# Patient Record
Sex: MALE | Race: WHITE | NOT HISPANIC OR LATINO | Employment: UNEMPLOYED | URBAN - METROPOLITAN AREA
[De-identification: names, ages, dates, MRNs, and addresses within clinical notes are randomized per-mention and may not be internally consistent; named-entity substitution may affect disease eponyms.]

---

## 2023-04-04 LAB
ALANINE AMINOTRANSFERASE (SGPT) (U/L) IN SER/PLAS: 11 U/L (ref 10–52)
ALBUMIN (G/DL) IN SER/PLAS: 3.4 G/DL (ref 3.4–5)
ALKALINE PHOSPHATASE (U/L) IN SER/PLAS: 138 U/L (ref 33–120)
ANION GAP IN SER/PLAS: 11 MMOL/L (ref 10–20)
ASPARTATE AMINOTRANSFERASE (SGOT) (U/L) IN SER/PLAS: 13 U/L (ref 9–39)
BASOPHILS (10*3/UL) IN BLOOD BY AUTOMATED COUNT: 0.07 X10E9/L (ref 0–0.1)
BASOPHILS/100 LEUKOCYTES IN BLOOD BY AUTOMATED COUNT: 1 % (ref 0–2)
BILIRUBIN TOTAL (MG/DL) IN SER/PLAS: 0.4 MG/DL (ref 0–1.2)
CALCIUM (MG/DL) IN SER/PLAS: 8.7 MG/DL (ref 8.6–10.3)
CARBON DIOXIDE, TOTAL (MMOL/L) IN SER/PLAS: 30 MMOL/L (ref 21–32)
CARCINOEMBRYONIC AG (NG/ML) IN SER/PLAS: 160.4 UG/L
CHLORIDE (MMOL/L) IN SER/PLAS: 98 MMOL/L (ref 98–107)
CREATININE (MG/DL) IN SER/PLAS: 0.69 MG/DL (ref 0.5–1.3)
EOSINOPHILS (10*3/UL) IN BLOOD BY AUTOMATED COUNT: 0.37 X10E9/L (ref 0–0.7)
EOSINOPHILS/100 LEUKOCYTES IN BLOOD BY AUTOMATED COUNT: 5.3 % (ref 0–6)
ERYTHROCYTE DISTRIBUTION WIDTH (RATIO) BY AUTOMATED COUNT: 20.5 % (ref 11.5–14.5)
ERYTHROCYTE MEAN CORPUSCULAR HEMOGLOBIN CONCENTRATION (G/DL) BY AUTOMATED: 30.7 G/DL (ref 32–36)
ERYTHROCYTE MEAN CORPUSCULAR VOLUME (FL) BY AUTOMATED COUNT: 80 FL (ref 80–100)
ERYTHROCYTES (10*6/UL) IN BLOOD BY AUTOMATED COUNT: 4.82 X10E12/L (ref 4.5–5.9)
GFR MALE: >90 ML/MIN/1.73M2
GLUCOSE (MG/DL) IN SER/PLAS: 144 MG/DL (ref 74–99)
HEMATOCRIT (%) IN BLOOD BY AUTOMATED COUNT: 38.4 % (ref 41–52)
HEMOGLOBIN (G/DL) IN BLOOD: 11.8 G/DL (ref 13.5–17.5)
IMMATURE GRANULOCYTES/100 LEUKOCYTES IN BLOOD BY AUTOMATED COUNT: 0.4 % (ref 0–0.9)
LEUKOCYTES (10*3/UL) IN BLOOD BY AUTOMATED COUNT: 7 X10E9/L (ref 4.4–11.3)
LYMPHOCYTES (10*3/UL) IN BLOOD BY AUTOMATED COUNT: 1.65 X10E9/L (ref 1.2–4.8)
LYMPHOCYTES/100 LEUKOCYTES IN BLOOD BY AUTOMATED COUNT: 23.4 % (ref 13–44)
MAGNESIUM (MG/DL) IN SER/PLAS: 1.88 MG/DL (ref 1.6–2.4)
MONOCYTES (10*3/UL) IN BLOOD BY AUTOMATED COUNT: 0.83 X10E9/L (ref 0.1–1)
MONOCYTES/100 LEUKOCYTES IN BLOOD BY AUTOMATED COUNT: 11.8 % (ref 2–10)
NEUTROPHILS (10*3/UL) IN BLOOD BY AUTOMATED COUNT: 4.09 X10E9/L (ref 1.2–7.7)
NEUTROPHILS/100 LEUKOCYTES IN BLOOD BY AUTOMATED COUNT: 58.1 % (ref 40–80)
PLATELETS (10*3/UL) IN BLOOD AUTOMATED COUNT: 369 X10E9/L (ref 150–450)
POTASSIUM (MMOL/L) IN SER/PLAS: 3.7 MMOL/L (ref 3.5–5.3)
PROTEIN TOTAL: 6.3 G/DL (ref 6.4–8.2)
SODIUM (MMOL/L) IN SER/PLAS: 135 MMOL/L (ref 136–145)
UREA NITROGEN (MG/DL) IN SER/PLAS: 7 MG/DL (ref 6–23)

## 2023-04-18 LAB
ALANINE AMINOTRANSFERASE (SGPT) (U/L) IN SER/PLAS: 10 U/L (ref 10–52)
ALBUMIN (G/DL) IN SER/PLAS: 3.4 G/DL (ref 3.4–5)
ALKALINE PHOSPHATASE (U/L) IN SER/PLAS: 125 U/L (ref 33–120)
ANION GAP IN SER/PLAS: 11 MMOL/L (ref 10–20)
ASPARTATE AMINOTRANSFERASE (SGOT) (U/L) IN SER/PLAS: 10 U/L (ref 9–39)
BASOPHILS (10*3/UL) IN BLOOD BY AUTOMATED COUNT: 0.07 X10E9/L (ref 0–0.1)
BASOPHILS/100 LEUKOCYTES IN BLOOD BY AUTOMATED COUNT: 0.8 % (ref 0–2)
BILIRUBIN TOTAL (MG/DL) IN SER/PLAS: 0.3 MG/DL (ref 0–1.2)
CALCIUM (MG/DL) IN SER/PLAS: 8.9 MG/DL (ref 8.6–10.3)
CARBON DIOXIDE, TOTAL (MMOL/L) IN SER/PLAS: 28 MMOL/L (ref 21–32)
CHLORIDE (MMOL/L) IN SER/PLAS: 99 MMOL/L (ref 98–107)
CREATININE (MG/DL) IN SER/PLAS: 0.65 MG/DL (ref 0.5–1.3)
EOSINOPHILS (10*3/UL) IN BLOOD BY AUTOMATED COUNT: 0.25 X10E9/L (ref 0–0.7)
EOSINOPHILS/100 LEUKOCYTES IN BLOOD BY AUTOMATED COUNT: 3 % (ref 0–6)
ERYTHROCYTE DISTRIBUTION WIDTH (RATIO) BY AUTOMATED COUNT: 20 % (ref 11.5–14.5)
ERYTHROCYTE MEAN CORPUSCULAR HEMOGLOBIN CONCENTRATION (G/DL) BY AUTOMATED: 31 G/DL (ref 32–36)
ERYTHROCYTE MEAN CORPUSCULAR VOLUME (FL) BY AUTOMATED COUNT: 80 FL (ref 80–100)
ERYTHROCYTES (10*6/UL) IN BLOOD BY AUTOMATED COUNT: 4.69 X10E12/L (ref 4.5–5.9)
FERRITIN (UG/LL) IN SER/PLAS: 165 UG/L (ref 20–300)
GFR MALE: >90 ML/MIN/1.73M2
GLUCOSE (MG/DL) IN SER/PLAS: 172 MG/DL (ref 74–99)
HEMATOCRIT (%) IN BLOOD BY AUTOMATED COUNT: 37.7 % (ref 41–52)
HEMOGLOBIN (G/DL) IN BLOOD: 11.7 G/DL (ref 13.5–17.5)
IMMATURE GRANULOCYTES/100 LEUKOCYTES IN BLOOD BY AUTOMATED COUNT: 0.5 % (ref 0–0.9)
IRON (UG/DL) IN SER/PLAS: NORMAL UG/DL (ref 35–150)
IRON BINDING CAPACITY (UG/DL) IN SER/PLAS: NORMAL UG/DL (ref 240–445)
IRON SATURATION (%) IN SER/PLAS: NORMAL % (ref 25–45)
LEUKOCYTES (10*3/UL) IN BLOOD BY AUTOMATED COUNT: 8.3 X10E9/L (ref 4.4–11.3)
LYMPHOCYTES (10*3/UL) IN BLOOD BY AUTOMATED COUNT: 1.57 X10E9/L (ref 1.2–4.8)
LYMPHOCYTES/100 LEUKOCYTES IN BLOOD BY AUTOMATED COUNT: 18.9 % (ref 13–44)
MAGNESIUM (MG/DL) IN SER/PLAS: 1.68 MG/DL (ref 1.6–2.4)
MONOCYTES (10*3/UL) IN BLOOD BY AUTOMATED COUNT: 0.69 X10E9/L (ref 0.1–1)
MONOCYTES/100 LEUKOCYTES IN BLOOD BY AUTOMATED COUNT: 8.3 % (ref 2–10)
NEUTROPHILS (10*3/UL) IN BLOOD BY AUTOMATED COUNT: 5.67 X10E9/L (ref 1.2–7.7)
NEUTROPHILS/100 LEUKOCYTES IN BLOOD BY AUTOMATED COUNT: 68.5 % (ref 40–80)
PLATELETS (10*3/UL) IN BLOOD AUTOMATED COUNT: 407 X10E9/L (ref 150–450)
POTASSIUM (MMOL/L) IN SER/PLAS: 3.7 MMOL/L (ref 3.5–5.3)
PROTEIN TOTAL: 6.5 G/DL (ref 6.4–8.2)
SODIUM (MMOL/L) IN SER/PLAS: 134 MMOL/L (ref 136–145)
UREA NITROGEN (MG/DL) IN SER/PLAS: 5 MG/DL (ref 6–23)

## 2023-04-22 LAB
INTRINSIC FACTOR BLOCKING ANTIBODY: NEGATIVE
PARIETAL CELL ANTIBODY: 1.3 UNITS (ref 0–24.9)

## 2023-04-28 LAB — METHYLMALONIC ACID, S: 0.34 UMOL/L (ref 0–0.4)

## 2023-05-02 LAB
ALANINE AMINOTRANSFERASE (SGPT) (U/L) IN SER/PLAS: 13 U/L (ref 10–52)
ALBUMIN (G/DL) IN SER/PLAS: 3.6 G/DL (ref 3.4–5)
ALKALINE PHOSPHATASE (U/L) IN SER/PLAS: 137 U/L (ref 33–120)
ANION GAP IN SER/PLAS: 14 MMOL/L (ref 10–20)
ASPARTATE AMINOTRANSFERASE (SGOT) (U/L) IN SER/PLAS: 13 U/L (ref 9–39)
BASOPHILS (10*3/UL) IN BLOOD BY AUTOMATED COUNT: 0.11 X10E9/L (ref 0–0.1)
BASOPHILS/100 LEUKOCYTES IN BLOOD BY AUTOMATED COUNT: 0.9 % (ref 0–2)
BILIRUBIN TOTAL (MG/DL) IN SER/PLAS: 0.3 MG/DL (ref 0–1.2)
CALCIUM (MG/DL) IN SER/PLAS: 8.9 MG/DL (ref 8.6–10.3)
CARBON DIOXIDE, TOTAL (MMOL/L) IN SER/PLAS: 26 MMOL/L (ref 21–32)
CARCINOEMBRYONIC AG (NG/ML) IN SER/PLAS: 137.1 UG/L
CHLORIDE (MMOL/L) IN SER/PLAS: 99 MMOL/L (ref 98–107)
CREATININE (MG/DL) IN SER/PLAS: 0.62 MG/DL (ref 0.5–1.3)
EOSINOPHILS (10*3/UL) IN BLOOD BY AUTOMATED COUNT: 0.29 X10E9/L (ref 0–0.7)
EOSINOPHILS/100 LEUKOCYTES IN BLOOD BY AUTOMATED COUNT: 2.4 % (ref 0–6)
ERYTHROCYTE DISTRIBUTION WIDTH (RATIO) BY AUTOMATED COUNT: 20.1 % (ref 11.5–14.5)
ERYTHROCYTE MEAN CORPUSCULAR HEMOGLOBIN CONCENTRATION (G/DL) BY AUTOMATED: 30.8 G/DL (ref 32–36)
ERYTHROCYTE MEAN CORPUSCULAR VOLUME (FL) BY AUTOMATED COUNT: 81 FL (ref 80–100)
ERYTHROCYTES (10*6/UL) IN BLOOD BY AUTOMATED COUNT: 4.74 X10E12/L (ref 4.5–5.9)
GFR MALE: >90 ML/MIN/1.73M2
GLUCOSE (MG/DL) IN SER/PLAS: 222 MG/DL (ref 74–99)
HEMATOCRIT (%) IN BLOOD BY AUTOMATED COUNT: 38.3 % (ref 41–52)
HEMOGLOBIN (G/DL) IN BLOOD: 11.8 G/DL (ref 13.5–17.5)
IMMATURE GRANULOCYTES/100 LEUKOCYTES IN BLOOD BY AUTOMATED COUNT: 0.6 % (ref 0–0.9)
LEUKOCYTES (10*3/UL) IN BLOOD BY AUTOMATED COUNT: 12 X10E9/L (ref 4.4–11.3)
LYMPHOCYTES (10*3/UL) IN BLOOD BY AUTOMATED COUNT: 1.59 X10E9/L (ref 1.2–4.8)
LYMPHOCYTES/100 LEUKOCYTES IN BLOOD BY AUTOMATED COUNT: 13.2 % (ref 13–44)
MAGNESIUM (MG/DL) IN SER/PLAS: 1.7 MG/DL (ref 1.6–2.4)
MONOCYTES (10*3/UL) IN BLOOD BY AUTOMATED COUNT: 1.17 X10E9/L (ref 0.1–1)
MONOCYTES/100 LEUKOCYTES IN BLOOD BY AUTOMATED COUNT: 9.7 % (ref 2–10)
NEUTROPHILS (10*3/UL) IN BLOOD BY AUTOMATED COUNT: 8.8 X10E9/L (ref 1.2–7.7)
NEUTROPHILS/100 LEUKOCYTES IN BLOOD BY AUTOMATED COUNT: 73.2 % (ref 40–80)
PLATELETS (10*3/UL) IN BLOOD AUTOMATED COUNT: 465 X10E9/L (ref 150–450)
POTASSIUM (MMOL/L) IN SER/PLAS: 3.6 MMOL/L (ref 3.5–5.3)
PROTEIN TOTAL: 6.7 G/DL (ref 6.4–8.2)
SODIUM (MMOL/L) IN SER/PLAS: 135 MMOL/L (ref 136–145)
UREA NITROGEN (MG/DL) IN SER/PLAS: 4 MG/DL (ref 6–23)

## 2023-05-16 LAB
ALANINE AMINOTRANSFERASE (SGPT) (U/L) IN SER/PLAS: 11 U/L (ref 10–52)
ALBUMIN (G/DL) IN SER/PLAS: 3.4 G/DL (ref 3.4–5)
ALKALINE PHOSPHATASE (U/L) IN SER/PLAS: 126 U/L (ref 33–120)
ANION GAP IN SER/PLAS: 11 MMOL/L (ref 10–20)
ASPARTATE AMINOTRANSFERASE (SGOT) (U/L) IN SER/PLAS: 11 U/L (ref 9–39)
BASOPHILS (10*3/UL) IN BLOOD BY AUTOMATED COUNT: 0.1 X10E9/L (ref 0–0.1)
BASOPHILS/100 LEUKOCYTES IN BLOOD BY AUTOMATED COUNT: 0.9 % (ref 0–2)
BILIRUBIN TOTAL (MG/DL) IN SER/PLAS: 0.3 MG/DL (ref 0–1.2)
CALCIUM (MG/DL) IN SER/PLAS: 8.9 MG/DL (ref 8.6–10.3)
CARBON DIOXIDE, TOTAL (MMOL/L) IN SER/PLAS: 27 MMOL/L (ref 21–32)
CHLORIDE (MMOL/L) IN SER/PLAS: 99 MMOL/L (ref 98–107)
CREATININE (MG/DL) IN SER/PLAS: 0.58 MG/DL (ref 0.5–1.3)
EOSINOPHILS (10*3/UL) IN BLOOD BY AUTOMATED COUNT: 0.3 X10E9/L (ref 0–0.7)
EOSINOPHILS/100 LEUKOCYTES IN BLOOD BY AUTOMATED COUNT: 2.7 % (ref 0–6)
ERYTHROCYTE DISTRIBUTION WIDTH (RATIO) BY AUTOMATED COUNT: 19.9 % (ref 11.5–14.5)
ERYTHROCYTE MEAN CORPUSCULAR HEMOGLOBIN CONCENTRATION (G/DL) BY AUTOMATED: 31.2 G/DL (ref 32–36)
ERYTHROCYTE MEAN CORPUSCULAR VOLUME (FL) BY AUTOMATED COUNT: 81 FL (ref 80–100)
ERYTHROCYTES (10*6/UL) IN BLOOD BY AUTOMATED COUNT: 4.86 X10E12/L (ref 4.5–5.9)
GFR MALE: >90 ML/MIN/1.73M2
GLUCOSE (MG/DL) IN SER/PLAS: 223 MG/DL (ref 74–99)
HEMATOCRIT (%) IN BLOOD BY AUTOMATED COUNT: 39.4 % (ref 41–52)
HEMOGLOBIN (G/DL) IN BLOOD: 12.3 G/DL (ref 13.5–17.5)
IMMATURE GRANULOCYTES/100 LEUKOCYTES IN BLOOD BY AUTOMATED COUNT: 0.8 % (ref 0–0.9)
LEUKOCYTES (10*3/UL) IN BLOOD BY AUTOMATED COUNT: 11.1 X10E9/L (ref 4.4–11.3)
LYMPHOCYTES (10*3/UL) IN BLOOD BY AUTOMATED COUNT: 1.92 X10E9/L (ref 1.2–4.8)
LYMPHOCYTES/100 LEUKOCYTES IN BLOOD BY AUTOMATED COUNT: 17.4 % (ref 13–44)
MAGNESIUM (MG/DL) IN SER/PLAS: 1.54 MG/DL (ref 1.6–2.4)
MONOCYTES (10*3/UL) IN BLOOD BY AUTOMATED COUNT: 0.81 X10E9/L (ref 0.1–1)
MONOCYTES/100 LEUKOCYTES IN BLOOD BY AUTOMATED COUNT: 7.3 % (ref 2–10)
NEUTROPHILS (10*3/UL) IN BLOOD BY AUTOMATED COUNT: 7.84 X10E9/L (ref 1.2–7.7)
NEUTROPHILS/100 LEUKOCYTES IN BLOOD BY AUTOMATED COUNT: 70.9 % (ref 40–80)
PHOSPHATE (MG/DL) IN SER/PLAS: 2.8 MG/DL (ref 2.5–4.9)
PLATELETS (10*3/UL) IN BLOOD AUTOMATED COUNT: 429 X10E9/L (ref 150–450)
POTASSIUM (MMOL/L) IN SER/PLAS: 3.1 MMOL/L (ref 3.5–5.3)
PROTEIN TOTAL: 6.4 G/DL (ref 6.4–8.2)
SODIUM (MMOL/L) IN SER/PLAS: 134 MMOL/L (ref 136–145)
UREA NITROGEN (MG/DL) IN SER/PLAS: 2 MG/DL (ref 6–23)

## 2023-05-23 LAB
MAGNESIUM (MG/DL) IN SER/PLAS: 1.92 MG/DL (ref 1.6–2.4)
POTASSIUM (MMOL/L) IN SER/PLAS: 3.9 MMOL/L (ref 3.5–5.3)

## 2023-05-25 LAB
ACANTHOCYTES PRESENCE IN BLOOD BY LIGHT MICROSCOPY: NORMAL
ALANINE AMINOTRANSFERASE (SGPT) (U/L) IN SER/PLAS: NORMAL U/L (ref 10–52)
ALBUMIN (G/DL) IN SER/PLAS: NORMAL G/DL (ref 3.4–5)
ALKALINE PHOSPHATASE (U/L) IN SER/PLAS: NORMAL U/L (ref 33–120)
ANION GAP IN SER/PLAS: NORMAL MMOL/L (ref 10–20)
ASPARTATE AMINOTRANSFERASE (SGOT) (U/L) IN SER/PLAS: NORMAL U/L (ref 9–39)
AUER RODS PRESENCE IN BLOOD BY LIGHT MICROSCOPY: NORMAL
BASOPHILIC STIPPLING PRESENCE IN BLOOD BY LIGHT MICROSCOPY: NORMAL
BASOPHILS (10*3/UL) IN BLOOD BY AUTOMATED COUNT: NORMAL X10E9/L (ref 0–0.1)
BASOPHILS/100 LEUKOCYTES IN BLOOD BY AUTOMATED COUNT: NORMAL % (ref 0–2)
BILIRUBIN TOTAL (MG/DL) IN SER/PLAS: NORMAL MG/DL (ref 0–1.2)
BITE CELLS: NORMAL
BURR CELLS PRESENCE IN BLOOD BY LIGHT MICROSCOPY: NORMAL
CALCIUM (MG/DL) IN SER/PLAS: NORMAL MG/DL (ref 8.6–10.3)
CARBON DIOXIDE, TOTAL (MMOL/L) IN SER/PLAS: NORMAL MMOL/L (ref 21–32)
CHLORIDE (MMOL/L) IN SER/PLAS: NORMAL MMOL/L (ref 98–107)
CREATININE (MG/DL) IN SER/PLAS: NORMAL MG/DL (ref 0.5–1.3)
DACROCYTES PRESENCE IN BLOOD BY LIGHT MICROSCOPY: NORMAL
DOHLE BODY PRESENCE IN BLOOD BY LIGHT MICROSCOPY: NORMAL
EOSINOPHILS (10*3/UL) IN BLOOD BY AUTOMATED COUNT: NORMAL X10E9/L (ref 0–0.7)
EOSINOPHILS/100 LEUKOCYTES IN BLOOD BY AUTOMATED COUNT: NORMAL % (ref 0–6)
ERYTHROCYTE DISTRIBUTION WIDTH (RATIO) BY AUTOMATED COUNT: NORMAL % (ref 11.5–14.5)
ERYTHROCYTE MEAN CORPUSCULAR HEMOGLOBIN CONCENTRATION (G/DL) BY AUTOMATED: NORMAL G/DL (ref 32–36)
ERYTHROCYTE MEAN CORPUSCULAR VOLUME (FL) BY AUTOMATED COUNT: NORMAL FL (ref 80–100)
ERYTHROCYTES (10*6/UL) IN BLOOD BY AUTOMATED COUNT: NORMAL X10E12/L (ref 4.5–5.9)
FERRITIN (UG/LL) IN SER/PLAS: NORMAL UG/L (ref 20–300)
GFR FEMALE: NORMAL ML/MIN/1.73M2
GFR MALE: NORMAL ML/MIN/1.73M2
GLUCOSE (MG/DL) IN SER/PLAS: NORMAL MG/DL (ref 74–99)
HEMATOCRIT (%) IN BLOOD BY AUTOMATED COUNT: NORMAL % (ref 41–52)
HEMOGLOBIN (G/DL) IN BLOOD: NORMAL G/DL (ref 13.5–17.5)
HEMOGLOBIN CRYSTALS: NORMAL
HOWELL-JOLLY BODIES PRESENCE IN BLOOD BY LIGHT MICROSCOPY: NORMAL
HYPERSEGMENTED NEUTROPHILS IN BLOOD BY LIGHT MICROSCOPY: NORMAL
HYPOCHROMIA (PRESENCE) IN BLOOD BY LIGHT MICROSCOPY: NORMAL
IMMATURE GRANULOCYTES/100 LEUKOCYTES IN BLOOD BY AUTOMATED COUNT: NORMAL % (ref 0–0.9)
INTRACELLULAR BACTERIA: NORMAL
INTRACELLULAR YEAST: NORMAL
IRON (UG/DL) IN SER/PLAS: NORMAL UG/DL (ref 35–150)
IRON BINDING CAPACITY (UG/DL) IN SER/PLAS: NORMAL UG/DL (ref 240–445)
IRON SATURATION (%) IN SER/PLAS: NORMAL % (ref 25–45)
LEUKOCYTES (10*3/UL) IN BLOOD BY AUTOMATED COUNT: NORMAL X10E9/L (ref 4.4–11.3)
LYMPHOCYTES (10*3/UL) IN BLOOD BY AUTOMATED COUNT: NORMAL X10E9/L (ref 1.2–4.8)
LYMPHOCYTES/100 LEUKOCYTES IN BLOOD BY AUTOMATED COUNT: NORMAL % (ref 13–44)
MALARIA (PRESENCE) IN BLOOD BY LIGHT MICROSCOPY: NORMAL
MANUAL DIFFERENTIAL Y/N: NORMAL
MONOCYTES (10*3/UL) IN BLOOD BY AUTOMATED COUNT: NORMAL X10E9/L (ref 0.1–1)
MONOCYTES/100 LEUKOCYTES IN BLOOD BY AUTOMATED COUNT: NORMAL % (ref 2–10)
NEUTROPHILS (10*3/UL) IN BLOOD BY AUTOMATED COUNT: NORMAL X10E9/L (ref 1.2–7.7)
NEUTROPHILS/100 LEUKOCYTES IN BLOOD BY AUTOMATED COUNT: NORMAL % (ref 40–80)
NRBC (PER 100 WBCS) BY AUTOMATED COUNT: NORMAL /100 WBC (ref 0–0)
OVALOCYTES PRESENCE IN BLOOD BY LIGHT MICROSCOPY: NORMAL
PAPPENHEIMER BODIES: NORMAL
PELGEROID NEUTROPHILS: NORMAL
PLATELET CLUMP (PRESENCE) IN BLOOD BY LIGHT MICROSCOPY: NORMAL
PLATELET SATELLITOSIS: NORMAL
PLATELETS (10*3/UL) IN BLOOD AUTOMATED COUNT: NORMAL X10E9/L (ref 150–450)
PLATELETS GIANT PRESENCE IN BLOOD BY LIGHT MICROSCOPY: NORMAL
POLYCHROMASIA IN BLOOD BY LIGHT MICROSCOPY: NORMAL
POTASSIUM (MMOL/L) IN SER/PLAS: NORMAL MMOL/L (ref 3.5–5.3)
PROTEIN TOTAL: NORMAL G/DL (ref 6.4–8.2)
RBC AGGLUTINATION: NORMAL
RBC MORPHOLOGY IN BLOOD: NORMAL
REACTIVE LYMPH: NORMAL
ROULEAUX PRESENCE IN BLOOD BY LIGHT MICROSCOPY: NORMAL
SCHISTOCYTES (PRESENCE) IN BLOOD BY LIGHT MICROSCOPY: NORMAL
SICKLE CELLS (PRESENCE) IN BLOOD BY LIGHT MICROSCOPY: NORMAL
SMUDGE CELLS/100 LEUKOCYTES IN BLOOD BY MANUAL COUNT: NORMAL
SODIUM (MMOL/L) IN SER/PLAS: NORMAL MMOL/L (ref 136–145)
SPHEROCYTES PRESENCE IN BLOOD BY LIGHT MICROSCOPY: NORMAL
STOMATOCYTES IN BLOOD BY LIGHT MICROSCOPY: NORMAL
TARGET CELLS IN BLOOD BY LIGHT MICROSCOPY: NORMAL
TOXIC GRANULES PRESENCE IN BLOOD BY LIGHT MICROSCOPY: NORMAL
UREA NITROGEN (MG/DL) IN SER/PLAS: NORMAL MG/DL (ref 6–23)
VACUOLATED NEUTROPHILS PRESENCE IN BLOOD BY LIGHT MICROSCOPY: NORMAL

## 2023-05-26 LAB — PHOSPHATE (MG/DL) IN SER/PLAS: NORMAL MG/DL (ref 2.5–4.9)

## 2023-05-30 LAB
ALANINE AMINOTRANSFERASE (SGPT) (U/L) IN SER/PLAS: 13 U/L (ref 10–52)
ALBUMIN (G/DL) IN SER/PLAS: 3.6 G/DL (ref 3.4–5)
ALKALINE PHOSPHATASE (U/L) IN SER/PLAS: 129 U/L (ref 33–120)
ANION GAP IN SER/PLAS: 14 MMOL/L (ref 10–20)
ASPARTATE AMINOTRANSFERASE (SGOT) (U/L) IN SER/PLAS: 11 U/L (ref 9–39)
BASOPHILS (10*3/UL) IN BLOOD BY AUTOMATED COUNT: 0.1 X10E9/L (ref 0–0.1)
BASOPHILS/100 LEUKOCYTES IN BLOOD BY AUTOMATED COUNT: 0.8 % (ref 0–2)
BILIRUBIN TOTAL (MG/DL) IN SER/PLAS: 0.4 MG/DL (ref 0–1.2)
CALCIUM (MG/DL) IN SER/PLAS: 9.5 MG/DL (ref 8.6–10.3)
CARBON DIOXIDE, TOTAL (MMOL/L) IN SER/PLAS: 25 MMOL/L (ref 21–32)
CHLORIDE (MMOL/L) IN SER/PLAS: 99 MMOL/L (ref 98–107)
CREATININE (MG/DL) IN SER/PLAS: 0.6 MG/DL (ref 0.5–1.3)
EOSINOPHILS (10*3/UL) IN BLOOD BY AUTOMATED COUNT: 0.29 X10E9/L (ref 0–0.7)
EOSINOPHILS/100 LEUKOCYTES IN BLOOD BY AUTOMATED COUNT: 2.3 % (ref 0–6)
ERYTHROCYTE DISTRIBUTION WIDTH (RATIO) BY AUTOMATED COUNT: 20 % (ref 11.5–14.5)
ERYTHROCYTE MEAN CORPUSCULAR HEMOGLOBIN CONCENTRATION (G/DL) BY AUTOMATED: 31.3 G/DL (ref 32–36)
ERYTHROCYTE MEAN CORPUSCULAR VOLUME (FL) BY AUTOMATED COUNT: 81 FL (ref 80–100)
ERYTHROCYTES (10*6/UL) IN BLOOD BY AUTOMATED COUNT: 4.98 X10E12/L (ref 4.5–5.9)
GAMMA GLUTAMYL TRANSFERASE (U/L) IN SER/PLAS: 59 U/L (ref 5–64)
GFR MALE: >90 ML/MIN/1.73M2
GLUCOSE (MG/DL) IN SER/PLAS: 208 MG/DL (ref 74–99)
HEMATOCRIT (%) IN BLOOD BY AUTOMATED COUNT: 40.3 % (ref 41–52)
HEMOGLOBIN (G/DL) IN BLOOD: 12.6 G/DL (ref 13.5–17.5)
IMMATURE GRANULOCYTES/100 LEUKOCYTES IN BLOOD BY AUTOMATED COUNT: 0.8 % (ref 0–0.9)
LEUKOCYTES (10*3/UL) IN BLOOD BY AUTOMATED COUNT: 12.8 X10E9/L (ref 4.4–11.3)
LYMPHOCYTES (10*3/UL) IN BLOOD BY AUTOMATED COUNT: 1.78 X10E9/L (ref 1.2–4.8)
LYMPHOCYTES/100 LEUKOCYTES IN BLOOD BY AUTOMATED COUNT: 13.9 % (ref 13–44)
MAGNESIUM (MG/DL) IN SER/PLAS: 1.8 MG/DL (ref 1.6–2.4)
MONOCYTES (10*3/UL) IN BLOOD BY AUTOMATED COUNT: 0.88 X10E9/L (ref 0.1–1)
MONOCYTES/100 LEUKOCYTES IN BLOOD BY AUTOMATED COUNT: 6.9 % (ref 2–10)
NEUTROPHILS (10*3/UL) IN BLOOD BY AUTOMATED COUNT: 9.61 X10E9/L (ref 1.2–7.7)
NEUTROPHILS/100 LEUKOCYTES IN BLOOD BY AUTOMATED COUNT: 75.3 % (ref 40–80)
PLATELETS (10*3/UL) IN BLOOD AUTOMATED COUNT: 462 X10E9/L (ref 150–450)
POTASSIUM (MMOL/L) IN SER/PLAS: 3.5 MMOL/L (ref 3.5–5.3)
PROTEIN TOTAL: 7 G/DL (ref 6.4–8.2)
SODIUM (MMOL/L) IN SER/PLAS: 134 MMOL/L (ref 136–145)
UREA NITROGEN (MG/DL) IN SER/PLAS: 3 MG/DL (ref 6–23)

## 2023-06-20 LAB
ALANINE AMINOTRANSFERASE (SGPT) (U/L) IN SER/PLAS: 28 U/L (ref 10–52)
ALBUMIN (G/DL) IN SER/PLAS: 3.4 G/DL (ref 3.4–5)
ALKALINE PHOSPHATASE (U/L) IN SER/PLAS: 160 U/L (ref 33–120)
ANION GAP IN SER/PLAS: 13 MMOL/L (ref 10–20)
ASPARTATE AMINOTRANSFERASE (SGOT) (U/L) IN SER/PLAS: 21 U/L (ref 9–39)
BASOPHILS (10*3/UL) IN BLOOD BY AUTOMATED COUNT: 0.14 X10E9/L (ref 0–0.1)
BASOPHILS/100 LEUKOCYTES IN BLOOD BY AUTOMATED COUNT: 1.1 % (ref 0–2)
BILIRUBIN TOTAL (MG/DL) IN SER/PLAS: 0.5 MG/DL (ref 0–1.2)
CALCIUM (MG/DL) IN SER/PLAS: 9.2 MG/DL (ref 8.6–10.3)
CARBON DIOXIDE, TOTAL (MMOL/L) IN SER/PLAS: 25 MMOL/L (ref 21–32)
CHLORIDE (MMOL/L) IN SER/PLAS: 99 MMOL/L (ref 98–107)
CREATININE (MG/DL) IN SER/PLAS: 0.61 MG/DL (ref 0.5–1.3)
EOSINOPHILS (10*3/UL) IN BLOOD BY AUTOMATED COUNT: 0.36 X10E9/L (ref 0–0.7)
EOSINOPHILS/100 LEUKOCYTES IN BLOOD BY AUTOMATED COUNT: 2.7 % (ref 0–6)
ERYTHROCYTE DISTRIBUTION WIDTH (RATIO) BY AUTOMATED COUNT: 19.6 % (ref 11.5–14.5)
ERYTHROCYTE MEAN CORPUSCULAR HEMOGLOBIN CONCENTRATION (G/DL) BY AUTOMATED: 30.6 G/DL (ref 32–36)
ERYTHROCYTE MEAN CORPUSCULAR VOLUME (FL) BY AUTOMATED COUNT: 80 FL (ref 80–100)
ERYTHROCYTES (10*6/UL) IN BLOOD BY AUTOMATED COUNT: 4.8 X10E12/L (ref 4.5–5.9)
FERRITIN (UG/LL) IN SER/PLAS: 343 UG/L (ref 20–300)
GFR MALE: >90 ML/MIN/1.73M2
GLUCOSE (MG/DL) IN SER/PLAS: 198 MG/DL (ref 74–99)
HEMATOCRIT (%) IN BLOOD BY AUTOMATED COUNT: 38.6 % (ref 41–52)
HEMOGLOBIN (G/DL) IN BLOOD: 11.8 G/DL (ref 13.5–17.5)
IMMATURE GRANULOCYTES/100 LEUKOCYTES IN BLOOD BY AUTOMATED COUNT: 2 % (ref 0–0.9)
IRON (UG/DL) IN SER/PLAS: 18 UG/DL (ref 35–150)
IRON BINDING CAPACITY (UG/DL) IN SER/PLAS: 236 UG/DL (ref 240–445)
IRON SATURATION (%) IN SER/PLAS: 8 % (ref 25–45)
LEUKOCYTES (10*3/UL) IN BLOOD BY AUTOMATED COUNT: 13.3 X10E9/L (ref 4.4–11.3)
LYMPHOCYTES (10*3/UL) IN BLOOD BY AUTOMATED COUNT: 1.76 X10E9/L (ref 1.2–4.8)
LYMPHOCYTES/100 LEUKOCYTES IN BLOOD BY AUTOMATED COUNT: 13.2 % (ref 13–44)
MAGNESIUM (MG/DL) IN SER/PLAS: 1.72 MG/DL (ref 1.6–2.4)
MONOCYTES (10*3/UL) IN BLOOD BY AUTOMATED COUNT: 1.68 X10E9/L (ref 0.1–1)
MONOCYTES/100 LEUKOCYTES IN BLOOD BY AUTOMATED COUNT: 12.6 % (ref 2–10)
NEUTROPHILS (10*3/UL) IN BLOOD BY AUTOMATED COUNT: 9.1 X10E9/L (ref 1.2–7.7)
NEUTROPHILS/100 LEUKOCYTES IN BLOOD BY AUTOMATED COUNT: 68.4 % (ref 40–80)
PLATELETS (10*3/UL) IN BLOOD AUTOMATED COUNT: 644 X10E9/L (ref 150–450)
POTASSIUM (MMOL/L) IN SER/PLAS: 3.8 MMOL/L (ref 3.5–5.3)
PROTEIN TOTAL: 7 G/DL (ref 6.4–8.2)
SODIUM (MMOL/L) IN SER/PLAS: 133 MMOL/L (ref 136–145)
UREA NITROGEN (MG/DL) IN SER/PLAS: 3 MG/DL (ref 6–23)

## 2023-06-21 LAB
COBALAMIN (VITAMIN B12) (PG/ML) IN SER/PLAS: 396 PG/ML (ref 211–911)
FOLATE (NG/ML) IN SER/PLAS: 12.8 NG/ML

## 2023-06-26 LAB
GENETICS TEST NAME-DATA CONVERSION: NORMAL
LAB MOLECULAR CA TECHNICAL NOTES: NORMAL

## 2023-07-03 LAB
ALANINE AMINOTRANSFERASE (SGPT) (U/L) IN SER/PLAS: 11 U/L (ref 10–52)
ALBUMIN (G/DL) IN SER/PLAS: 3.5 G/DL (ref 3.4–5)
ALKALINE PHOSPHATASE (U/L) IN SER/PLAS: 118 U/L (ref 33–120)
ANION GAP IN SER/PLAS: 11 MMOL/L (ref 10–20)
ASPARTATE AMINOTRANSFERASE (SGOT) (U/L) IN SER/PLAS: 10 U/L (ref 9–39)
BASOPHILS (10*3/UL) IN BLOOD BY AUTOMATED COUNT: 0.12 X10E9/L (ref 0–0.1)
BASOPHILS/100 LEUKOCYTES IN BLOOD BY AUTOMATED COUNT: 1 % (ref 0–2)
BILIRUBIN TOTAL (MG/DL) IN SER/PLAS: 0.2 MG/DL (ref 0–1.2)
CALCIUM (MG/DL) IN SER/PLAS: 8.9 MG/DL (ref 8.6–10.3)
CARBON DIOXIDE, TOTAL (MMOL/L) IN SER/PLAS: 26 MMOL/L (ref 21–32)
CHLORIDE (MMOL/L) IN SER/PLAS: 99 MMOL/L (ref 98–107)
CREATININE (MG/DL) IN SER/PLAS: 0.59 MG/DL (ref 0.5–1.3)
EOSINOPHILS (10*3/UL) IN BLOOD BY AUTOMATED COUNT: 0.39 X10E9/L (ref 0–0.7)
EOSINOPHILS/100 LEUKOCYTES IN BLOOD BY AUTOMATED COUNT: 3.3 % (ref 0–6)
ERYTHROCYTE DISTRIBUTION WIDTH (RATIO) BY AUTOMATED COUNT: 19.6 % (ref 11.5–14.5)
ERYTHROCYTE MEAN CORPUSCULAR HEMOGLOBIN CONCENTRATION (G/DL) BY AUTOMATED: 31.2 G/DL (ref 32–36)
ERYTHROCYTE MEAN CORPUSCULAR VOLUME (FL) BY AUTOMATED COUNT: 81 FL (ref 80–100)
ERYTHROCYTES (10*6/UL) IN BLOOD BY AUTOMATED COUNT: 4.73 X10E12/L (ref 4.5–5.9)
GFR MALE: >90 ML/MIN/1.73M2
GLUCOSE (MG/DL) IN SER/PLAS: 201 MG/DL (ref 74–99)
HEMATOCRIT (%) IN BLOOD BY AUTOMATED COUNT: 38.2 % (ref 41–52)
HEMOGLOBIN (G/DL) IN BLOOD: 11.9 G/DL (ref 13.5–17.5)
IMMATURE GRANULOCYTES/100 LEUKOCYTES IN BLOOD BY AUTOMATED COUNT: 0.9 % (ref 0–0.9)
LEUKOCYTES (10*3/UL) IN BLOOD BY AUTOMATED COUNT: 11.7 X10E9/L (ref 4.4–11.3)
LYMPHOCYTES (10*3/UL) IN BLOOD BY AUTOMATED COUNT: 2.01 X10E9/L (ref 1.2–4.8)
LYMPHOCYTES/100 LEUKOCYTES IN BLOOD BY AUTOMATED COUNT: 17.1 % (ref 13–44)
MAGNESIUM (MG/DL) IN SER/PLAS: 1.69 MG/DL (ref 1.6–2.4)
MONOCYTES (10*3/UL) IN BLOOD BY AUTOMATED COUNT: 0.83 X10E9/L (ref 0.1–1)
MONOCYTES/100 LEUKOCYTES IN BLOOD BY AUTOMATED COUNT: 7.1 % (ref 2–10)
NEUTROPHILS (10*3/UL) IN BLOOD BY AUTOMATED COUNT: 8.28 X10E9/L (ref 1.2–7.7)
NEUTROPHILS/100 LEUKOCYTES IN BLOOD BY AUTOMATED COUNT: 70.6 % (ref 40–80)
PLATELETS (10*3/UL) IN BLOOD AUTOMATED COUNT: 550 X10E9/L (ref 150–450)
POTASSIUM (MMOL/L) IN SER/PLAS: 3.2 MMOL/L (ref 3.5–5.3)
PROTEIN TOTAL: 6.7 G/DL (ref 6.4–8.2)
SODIUM (MMOL/L) IN SER/PLAS: 133 MMOL/L (ref 136–145)
UREA NITROGEN (MG/DL) IN SER/PLAS: 4 MG/DL (ref 6–23)

## 2023-07-20 LAB
ALANINE AMINOTRANSFERASE (SGPT) (U/L) IN SER/PLAS: 9 U/L (ref 10–52)
ALBUMIN (G/DL) IN SER/PLAS: 3.5 G/DL (ref 3.4–5)
ALKALINE PHOSPHATASE (U/L) IN SER/PLAS: 128 U/L (ref 33–120)
ANION GAP IN SER/PLAS: 13 MMOL/L (ref 10–20)
ASPARTATE AMINOTRANSFERASE (SGOT) (U/L) IN SER/PLAS: 10 U/L (ref 9–39)
BASOPHILS (10*3/UL) IN BLOOD BY AUTOMATED COUNT: 0.13 X10E9/L (ref 0–0.1)
BASOPHILS/100 LEUKOCYTES IN BLOOD BY AUTOMATED COUNT: 1 % (ref 0–2)
BILIRUBIN TOTAL (MG/DL) IN SER/PLAS: 0.7 MG/DL (ref 0–1.2)
CALCIUM (MG/DL) IN SER/PLAS: 9 MG/DL (ref 8.6–10.3)
CARBON DIOXIDE, TOTAL (MMOL/L) IN SER/PLAS: 25 MMOL/L (ref 21–32)
CHLORIDE (MMOL/L) IN SER/PLAS: 100 MMOL/L (ref 98–107)
COBALAMIN (VITAMIN B12) (PG/ML) IN SER/PLAS: 336 PG/ML (ref 211–911)
CREATININE (MG/DL) IN SER/PLAS: 0.53 MG/DL (ref 0.5–1.3)
EOSINOPHILS (10*3/UL) IN BLOOD BY AUTOMATED COUNT: 0.32 X10E9/L (ref 0–0.7)
EOSINOPHILS/100 LEUKOCYTES IN BLOOD BY AUTOMATED COUNT: 2.6 % (ref 0–6)
ERYTHROCYTE DISTRIBUTION WIDTH (RATIO) BY AUTOMATED COUNT: 20.5 % (ref 11.5–14.5)
ERYTHROCYTE MEAN CORPUSCULAR HEMOGLOBIN CONCENTRATION (G/DL) BY AUTOMATED: 31.4 G/DL (ref 32–36)
ERYTHROCYTE MEAN CORPUSCULAR VOLUME (FL) BY AUTOMATED COUNT: 81 FL (ref 80–100)
ERYTHROCYTES (10*6/UL) IN BLOOD BY AUTOMATED COUNT: 5.13 X10E12/L (ref 4.5–5.9)
GFR MALE: >90 ML/MIN/1.73M2
GLUCOSE (MG/DL) IN SER/PLAS: 188 MG/DL (ref 74–99)
HEMATOCRIT (%) IN BLOOD BY AUTOMATED COUNT: 41.4 % (ref 41–52)
HEMOGLOBIN (G/DL) IN BLOOD: 13 G/DL (ref 13.5–17.5)
IMMATURE GRANULOCYTES/100 LEUKOCYTES IN BLOOD BY AUTOMATED COUNT: 1 % (ref 0–0.9)
LEUKOCYTES (10*3/UL) IN BLOOD BY AUTOMATED COUNT: 12.5 X10E9/L (ref 4.4–11.3)
LYMPHOCYTES (10*3/UL) IN BLOOD BY AUTOMATED COUNT: 1.89 X10E9/L (ref 1.2–4.8)
LYMPHOCYTES/100 LEUKOCYTES IN BLOOD BY AUTOMATED COUNT: 15.1 % (ref 13–44)
MAGNESIUM (MG/DL) IN SER/PLAS: 1.88 MG/DL (ref 1.6–2.4)
MONOCYTES (10*3/UL) IN BLOOD BY AUTOMATED COUNT: 1.34 X10E9/L (ref 0.1–1)
MONOCYTES/100 LEUKOCYTES IN BLOOD BY AUTOMATED COUNT: 10.7 % (ref 2–10)
NEUTROPHILS (10*3/UL) IN BLOOD BY AUTOMATED COUNT: 8.71 X10E9/L (ref 1.2–7.7)
NEUTROPHILS/100 LEUKOCYTES IN BLOOD BY AUTOMATED COUNT: 69.6 % (ref 40–80)
PHOSPHATE (MG/DL) IN SER/PLAS: 2.3 MG/DL (ref 2.5–4.9)
PLATELETS (10*3/UL) IN BLOOD AUTOMATED COUNT: 430 X10E9/L (ref 150–450)
POLYCHROMASIA IN BLOOD BY LIGHT MICROSCOPY: NORMAL
POTASSIUM (MMOL/L) IN SER/PLAS: 3.2 MMOL/L (ref 3.5–5.3)
PROTEIN TOTAL: 6.8 G/DL (ref 6.4–8.2)
RBC MORPHOLOGY IN BLOOD: NORMAL
SODIUM (MMOL/L) IN SER/PLAS: 135 MMOL/L (ref 136–145)
UREA NITROGEN (MG/DL) IN SER/PLAS: 4 MG/DL (ref 6–23)

## 2023-07-24 LAB — METHYLMALONIC ACID, S: 0.17 UMOL/L (ref 0–0.4)

## 2023-08-08 LAB
ALANINE AMINOTRANSFERASE (SGPT) (U/L) IN SER/PLAS: 9 U/L (ref 10–52)
ALBUMIN (G/DL) IN SER/PLAS: 3.4 G/DL (ref 3.4–5)
ALKALINE PHOSPHATASE (U/L) IN SER/PLAS: 124 U/L (ref 33–120)
ANION GAP IN SER/PLAS: 12 MMOL/L (ref 10–20)
ASPARTATE AMINOTRANSFERASE (SGOT) (U/L) IN SER/PLAS: 9 U/L (ref 9–39)
BASOPHILS (10*3/UL) IN BLOOD BY AUTOMATED COUNT: 0.07 X10E9/L (ref 0–0.1)
BASOPHILS/100 LEUKOCYTES IN BLOOD BY AUTOMATED COUNT: 0.7 % (ref 0–2)
BILIRUBIN TOTAL (MG/DL) IN SER/PLAS: 0.4 MG/DL (ref 0–1.2)
CALCIUM (MG/DL) IN SER/PLAS: 9 MG/DL (ref 8.6–10.3)
CARBON DIOXIDE, TOTAL (MMOL/L) IN SER/PLAS: 26 MMOL/L (ref 21–32)
CHLORIDE (MMOL/L) IN SER/PLAS: 99 MMOL/L (ref 98–107)
CREATININE (MG/DL) IN SER/PLAS: 0.48 MG/DL (ref 0.5–1.3)
EOSINOPHILS (10*3/UL) IN BLOOD BY AUTOMATED COUNT: 0.38 X10E9/L (ref 0–0.7)
EOSINOPHILS/100 LEUKOCYTES IN BLOOD BY AUTOMATED COUNT: 3.9 % (ref 0–6)
ERYTHROCYTE DISTRIBUTION WIDTH (RATIO) BY AUTOMATED COUNT: 21 % (ref 11.5–14.5)
ERYTHROCYTE MEAN CORPUSCULAR HEMOGLOBIN CONCENTRATION (G/DL) BY AUTOMATED: 31.3 G/DL (ref 32–36)
ERYTHROCYTE MEAN CORPUSCULAR VOLUME (FL) BY AUTOMATED COUNT: 82 FL (ref 80–100)
ERYTHROCYTES (10*6/UL) IN BLOOD BY AUTOMATED COUNT: 4.93 X10E12/L (ref 4.5–5.9)
FERRITIN (UG/LL) IN SER/PLAS: 524 UG/L (ref 20–300)
GFR MALE: >90 ML/MIN/1.73M2
GLUCOSE (MG/DL) IN SER/PLAS: 203 MG/DL (ref 74–99)
HEMATOCRIT (%) IN BLOOD BY AUTOMATED COUNT: 40.2 % (ref 41–52)
HEMOGLOBIN (G/DL) IN BLOOD: 12.6 G/DL (ref 13.5–17.5)
IMMATURE GRANULOCYTES/100 LEUKOCYTES IN BLOOD BY AUTOMATED COUNT: 0.4 % (ref 0–0.9)
IRON (UG/DL) IN SER/PLAS: 45 UG/DL (ref 35–150)
IRON BINDING CAPACITY (UG/DL) IN SER/PLAS: 233 UG/DL (ref 240–445)
IRON SATURATION (%) IN SER/PLAS: 19 % (ref 25–45)
LEUKOCYTES (10*3/UL) IN BLOOD BY AUTOMATED COUNT: 9.9 X10E9/L (ref 4.4–11.3)
LYMPHOCYTES (10*3/UL) IN BLOOD BY AUTOMATED COUNT: 1.7 X10E9/L (ref 1.2–4.8)
LYMPHOCYTES/100 LEUKOCYTES IN BLOOD BY AUTOMATED COUNT: 17.2 % (ref 13–44)
MAGNESIUM (MG/DL) IN SER/PLAS: 1.74 MG/DL (ref 1.6–2.4)
MONOCYTES (10*3/UL) IN BLOOD BY AUTOMATED COUNT: 0.91 X10E9/L (ref 0.1–1)
MONOCYTES/100 LEUKOCYTES IN BLOOD BY AUTOMATED COUNT: 9.2 % (ref 2–10)
NEUTROPHILS (10*3/UL) IN BLOOD BY AUTOMATED COUNT: 6.77 X10E9/L (ref 1.2–7.7)
NEUTROPHILS/100 LEUKOCYTES IN BLOOD BY AUTOMATED COUNT: 68.6 % (ref 40–80)
PHOSPHATE (MG/DL) IN SER/PLAS: 3.1 MG/DL (ref 2.5–4.9)
PLATELETS (10*3/UL) IN BLOOD AUTOMATED COUNT: 452 X10E9/L (ref 150–450)
POLYCHROMASIA IN BLOOD BY LIGHT MICROSCOPY: NORMAL
POTASSIUM (MMOL/L) IN SER/PLAS: 3.2 MMOL/L (ref 3.5–5.3)
PROTEIN TOTAL: 6.6 G/DL (ref 6.4–8.2)
RBC MORPHOLOGY IN BLOOD: NORMAL
SCHISTOCYTES (PRESENCE) IN BLOOD BY LIGHT MICROSCOPY: NORMAL
SODIUM (MMOL/L) IN SER/PLAS: 134 MMOL/L (ref 136–145)
UREA NITROGEN (MG/DL) IN SER/PLAS: 2 MG/DL (ref 6–23)

## 2023-08-29 LAB
ALANINE AMINOTRANSFERASE (SGPT) (U/L) IN SER/PLAS: 11 U/L (ref 10–52)
ALBUMIN (G/DL) IN SER/PLAS: 3.5 G/DL (ref 3.4–5)
ALKALINE PHOSPHATASE (U/L) IN SER/PLAS: 126 U/L (ref 33–120)
ANION GAP IN SER/PLAS: 13 MMOL/L (ref 10–20)
APPEARANCE, URINE: CLEAR
ASPARTATE AMINOTRANSFERASE (SGOT) (U/L) IN SER/PLAS: 10 U/L (ref 9–39)
BASOPHILS (10*3/UL) IN BLOOD BY AUTOMATED COUNT: 0.12 X10E9/L (ref 0–0.1)
BASOPHILS/100 LEUKOCYTES IN BLOOD BY AUTOMATED COUNT: 1.1 % (ref 0–2)
BILIRUBIN TOTAL (MG/DL) IN SER/PLAS: 0.3 MG/DL (ref 0–1.2)
BILIRUBIN, URINE: NEGATIVE
BLOOD, URINE: NEGATIVE
CALCIUM (MG/DL) IN SER/PLAS: 9.2 MG/DL (ref 8.6–10.3)
CARBON DIOXIDE, TOTAL (MMOL/L) IN SER/PLAS: 26 MMOL/L (ref 21–32)
CARCINOEMBRYONIC AG (NG/ML) IN SER/PLAS: 383.3 UG/L
CHLORIDE (MMOL/L) IN SER/PLAS: 101 MMOL/L (ref 98–107)
COLOR, URINE: ABNORMAL
CREATININE (MG/DL) IN SER/PLAS: 0.54 MG/DL (ref 0.5–1.3)
EOSINOPHILS (10*3/UL) IN BLOOD BY AUTOMATED COUNT: 0.46 X10E9/L (ref 0–0.7)
EOSINOPHILS/100 LEUKOCYTES IN BLOOD BY AUTOMATED COUNT: 4.1 % (ref 0–6)
ERYTHROCYTE DISTRIBUTION WIDTH (RATIO) BY AUTOMATED COUNT: 20.5 % (ref 11.5–14.5)
ERYTHROCYTE MEAN CORPUSCULAR HEMOGLOBIN CONCENTRATION (G/DL) BY AUTOMATED: 31.6 G/DL (ref 32–36)
ERYTHROCYTE MEAN CORPUSCULAR VOLUME (FL) BY AUTOMATED COUNT: 83 FL (ref 80–100)
ERYTHROCYTES (10*6/UL) IN BLOOD BY AUTOMATED COUNT: 4.96 X10E12/L (ref 4.5–5.9)
GFR MALE: >90 ML/MIN/1.73M2
GLUCOSE (MG/DL) IN SER/PLAS: 174 MG/DL (ref 74–99)
GLUCOSE, URINE: ABNORMAL MG/DL
HEMATOCRIT (%) IN BLOOD BY AUTOMATED COUNT: 41.1 % (ref 41–52)
HEMOGLOBIN (G/DL) IN BLOOD: 13 G/DL (ref 13.5–17.5)
IMMATURE GRANULOCYTES/100 LEUKOCYTES IN BLOOD BY AUTOMATED COUNT: 1 % (ref 0–0.9)
KETONES, URINE: NEGATIVE MG/DL
LEUKOCYTE ESTERASE, URINE: NEGATIVE
LEUKOCYTES (10*3/UL) IN BLOOD BY AUTOMATED COUNT: 11.3 X10E9/L (ref 4.4–11.3)
LYMPHOCYTES (10*3/UL) IN BLOOD BY AUTOMATED COUNT: 2.08 X10E9/L (ref 1.2–4.8)
LYMPHOCYTES/100 LEUKOCYTES IN BLOOD BY AUTOMATED COUNT: 18.3 % (ref 13–44)
MAGNESIUM (MG/DL) IN SER/PLAS: 1.85 MG/DL (ref 1.6–2.4)
MONOCYTES (10*3/UL) IN BLOOD BY AUTOMATED COUNT: 1.06 X10E9/L (ref 0.1–1)
MONOCYTES/100 LEUKOCYTES IN BLOOD BY AUTOMATED COUNT: 9.3 % (ref 2–10)
NEUTROPHILS (10*3/UL) IN BLOOD BY AUTOMATED COUNT: 7.51 X10E9/L (ref 1.2–7.7)
NEUTROPHILS/100 LEUKOCYTES IN BLOOD BY AUTOMATED COUNT: 66.2 % (ref 40–80)
NITRITE, URINE: NEGATIVE
PH, URINE: 6 (ref 5–8)
PLATELETS (10*3/UL) IN BLOOD AUTOMATED COUNT: 582 X10E9/L (ref 150–450)
PLATELETS GIANT PRESENCE IN BLOOD BY LIGHT MICROSCOPY: NORMAL
POLYCHROMASIA IN BLOOD BY LIGHT MICROSCOPY: NORMAL
POTASSIUM (MMOL/L) IN SER/PLAS: 3.9 MMOL/L (ref 3.5–5.3)
PROTEIN TOTAL: 6.8 G/DL (ref 6.4–8.2)
PROTEIN, URINE: NEGATIVE MG/DL
RBC MORPHOLOGY IN BLOOD: NORMAL
SCHISTOCYTES (PRESENCE) IN BLOOD BY LIGHT MICROSCOPY: NORMAL
SODIUM (MMOL/L) IN SER/PLAS: 136 MMOL/L (ref 136–145)
SPECIFIC GRAVITY, URINE: 1.01 (ref 1–1.03)
UREA NITROGEN (MG/DL) IN SER/PLAS: 4 MG/DL (ref 6–23)
UROBILINOGEN, URINE: <2 MG/DL (ref 0–1.9)

## 2023-08-30 LAB — URINE CULTURE: ABNORMAL

## 2023-08-31 PROBLEM — I60.9 SUBARACHNOID HEMORRHAGE (MULTI): Status: ACTIVE | Noted: 2023-08-31

## 2023-08-31 PROBLEM — I26.99 PULMONARY EMBOLISM (MULTI): Status: ACTIVE | Noted: 2023-08-31

## 2023-08-31 PROBLEM — Z93.2 ILEOSTOMY PRESENT (MULTI): Status: ACTIVE | Noted: 2023-08-31

## 2023-08-31 PROBLEM — R06.82 TACHYPNEA: Status: ACTIVE | Noted: 2023-08-31

## 2023-08-31 PROBLEM — Z15.09 MONOALLELIC MUTATION OF CHEK2 GENE IN FEMALE PATIENT: Status: ACTIVE | Noted: 2023-08-31

## 2023-08-31 PROBLEM — C18.2: Status: ACTIVE | Noted: 2023-08-31

## 2023-08-31 PROBLEM — I10 HYPERTENSION: Status: ACTIVE | Noted: 2023-08-31

## 2023-08-31 PROBLEM — Z15.01 MONOALLELIC MUTATION OF CHEK2 GENE IN FEMALE PATIENT: Status: ACTIVE | Noted: 2023-08-31

## 2023-08-31 PROBLEM — R10.9 RIGHT SIDED ABDOMINAL PAIN: Status: ACTIVE | Noted: 2023-08-31

## 2023-08-31 PROBLEM — F43.21 GRIEF: Status: ACTIVE | Noted: 2023-08-31

## 2023-08-31 PROBLEM — C44.92 SCC (SQUAMOUS CELL CARCINOMA): Status: ACTIVE | Noted: 2023-08-31

## 2023-08-31 PROBLEM — D72.829 LEUCOCYTOSIS: Status: ACTIVE | Noted: 2023-08-31

## 2023-08-31 PROBLEM — I21.4 NON-ST ELEVATION MYOCARDIAL INFARCTION (NSTEMI) (MULTI): Status: ACTIVE | Noted: 2023-08-31

## 2023-08-31 PROBLEM — C20 RECTAL CANCER METASTASIZED TO LUNG (MULTI): Status: ACTIVE | Noted: 2023-08-31

## 2023-08-31 PROBLEM — Z85.038 HISTORY OF MALIGNANT NEOPLASM OF COLON: Status: ACTIVE | Noted: 2023-08-31

## 2023-08-31 PROBLEM — J18.9 POSTOBSTRUCTIVE PNEUMONIA: Status: ACTIVE | Noted: 2023-08-31

## 2023-08-31 PROBLEM — Z15.02 MONOALLELIC MUTATION OF CHEK2 GENE IN FEMALE PATIENT: Status: ACTIVE | Noted: 2023-08-31

## 2023-08-31 PROBLEM — E27.8 ADRENAL MASS (MULTI): Status: ACTIVE | Noted: 2023-08-31

## 2023-08-31 PROBLEM — R59.0 HILAR LYMPHADENOPATHY: Status: ACTIVE | Noted: 2023-08-31

## 2023-08-31 PROBLEM — R09.1 PLEURISY: Status: ACTIVE | Noted: 2023-08-31

## 2023-08-31 PROBLEM — G62.0 DRUG-INDUCED POLYNEUROPATHY (MULTI): Status: ACTIVE | Noted: 2023-08-31

## 2023-08-31 PROBLEM — C77.2: Status: ACTIVE | Noted: 2023-08-31

## 2023-08-31 PROBLEM — I87.8 VENOUS STASIS: Status: ACTIVE | Noted: 2023-08-31

## 2023-08-31 PROBLEM — D64.9 ANEMIA: Status: ACTIVE | Noted: 2023-08-31

## 2023-08-31 PROBLEM — M54.2 CERVICALGIA: Status: ACTIVE | Noted: 2023-08-31

## 2023-08-31 PROBLEM — C20 RECTAL CANCER (MULTI): Status: ACTIVE | Noted: 2023-08-31

## 2023-08-31 PROBLEM — Z86.711 HISTORY OF PULMONARY EMBOLISM: Status: ACTIVE | Noted: 2023-08-31

## 2023-08-31 PROBLEM — R00.0 TACHYCARDIA: Status: ACTIVE | Noted: 2023-08-31

## 2023-08-31 PROBLEM — Z15.89 MONOALLELIC MUTATION OF CHEK2 GENE IN FEMALE PATIENT: Status: ACTIVE | Noted: 2023-08-31

## 2023-08-31 PROBLEM — C78.00 RECTAL CANCER METASTASIZED TO LUNG (MULTI): Status: ACTIVE | Noted: 2023-08-31

## 2023-08-31 PROBLEM — I82.402 LEFT LEG DVT (MULTI): Status: ACTIVE | Noted: 2023-08-31

## 2023-08-31 PROBLEM — R79.89 OTHER SPECIFIED ABNORMAL FINDINGS OF BLOOD CHEMISTRY: Status: ACTIVE | Noted: 2023-08-31

## 2023-08-31 PROBLEM — K31.9 DISORDER OF FUNCTION OF STOMACH: Status: ACTIVE | Noted: 2023-08-31

## 2023-08-31 PROBLEM — D17.9 LIPOMA: Status: ACTIVE | Noted: 2023-08-31

## 2023-08-31 PROBLEM — I60.9 SUBARACHNOID BLEED (MULTI): Status: ACTIVE | Noted: 2023-08-31

## 2023-08-31 RX ORDER — BUPRENORPHINE HYDROCHLORIDE AND NALOXONE HYDROCHLORIDE DIHYDRATE 8; 2 MG/1; MG/1
0.5 TABLET SUBLINGUAL NIGHTLY
COMMUNITY
Start: 2023-08-22 | End: 2023-11-07 | Stop reason: ALTCHOICE

## 2023-08-31 RX ORDER — DEXLANSOPRAZOLE 60 MG/1
60 CAPSULE, DELAYED RELEASE ORAL DAILY
COMMUNITY
Start: 2011-08-12 | End: 2023-10-24 | Stop reason: SDUPTHER

## 2023-08-31 RX ORDER — BUPRENORPHINE HYDROCHLORIDE AND NALOXONE HYDROCHLORIDE DIHYDRATE 8; 2 MG/1; MG/1
1 TABLET SUBLINGUAL 2 TIMES DAILY
COMMUNITY
Start: 2023-08-22 | End: 2024-01-09

## 2023-08-31 RX ORDER — NALOXONE HYDROCHLORIDE 4 MG/.1ML
4 SPRAY NASAL SEE ADMIN INSTRUCTIONS
Status: ON HOLD | COMMUNITY
Start: 2023-05-09 | End: 2024-01-01 | Stop reason: ALTCHOICE

## 2023-08-31 RX ORDER — BUPRENORPHINE HYDROCHLORIDE AND NALOXONE HYDROCHLORIDE 5.7; 1.4 MG/1; MG/1
TABLET, ORALLY DISINTEGRATING SUBLINGUAL
COMMUNITY
Start: 2015-09-04 | End: 2023-10-10 | Stop reason: ALTCHOICE

## 2023-08-31 RX ORDER — GABAPENTIN 400 MG/1
400 CAPSULE ORAL
COMMUNITY
Start: 2018-10-04 | End: 2023-10-10 | Stop reason: ALTCHOICE

## 2023-08-31 RX ORDER — IBUPROFEN 800 MG/1
800 TABLET ORAL
Status: ON HOLD | COMMUNITY
End: 2024-01-01 | Stop reason: ALTCHOICE

## 2023-08-31 RX ORDER — BENZONATATE 100 MG/1
100 CAPSULE ORAL 3 TIMES DAILY PRN
COMMUNITY
End: 2023-10-10 | Stop reason: ALTCHOICE

## 2023-09-12 LAB
ALANINE AMINOTRANSFERASE (SGPT) (U/L) IN SER/PLAS: 9 U/L (ref 10–52)
ALBUMIN (G/DL) IN SER/PLAS: 3.4 G/DL (ref 3.4–5)
ALKALINE PHOSPHATASE (U/L) IN SER/PLAS: 117 U/L (ref 33–120)
ANION GAP IN SER/PLAS: 13 MMOL/L (ref 10–20)
ASPARTATE AMINOTRANSFERASE (SGOT) (U/L) IN SER/PLAS: 10 U/L (ref 9–39)
BASOPHILS (10*3/UL) IN BLOOD BY AUTOMATED COUNT: 0.18 X10E9/L (ref 0–0.1)
BASOPHILS/100 LEUKOCYTES IN BLOOD BY AUTOMATED COUNT: 1.3 % (ref 0–2)
BILIRUBIN TOTAL (MG/DL) IN SER/PLAS: 0.3 MG/DL (ref 0–1.2)
CALCIUM (MG/DL) IN SER/PLAS: 9.6 MG/DL (ref 8.6–10.3)
CARBON DIOXIDE, TOTAL (MMOL/L) IN SER/PLAS: 28 MMOL/L (ref 21–32)
CHLORIDE (MMOL/L) IN SER/PLAS: 97 MMOL/L (ref 98–107)
CREATININE (MG/DL) IN SER/PLAS: 0.6 MG/DL (ref 0.5–1.3)
EOSINOPHILS (10*3/UL) IN BLOOD BY AUTOMATED COUNT: 0.6 X10E9/L (ref 0–0.7)
EOSINOPHILS/100 LEUKOCYTES IN BLOOD BY AUTOMATED COUNT: 4.2 % (ref 0–6)
ERYTHROCYTE DISTRIBUTION WIDTH (RATIO) BY AUTOMATED COUNT: 19.9 % (ref 11.5–14.5)
ERYTHROCYTE MEAN CORPUSCULAR HEMOGLOBIN CONCENTRATION (G/DL) BY AUTOMATED: 31 G/DL (ref 32–36)
ERYTHROCYTE MEAN CORPUSCULAR VOLUME (FL) BY AUTOMATED COUNT: 83 FL (ref 80–100)
ERYTHROCYTES (10*6/UL) IN BLOOD BY AUTOMATED COUNT: 5.11 X10E12/L (ref 4.5–5.9)
GFR MALE: >90 ML/MIN/1.73M2
GLUCOSE (MG/DL) IN SER/PLAS: 258 MG/DL (ref 74–99)
HEMATOCRIT (%) IN BLOOD BY AUTOMATED COUNT: 42.2 % (ref 41–52)
HEMOGLOBIN (G/DL) IN BLOOD: 13.1 G/DL (ref 13.5–17.5)
IMMATURE GRANULOCYTES/100 LEUKOCYTES IN BLOOD BY AUTOMATED COUNT: 1.1 % (ref 0–0.9)
LEUKOCYTES (10*3/UL) IN BLOOD BY AUTOMATED COUNT: 14.3 X10E9/L (ref 4.4–11.3)
LYMPHOCYTES (10*3/UL) IN BLOOD BY AUTOMATED COUNT: 2.09 X10E9/L (ref 1.2–4.8)
LYMPHOCYTES/100 LEUKOCYTES IN BLOOD BY AUTOMATED COUNT: 14.6 % (ref 13–44)
MAGNESIUM (MG/DL) IN SER/PLAS: 1.6 MG/DL (ref 1.6–2.4)
MONOCYTES (10*3/UL) IN BLOOD BY AUTOMATED COUNT: 1.26 X10E9/L (ref 0.1–1)
MONOCYTES/100 LEUKOCYTES IN BLOOD BY AUTOMATED COUNT: 8.8 % (ref 2–10)
NEUTROPHILS (10*3/UL) IN BLOOD BY AUTOMATED COUNT: 10.02 X10E9/L (ref 1.2–7.7)
NEUTROPHILS/100 LEUKOCYTES IN BLOOD BY AUTOMATED COUNT: 70 % (ref 40–80)
PHOSPHATE (MG/DL) IN SER/PLAS: 3 MG/DL (ref 2.5–4.9)
PLATELETS (10*3/UL) IN BLOOD AUTOMATED COUNT: 626 X10E9/L (ref 150–450)
POTASSIUM (MMOL/L) IN SER/PLAS: 4 MMOL/L (ref 3.5–5.3)
PROTEIN TOTAL: 7.5 G/DL (ref 6.4–8.2)
SODIUM (MMOL/L) IN SER/PLAS: 134 MMOL/L (ref 136–145)
UREA NITROGEN (MG/DL) IN SER/PLAS: 3 MG/DL (ref 6–23)

## 2023-09-20 RX ORDER — HEPARIN SODIUM,PORCINE/PF 10 UNIT/ML
50 SYRINGE (ML) INTRAVENOUS AS NEEDED
Status: CANCELLED | OUTPATIENT
Start: 2023-09-30

## 2023-09-20 RX ORDER — HEPARIN 100 UNIT/ML
500 SYRINGE INTRAVENOUS AS NEEDED
Status: CANCELLED | OUTPATIENT
Start: 2023-09-30

## 2023-09-26 LAB
ALANINE AMINOTRANSFERASE (SGPT) (U/L) IN SER/PLAS: 7 U/L (ref 10–52)
ALBUMIN (G/DL) IN SER/PLAS: 3.4 G/DL (ref 3.4–5)
ALKALINE PHOSPHATASE (U/L) IN SER/PLAS: 114 U/L (ref 33–120)
ANION GAP IN SER/PLAS: 15 MMOL/L (ref 10–20)
ASPARTATE AMINOTRANSFERASE (SGOT) (U/L) IN SER/PLAS: 9 U/L (ref 9–39)
BASOPHILS (10*3/UL) IN BLOOD BY AUTOMATED COUNT: 0.09 X10E9/L (ref 0–0.1)
BASOPHILS/100 LEUKOCYTES IN BLOOD BY AUTOMATED COUNT: 0.9 % (ref 0–2)
BILIRUBIN TOTAL (MG/DL) IN SER/PLAS: 0.3 MG/DL (ref 0–1.2)
CALCIUM (MG/DL) IN SER/PLAS: 9.1 MG/DL (ref 8.6–10.3)
CARBON DIOXIDE, TOTAL (MMOL/L) IN SER/PLAS: 25 MMOL/L (ref 21–32)
CARCINOEMBRYONIC AG (NG/ML) IN SER/PLAS: 288.4 UG/L
CHLORIDE (MMOL/L) IN SER/PLAS: 97 MMOL/L (ref 98–107)
CREATININE (MG/DL) IN SER/PLAS: 0.58 MG/DL (ref 0.5–1.3)
EOSINOPHILS (10*3/UL) IN BLOOD BY AUTOMATED COUNT: 0.48 X10E9/L (ref 0–0.7)
EOSINOPHILS/100 LEUKOCYTES IN BLOOD BY AUTOMATED COUNT: 5 % (ref 0–6)
ERYTHROCYTE DISTRIBUTION WIDTH (RATIO) BY AUTOMATED COUNT: 19.6 % (ref 11.5–14.5)
ERYTHROCYTE MEAN CORPUSCULAR HEMOGLOBIN CONCENTRATION (G/DL) BY AUTOMATED: 31.4 G/DL (ref 32–36)
ERYTHROCYTE MEAN CORPUSCULAR VOLUME (FL) BY AUTOMATED COUNT: 83 FL (ref 80–100)
ERYTHROCYTES (10*6/UL) IN BLOOD BY AUTOMATED COUNT: 4.97 X10E12/L (ref 4.5–5.9)
GFR MALE: >90 ML/MIN/1.73M2
GLUCOSE (MG/DL) IN SER/PLAS: 294 MG/DL (ref 74–99)
HEMATOCRIT (%) IN BLOOD BY AUTOMATED COUNT: 41.4 % (ref 41–52)
HEMOGLOBIN (G/DL) IN BLOOD: 13 G/DL (ref 13.5–17.5)
IMMATURE GRANULOCYTES/100 LEUKOCYTES IN BLOOD BY AUTOMATED COUNT: 0.6 % (ref 0–0.9)
LEUKOCYTES (10*3/UL) IN BLOOD BY AUTOMATED COUNT: 9.6 X10E9/L (ref 4.4–11.3)
LYMPHOCYTES (10*3/UL) IN BLOOD BY AUTOMATED COUNT: 1.94 X10E9/L (ref 1.2–4.8)
LYMPHOCYTES/100 LEUKOCYTES IN BLOOD BY AUTOMATED COUNT: 20.2 % (ref 13–44)
MAGNESIUM (MG/DL) IN SER/PLAS: 1.82 MG/DL (ref 1.6–2.4)
MONOCYTES (10*3/UL) IN BLOOD BY AUTOMATED COUNT: 0.92 X10E9/L (ref 0.1–1)
MONOCYTES/100 LEUKOCYTES IN BLOOD BY AUTOMATED COUNT: 9.6 % (ref 2–10)
NEUTROPHILS (10*3/UL) IN BLOOD BY AUTOMATED COUNT: 6.1 X10E9/L (ref 1.2–7.7)
NEUTROPHILS/100 LEUKOCYTES IN BLOOD BY AUTOMATED COUNT: 63.7 % (ref 40–80)
PLATELETS (10*3/UL) IN BLOOD AUTOMATED COUNT: 476 X10E9/L (ref 150–450)
POTASSIUM (MMOL/L) IN SER/PLAS: 3.6 MMOL/L (ref 3.5–5.3)
PROTEIN TOTAL: 7.2 G/DL (ref 6.4–8.2)
SODIUM (MMOL/L) IN SER/PLAS: 133 MMOL/L (ref 136–145)
UREA NITROGEN (MG/DL) IN SER/PLAS: 4 MG/DL (ref 6–23)

## 2023-10-03 ENCOUNTER — PHARMACY VISIT (OUTPATIENT)
Dept: PHARMACY | Facility: CLINIC | Age: 38
End: 2023-10-03
Payer: MEDICARE

## 2023-10-03 ENCOUNTER — SPECIALTY PHARMACY (OUTPATIENT)
Dept: PHARMACY | Facility: CLINIC | Age: 38
End: 2023-10-03

## 2023-10-05 ENCOUNTER — SPECIALTY PHARMACY (OUTPATIENT)
Dept: PHARMACY | Facility: CLINIC | Age: 38
End: 2023-10-05

## 2023-10-05 ENCOUNTER — PHARMACY VISIT (OUTPATIENT)
Dept: PHARMACY | Facility: CLINIC | Age: 38
End: 2023-10-05
Payer: MEDICARE

## 2023-10-05 PROCEDURE — RXMED WILLOW AMBULATORY MEDICATION CHARGE

## 2023-10-09 ENCOUNTER — TELEPHONE (OUTPATIENT)
Dept: HEMATOLOGY/ONCOLOGY | Facility: HOSPITAL | Age: 38
End: 2023-10-09
Payer: MEDICAID

## 2023-10-09 NOTE — TELEPHONE ENCOUNTER
Patient no showed appointment. Attempted to reach patient to reschedule. Unable to reach patient, and unable to leave a voicemail due to mailbox being full. Patient is scheduled for next MD appointment on 10/24/2023.

## 2023-10-10 ENCOUNTER — OFFICE VISIT (OUTPATIENT)
Dept: HEMATOLOGY/ONCOLOGY | Facility: HOSPITAL | Age: 38
End: 2023-10-10
Payer: MEDICARE

## 2023-10-10 ENCOUNTER — DOCUMENTATION (OUTPATIENT)
Dept: HEMATOLOGY/ONCOLOGY | Facility: HOSPITAL | Age: 38
End: 2023-10-10

## 2023-10-10 ENCOUNTER — LAB (OUTPATIENT)
Dept: LAB | Facility: LAB | Age: 38
End: 2023-10-10
Payer: MEDICARE

## 2023-10-10 VITALS
RESPIRATION RATE: 18 BRPM | OXYGEN SATURATION: 94 % | TEMPERATURE: 97.2 F | HEIGHT: 69 IN | SYSTOLIC BLOOD PRESSURE: 127 MMHG | DIASTOLIC BLOOD PRESSURE: 82 MMHG | WEIGHT: 206.57 LBS | BODY MASS INDEX: 30.6 KG/M2 | HEART RATE: 107 BPM

## 2023-10-10 DIAGNOSIS — Z72.0 TOBACCO ABUSE: ICD-10-CM

## 2023-10-10 DIAGNOSIS — R11.2 CHEMOTHERAPY INDUCED NAUSEA AND VOMITING: ICD-10-CM

## 2023-10-10 DIAGNOSIS — E61.1 IRON DEFICIENCY: ICD-10-CM

## 2023-10-10 DIAGNOSIS — T45.1X5A CHEMOTHERAPY INDUCED DIARRHEA: ICD-10-CM

## 2023-10-10 DIAGNOSIS — R74.8 ABNORMAL LIVER ENZYMES: ICD-10-CM

## 2023-10-10 DIAGNOSIS — L27.0 RASH, DRUG: ICD-10-CM

## 2023-10-10 DIAGNOSIS — E83.39 HYPOPHOSPHATEMIA: ICD-10-CM

## 2023-10-10 DIAGNOSIS — C20 RECTAL CANCER (MULTI): ICD-10-CM

## 2023-10-10 DIAGNOSIS — E83.42 HYPOMAGNESEMIA: ICD-10-CM

## 2023-10-10 DIAGNOSIS — T45.1X5A CHEMOTHERAPY INDUCED NAUSEA AND VOMITING: ICD-10-CM

## 2023-10-10 DIAGNOSIS — E87.6 HYPOKALEMIA: ICD-10-CM

## 2023-10-10 DIAGNOSIS — C78.00 RECTAL CANCER METASTASIZED TO LUNG (MULTI): Primary | ICD-10-CM

## 2023-10-10 DIAGNOSIS — R73.09 ELEVATED GLUCOSE: ICD-10-CM

## 2023-10-10 DIAGNOSIS — C20 RECTAL CANCER METASTASIZED TO LUNG (MULTI): Primary | ICD-10-CM

## 2023-10-10 DIAGNOSIS — C78.00 RECTAL CANCER METASTASIZED TO LUNG (MULTI): ICD-10-CM

## 2023-10-10 DIAGNOSIS — C20 RECTAL CANCER METASTASIZED TO LUNG (MULTI): ICD-10-CM

## 2023-10-10 DIAGNOSIS — Z86.711 HISTORY OF PULMONARY EMBOLISM: ICD-10-CM

## 2023-10-10 DIAGNOSIS — K76.0 HEPATIC STEATOSIS: ICD-10-CM

## 2023-10-10 DIAGNOSIS — D72.829 LEUKOCYTOSIS, UNSPECIFIED TYPE: ICD-10-CM

## 2023-10-10 DIAGNOSIS — K52.1 CHEMOTHERAPY INDUCED DIARRHEA: ICD-10-CM

## 2023-10-10 DIAGNOSIS — F39 MOOD DISORDER (CMS-HCC): ICD-10-CM

## 2023-10-10 DIAGNOSIS — I10 HYPERTENSION, UNSPECIFIED TYPE: ICD-10-CM

## 2023-10-10 LAB
ALBUMIN SERPL BCP-MCNC: 3.9 G/DL (ref 3.4–5)
ALP SERPL-CCNC: 119 U/L (ref 33–120)
ALT SERPL W P-5'-P-CCNC: 8 U/L (ref 10–52)
ANION GAP SERPL CALC-SCNC: 15 MMOL/L (ref 10–20)
AST SERPL W P-5'-P-CCNC: 13 U/L (ref 9–39)
BASOPHILS # BLD AUTO: 0.09 X10*3/UL (ref 0–0.1)
BASOPHILS NFR BLD AUTO: 1 %
BILIRUB SERPL-MCNC: 0.5 MG/DL (ref 0–1.2)
BUN SERPL-MCNC: 5 MG/DL (ref 6–23)
CALCIUM SERPL-MCNC: 9.5 MG/DL (ref 8.6–10.3)
CHLORIDE SERPL-SCNC: 98 MMOL/L (ref 98–107)
CO2 SERPL-SCNC: 26 MMOL/L (ref 21–32)
CREAT SERPL-MCNC: 0.57 MG/DL (ref 0.5–1.3)
EOSINOPHIL # BLD AUTO: 0.36 X10*3/UL (ref 0–0.7)
EOSINOPHIL NFR BLD AUTO: 4.1 %
ERYTHROCYTE [DISTWIDTH] IN BLOOD BY AUTOMATED COUNT: 20.3 % (ref 11.5–14.5)
FERRITIN SERPL-MCNC: 411 NG/ML (ref 20–300)
GFR SERPL CREATININE-BSD FRML MDRD: >90 ML/MIN/1.73M*2
GLUCOSE SERPL-MCNC: 145 MG/DL (ref 74–99)
HCT VFR BLD AUTO: 44.3 % (ref 41–52)
HGB BLD-MCNC: 13.9 G/DL (ref 13.5–17.5)
IMM GRANULOCYTES # BLD AUTO: 0.07 X10*3/UL (ref 0–0.7)
IMM GRANULOCYTES NFR BLD AUTO: 0.8 % (ref 0–0.9)
IRON SATN MFR SERPL: 24 % (ref 25–45)
IRON SERPL-MCNC: 71 UG/DL (ref 35–150)
LYMPHOCYTES # BLD AUTO: 2.2 X10*3/UL (ref 1.2–4.8)
LYMPHOCYTES NFR BLD AUTO: 25.2 %
MAGNESIUM SERPL-MCNC: 2.3 MG/DL (ref 1.6–2.4)
MCH RBC QN AUTO: 26.6 PG (ref 26–34)
MCHC RBC AUTO-ENTMCNC: 31.4 G/DL (ref 32–36)
MCV RBC AUTO: 85 FL (ref 80–100)
MONOCYTES # BLD AUTO: 0.98 X10*3/UL (ref 0.1–1)
MONOCYTES NFR BLD AUTO: 11.2 %
NEUTROPHILS # BLD AUTO: 5.04 X10*3/UL (ref 1.2–7.7)
NEUTROPHILS NFR BLD AUTO: 57.7 %
NRBC BLD-RTO: 0 /100 WBCS (ref 0–0)
PLATELET # BLD AUTO: 488 X10*3/UL (ref 150–450)
PMV BLD AUTO: 9.6 FL (ref 7.5–11.5)
POTASSIUM SERPL-SCNC: 4.6 MMOL/L (ref 3.5–5.3)
PROT SERPL-MCNC: 7.5 G/DL (ref 6.4–8.2)
RBC # BLD AUTO: 5.22 X10*6/UL (ref 4.5–5.9)
RBC MORPH BLD: NORMAL
SODIUM SERPL-SCNC: 134 MMOL/L (ref 136–145)
STOMATOCYTES BLD QL SMEAR: NORMAL
TIBC SERPL-MCNC: 296 UG/DL (ref 240–445)
UIBC SERPL-MCNC: 225 UG/DL (ref 110–370)
WBC # BLD AUTO: 8.7 X10*3/UL (ref 4.4–11.3)

## 2023-10-10 PROCEDURE — 85025 COMPLETE CBC W/AUTO DIFF WBC: CPT | Performed by: INTERNAL MEDICINE

## 2023-10-10 PROCEDURE — 83735 ASSAY OF MAGNESIUM: CPT

## 2023-10-10 PROCEDURE — 99215 OFFICE O/P EST HI 40 MIN: CPT | Performed by: INTERNAL MEDICINE

## 2023-10-10 PROCEDURE — 3074F SYST BP LT 130 MM HG: CPT | Performed by: INTERNAL MEDICINE

## 2023-10-10 PROCEDURE — 4004F PT TOBACCO SCREEN RCVD TLK: CPT | Performed by: INTERNAL MEDICINE

## 2023-10-10 PROCEDURE — 83550 IRON BINDING TEST: CPT

## 2023-10-10 PROCEDURE — 36415 COLL VENOUS BLD VENIPUNCTURE: CPT

## 2023-10-10 PROCEDURE — 83540 ASSAY OF IRON: CPT

## 2023-10-10 PROCEDURE — 80053 COMPREHEN METABOLIC PANEL: CPT

## 2023-10-10 PROCEDURE — 82728 ASSAY OF FERRITIN: CPT

## 2023-10-10 PROCEDURE — 3079F DIAST BP 80-89 MM HG: CPT | Performed by: INTERNAL MEDICINE

## 2023-10-10 RX ORDER — DAPAGLIFLOZIN 5 MG/1
TABLET, FILM COATED ORAL
Status: ON HOLD | COMMUNITY
End: 2024-01-01 | Stop reason: ALTCHOICE

## 2023-10-10 ASSESSMENT — PAIN SCALES - GENERAL: PAINLEVEL: 7

## 2023-10-10 NOTE — PROGRESS NOTES
I asked RN Veronica to try to send my note to patient's PCP as I was unable to do this in our system.

## 2023-10-10 NOTE — PROGRESS NOTES
"Interval History:    Patient is a 38 -year-old white man with metastatic rectal carcinoma , previously seen at [place] with Dr. Zuniga, seen by me for the first time 2/7/2023 at [place].  He initially presented with stage III disease (T3 N2 M0) diagnosed in 2013 status post neoadjuvant Xeloda and radiation not completed secondary to noncompliance  but last treated November 2013 followed by resection followed by adjuvant FOLFOX not completed secondary to patient noncompliance.  He had biopsy-proven lung metastasis 6/22/2018 (RLL) , MMR normal, NRAS/KRAS/BRAF wild-type ; also adrenal mets but did not find a bx-metastatic disease was treated with FOLFOX with \" serious adverse\" reaction to oxaliplatin according to notes that patient  could not tell me what the reaction except possibly uncontrollable diarrhea, followed by Xeloda/Avastin with progressive disease,  started irinotecan/cetuximab 11/29/2021 complicated by skin and GI toxicity requiring dose reductions.  Patient was last seen by Dr. Zuniga 1/24/2023 noting status post 22 cycles of cetuximab  with irinotecan with dose reductions due to skin and GI toxicity, loose stools better, skin stable on minocycline, maintained on  Eliquis for recurrent thrombosis lower extremities.       Cetuximab/irinotecan was most recently given 8/9/2023, but unfortunately CT showed progressive disease 8/25/2023 in the lung/mediastinum and bilateral adrenal masses, luckily patient clinically  doing okay, ultimately decided on doing Lonsurf and Avastin given every 2 weeks, started 9/13/2023, did well except for a few episodes of nausea with vomiting while taking the Lonsurf but not every day, only took his Compazine or Zofran as needed but not  prior to taking the Lonsurf which helped, no significant diarrhea, no wound healing issues, did get IV magnesium 9/13/2023.  He got his second dose of the Avastin/Lonsurf 9/27/2023 and took Zofran premedication for the Lonsurf with no nausea or " "vomiting, bowels did well, 10/10/2023 admitted to using marijuana daily.  7/2023 phosphorus rich foods were recommended, IV magnesium and potassium given, recommended referral to nephrology for electrolyte  abnormalities but patient refused.  No thrombotic or bleeding events on anticoagulation, multiple thrombotic events since his diagnosis  of cancer but not prior, compliant with his Eliquis.  I verified with patient that his last CT was in 2021, so I ordered another CT scan done 2/20/2023 which showed most lung nodules increased in size and increase in the right adrenal gland mass . He had an appt. with Dr. Lui 3/20/23-progression of disease but difficult to assess given only 2 time points over about 1.5 years, patient not interested in changing treatment to a 5-FU  based regimen and wanted to continue irinotecan/cetuximab but recommended CT after 4 cycles, offered a clinical trial but patient declined due to the travel, Dr. Lui available for recommendations upon progression.  CT 4/24/2023 thankfully showed  overall stable disease despite CEA rising-patient said he has had the CEA fluctuations in the past, was actually lower subsequently even though CT showed progression 8/2023.  2/2023 folate, iron, and B12 were borderline, prescription written but apparently  insurance denied so he had to get these over-the-counter which he eventually did March 2023-given persistently low iron Feraheme given 7/5/2023 and 7/24/2023-no issues, did not feel any different.     Patient is by himself today, previously accompanied by his mother.   He lives  with his mother and brother.  He is able to do light chores.  He has some sort of mood disorder followed by psychiatry on Haldol and Depakote.  He does take a PPI given his history of Day's.  He is taking potassium 5 tabs/day which was increased from 4 tablets/day 9/26/2023.  PCP manages his chronic pain medications for his \"lung and kidney\" pain which has been " stable since 2018 including Suboxone (Zubsolv), used to take gabapentin but no longer and believes he last took this  in 2022, is on  medications because of a history of pain medication addiction, uses ibuprofen less than once per month.  He has Zofran and Compazine as needed for nausea, 6/20/2023 noted that his nausea was worse with 1 episode of vomiting and we finally figured out that he was using 8 mg of  Zofran instead of the 4 mg that I prescribed, had nausea from his chemotherapy as well as from the minocycline, alternated the Zofran and the Compazine.  8/29/2023 he said he recently stopped his minocycline because he noted more nausea and vomiting with  that medication with subsequent symptoms returned to baseline and no change in his facial rash.  2/21/2023 I felt that his facial rash was worse so I gave him clindamycin topical but it made his face more red so stopped, had been well controlled with  minocycline 1 tablet/day and a facial lotion but not a facial steroid-3/21/2023 I felt that he had some seborrheic dermatitis and recommended the antidandruff shampoo on his face which he has been using which is helping-8/29/2023->CURRENT facial rash  doing okay even off the minocycline, just using a facial lotion and the dandruff shampoo, 10/10/2023 has only minimal flaking on his face.  He was averaging about 2-3 bowel movements per day controlled with Imodium 4 tablets/day and Lomotil 1 tablet/day; March 2023 he tried Metamucil but this did not improve his bowel movements;  5/16/2023 he said that he was having more like 4-5 more watery bowel movements not controlled with his current regimen; 5/30/2023 bowel movements were unchanged even though he increased his Lomotil to 3/day and was taking the Imodium only 3/day, no change  in his diet or sick contacts, negative C. difficile May 2023; June 2023 he started on a probiotic, bowel movements slightly better at 3/day, still using Lomotil and Imodium 3/day;  7/3/2023->current bowel movements are better at 1-2/day using the Imodium  and Lomotil 4 times a day as well as the probiotic.   He has some left thigh numbness and burning since his rectal surgery, no neuropathy from chemotherapy.  He has an essential tremor which is unchanged since his cancer diagnosis.   Patient previously took Lasix which was held April 2022 due to dehydration and hypokalemia-chronic leg edema not bothering him lately.    Glucose has been elevated, no diabetes but he was diagnosed  with diabetes May 2023 and started on dapagliflozin by PCP at the end of May 2023, does not check his glucose.  7/20/2023 he showed me his varicose veins, going on for years, declined vascular consultation.     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     Prescriptions from Dr. Zuniga included Eliquis, potassium, Emla cream, Lomotil.     PCP is Dr. Jesse Dominguez, notes not in our old system but able to see in epic 9/26/2023 saw for chronic medical issues.   Patient declined a flu vaccine 9/12/2023.  Patient received 2 COVID vaccines but  no booster.  I  recommended ongoing vaccine management and COVID management through PCP 9/12/2023. I have reviewed the available laboratory data, radiographic data, medications, and notes,  and have summarized them in this note 10/10/2023.  No other hospitalizations, major illness, or  procedures since his last appointment 9/26/2023.    Epic transition occurred 10/2/2023 from prior EMR system.  I did not go back in the epic system prior to this point unless patient brought up an issue.  I did review the EMR data in epic from 10/2/2023 going forward, but relied on our old EMR system for data prior to the transition.      10/10/2023 note that medication list is not correct, patient going to bring in an updated medication list, supposed to be on potassium 20 mEq 5 tablets/day, Emla cream, Zofran 8 mg, Lomotil, Compazine 10 mg, B12 1000 mg, folic acid 1 mg, metoprolol, losartan, Ventolin, iron, doxepin, Haldol, Depakote, probiotic.  Nursing  was having issues inputting the meds into the system.     No fevers, unintentional weight loss,  drenching sweats, headaches, sore throat, acute vision changes, chest pain, shortness breath, cough, abdominal pain, nausea or vomiting now, blood in stool, dysuria or hematuria, pain or neurologic symptoms except as above , abnormal bruising or bleeding, or thyroid disorder.    Hematology-oncology history (reviewed and updated 10/10/2023)   -At age 28 patient underwent colonoscopy August 2013 for abdominal pain, rectal bleeding, weight loss, nausea/vomiting, and painful small bowel movements found to have a circumferential fungating partially obstructive mass in the rectum with biopsy showing  invasive moderately differentiated adenocarcinoma.  Pathology showed invasive moderately differentiated adenocarcinoma, normal mismatch repair protein expression.  He was felt to have a stage III = T3 N2 M0 rectal adenocarcinoma.  Initial CEA was 5.4.   -Patient underwent neoadjuvant Xeloda (according to Dr. River  Jak's note from 8/2013 plan 1500 mg twice daily throughout radiation) and radiation not completed secondary to noncompliance but  last treated November 2013 under Dr. Perico Benedict and Dr. Constantine Tong, 45 Gy to the pelvis including lymph nodes and an additional 3.6 Gy to the involved nodes and rectal mass, initially  planned to go to 50.4 to the involved nodes and 54 to the rectal masses but patient was noncompliant.  Treatment was complicated by loose stools, nausea, vomiting, anorectal pain, skin irritation.  Rectal bleeding and appetite improved.  He had what  was felt to be an excellent response on MRI.  -1/2/2014 Dr. Willa Khan performed open proctosigmoidectomy with coloanal anastomosis and diverting ileostomy.  Pathology  showed invasive moderately differentiated adenocarcinoma of the rectum, 2 of 31 lymph nodes positive, therapy effect, low-grade, tumor 3.5 x 3 x 0.5 cm, negative margins, lymphovascular invasion and perineural invasion present,  ypT3 ypN1b  -11/2014 patient underwent ileostomy takedown with resection of small bowel by Dr. Willa Khan, benign pathology.  -Patient received adjuvant FOLFOX not completed secondary to patient noncompliance.  -12/2015 Dr. Fernando attempted a colonoscopy but bowel prep was incomplete  -6/2018 biopsy-proven RLL lung  (no adrenal path seen)  metastasis, MMR normal, NRAS and BRAF wild-type, moderately differentiated adenocarcinoma, reviewed the pathology report, c/w CRC origin .  -6/2021 CT showed worsening metastasis including lungs, mediastinum, left adrenal.  -Metastatic rectal cancer was treated with FOLFOX with serious adverse reaction to oxaliplatin followed by Xeloda/Avastin with progressive disease .  -11/8/2021 CT chest, abdomen, pelvis with contrast showed progressive metastasis compared to 3 months prior including pulmonary/mediastinal and left adrenal metastasis (right lower lobe at 7.4 x 5.8 cm up to 8 x 6.3 cm, left lower lobe 4.3 x 2.9  cm up  to 4.7 x 3.3 cm, prevascular lymph node 27 x 15 mm increased to 29 x 22 mm, prevascular nodularity enlarged from 28 x 8 mm to 25 x 11 mm, left adrenal 5.2 x 2.1 cm increased to 5.7 x 3.0 cm), increased nodularity right adrenal gland 22 x 9 mm, stable  presacral soft tissue thickening 3.3 cm, avascular necrosis femoral heads.  -Patient started irinotecan/cetuximab 11/29/2021 at 180 mg/M2 and 500 mg/M2, ev sho 2 weeks.  -2/16/2022 fourth dose irinotecan/cetuximab was reduced in the cetuximab to 400 mg/M2 (20% dose reduction due to skin toxicity and diarrhea)  -3/10/2022 acute nonocclusive DVT left common femoral and proximal to mid femoral veins treated with heparin and subsequently Eliquis .  -4/20/2022 irinotecan was continued at 180 mg/M2 but cetuximab was decreased to 300 mg/M2 (due to diarrhea and dehydration)  -5/11/2022 irinotecan was decreased to 150 mg/M2 (20% reduction due to diarrhea), cetuximab continued at 300 mg/M2   -6/8/2022 irinotecan continued 150 mg/M2, cetuximab was decreased to 250 mg/M2 (due to skin toxicity)  -10/5/2022 irinotecan was continued at 150 mg/M2, cetuximab decreased to 200 mg/M2 (due to skin toxicity)  -10/19/2022 irinotecan was increased  to 165 mg/M2 (due to increased CEA), cetuximab continued 200 mg/M2  -1/11/2022 irinotecan was continued at 165 mg/M2, cetuximab given 445 mg flat dose which came out to 200 mg/M2  -January 2023: WBC 7.7-8.5, hemoglobin 11.6-12.1, MCV 80-81, platelet 341-285, ANC 5.0-4.5.  Glucose 117-133, normal sodium, potassium 3.6-3.7, creatinine 0.5-0.6, calcium 8.6-9.0, magnesium 1.8, albumin 3.4-3.5 otherwise normal LFTs.  -1/25/2023 patient received dexamethasone 12 mg, Benadryl 50 mg, Aloxi 0.25 mg, atropine 0.4 mg, irinotecan 165 mg/M2, cetuximab 445 mg .     -I initially saw the patient on 2/7/2023, previously seen by other providers most recently Dr. Zuniga at [facility], not all of his information in my EMR system, somewhat difficult to sort through the notes.   I did not go through all of his extensive labs.  CEA trend: August 2013: 5.4, November 2014: 2.2, November 2021: 165, January 2022: 151, February 2022: 52.8, March 2022: 27.5, April-May 2022: 46-47.6 (rise felt secondary to dose reductions and missed doses), July 2022: 80, August-September 2022: 56-61,  October 2022: 60<-71, November 2022 64-69, December 2022: 85, January 2023: 71  Patient received IV magnesium most recently November 2022, October 2022, July 2022.     -2/7/2023 labs which went to Dr. Zuniga: WBC 7, hemoglobin 11.4, MCV 81, platelets 350, normal differential number.  Sodium 136, potassium 3.3, creatinine 0.5, calcium 8s, glucose 149.  Magnesium 1.8.  Normal LFTs.  Iron 7% with ferritin 96.  Normal phosphorus.   B12 = 328.  Folate 7.3.  CEA 79.   -2/20/2023 CT chest, abdomen, pelvis with contrast compared to November 2021 showed multiple lung nodules increased in size for example right upper lobe 26 x 24 mm previously 23 x 10 mm and left upper lobe 35 x 32 mm previously 22 x 17 mm, right lower  lobe slightly decreased to 65 x 59 x 52 mm previously 72 x 61 x 62 mm, enlarged mediastinal and hilar lymph nodes and cystic appearing area anterior mediastinal lymph node less obvious otherwise lymph nodes stable, no focal liver lesions, both adrenals  enlarged right measuring 39 x 31 mm previously 22 x 9 mm whereas left adrenal was similar, mixed attenuation presacral region 33 x 24 mm similar could be postoperative change or residual tumor, slightly prominent new pelvic and inguinal lymph nodes, hernia  stable   -2/2023 potassium was increased to 2 tablets/day, B12 and folate recommended along with iron which he had to get over-the-counter.   -2/21/2023: WBC 7.8, hemoglobin 12, MCV 82, platelet 399, ANC 4.4.  Sodium 135, potassium 3.9, creatinine 0.6, normal calcium, glucose elevated 164.  Magnesium 1.9.  Normal LFTs, TSH, methylmalonic acid at 0.25.  Negative intrinsic factor antibody and  parietal cell  antibody, celiac panel, hepatitis panel.    -3/2023: WBC 8s, hemoglobin 11.6-12, MCV 80-82, platelet 359-456, ANC 5.4-5.2.  Normal sodium, potassium 3.3 with 40 mEq IV given and increase to 2 tablets/day->3.5 , creatinine 0.6, normal calcium, glucose 166-178.  Magnesium 1.6-4 g given and 1.7.  Normal LFTs.  Iron 7-9% with ferritin 138  But he had not started the oral iron yet. 3/22 potassium 20 and magnesium 2 given.      -March 2023 he eventually got the B12, folate, iron.  -He had an appt. with Dr. Lui 3/20/23 (he missed 2/27/2023 due to car trouble)-progression of disease but difficult to assess given  only 2 time points over about 1.5 years, patient not interested in changing treatment to a 5-FU based regimen and wanted to continue irinotecan/cetuximab but recommended CT after 4 cycles, offered a clinical trial but patient declined due to the travel,  Dr. Lui available for recommendations upon progression   -4/4/2023 potassium was increased to 3 tablets/day.   -4/4/2023-4/18/23: WBC 7-8.3, hemoglobin 11.7-11.8, MCV 80, platelets 369-407, ANC 4.09-5.6.  Sodium 134-135, potassium 3.7, creatinine 0.6, calcium normal, glucose 144-172.  Magnesium 1.8-1.6.  Total protein 6.3-6.5, alkaline phosphatase 138-125, CEA  elevated 160 up from 79.  Ferritin increased 165.  Negative intrinsic factor antibody and parietal cell antibody.  Methylmalonic acid normal 0.34.  IV potassium 20 and magnesium 4 given on 4/19/2023.   -4/24/2023 CT chest, abdomen, pelvis with contrast showed stable bilateral pulmonary nodules (right upper lobe 2.7 cm, left upper lobe 3.4 cm, right lower lobe 6.5 cm), slight decrease in presacral mass 2.9 x 2.4 cm previously 3.5 x 2.7 cm, decreased  multiple bilateral adrenal nodules on the right 3.4 cm previously 3.9 cm and on the left 4.1 cm previously 3.8 cm, no new or worsening metastatic disease, no lymphadenopathy, bilateral femoral head avascular necrosis and hernia stable, unremarkable  liver,  fatty pancreas.   -5/2/2023-5/30/2023: WBC 12s-11.1, hemoglobin 11.8-12s, MCV 81, platelet 465-429, elevated neutrophils.  Sodium 134-135, potassium 3.6 down to 3.1 with potassium increased to 20 mEq 2 tabs twice  a day and potassium 20 meq given on 5/17 with potassium subsequently up to 3.9 and was 3.5 on 5/30/2023 at which time 20 mEq IV were given, creatinine 0.6-0.5, calcium 8.9-9s, glucose up to 222.   Magnesium 1.7 down to 1.5 status post 4 g of magnesium on 5/17 and improved at 1.9 but subsequently 1.8 and was given 2 g on 5/31/2023.  Normal phosphorus.  Negative C. difficile..  Alkaline phosphatase 137-126.  CEA was better at 137.  Labs sent to  PCP.  Potassium 20 and magnesium 2 IV given on several occasions in May 2023.  Normal GGT.   -6/20/2023: WBC 13.3, hemoglobin 11.8, MCV 80, platelets 644, elevated neutrophils.  Sodium 133, potassium 3.8, creatinine 0.6, calcium 9.2, glucose 198.  Magnesium 1.7.  Alkaline phosphatase 160.  Iron 8% with ferritin 343.  B12 = 396.  Folate 12.  Magnesium  2 and potassium 20 IV given.   -7/3/2023: WBC 11.7, hemoglobin 11.9, MCV 81, platelet 550, elevated neutrophils.  Sodium 133, potassium 3.2, creatinine 0.5, calcium 8.9, glucose 201.  Magnesium 1.6.  Normal LFTs.  Potassium 40 and magnesium 3 given.   -7/5/2023 and 7/24/2023 Feraheme was given.  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   -7/20/2023: WBC 12.5, hemoglobin 13, MCV 81, platelet 430, elevated neutrophils.  Sodium 135, potassium 3.2 with 20 mEq given IV, creatinine 0.5, glucose 188, magnesium 1.8 with 2 g magnesium given, phosphorus 2.3 with phosphorus rich foods recommended,  alkaline phosphatase 128, B12 = 336 with MMA normal 0.17.  -Irinotecan and cetuximab were given 7/24/2023,  slightly delayed due to an issue with the port but was subsequently functioning okay.   -8/8/2023: WBC 9.9, hemoglobin 12.6, MCV 82, platelet 452, normal differential number.  Sodium 134, potassium 3.2 with potassium increased to 5 tablets/day, creatinine 0.4, calcium 9.  Magnesium 1.7, normal phosphorus, alkaline phosphatase 124, iron 19%  with ferritin 524; potassium 20 and magnesium 2 given   -8/9/23 Cetuximab/irinotecan  200 mg/M2 and 165 mg/M2 with Benadryl 50 mg p.o., dexamethasone 12 mg p.o., Aloxi, and atropine 0.4 mg IV,  subsequently discontinued due to progression disease.   -8/25/2023 CT chest, abdomen, pelvis with contrast showed progressive disease in the lung and mediastinal masses (left upper lobe 4.3 x 4.4 cm, left lower lobe 4.4 x 6.4 cm, right lower lobe 6.6 x 5.5 cm, subcarinal 6.3 x 4 cm), increased bilateral adrenal  masses (left 4.9 cm), increased presacral density 3.1 x 2.9 cm.  -8/29/2023: WBC normal 11.3, hemoglobin 13, MCV 83, platelet 582, ANC 7.5.  Normal sodium, potassium 3.9, creatinine 0.5, normal calcium, glucose 174.  Magnesium 1.8.  Alkaline phosphatase 126 stable.  Urine analysis no blood or protein.  .   -9/12/2023 patient admitted to only taking potassium 4 tabs per day instead of 5 tabs per day, 20 mEq each.   -9/12/2023: WBC  14.3, hemoglobin 13.1, MCV 83, platelet 626, elevated neutrophils, elevated monocytes 1.2.  Sodium 134, potassium 4, creatinine 0.6, calcium 9.6, glucose 258.  Magnesium 1.6 with 2 g given.  Normal LFTs and phosphorus.  -Given progressive disease, 9/13/2023 Avastin 5 mg/KG every 2 weeks was ordered along with Lonsurf usually 35 mg/M2 twice daily for 5 days but to make it easier on the patient I gave him 60 mg  (20 mg x 3 tabs) twice a day which was closer to 30 mg/M2.  -9/26/2023: WBC 9.6, hemoglobin 13, MCV 83, platelet 476, ANC 6.1.  Sodium 133, potassium 3.6 and with his potassium increased from 4/day up to 5/day, creatinine 0.5, calcium 9.1.  Magnesium 1.8, observed.  Normal LFTs.  CEA better 288 down from 383.  -10/10/2023: WBC 8.7, hemoglobin 13.9, MCV 85, platelet 488.  Iron 24% with ferritin 411.  Magnesium 2.3.  Sodium 134, potassium 4.6, creatinine 0.5, normal calcium, glucose 145, normal LFTs.        Past medical history  -Rectal cancer as above  -Rectal cancer and treatment complicated by neoplasm related pain, acneiform rash, diarrhea, hypokalemia, hypomagnesemia, noncompliance,  abdominal cramping improved with atropine  -Noncompliance  -3/2022 left common femoral vein DVT treated with heparin and transitioned  to Eliquis.  7/2021 DVT right popliteal vein and right posterior tibial vein.  Pulmonary embolism June 2014 not on anticoagulation at that time but subsequently anticoagulated although noncompliant.  DVT  leg after his rectal surgery in 2014 treated with Coumadin with questionable compliance.  2/7/23 patient noted no clots while he was  on a blood thinner and no clots prior to his cancer diagnosis.  -7/2019 LVEF 30%, multiple wall motion abnormalities, ischemic cardiomyopathy. 7/2019 chest pain with cardiac catheterization  normal coronaries but Takotsubo or stress cardiomyopathy.  2/7/2023 patient was not followed by cardiology.  -Cholecystitis January 2019 managed conservatively given  metastatic cancer but given progressive symptoms patient underwent cholecystectomy by Dr. Fernando 7/2019 complicated by pulmonary edema with evaluation showing cardiomyopathy  -Subarachnoid hemorrhage 9/2014 with cerebrospinal  fluid no malignant cells and was felt to be less likely a bleed based on CSF studies, EEG no seizure-like activity, had vasovagal syncope from pain  -Venous stasis, varicose veins   -Day's esophagus by EGD 8/2013, GERD  -Tobacco abuse  -Abdominal pain 2013/2014 managed with methadone  -Depression/anxiety  -Dyslipidemia  -Hypertension  -Avascular  necrosis femoral heads noted on imaging 2021  -hepatic steatosis noted on imaging 2020  -MRSA right hand infection status post I&D (before his diagnosis of rectal cancer)  -1/2019 admitted with neutropenic fever and pneumonia  -Microcytic anemia.  Patient reported taking oral iron for a few months in 2013/2014.  - Elevated glucose.  5/2023 started on dapagliflozin diabetes type 2 by PCP.  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 Fernando 1/2014, incision and drainage hand for abscess, EGD and colonoscopies, cardiac catheterization, complete dental extractions     Family history  Father with esophageal cancer.  Maternal grandfather with lung cancer.  Paternal grandfather with esophageal  cancer.     Social history  Tobacco: 1 pack/day, started as a teenager.  Alcohol:  Denied.  Drugs: Marijuana.  Has 4 children.     Allergies   Allergen Reactions    Acetaminophen Nausea And Vomiting    Adhesive Tape-Silicones Unknown     Current Outpatient Medications on File Prior to Visit   Medication Sig Dispense Refill    apixaban (Eliquis) 5 mg tablet Take 1 tablet (5  mg) by mouth 2 times a day.      buprenorphine-naloxone (Suboxone) 8-2 mg SL tablet Place 1 tablet under the tongue 2 times a day.      buprenorphine-naloxone (Suboxone) 8-2 mg SL tablet Place 0.5 tablets under the tongue once daily at bedtime.      dapagliflozin propanediol (Farxiga) 5 mg Take by mouth.      dexlansoprazole (Dexilant) 60 mg DR capsule Take 1 capsule (60 mg) by mouth once daily.      ibuprofen 800 mg tablet Take 1 tablet (800 mg) by mouth.      naloxone (Narcan) 4 mg/0.1 mL nasal spray Administer 1 spray (4 mg) into affected nostril(s) see administration instructions. 1 nasal spray (4 mg) as a single dose in one nostril as needed for opioid reversal; may repeat in 2 to 3 minutes in the opposite nostril if there is no response to the first dose. Immediately call 911.      trifluridine-tipiraciL (Lonsurf) 20-8.19 mg tablet TAKE THREE (3) TABLETS BY MOUTH 2 TIMES DAILY FOR 5 DAYS, EVERY 2 WEEKS 30 tablet 11    [DISCONTINUED] benzonatate (Tessalon Perles) 100 mg capsule Take 1 capsule (100 mg) by mouth 3 times a day as needed for cough.      [DISCONTINUED] buprenorphine-naloxone (Zubsolv) 5.7-1.4 mg SL tablet Place under the tongue.      [DISCONTINUED] gabapentin (Neurontin) 400 mg capsule Take 1 capsule (400 mg) by mouth.      [DISCONTINUED] hydrocodone/acetaminophen (VICODIN ORAL) Take 1 tablet by mouth every 6 hours if needed (PAIN).       No current facility-administered medications on file prior to visit.        Vitals:    10/10/23 0843   BP: 127/82   Pulse: 107   Resp: 18   Temp: 36.2 °C (97.2 °F)   SpO2: 94%     Physical Exam:      Constitutional: Performance status 1.  93.7  kg.  -General: Well-developed and well-nourished. No acute distress.  Somewhat odd affect, stands up during the visit.  Visible essential tremor-stable.  Looks well for him today.  -Lymphatics: No cervical or supraclavicular lymphadenopathy.  -Eyes:  Anicteric.  -HEENT: MMM , neck supple, no thrush.   Edentulous.  -Cardiovascular: RRR.    -Respiratory: CTAB. Breathing is nonlabored.  -Abdomen: Soft, nontender, multiple well-healed scars.  Limited by body habitus.  Possible hernia near his scars but reducible.  -Back: No costovertebral angle tenderness. No spine tenderness.  -Extremities: Significant bilateral leg edema with venous stasis changes and varicose veins-stable.    -Neurologic: Awake and interactive.  Gait steady.  Speech fluent.  Essential tremor.   -Skin: Venous stasis changes.  Port clean, dry, intact.  Face clear with mild flaking around his nose.  Tattoos not fully examined today.  Hands clear.  -Psychiatric: Cooperative.  Calm.          Assessment and Plan:   Assessment:    #Metastatic rectal cancer to the lungs (biopsy-proven), mediastinum, adrenals, MMR intact, BRAF and NRAS/KRAS wild-type .    Patient initially had stage III disease diagnosed in 2013 status post neoadjuvant Xeloda and radiation followed by resection followed by adjuvant FOLFOX but treatment incomplete due to compliance  issues, developed a metastasis 6/2018 treated with FOLFOX with some sort of reaction (patient recalled uncontrollable diarrhea), progressive disease on Xeloda/Avastin, started irinotecan/cetuximab 11/2021 complicated by GI and skin toxicity requiring  dose reductions and modifications. Some of his CEA fluctuations were  felt secondary to decreased chemotherapy doses as well as missed doses, April 2023 CEA significantly increased at 160 but May 2022 back down to 137, CEA at the time of initiation of irinotecan/cetuximab  165 in November 2021.  May 2023 he had increased diarrhea of unknown etiology, better after probiotic started June 2023, even better after increasing Imodium and Lomotil June 2023.   Last CT was in 11/2021, so I ordered a follow-up CT 2/20/2023 which over a year showed worsening lung nodules and significant increase in right adrenal gland as well as mild enlargement of inguinal lymph nodes but no  liver metastasis.  He had an appt.  with Dr. Lui 3/20/23 -progression of disease but difficult to assess given only 2 time points over about 1.5 years, patient not interested in changing treatment to a 5-FU based regimen and wanted to continue irinotecan/cetuximab but recommended  CT after 4 cycles, offered a clinical trial but patient declined due to the travel.  CT was stable or improved on 4/24/2023.       Unfortunately CT 8/25/2023 showed disease progression in the lung/mediastinal masses, presacral density, and bilateral adrenal masses consistent with progressive disease.  He still clinically was still doing quite well.  CEA significantly lora 8/29/2023  at 383.  We discussed options and decided on a dose modified Avastin and Lonsurf regimen started 9/13/2023 which was well-tolerated except for some nausea and vomiting but he did not take his premedications.  9/27/2023 he started taking the Zofran as a premedication and Lonsurf was much better tolerated.  CEA down to 288 on 9/26/2023.  Electrolytes seem better on this regimen.  Hepatitis panel negative 2/2023.     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     #Microcytic anemia suggestive of iron deficiency, also anemia on chemotherapy .  He remotely took iron at his original diagnosis in 2013/2014.  February 2023 iron 7% and ferritin 96 with hemoglobin 11.4 likely iron deficiency, also borderline B12 = 328 and borderline folate 7.3.  Keep in mind possible  malabsorption on his PPI.   2/2023 negative/normal intrinsic factor antibody, parietal cell antibody, methylmalonic acid, celiac.  He eventually got iron, B12, folate  over-the-counter March 2023.  3/2023 thrombocytosis  also would go along with the iron deficiency.  April 2023 thrombocytosis resolved, hemoglobin stable 11s.  May 2023 hemoglobin stable, platelet count back up which again could be due to iron deficiency or inflammation or malignancy.  June 2023 hemoglobin  11.8, platelet count up to 644, persistent microcytosis, persistent iron deficiency anemia with iron 8% and ferritin 343 so possible malabsorption with IV iron ordered (Feraheme given 7/5/2023  and 7/24/2023), folate normal, B12 = 396 somewhat borderline.  7/20/2023 hemoglobin improved, platelet count down to 430, MCV improved, so he seemed to respond to the IV iron; also B12 336 but methylmalonic  acid normal so not frankly deficient.  8/2023 iron 19% with ferritin 524 with close to normal hemoglobin so maintained observation, still has thrombocytosis which could be due to his malignancy, basically normal WBC and hemoglobin 8/29/2023.  9/12/2023  hemoglobin okay, leukocytosis and thrombocytosis likely from his malignancy.  10/10/2023 iron 24% with ferritin 411, normal WBC and hemoglobin, platelet count mildly elevated 488 again likely secondary to malignancy and inflammation.     #Hypokalemia possibly secondary to treatment versus malabsorption versus diarrhea.  Still low March 2023 requiring IV potassium and  increased potassium to 2 tablets/day, potassium 3.7 on 4/4/2023 with  potassium increased to 3 tablets/day.  April-May 2023 potassium borderline at 3.6-3.7 status post IV potassium but still persistently low at 3.1 so potassium was increased to a total  of 80 mEq daily and IV potassium given May 2023 with improvement on follow-up labs but still received IV potassium later in May 2023 and again June-July 2023.  Patient refused nephrology consultation July 2023.  August 2023 patient's potassium was increased  to 100 mEq daily and received IV potassium, level actually better 8/29/2023.  9/12/2023 patient admitted to only taking 4 tabs of his potassium rather than 5 tabs for unclear reasons, but actually potassium was okay at 4.0.  9/26/2023 potassium 3.6 so he was increased back up to 5 tablets today with normal potassium 10/10/2023.     #Hypomagnesemia likely from chemotherapy and/or diarrhea as well as possible  malabsorption from PPI.  Magnesium low March- September 2023 requiring IV magnesium.   No oral magnesium given diarrhea.  10/10/2023 magnesium normal, possibly due to being off the cetuximab.     #Mild hypophosphatemia July 2023 recommending dietary increase of phosphorus.  Normal phosphorus 8/8/2023 and 9/12/2023.     #Noncompliance     #Acneiform rash secondary to cetuximab managed with dose reductions/delays, topicals, minocycline.  He was doing well on minocycline 1 tablet daily and a facial lotion but not a facial steroid, seemed a little worse 2/21/2023 so I prescribed  topical  clindamycin lotion 1% but  this made his face more red  so stopped.  3/21/2023 his facial rash looked more consistent with seborrheic dermatitis improved with an antidandruff shampoo, fluctuates.  8/29/2023 he reported stopping minocycline due to nausea  and vomiting with GI symptoms improved and facial rash no worse.  9/12/2023 facial rash actually resolved after stopping the cetuximab.  10/10/2023 mild flaking around his face seems to get better with antidandruff shampoo.     #Diarrhea secondary  to chemotherapy managed with dose reductions/delays, Lomotil, Imodium; March 2023 he tried Metamucil which did not help his bowels.  May 2023 slightly increased bowel movements with more watery bowel movements, negative C. difficile,  not affecting his quality of life at this time and no dehydration, no clear etiology of the increased bowel movements, was still not taking his antidiarrheal medications at maximum.  June 2023 probiotic was started with improvement in bowels although  still baseline diarrhea, better July 2023 with increased Imodium and Lomotil use.  Bowels seem better off the cetuximab.    #Multiple episodes of thrombosis since his cancer diagnosis likely secondary to malignancy most recently 3/2022 treated with Eliquis with no bleeding or thrombotic events with this.  He does have chronic venous stasis changes and varicose veins which  do not bother him, 7/2023 declined vascular consultation.     #Nausea secondary to chemotherapy and minocycline controlled with Zofran and prochlorperazine except June 2023 realized that he was previously using 8 mg of Zofran, 4 mg of Zofran not controlling his nausea and vomiting.  Nausea/vomiting doing well with  the Zofran 8 mg and Compazine except 8/29/2023 noted worsening symptoms so he stopped his minocycline with subsequent improved symptoms.  September 2023 nausea and vomiting on Avastin/Lonsurf as above, better with taking scheduled Zofran.     #Elevated glucose on labs-patient reported PCP April 2023 was going to follow-up on his labs from my office and decide about starting a medication.  I did send our labs to her PCP including May 2023 glucose up to 200s, and PCP started patient to May 2023  on dapagliflozin.     #Chronic pain apparently on pain medications even prior to his cancer diagnosis, could also have pain secondary to malignancy, managed by PCP with Suboxone (2022 took gabapentin but no longer) with a history of pain medication addiction.   10/10/2023 patient admitted to taking ibuprofen which I asked him to stop even though he rarely took it, as he is on a blood thinner.     #Mood disorder, anxiety/depression, followed by psychiatry.     #Tobacco abuse     #Hypertension-this will be monitored on Avastin, so far doing okay     #Hepatic steatosis on imaging     #Takotsubo or stress cardiomyopathy, Day's esophagus, dyslipidemia, asthma/COPD, left thigh neuropathy after surgery but not related to chemotherapy, essential tremor     #April-May 2023 only minimally elevated alkaline phosphatase with normal GGT May 2023, could be due to fatty liver versus chemotherapy with bone marrow stimulation, no known liver metastasis.  June 2023 alkaline phosphatase increased 160.  July 2023 LFTs  actually normal except minimally elevated alkaline phosphatase 120s likely not significant.  August 2023 LFTs with stable mild alkaline phosphatase 120s, no liver metastasis 8/2023.  LFTs normal September 2023 and 10/10/2023.     #Mild hyponatremia down to 134-135 in April-May 2023, 133 in June-July 2023, lowest 133 in August- October 2023- can be observed.     #May-June 2023 mild leukocytosis without signs or symptoms of infection at that time although slightly worsening diarrhea also could have had something else going on.  His new diabetic medication in 2023 does not have leukocytosis associated with it.   Tobacco abuse can cause leukocytosis.  July 2023 WBC count only minimally elevated.  August 2023 normal WBC.  September 2023 mild leukocytosis could be from malignancy.  No signs or symptoms of infection.     -I have explained his unfortunate progressive disease, gave him options of additional treatment versus hospice and he wanted to go with additional treatment, again 8/29/2023 declined a clinical trial.  I did email Dr. Lui and notified her of my  plan, could email her again if issues, using NCCN guidelines version 4/2023 given his prior treatment I  recommended Avastin and Lonsurf, has been studied in patients who previously received bevacizumab, after that could consider regorafenib.  Given his  questionable compliance I am somewhat concerned about the oral regimen but nursing and myself have gone over this with him and he appears to be taking it correctly.  -Avastin 5 mg/kilogram every 2 weeks.   8/29/2023 Lonsurf was ordered, usually 35 mg/M2 twice daily for 5 days but to make it easier on the patient as that dose using a BSA of 2.13 came out to 74 mg, I decided to give him  60 mg (20 mg x 3 tabs) twice daily which is closer to 30 mg/M2 which is reasonable given his previous chemotherapy, #30 with 11 refills given as I only want to give him the 5 days worth each time  so he does not accidentally take too much, going to use a modified schedule where I give the Avastin and the Lonsurf every 2 weeks for less toxicity and ease of administration, advised home antiemetic premedication.   Urine analysis 8/29/2023 was okay, follow-up every 3 months on the Avastin.  Avastin and Lonsurf side effects were outlined in my note on 8/29/2023.  CBC with differential prior to each treatment, treat if ANC greater than 1500 and platelets greater  than 75, continue to monitor for drug interactions, CMP prior to each cycle.   No dose adjustment needed for mild hepatic impairment.  Maximum of 3 dose reductions are allowed for toxicity, discontinue if unable to tolerate 20 mg/M2.  Comes in 15 mg and  20 mg.  Patient was advised to take with food and swallow tablets whole.  Patient was advised to use Imodium at the first onset of diarrhea, call if symptoms not controlled.  Growth factor support or dose reduction could be considered for CBC toxicity.    I did advise the patient to be extra cautious especially as he is on a blood thinner. Discontinue bevacizumab for hypertensive crisis or hypertensive encephalopathy, serious hemorrhage, arterial thromboembolism, nephrotic syndrome,  gastrointestinal  perforation, fistula, heart failure, wound healing complications requiring medical intervention, or RPLS or posterior reversible encephalopathy syndrome.  Bevacizumab should not be administered within 28 days of surgery (before and after), should be suspended  prior to elective surgery, and should not resume after surgery until surgical wound is fully healed.  No dose adjustments for renal or hepatic impairment except if nephrotic syndrome discontinue the bevacizumab (proteinuria >= 2 g in 24 hours requires  holding bevacizumab). CBC, CMP, urinalysis, and magnesium should be monitored.  If proteinuria >=2+ by dipstick, assessed with a 24-hour urine protein.  Monitor blood pressure every 2-3 weeks during treatment and regularly after discontinuation if bevacizumab-induced  hypertension occurs.  Monitor for signs or symptoms of GI perforation or fistula, bleeding, thromboembolism, wound healing complications, and heart failure.    -CBC, CMP, magnesium, iron studies noted 10/10/2023.  CEA can be followed up in 1-2 months from 9/26/2023.  Phosphorus 9/12/2020 3 can be monitored.  Urine analysis 8/29/2023.  Vitamin D level could be considered.  -Continue Lomotil and Imodium.  I advised a bland diet while having diarrhea.    -Continue to monitor genetics recommendations, follow-up in 1-2 years from 7/2023.  I have recommend that family members get colonoscopies  starting at age 20.  -I explained that his disease is not curable but treatable on 2/7/2023, reviewed again 8/29/2023 and advised hope for the best but prepare for the worst.    -Dr. Lui did not recommend any additional tumor testing.  -He started taking over-the-counter B12, folate, iron in March 2023-Feraheme can be given again as needed.  Iron instructions were outlined  in my note on 2/21/2023, 8/29/2023 discussed discontinuing the oral iron but he seemed to want to continue to take that.  Side effects of IV iron were outlined in my  note on 6/20/2023.  Endoscopy for iron deficiency could be considered.  B12 borderline  with normal methylmalonic acid 7/20/2023, follow-up labs in 3-4 months. Folate normal 6/20/2023, follow-up in 4-6 months.  -CT chest, abdomen, pelvis with contrast can be monitored most recently 8/25/2023-patient has been reluctant to do imaging due to cost  but I explained it is the only way I can truly see what is going on, need to monitor at least every 4 months for now.   If alkaline phosphatase increases, consider bone scan given normal GGT or  a PET scan.  -Zofran 8 mg every 8 hours as needed #30 with 11 refills was prescribed 6/20/2023, asked him to disregard the Zofran 4 mg that was ordered 4/18/2023.  Prochlorperazine 10 mg as needed #30 with  11 refills was refilled on 5/2/2023.   -Continue pain medications from PCP, but 10/10/2023 asked him to discontinue the ibuprofen given the blood thinner use.   -Tobacco cessation was again recommended 8/8/2023.  - Minocycline 100 mg daily #30  was prescribed 11/17/2022 but discontinued 8/29/2023 as he stopped taking this and went off the cetuximab.   He can continue with antidandruff shampoo and facial lotion.  Dr. Zuniga recommended 1% hydrocortisone cream on his face twice daily but I have asked him to hold that and see how he does .  Dermatology consultation was offered 5/30/2023 but patient declined.  -Continue Imodium and   Lomotil 2 tabs 4 times a day as needed #80 with 3 refills, refilled 5/16/2023.    -Continue potassium and magnesium replacement, consider IV  if levels less than 3.8 and 1.8 respectively.  No oral magnesium because a tendency towards diarrhea.    Potassium 20 mEq, 5 tablets/day 8/8/2023 #150 with 11 refills, can take 2 in  the morning and 3 at night.  I highly encouraged a nephrology consultation for his electrolyte abnormalities but he declined 8/8/2023 understanding I have limitations in my expertise in this area.   -Continue Emla cream, refilled  6/22/2020 330 g with 3 refills.  -Port flushes will be done on treatment.  - Continue PPI for his history of Day's esophagus with further EGD as needed.  -Continue Eliquis 5 mg twice daily, #60 with 11  refills refilled on 9/11/2023, 8/29/2023 explained the risk on the Avastin with potential bleeding.  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   Information on Eliquis was outlined in my note on 2/21/2023.  General healthy lifestyle with reduction of immobility  and prevention of blood clots was encouraged including prophylactic anticoagulation if ever hospitalized or has a surgery if not on anticoagulation , no smoking, optimization of weight, getting up and moving around regularly if on a plane or in a car, no hormonal agents.  Compression  stockings were recommended again 6/20/2023. Concurrent other blood thinning medications were discouraged unless clinically needed.   I again 6/20/2023 advised the patient to get a medical alert  bracelet while on anticoagulation.  -Appreciate PCP management of diabetes.  - Nutrition consultation was offered 6/20/2023 regarding his diarrhea as well as his diabetes but he declined.  -Labs are incorporated in my note which is to be sent to PCP. I have recommended routine health care maintenance and screening through PCP. The patient was reminded to followup with the PCP  for other medical problems and ongoing care. The patient was asked to return to clinic to see me, or sooner as needed for new or concerning symptoms, in  2 weeks.  10/10/2023 I told the patient of my upcoming departure, notified him that I was told [facility] is working on a replacement for me, wished him the best of luck.

## 2023-10-11 ENCOUNTER — INFUSION (OUTPATIENT)
Dept: HEMATOLOGY/ONCOLOGY | Facility: HOSPITAL | Age: 38
End: 2023-10-11
Payer: MEDICARE

## 2023-10-11 VITALS
RESPIRATION RATE: 18 BRPM | WEIGHT: 209.44 LBS | TEMPERATURE: 97 F | DIASTOLIC BLOOD PRESSURE: 80 MMHG | BODY MASS INDEX: 30.93 KG/M2 | HEART RATE: 100 BPM | SYSTOLIC BLOOD PRESSURE: 128 MMHG | OXYGEN SATURATION: 98 %

## 2023-10-11 DIAGNOSIS — C20 RECTAL CANCER (MULTI): Primary | ICD-10-CM

## 2023-10-11 PROCEDURE — 2500000004 HC RX 250 GENERAL PHARMACY W/ HCPCS (ALT 636 FOR OP/ED): Mod: SE | Performed by: INTERNAL MEDICINE

## 2023-10-11 PROCEDURE — 96409 CHEMO IV PUSH SNGL DRUG: CPT

## 2023-10-11 PROCEDURE — 2500000004 HC RX 250 GENERAL PHARMACY W/ HCPCS (ALT 636 FOR OP/ED): Mod: SE | Performed by: NURSE PRACTITIONER

## 2023-10-11 PROCEDURE — 96523 IRRIG DRUG DELIVERY DEVICE: CPT

## 2023-10-11 RX ORDER — HEPARIN 100 UNIT/ML
500 SYRINGE INTRAVENOUS AS NEEDED
Status: CANCELLED | OUTPATIENT
Start: 2023-10-11

## 2023-10-11 RX ORDER — DIPHENHYDRAMINE HYDROCHLORIDE 50 MG/ML
50 INJECTION INTRAMUSCULAR; INTRAVENOUS AS NEEDED
Status: DISCONTINUED | OUTPATIENT
Start: 2023-10-11 | End: 2023-10-11 | Stop reason: HOSPADM

## 2023-10-11 RX ORDER — FAMOTIDINE 10 MG/ML
20 INJECTION INTRAVENOUS ONCE AS NEEDED
Status: DISCONTINUED | OUTPATIENT
Start: 2023-10-11 | End: 2023-10-11 | Stop reason: HOSPADM

## 2023-10-11 RX ORDER — EPINEPHRINE 0.3 MG/.3ML
0.3 INJECTION SUBCUTANEOUS EVERY 5 MIN PRN
Status: DISCONTINUED | OUTPATIENT
Start: 2023-10-11 | End: 2023-10-11 | Stop reason: HOSPADM

## 2023-10-11 RX ORDER — HEPARIN 100 UNIT/ML
500 SYRINGE INTRAVENOUS AS NEEDED
Status: DISCONTINUED | OUTPATIENT
Start: 2023-10-11 | End: 2023-10-11 | Stop reason: HOSPADM

## 2023-10-11 RX ORDER — PROCHLORPERAZINE MALEATE 10 MG
10 TABLET ORAL EVERY 6 HOURS PRN
Status: DISCONTINUED | OUTPATIENT
Start: 2023-10-11 | End: 2023-10-11 | Stop reason: HOSPADM

## 2023-10-11 RX ORDER — ALBUTEROL SULFATE 0.83 MG/ML
3 SOLUTION RESPIRATORY (INHALATION) AS NEEDED
Status: DISCONTINUED | OUTPATIENT
Start: 2023-10-11 | End: 2023-10-11 | Stop reason: HOSPADM

## 2023-10-11 RX ORDER — HEPARIN SODIUM,PORCINE/PF 10 UNIT/ML
50 SYRINGE (ML) INTRAVENOUS AS NEEDED
Status: CANCELLED | OUTPATIENT
Start: 2023-10-11

## 2023-10-11 RX ORDER — PROCHLORPERAZINE EDISYLATE 5 MG/ML
10 INJECTION INTRAMUSCULAR; INTRAVENOUS EVERY 6 HOURS PRN
Status: DISCONTINUED | OUTPATIENT
Start: 2023-10-11 | End: 2023-10-11 | Stop reason: HOSPADM

## 2023-10-11 RX ADMIN — Medication 500 UNITS: at 10:58

## 2023-10-11 RX ADMIN — BEVACIZUMAB-AWWB 472 MG: 400 INJECTION, SOLUTION INTRAVENOUS at 10:47

## 2023-10-11 ASSESSMENT — PAIN SCALES - GENERAL
PAINLEVEL: 7
PAINLEVEL_OUTOF10: 5

## 2023-10-11 NOTE — PROGRESS NOTES
Outstanding Clarification:     - Regimen: MVASI every 14 days  - Cycle/Day: Cycle 3 day 1 (last dose 9/27/23)  - Dose modifications: no  - Anti-emetics: no  - Drug interactions / Allergies:no  - Growth Factor: no  - Date change required: no    I Raj Ramirez, PharmD hereby acknowledge that the treatment plan indicated above has been verified for correctness to the best of my ability on 10/11/2023 at 9:29 AM.

## 2023-10-11 NOTE — SIGNIFICANT EVENT
10/11/23 0921   Prechemo Checklist   Has the patient been in the hospital, ED, or urgent care since last date of service No   Chemo/Immuno Consent Signed Yes   Protocol/Indications Verified Yes   Confirmed to previous date/time of medication Yes   Compared to previous dose Yes   All medications are dated accurately Yes   Pregnancy Test Negative Not applicable   Parameters Met Yes   BSA/Weight-Height Verified Yes   Dose Calculations Verified Yes

## 2023-10-23 ENCOUNTER — SPECIALTY PHARMACY (OUTPATIENT)
Dept: PHARMACY | Facility: CLINIC | Age: 38
End: 2023-10-23

## 2023-10-23 ENCOUNTER — PHARMACY VISIT (OUTPATIENT)
Dept: PHARMACY | Facility: CLINIC | Age: 38
End: 2023-10-23
Payer: MEDICARE

## 2023-10-23 PROCEDURE — RXMED WILLOW AMBULATORY MEDICATION CHARGE

## 2023-10-24 ENCOUNTER — APPOINTMENT (OUTPATIENT)
Dept: LAB | Facility: LAB | Age: 38
End: 2023-10-24
Payer: MEDICARE

## 2023-10-24 ENCOUNTER — TELEPHONE (OUTPATIENT)
Dept: HEMATOLOGY/ONCOLOGY | Facility: HOSPITAL | Age: 38
End: 2023-10-24

## 2023-10-24 ENCOUNTER — OFFICE VISIT (OUTPATIENT)
Dept: HEMATOLOGY/ONCOLOGY | Facility: HOSPITAL | Age: 38
End: 2023-10-24
Payer: MEDICARE

## 2023-10-24 VITALS
HEART RATE: 81 BPM | WEIGHT: 203.18 LBS | BODY MASS INDEX: 30.09 KG/M2 | RESPIRATION RATE: 20 BRPM | TEMPERATURE: 97.3 F | OXYGEN SATURATION: 96 % | HEIGHT: 69 IN | SYSTOLIC BLOOD PRESSURE: 114 MMHG | DIASTOLIC BLOOD PRESSURE: 75 MMHG

## 2023-10-24 DIAGNOSIS — E61.1 IRON DEFICIENCY: ICD-10-CM

## 2023-10-24 DIAGNOSIS — C78.00 RECTAL CANCER METASTASIZED TO LUNG (MULTI): ICD-10-CM

## 2023-10-24 DIAGNOSIS — E53.8 B12 DEFICIENCY: ICD-10-CM

## 2023-10-24 DIAGNOSIS — I26.99 PULMONARY EMBOLISM, UNSPECIFIED CHRONICITY, UNSPECIFIED PULMONARY EMBOLISM TYPE, UNSPECIFIED WHETHER ACUTE COR PULMONALE PRESENT (MULTI): ICD-10-CM

## 2023-10-24 DIAGNOSIS — C20 RECTAL CANCER (MULTI): Primary | ICD-10-CM

## 2023-10-24 DIAGNOSIS — E55.9 VITAMIN D DEFICIENCY: ICD-10-CM

## 2023-10-24 DIAGNOSIS — E53.8 FOLATE DEFICIENCY: ICD-10-CM

## 2023-10-24 DIAGNOSIS — E83.42 HYPOMAGNESEMIA: ICD-10-CM

## 2023-10-24 DIAGNOSIS — E87.6 HYPOKALEMIA: ICD-10-CM

## 2023-10-24 DIAGNOSIS — C20 RECTAL CANCER METASTASIZED TO LUNG (MULTI): ICD-10-CM

## 2023-10-24 DIAGNOSIS — I10 HYPERTENSION, UNSPECIFIED TYPE: ICD-10-CM

## 2023-10-24 LAB
25(OH)D3 SERPL-MCNC: 9 NG/ML (ref 30–100)
BASOPHILS # BLD AUTO: 0.09 X10*3/UL (ref 0–0.1)
BASOPHILS NFR BLD AUTO: 0.9 %
EOSINOPHIL # BLD AUTO: 0.3 X10*3/UL (ref 0–0.7)
EOSINOPHIL NFR BLD AUTO: 2.9 %
ERYTHROCYTE [DISTWIDTH] IN BLOOD BY AUTOMATED COUNT: 20.4 % (ref 11.5–14.5)
HCT VFR BLD AUTO: 43.7 % (ref 41–52)
HGB BLD-MCNC: 13.9 G/DL (ref 13.5–17.5)
IMM GRANULOCYTES # BLD AUTO: 0.11 X10*3/UL (ref 0–0.7)
IMM GRANULOCYTES NFR BLD AUTO: 1.1 % (ref 0–0.9)
LYMPHOCYTES # BLD AUTO: 1.95 X10*3/UL (ref 1.2–4.8)
LYMPHOCYTES NFR BLD AUTO: 19 %
MCH RBC QN AUTO: 27.3 PG (ref 26–34)
MCHC RBC AUTO-ENTMCNC: 31.8 G/DL (ref 32–36)
MCV RBC AUTO: 86 FL (ref 80–100)
MONOCYTES # BLD AUTO: 1.01 X10*3/UL (ref 0.1–1)
MONOCYTES NFR BLD AUTO: 9.8 %
NEUTROPHILS # BLD AUTO: 6.82 X10*3/UL (ref 1.2–7.7)
NEUTROPHILS NFR BLD AUTO: 66.3 %
NRBC BLD-RTO: 0 /100 WBCS (ref 0–0)
PLATELET # BLD AUTO: 511 X10*3/UL (ref 150–450)
PMV BLD AUTO: 9.4 FL (ref 7.5–11.5)
RBC # BLD AUTO: 5.09 X10*6/UL (ref 4.5–5.9)
RBC MORPH BLD: NORMAL
SCHISTOCYTES BLD QL SMEAR: NORMAL
WBC # BLD AUTO: 10.3 X10*3/UL (ref 4.4–11.3)

## 2023-10-24 PROCEDURE — 36415 COLL VENOUS BLD VENIPUNCTURE: CPT

## 2023-10-24 PROCEDURE — 82306 VITAMIN D 25 HYDROXY: CPT

## 2023-10-24 PROCEDURE — 4004F PT TOBACCO SCREEN RCVD TLK: CPT | Performed by: INTERNAL MEDICINE

## 2023-10-24 PROCEDURE — 82378 CARCINOEMBRYONIC ANTIGEN: CPT

## 2023-10-24 PROCEDURE — 3078F DIAST BP <80 MM HG: CPT | Performed by: INTERNAL MEDICINE

## 2023-10-24 PROCEDURE — 99215 OFFICE O/P EST HI 40 MIN: CPT | Performed by: INTERNAL MEDICINE

## 2023-10-24 PROCEDURE — 3074F SYST BP LT 130 MM HG: CPT | Performed by: INTERNAL MEDICINE

## 2023-10-24 PROCEDURE — 85025 COMPLETE CBC W/AUTO DIFF WBC: CPT

## 2023-10-24 RX ORDER — PROCHLORPERAZINE MALEATE 5 MG
10 TABLET ORAL EVERY 6 HOURS PRN
Status: CANCELLED | OUTPATIENT
Start: 2023-10-25

## 2023-10-24 RX ORDER — DOXEPIN HYDROCHLORIDE 10 MG/1
10 CAPSULE ORAL DAILY
Status: ON HOLD | COMMUNITY
End: 2024-01-01 | Stop reason: ALTCHOICE

## 2023-10-24 RX ORDER — ONDANSETRON HYDROCHLORIDE 8 MG/1
8 TABLET, FILM COATED ORAL EVERY 8 HOURS PRN
Status: ON HOLD | COMMUNITY
End: 2024-01-01 | Stop reason: ALTCHOICE

## 2023-10-24 RX ORDER — FERROUS SULFATE 325(65) MG
65 TABLET ORAL DAILY
Status: ON HOLD | COMMUNITY
End: 2024-01-01 | Stop reason: ALTCHOICE

## 2023-10-24 RX ORDER — POTASSIUM CHLORIDE 1500 MG/1
1 TABLET, EXTENDED RELEASE ORAL 2 TIMES DAILY
Status: ON HOLD | COMMUNITY
Start: 2022-05-11 | End: 2024-01-01 | Stop reason: ALTCHOICE

## 2023-10-24 RX ORDER — ALBUTEROL SULFATE 0.83 MG/ML
3 SOLUTION RESPIRATORY (INHALATION) AS NEEDED
Status: CANCELLED | OUTPATIENT
Start: 2023-10-25

## 2023-10-24 RX ORDER — OMEPRAZOLE 40 MG/1
1 CAPSULE, DELAYED RELEASE ORAL
Status: ON HOLD | COMMUNITY
Start: 2023-06-06 | End: 2024-01-01 | Stop reason: ALTCHOICE

## 2023-10-24 RX ORDER — DIVALPROEX SODIUM 500 MG/1
1000 TABLET, FILM COATED, EXTENDED RELEASE ORAL
Status: ON HOLD | COMMUNITY
Start: 2020-01-17 | End: 2024-01-01 | Stop reason: ALTCHOICE

## 2023-10-24 RX ORDER — HALOPERIDOL 2 MG/1
TABLET ORAL
Status: ON HOLD | COMMUNITY
Start: 2023-10-10 | End: 2024-01-01 | Stop reason: ALTCHOICE

## 2023-10-24 RX ORDER — ALBUTEROL SULFATE 90 UG/1
AEROSOL, METERED RESPIRATORY (INHALATION)
COMMUNITY
Start: 2021-11-02

## 2023-10-24 RX ORDER — LIDOCAINE AND PRILOCAINE 25; 25 MG/G; MG/G
CREAM TOPICAL
COMMUNITY
Start: 2021-12-21 | End: 2023-11-21 | Stop reason: SDUPTHER

## 2023-10-24 RX ORDER — PROCHLORPERAZINE MALEATE 10 MG
TABLET ORAL
Status: ON HOLD | COMMUNITY
End: 2024-01-01 | Stop reason: ALTCHOICE

## 2023-10-24 RX ORDER — LANOLIN ALCOHOL/MO/W.PET/CERES
1000 CREAM (GRAM) TOPICAL DAILY
Status: ON HOLD | COMMUNITY
End: 2024-01-01 | Stop reason: ALTCHOICE

## 2023-10-24 RX ORDER — EPINEPHRINE 0.3 MG/.3ML
0.3 INJECTION SUBCUTANEOUS EVERY 5 MIN PRN
Status: CANCELLED | OUTPATIENT
Start: 2023-10-25

## 2023-10-24 RX ORDER — PROCHLORPERAZINE EDISYLATE 5 MG/ML
10 INJECTION INTRAMUSCULAR; INTRAVENOUS EVERY 6 HOURS PRN
Status: CANCELLED | OUTPATIENT
Start: 2023-10-25

## 2023-10-24 RX ORDER — DIVALPROEX SODIUM 250 MG/1
1 TABLET, DELAYED RELEASE ORAL NIGHTLY
Status: ON HOLD | COMMUNITY
Start: 2020-01-17 | End: 2024-01-01 | Stop reason: ALTCHOICE

## 2023-10-24 RX ORDER — LOSARTAN POTASSIUM 50 MG/1
50 TABLET ORAL DAILY
COMMUNITY

## 2023-10-24 RX ORDER — LOPERAMIDE HCL 2 MG
2 TABLET ORAL 4 TIMES DAILY PRN
Status: ON HOLD | COMMUNITY
End: 2024-01-01 | Stop reason: ALTCHOICE

## 2023-10-24 RX ORDER — FOLIC ACID 1 MG/1
1 TABLET ORAL DAILY
Status: ON HOLD | COMMUNITY
End: 2024-01-01 | Stop reason: ALTCHOICE

## 2023-10-24 RX ORDER — FAMOTIDINE 10 MG/ML
20 INJECTION INTRAVENOUS ONCE AS NEEDED
Status: CANCELLED | OUTPATIENT
Start: 2023-10-25

## 2023-10-24 RX ORDER — METOPROLOL SUCCINATE 25 MG/1
25 TABLET, EXTENDED RELEASE ORAL DAILY
COMMUNITY

## 2023-10-24 RX ORDER — DIPHENHYDRAMINE HYDROCHLORIDE 50 MG/ML
50 INJECTION INTRAMUSCULAR; INTRAVENOUS AS NEEDED
Status: CANCELLED | OUTPATIENT
Start: 2023-10-25

## 2023-10-24 ASSESSMENT — PAIN SCALES - GENERAL: PAINLEVEL: 7

## 2023-10-24 NOTE — PROGRESS NOTES
"Interval History:    Patient is a 38 -year-old white man with metastatic rectal carcinoma , previously seen at [facility] with Dr. Zuniga, seen by me for the first time 2/7/2023 at [place].  He initially presented with stage III disease (T3 N2 M0) diagnosed in 2013 status post neoadjuvant Xeloda and radiation not completed secondary to noncompliance  but last treated November 2013 followed by resection followed by adjuvant FOLFOX not completed secondary to patient noncompliance.  He had biopsy-proven lung metastasis 6/22/2018 (RLL) , MMR normal, NRAS/KRAS/BRAF wild-type ; also adrenal mets but did not find a bx-metastatic disease was treated with FOLFOX with \" serious adverse\" reaction to oxaliplatin according to notes that patient  could not tell me what the reaction except possibly uncontrollable diarrhea, followed by Xeloda/Avastin with progressive disease,  started irinotecan/cetuximab 11/29/2021 complicated by skin and GI toxicity requiring dose reductions.  Patient was last seen by Dr. Zuniga 1/24/2023 noting status post 22 cycles of cetuximab  with irinotecan with dose reductions due to skin and GI toxicity, loose stools better, skin stable on minocycline, maintained on  Eliquis for recurrent thrombosis lower extremities.       Cetuximab/irinotecan was most recently given 8/9/2023, but unfortunately CT showed progressive disease 8/25/2023 in the lung/mediastinum and bilateral adrenal masses, luckily patient clinically  doing okay, ultimately decided on doing Lonsurf and Avastin given every 2 weeks, started 9/13/2023, did well except for a few episodes of nausea with vomiting while taking the Lonsurf but not every day, only took his Compazine or Zofran as needed but not  prior to taking the Lonsurf which helped, no significant diarrhea, no wound healing issues, did get IV magnesium 9/13/2023.  He got his second dose of the Avastin/Lonsurf 9/27/2023 and took Zofran premedication for the Lonsurf with no nausea or " vomiting, bowels did well.  He received the Lonsurf/Avastin 10/11/2023 with no nausea or vomiting, bowels doing okay.  10/24/2023 he says his Lonsurf is supposed to be coming today, asked my office to check on this and asked the patient to call with any issues.  10/10/2023 he admitted to using marijuana daily.  7/2023 phosphorus rich foods were recommended, IV magnesium and potassium given, recommended referral to nephrology for electrolyte  abnormalities but patient refused.  No thrombotic or bleeding events on anticoagulation, multiple thrombotic events since his diagnosis  of cancer but not prior, compliant with his Eliquis.  I verified with patient that his last CT was in 2021, so I ordered another CT scan done 2/20/2023 which showed most lung nodules increased in size and increase in the right adrenal gland mass . He had an appt. with Dr. Lui 3/20/23-progression of disease but difficult to assess given only 2 time points over about 1.5 years, patient not interested in changing treatment to a 5-FU  based regimen and wanted to continue irinotecan/cetuximab but recommended CT after 4 cycles, offered a clinical trial but patient declined due to the travel, Dr. Lui available for recommendations upon progression.  CT 4/24/2023 thankfully showed  overall stable disease despite CEA rising-patient said he has had the CEA fluctuations in the past, was actually lower subsequently even though CT showed progression 8/2023.  2/2023 folate, iron, and B12 were borderline, prescription written but apparently  insurance denied so he had to get these over-the-counter which he eventually did March 2023-given persistently low iron Feraheme given 7/5/2023 and 7/24/2023-no issues, did not feel any different.     Patient is by himself today, previously accompanied by his mother.   He lives  with his mother and brother.  He is able to do light chores.  He has some sort of mood disorder followed by psychiatry on Haldol and  "Depakote.  He does take a PPI given his history of Day's.  He is taking potassium 5 tabs/day which was increased from 4 tablets/day 9/26/2023.  PCP manages his chronic pain medications for his \"lung and kidney\" pain which has been stable since 2018 including Suboxone (Zubsolv), used to take gabapentin but no longer and believes he last took this  in 2022, is on  medications because of a history of pain medication addiction, uses ibuprofen less than once per month.  He has Zofran and Compazine as needed for nausea, 6/20/2023 noted that his nausea was worse with 1 episode of vomiting and we finally figured out that he was using 8 mg of  Zofran instead of the 4 mg that I prescribed, had nausea from his chemotherapy as well as from the minocycline, alternated the Zofran and the Compazine.  8/29/2023 he said he recently stopped his minocycline because he noted more nausea and vomiting with  that medication with subsequent symptoms returned to baseline and no change in his facial rash.  2/21/2023 I felt that his facial rash was worse so I gave him clindamycin topical but it made his face more red so stopped, had been well controlled with  minocycline 1 tablet/day and a facial lotion but not a facial steroid-3/21/2023 I felt that he had some seborrheic dermatitis and recommended the antidandruff shampoo on his face which he has been using which is helping-8/29/2023->CURRENT facial rash  doing okay even off the minocycline, just using a facial lotion and the dandruff shampoo, October 2023 has only minimal flaking on his face.  He was averaging about 2-3 bowel movements per day controlled with Imodium 4 tablets/day and Lomotil 1 tablet/day; March 2023 he tried Metamucil but this did not improve his bowel movements;  5/16/2023 he said that he was having more like 4-5 more watery bowel movements not controlled with his current regimen; 5/30/2023 bowel movements were unchanged even though he increased his Lomotil to 3/day " and was taking the Imodium only 3/day, no change  in his diet or sick contacts, negative C. difficile May 2023; June 2023 he started on a probiotic, bowel movements slightly better at 3/day, still using Lomotil and Imodium 3/day; 7/3/2023->current bowel movements are better at 1-2/day, was using the Imodium  and Lomotil 4 times a day as well as the probiotic but 10/24/2023 he says he is only using the Imodium 4 times a day and no longer using the probiotic and Lomotil with bowels doing fine.   He has some left thigh numbness and burning since his rectal surgery, no neuropathy from chemotherapy.  He has an essential tremor which is unchanged since his cancer diagnosis.   Patient previously took Lasix which was held April 2022 due to dehydration and hypokalemia-chronic leg edema not bothering him lately.    Glucose has been elevated, no diabetes but he was diagnosed  with diabetes May 2023 and started on dapagliflozin by PCP at the end of May 2023, does not check his glucose.  7/20/2023 he showed me his varicose veins, going on for years, declined vascular consultation.     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     Prescriptions from Dr. Zuniga included Eliquis, potassium, Emla cream, Lomotil.     PCP is Dr. Jesse Dominguez, notes not in our old system but able to see in epic 9/26/2023 saw for chronic medical issues.   Patient declined a flu vaccine 9/12/2023.  Patient received 2 COVID vaccines but  no booster.  I recommended ongoing vaccine management and COVID management through PCP 9/12/2023. I have reviewed the available laboratory data, radiographic data, medications, and notes,  and have summarized them in this note 10/24/2023.  No other hospitalizations, major illness, or  procedures since his last appointment 10/10/2023.    Epic transition occurred 10/2/2023 from prior EMR system.  I did not go back in the epic system prior to this point unless patient brought up an issue.  I did review the EMR data in epic from 10/2/2023 going forward, but relied on our old EMR system for data prior to the transition.      10/10/2023 note that medication list is not correct, patient going to bring in an updated medication list, supposed to be on potassium 20 mEq 5 tablets/day, Emla cream, Zofran 8 mg, Lomotil, Compazine 10 mg, B12 1000 mg, folic acid 1 mg, metoprolol, losartan, Ventolin, iron, doxepin, Haldol, Depakote, probiotic.  Nursing  was having issues inputting the meds into the system.     No fevers, unintentional weight loss,  drenching sweats, headaches, sore throat, acute vision changes, chest pain, shortness breath, cough, abdominal pain, nausea or vomiting now, blood in stool, dysuria or hematuria, pain or neurologic symptoms except as above , abnormal bruising or bleeding, or thyroid disorder.    Hematology-oncology history (reviewed and updated 10/24/2023)   -At age 28 patient underwent colonoscopy August 2013 for abdominal pain, rectal bleeding, weight loss, nausea/vomiting, and painful small bowel movements found to have a circumferential fungating partially  obstructive mass in the rectum with biopsy showing  invasive moderately differentiated adenocarcinoma.  Pathology showed invasive moderately differentiated adenocarcinoma, normal mismatch repair protein expression.  He was felt to have a stage III = T3 N2 M0 rectal adenocarcinoma.  Initial CEA was 5.4.   -Patient underwent neoadjuvant Xeloda (according to Dr. Perico Benedict's note from 8/2013 plan 1500 mg twice daily throughout radiation) and radiation not completed secondary to noncompliance but  last treated November 2013 under Dr. Perico Benedict and Dr. Constantine Tong, 45 Gy to the pelvis including lymph nodes and an additional 3.6 Gy to the involved nodes and rectal mass, initially  planned to go to 50.4 to the involved nodes and 54 to the rectal masses but patient was noncompliant.  Treatment was complicated by loose stools, nausea, vomiting, anorectal pain, skin irritation.  Rectal bleeding and appetite improved.  He had what  was felt to be an excellent response on MRI.  -1/2/2014 Dr. Willa Khan performed open proctosigmoidectomy with coloanal anastomosis and diverting ileostomy.  Pathology  showed invasive moderately differentiated adenocarcinoma of the rectum, 2 of 31 lymph nodes positive, therapy effect, low-grade, tumor 3.5 x 3 x 0.5 cm, negative margins, lymphovascular invasion and perineural invasion present,  ypT3 ypN1b  -11/2014 patient underwent ileostomy takedown with resection of small bowel by Dr. Willa Khan, benign pathology.  -Patient received adjuvant FOLFOX not completed secondary to patient noncompliance.  -12/2015 Dr. Fernando attempted a colonoscopy but bowel prep was incomplete  -6/2018 biopsy-proven RLL lung  (no adrenal path seen)  metastasis, MMR normal, NRAS and BRAF wild-type, moderately differentiated adenocarcinoma, reviewed the pathology report, c/w CRC origin .  -6/2021 CT showed worsening metastasis including lungs, mediastinum, left adrenal.  -Metastatic rectal cancer was  treated with FOLFOX with serious adverse reaction to oxaliplatin followed by Xeloda/Avastin with progressive disease .  -11/8/2021 CT chest, abdomen, pelvis with contrast showed progressive metastasis compared to 3 months prior including pulmonary/mediastinal and left adrenal metastasis (right lower lobe at 7.4 x 5.8 cm up to 8 x 6.3 cm, left lower lobe 4.3 x 2.9  cm up to 4.7 x 3.3 cm, prevascular lymph node 27 x 15 mm increased to 29 x 22 mm, prevascular nodularity enlarged from 28 x 8 mm to 25 x 11 mm, left adrenal 5.2 x 2.1 cm increased to 5.7 x 3.0 cm), increased nodularity right adrenal gland 22 x 9 mm, stable  presacral soft tissue thickening 3.3 cm, avascular necrosis femoral heads.  -Patient started irinotecan/cetuximab 11/29/2021 at 180 mg/M2 and 500 mg/M2, ev sho 2 weeks.  -2/16/2022 fourth dose irinotecan/cetuximab was reduced in the cetuximab to 400 mg/M2 (20% dose reduction due to skin toxicity and diarrhea)  -3/10/2022 acute nonocclusive DVT left common femoral and proximal to mid femoral veins treated with heparin and subsequently Eliquis .  -4/20/2022 irinotecan was continued at 180 mg/M2 but cetuximab was decreased to 300 mg/M2 (due to diarrhea and dehydration)  -5/11/2022 irinotecan was decreased to 150 mg/M2 (20% reduction due to diarrhea), cetuximab continued at 300 mg/M2   -6/8/2022 irinotecan continued 150 mg/M2, cetuximab was decreased to 250 mg/M2 (due to skin toxicity)  -10/5/2022 irinotecan was continued at 150 mg/M2, cetuximab decreased to 200 mg/M2 (due to skin toxicity)  -10/19/2022 irinotecan was increased  to 165 mg/M2 (due to increased CEA), cetuximab continued 200 mg/M2  -1/11/2022 irinotecan was continued at 165 mg/M2, cetuximab given 445 mg flat dose which came out to 200 mg/M2  -January 2023: WBC 7.7-8.5, hemoglobin 11.6-12.1, MCV 80-81, platelet 341-285, ANC 5.0-4.5.  Glucose 117-133, normal sodium, potassium 3.6-3.7, creatinine 0.5-0.6, calcium 8.6-9.0, magnesium 1.8, albumin  3.4-3.5 otherwise normal LFTs.  -1/25/2023 patient received dexamethasone 12 mg, Benadryl 50 mg, Aloxi 0.25 mg, atropine 0.4 mg, irinotecan 165 mg/M2, cetuximab 445 mg .     -I initially saw the patient on 2/7/2023, previously seen by other providers most recently Dr. Zuniga at [facility], not all of his information in my EMR system, somewhat difficult to sort through the notes.  I did not go through all of his extensive labs.  CEA trend: August 2013: 5.4, November 2014: 2.2, November 2021: 165, January 2022: 151, February 2022: 52.8, March 2022: 27.5, April-May 2022: 46-47.6 (rise felt secondary to dose reductions and missed doses), July 2022: 80, August-September 2022: 56-61,  October 2022: 60<-71, November 2022 64-69, December 2022: 85, January 2023: 71  Patient received IV magnesium most recently November 2022, October 2022, July 2022.     -2/7/2023 labs which went to Dr. Zuniga: WBC 7, hemoglobin 11.4, MCV 81, platelets 350, normal differential number.  Sodium 136, potassium 3.3, creatinine 0.5, calcium 8s, glucose 149.  Magnesium 1.8.  Normal LFTs.  Iron 7% with ferritin 96.  Normal phosphorus.   B12 = 328.  Folate 7.3.  CEA 79.   -2/20/2023 CT chest, abdomen, pelvis with contrast compared to November 2021 showed multiple lung nodules increased in size for example right upper lobe 26 x 24 mm previously 23 x 10 mm and left upper lobe 35 x 32 mm previously 22 x 17 mm, right lower  lobe slightly decreased to 65 x 59 x 52 mm previously 72 x 61 x 62 mm, enlarged mediastinal and hilar lymph nodes and cystic appearing area anterior mediastinal lymph node less obvious otherwise lymph nodes stable, no focal liver lesions, both adrenals  enlarged right measuring 39 x 31 mm previously 22 x 9 mm whereas left adrenal was similar, mixed attenuation presacral region 33 x 24 mm similar could be postoperative change or residual tumor, slightly prominent new pelvic and inguinal lymph nodes, hernia  stable   -2/2023 potassium was  increased to 2 tablets/day, B12 and folate recommended along with iron which he had to get over-the-counter.   -2/21/2023: WBC 7.8, hemoglobin 12, MCV 82, platelet 399, ANC 4.4.  Sodium 135, potassium 3.9, creatinine 0.6, normal calcium, glucose elevated 164.  Magnesium 1.9.  Normal LFTs, TSH, methylmalonic acid at 0.25.  Negative intrinsic factor antibody and  parietal cell antibody, celiac panel, hepatitis panel.    -3/2023: WBC 8s, hemoglobin 11.6-12, MCV 80-82, platelet 359-456, ANC 5.4-5.2.  Normal sodium, potassium 3.3 with 40 mEq IV given and increase to 2 tablets/day->3.5 , creatinine 0.6, normal calcium, glucose 166-178.  Magnesium 1.6-4 g given and 1.7.  Normal LFTs.  Iron 7-9% with ferritin 138  But he had not started the oral iron yet. 3/22 potassium 20 and magnesium 2 given.      -March 2023 he eventually got the B12, folate, iron.  -He had an appt. with Dr. Lui 3/20/23 (he missed 2/27/2023 due to car trouble)-progression of disease but difficult to assess given  only 2 time points over about 1.5 years, patient not interested in changing treatment to a 5-FU based regimen and wanted to continue irinotecan/cetuximab but recommended CT after 4 cycles, offered a clinical trial but patient declined due to the travel,  Dr. Lui available for recommendations upon progression   -4/4/2023 potassium was increased to 3 tablets/day.   -4/4/2023-4/18/23: WBC 7-8.3, hemoglobin 11.7-11.8, MCV 80, platelets 369-407, ANC 4.09-5.6.  Sodium 134-135, potassium 3.7, creatinine 0.6, calcium normal, glucose 144-172.  Magnesium 1.8-1.6.  Total protein 6.3-6.5, alkaline phosphatase 138-125, CEA  elevated 160 up from 79.  Ferritin increased 165.  Negative intrinsic factor antibody and parietal cell antibody.  Methylmalonic acid normal 0.34.  IV potassium 20 and magnesium 4 given on 4/19/2023.   -4/24/2023 CT chest, abdomen, pelvis with contrast showed stable bilateral pulmonary nodules (right upper lobe 2.7 cm, left  upper lobe 3.4 cm, right lower lobe 6.5 cm), slight decrease in presacral mass 2.9 x 2.4 cm previously 3.5 x 2.7 cm, decreased  multiple bilateral adrenal nodules on the right 3.4 cm previously 3.9 cm and on the left 4.1 cm previously 3.8 cm, no new or worsening metastatic disease, no lymphadenopathy, bilateral femoral head avascular necrosis and hernia stable, unremarkable liver,  fatty pancreas.   -5/2/2023-5/30/2023: WBC 12s-11.1, hemoglobin 11.8-12s, MCV 81, platelet 465-429, elevated neutrophils.  Sodium 134-135, potassium 3.6 down to 3.1 with potassium increased to 20 mEq 2 tabs twice  a day and potassium 20 meq given on 5/17 with potassium subsequently up to 3.9 and was 3.5 on 5/30/2023 at which time 20 mEq IV were given, creatinine 0.6-0.5, calcium 8.9-9s, glucose up to 222.   Magnesium 1.7 down to 1.5 status post 4 g of magnesium on 5/17 and improved at 1.9 but subsequently 1.8 and was given 2 g on 5/31/2023.  Normal phosphorus.  Negative C. difficile..  Alkaline phosphatase 137-126.  CEA was better at 137.  Labs sent to  PCP.  Potassium 20 and magnesium 2 IV given on several occasions in May 2023.  Normal GGT.   -6/20/2023: WBC 13.3, hemoglobin 11.8, MCV 80, platelets 644, elevated neutrophils.  Sodium 133, potassium 3.8, creatinine 0.6, calcium 9.2, glucose 198.  Magnesium 1.7.  Alkaline phosphatase 160.  Iron 8% with ferritin 343.  B12 = 396.  Folate 12.  Magnesium  2 and potassium 20 IV given.   -7/3/2023: WBC 11.7, hemoglobin 11.9, MCV 81, platelet 550, elevated neutrophils.  Sodium 133, potassium 3.2, creatinine 0.5, calcium 8.9, glucose 201.  Magnesium 1.6.  Normal LFTs.  Potassium 40 and magnesium 3 given.   -7/5/2023 and 7/24/2023 Feraheme was given.  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   -7/20/2023: WBC 12.5, hemoglobin 13, MCV 81, platelet 430, elevated neutrophils.  Sodium 135, potassium 3.2 with 20 mEq given IV, creatinine 0.5, glucose 188, magnesium 1.8 with 2 g magnesium given, phosphorus 2.3 with phosphorus rich foods recommended,  alkaline phosphatase 128, B12 = 336 with MMA normal 0.17.  -Irinotecan and cetuximab were given 7/24/2023,  slightly delayed due to an issue with the port but was subsequently functioning okay.   -8/8/2023: WBC 9.9, hemoglobin 12.6, MCV 82, platelet 452, normal differential number.  Sodium 134, potassium 3.2 with potassium increased to 5 tablets/day, creatinine 0.4, calcium 9.  Magnesium 1.7, normal phosphorus, alkaline phosphatase 124, iron 19%  with ferritin 524; potassium 20 and magnesium 2 given   -8/9/23 Cetuximab/irinotecan  200 mg/M2 and 165 mg/M2 with Benadryl 50 mg p.o., dexamethasone 12 mg p.o., Aloxi, and atropine 0.4 mg IV,  subsequently discontinued due to progression disease.   -8/25/2023 CT chest, abdomen, pelvis with contrast showed progressive disease in the lung and mediastinal masses (left upper lobe 4.3 x 4.4 cm, left lower lobe 4.4 x 6.4 cm, right lower lobe 6.6 x 5.5 cm, subcarinal 6.3 x 4 cm), increased bilateral adrenal  masses (left 4.9 cm), increased presacral density 3.1 x 2.9  cm.  -8/29/2023: WBC normal 11.3, hemoglobin 13, MCV 83, platelet 582, ANC 7.5.  Normal sodium, potassium 3.9, creatinine 0.5, normal calcium, glucose 174.  Magnesium 1.8.  Alkaline phosphatase 126 stable.  Urine analysis no blood or protein.  .   -9/12/2023 patient admitted to only taking potassium 4 tabs per day instead of 5 tabs per day, 20 mEq each.   -9/12/2023: WBC 14.3, hemoglobin 13.1, MCV 83, platelet 626, elevated neutrophils, elevated monocytes 1.2.  Sodium 134, potassium 4, creatinine 0.6, calcium 9.6, glucose 258.  Magnesium 1.6 with 2 g given.  Normal LFTs and phosphorus.  -Given progressive disease, 9/13/2023 Avastin 5 mg/KG every 2 weeks was ordered along with Lonsurf usually 35 mg/M2 twice daily for 5 days but to make it easier on the patient I gave him 60 mg  (20 mg x 3 tabs) twice a day which was closer to 30 mg/M2.  -9/26/2023: WBC 9.6, hemoglobin 13, MCV 83, platelet 476, ANC 6.1.  Sodium 133, potassium 3.6 and with his potassium increased from 4/day up to 5/day, creatinine 0.5, calcium 9.1.  Magnesium 1.8, observed.  Normal LFTs.  CEA better 288 down from 383.  -10/10/2023: WBC 8.7, hemoglobin 13.9, MCV 85, platelet 488.  Iron 24% with ferritin 411.  Magnesium 2.3.  Sodium 134, potassium 4.6, creatinine 0.5, normal calcium, glucose 145, normal LFTs.        Past medical history  -Rectal cancer as above  -Rectal cancer and treatment complicated by neoplasm related pain, acneiform rash, diarrhea, hypokalemia, hypomagnesemia, noncompliance,  abdominal cramping improved with atropine  -Noncompliance  -3/2022 left common femoral vein DVT treated with heparin and transitioned  to Eliquis.  7/2021 DVT right popliteal vein and right posterior tibial vein.  Pulmonary embolism June 2014 not on anticoagulation at that time but subsequently anticoagulated although noncompliant.  DVT  leg after his rectal surgery in 2014 treated with Coumadin with questionable compliance.  2/7/23 patient noted no  clots while he was  on a blood thinner and no clots prior to his cancer diagnosis.  -7/2019 LVEF 30%, multiple wall motion abnormalities, ischemic cardiomyopathy. 7/2019 chest pain with cardiac catheterization  normal coronaries but Takotsubo or stress cardiomyopathy.  2/7/2023 patient was not followed by cardiology.  -Cholecystitis January 2019 managed conservatively given metastatic cancer but given progressive symptoms patient underwent cholecystectomy by Dr. Fernando 7/2019 complicated by pulmonary edema with evaluation showing cardiomyopathy  -Subarachnoid hemorrhage 9/2014 with cerebrospinal  fluid no malignant cells and was felt to be less likely a bleed based on CSF studies, EEG no seizure-like activity, had vasovagal syncope from pain  -Venous stasis, varicose veins   -Day's esophagus by EGD 8/2013, GERD  -Tobacco abuse  -Abdominal pain 2013/2014 managed with methadone  -Depression/anxiety  -Dyslipidemia  -Hypertension  -Avascular  necrosis femoral heads noted on imaging 2021  -hepatic steatosis noted on imaging 2020  -MRSA right hand infection status post I&D (before his diagnosis of rectal cancer)  -1/2019 admitted with neutropenic fever and pneumonia  -Microcytic anemia.  Patient reported taking oral iron for a few months in 2013/2014.  - Elevated glucose.  5/2023 started on dapagliflozin diabetes type 2 by PCP.  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 Fernando 1/2014, incision and drainage hand for abscess, EGD and colonoscopies, cardiac catheterization, complete dental extractions     Family history  Father with esophageal cancer.  Maternal grandfather with lung cancer.  Paternal grandfather with esophageal   cancer.     Social history  Tobacco: 1 pack/day, started as a teenager.  Alcohol:  Denied.  Drugs: Marijuana.  Has 4 children.     Allergies   Allergen Reactions    Acetaminophen Nausea And Vomiting    Adhesive Tape-Silicones Unknown     Current Outpatient Medications on File Prior to Visit   Medication Sig Dispense Refill    albuterol (Ventolin HFA) 90 mcg/actuation inhaler INHALE TWO PUFFS INTO THE LUNGS EVERY 6 HOURS AS NEEDED FOR SHORTNESS OF BREATH OR FOR WHEEZING FOR UP TO 30 DAYS      apixaban (Eliquis) 5 mg tablet Take 1 tablet (5 mg) by mouth 2 times a day.      buprenorphine-naloxone (Suboxone) 8-2 mg SL tablet Place 1 tablet under the tongue 2 times a day.      cyanocobalamin (Vitamin B-12) 1,000 mcg tablet Take 1 tablet (1,000 mcg) by mouth once daily.      dapagliflozin propanediol (Farxiga) 5 mg Take by mouth.      divalproex (Depakote ER) 500 mg 24 hr tablet Take 2 tablets (1,000 mg) by mouth.      divalproex (Depakote) 250 mg EC tablet Take 1 tablet (250 mg) by mouth once daily at bedtime.      ferrous sulfate 325 (65 Fe) MG tablet Take 1 tablet (65 mg of iron) by mouth once daily.      folic acid (Folvite) 1 mg tablet Take 1 tablet (1 mg) by mouth once daily.      haloperidol (Haldol) 2 mg tablet TAKE ONE TABLET BY MOUTH ONCE DAILY FOR AGITATION.      ibuprofen 800 mg tablet Take 1 tablet (800 mg) by mouth.      lidocaine-prilocaine (Emla) 2.5-2.5 % cream apply topically to affected area if needed      loperamide (Imodium A-D) 2 mg tablet Take 1 tablet (2 mg) by mouth 4 times a day as needed for diarrhea.      losartan (Cozaar) 50 mg tablet Take 1 tablet (50 mg) by mouth once daily.      metoprolol succinate XL (Toprol-XL) 25 mg 24 hr tablet Take 1 tablet (25 mg) by mouth once daily.      naloxone (Narcan) 4 mg/0.1 mL nasal spray Administer 1 spray (4 mg) into affected nostril(s) see administration instructions. 1 nasal spray (4 mg) as a single dose in one nostril as needed for opioid reversal; may  repeat in 2 to 3 minutes in the opposite nostril if there is no response to the first dose. Immediately call 911.      omeprazole (PriLOSEC) 40 mg DR capsule Take 1 capsule (40 mg) by mouth.      ondansetron (Zofran) 8 mg tablet Take 1 tablet (8 mg) by mouth every 8 hours if needed.      potassium chloride CR 20 mEq ER tablet Take 1 tablet (20 mEq) by mouth twice a day.      prochlorperazine (Compazine) 10 mg tablet TAKE ONE TABLET BY MOUTH EVERY 6 HOURS AS NEEDED FOR NAUSEA AND / OR VOMITING      trifluridine-tipiraciL (Lonsurf) 20-8.19 mg tablet TAKE THREE (3) TABLETS BY MOUTH 2 TIMES DAILY FOR 5 DAYS, EVERY 2 WEEKS 30 tablet 11    buprenorphine-naloxone (Suboxone) 8-2 mg SL tablet Place 0.5 tablets under the tongue once daily at bedtime.      doxepin (SINEquan) 10 mg capsule Take 1 capsule (10 mg) by mouth once daily. Pt unsure of dose      [DISCONTINUED] dexlansoprazole (Dexilant) 60 mg DR capsule Take 1 capsule (60 mg) by mouth once daily.       No current facility-administered medications on file prior to visit.        Vitals:    10/24/23 0800   BP: 114/75   Pulse: 81   Resp: 20   Temp: 36.3 °C (97.3 °F)   SpO2: 96%     Physical Exam:      Constitutional: Performance status 1.  92.2  kg.  -General: Well-developed and well-nourished. No acute distress.  Somewhat odd affect, stands up during the visit.  Visible essential tremor-stable.  Looks well for him today again.  -Lymphatics: No cervical or supraclavicular lymphadenopathy.  -Eyes:  Anicteric.  -HEENT: MMM , neck supple, no thrush.  Edentulous.  -Cardiovascular: RRR.    -Respiratory: CTAB except for a very faint wheeze scattered. Breathing is nonlabored.  -Abdomen: Soft, nontender, multiple well-healed scars.  Limited by body habitus.  Possible hernia near his scars but reducible.  -Back: No costovertebral angle tenderness. No spine tenderness.  -Extremities: Significant bilateral leg edema with venous stasis changes and varicose veins-stable.     -Neurologic: Awake and interactive.  Gait steady.  Speech fluent.  Essential tremor.   -Skin: Venous stasis changes.  Port clean, dry, intact.  Face clear with mild flaking around his nose and forehead-stable.  Tattoos not fully examined today.  Hands clear.  -Psychiatric: Cooperative.  Calm.          Assessment and Plan:   Assessment:    #Metastatic rectal cancer to the lungs (biopsy-proven), mediastinum, adrenals, MMR intact, BRAF and NRAS/KRAS wild-type .    Patient initially had stage III disease diagnosed in 2013 status post neoadjuvant Xeloda and radiation followed by resection followed by adjuvant FOLFOX but treatment incomplete due to compliance  issues, developed a metastasis 6/2018 treated with FOLFOX with some sort of reaction (patient recalled uncontrollable diarrhea), progressive disease on Xeloda/Avastin, started irinotecan/cetuximab 11/2021 complicated by GI and skin toxicity requiring  dose reductions and modifications. Some of his CEA fluctuations were  felt secondary to decreased chemotherapy doses as well as missed doses, April 2023 CEA significantly increased at 160 but May 2022 back down to 137, CEA at the time of initiation of irinotecan/cetuximab  165 in November 2021.  May 2023 he had increased diarrhea of unknown etiology, better after probiotic started June 2023, even better after increasing Imodium and Lomotil June 2023.   Last CT was in 11/2021, so I ordered a follow-up CT 2/20/2023 which over a year showed worsening lung nodules and significant increase in right adrenal gland as well as mild enlargement of inguinal lymph nodes but no liver metastasis.  He had an appt.  with Dr. Lui 3/20/23 -progression of disease but difficult to assess given only 2 time points over about 1.5 years, patient not interested in changing treatment to a 5-FU based regimen and wanted to continue irinotecan/cetuximab but recommended  CT after 4 cycles, offered a clinical trial but patient declined due  to the travel.  CT was stable or improved on 4/24/2023.       Unfortunately CT 8/25/2023 showed disease progression in the lung/mediastinal masses, presacral density, and bilateral adrenal masses consistent with progressive disease.  He still clinically was still doing quite well.  CEA significantly lora 8/29/2023  at 383.  We discussed options and decided on a dose modified Avastin and Lonsurf regimen started 9/13/2023 which was well-tolerated except for some nausea and vomiting but he did not take his premedications.  9/27/2023 he started taking the Zofran as a premedication and Lonsurf was much better tolerated.  CEA down to 288 on 9/26/2023.  Electrolytes and bowels as well as skin seem better on this regimen.  Hepatitis panel negative 2/2023.     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     #Microcytic anemia suggestive of iron deficiency, also anemia on chemotherapy .  He remotely took iron at his original diagnosis in 2013/2014.  February 2023 iron 7% and ferritin 96 with hemoglobin 11.4 likely iron deficiency, also borderline B12 = 328 and  borderline folate 7.3.  Keep in mind possible  malabsorption on his PPI.   2/2023 negative/normal intrinsic factor antibody, parietal cell antibody, methylmalonic acid, celiac.  He eventually got iron, B12, folate  over-the-counter March 2023.  3/2023 thrombocytosis  also would go along with the iron deficiency.  April 2023 thrombocytosis resolved, hemoglobin stable 11s.  May 2023 hemoglobin stable, platelet count back up which again could be due to iron deficiency or inflammation or malignancy.  June 2023 hemoglobin  11.8, platelet count up to 644, persistent microcytosis, persistent iron deficiency anemia with iron 8% and ferritin 343 so possible malabsorption with IV iron ordered (Feraheme given 7/5/2023  and 7/24/2023), folate normal, B12 = 396 somewhat borderline.  7/20/2023 hemoglobin improved, platelet count down to 430, MCV improved, so he seemed to respond to the IV iron; also B12 336 but methylmalonic  acid normal so not frankly deficient.  8/2023 iron 19% with ferritin 524 with close to normal hemoglobin so maintained observation, still has thrombocytosis which could be due to his malignancy, basically normal WBC and hemoglobin 8/29/2023.  9/12/2023  hemoglobin okay, leukocytosis and thrombocytosis likely from his malignancy.  10/10/2023 iron 24% with ferritin 411, normal WBC and hemoglobin, platelet count mildly elevated 488 again likely secondary to malignancy and inflammation.     #Hypokalemia possibly secondary to treatment versus malabsorption versus diarrhea.  Still low March 2023 requiring IV potassium and  increased potassium to 2 tablets/day, potassium 3.7 on 4/4/2023 with potassium increased to 3 tablets/day.  April-May 2023 potassium borderline at 3.6-3.7 status post IV potassium but still persistently low at 3.1 so potassium was increased to a total  of 80 mEq daily and IV potassium given May 2023 with improvement on follow-up labs but still received IV potassium later in May 2023 and again  June-July 2023.  Patient refused nephrology consultation July 2023.  August 2023 patient's potassium was increased  to 100 mEq daily and received IV potassium, level actually better 8/29/2023.  9/12/2023 patient admitted to only taking 4 tabs of his potassium rather than 5 tabs for unclear reasons, but actually potassium was okay at 4.0.  9/26/2023 potassium 3.6 so he was increased back up to 5 tablets today with normal potassium 10/10/2023.     #Hypomagnesemia likely from chemotherapy and/or diarrhea as well as possible  malabsorption from PPI.  Magnesium low March- September 2023 requiring IV magnesium.   No oral magnesium given diarrhea.  10/10/2023 magnesium normal, possibly due to being off the cetuximab.     #Mild hypophosphatemia July 2023 recommending dietary increase of phosphorus.  Normal phosphorus 8/8/2023 and 9/12/2023.     #Noncompliance     #Acneiform rash secondary to cetuximab managed with dose reductions/delays, topicals, minocycline.  He was doing well on minocycline 1 tablet daily and a facial lotion but not a facial steroid, seemed a little worse 2/21/2023 so I prescribed  topical  clindamycin lotion 1% but  this made his face more red  so stopped.  3/21/2023 his facial rash looked more consistent with seborrheic dermatitis improved with an antidandruff shampoo, fluctuates.  8/29/2023 he reported stopping minocycline due to nausea  and vomiting with GI symptoms improved and facial rash no worse.  9/12/2023 facial rash actually resolved after stopping the cetuximab.  October 2023 mild flaking around his face seems to get better with antidandruff shampoo.     #Diarrhea secondary to chemotherapy managed with dose reductions/delays, Lomotil, Imodium; March 2023 he tried Metamucil which did not help his bowels.  May 2023 slightly increased bowel movements with more watery bowel movements, negative C. difficile,  not affecting his quality of life at this time and no dehydration, no clear etiology of  the increased bowel movements, was still not taking his antidiarrheal medications at maximum.  June 2023 probiotic was started with improvement in bowels although  still baseline diarrhea, better July 2023 with increased Imodium and Lomotil use.  Bowels seem better off the cetuximab, even after he stopped the probiotic and Lomotil and is just using Imodium as of 10/24/2023.    #Multiple episodes of thrombosis since his cancer diagnosis likely secondary to malignancy most recently 3/2022 treated with Eliquis with no bleeding or thrombotic events with this.  He does have chronic venous stasis changes and varicose veins which  do not bother him, 7/2023 declined vascular consultation.     #Nausea secondary to chemotherapy and minocycline controlled with Zofran and prochlorperazine except June 2023 realized that he was previously using 8 mg of Zofran, 4 mg of Zofran not controlling his nausea and vomiting.  Nausea/vomiting doing well with  the Zofran 8 mg and Compazine except 8/29/2023 noted worsening symptoms so he stopped his minocycline with subsequent improved symptoms.  September 2023 nausea and vomiting on Avastin/Lonsurf as above, better with taking scheduled Zofran.     #Elevated glucose on labs-patient reported PCP April 2023 was going to follow-up on his labs from my office and decide about starting a medication.  I did send our labs to her PCP including May 2023 glucose up to 200s, and PCP started patient to May 2023  on dapagliflozin.     #Chronic pain apparently on pain medications even prior to his cancer diagnosis, could also have pain secondary to malignancy, managed by PCP with Suboxone (2022 took gabapentin but no longer) with a history of pain medication addiction.  10/10/2023 patient admitted to taking ibuprofen which I asked him to stop even though he rarely took it, as he is on a blood thinner.     #Mood disorder, anxiety/depression, followed by psychiatry.     #Tobacco abuse     #Hypertension-this  will be monitored on Avastin, so far doing okay     #Hepatic steatosis on imaging     #Takotsubo or stress cardiomyopathy, Day's esophagus, dyslipidemia, asthma/COPD, left thigh neuropathy after surgery but not related to chemotherapy, essential tremor     #April-May 2023 only minimally elevated alkaline phosphatase with normal GGT May 2023, could be due to fatty liver versus chemotherapy with bone marrow stimulation, no known liver metastasis.  June 2023 alkaline phosphatase increased 160.  July 2023 LFTs  actually normal except minimally elevated alkaline phosphatase 120s likely not significant.  August 2023 LFTs with stable mild alkaline phosphatase 120s, no liver metastasis 8/2023.  LFTs normal September 2023 and 10/10/2023.     #Mild hyponatremia down to 134-135 in April-May 2023, 133 in June-July 2023, lowest 133 in August- October 2023- can be observed.     #May-June 2023 mild leukocytosis without signs or symptoms of infection at that time although slightly worsening diarrhea also could have had something else going on.  His new diabetic medication in 2023 does not have leukocytosis associated with it.   Tobacco abuse can cause leukocytosis.  July 2023 WBC count only minimally elevated.  August 2023 normal WBC.  September 2023 mild leukocytosis could be from malignancy.  No signs or symptoms of infection.    #Mild wheezing on exam 10/24/2023 with no shortness of breath     -I have explained his unfortunate progressive disease, gave him options of additional treatment versus hospice and he wanted to go with additional treatment, again 8/29/2023 declined a clinical trial.  I did email Dr. Lui and notified her of my  plan, could email her again if issues, using NCCN guidelines version 4/2023 given his prior treatment I recommended Avastin and Lonsurf, has been studied in patients who previously received bevacizumab, after that could consider regorafenib.  Given his  questionable compliance I am  somewhat concerned about the oral regimen but nursing and myself have gone over this with him and he appears to be taking it correctly.  -Avastin 5 mg/kilogram every 2 weeks.   8/29/2023 Lonsurf was ordered, usually 35 mg/M2 twice daily for 5 days but to make it easier on the patient as that dose using a BSA of 2.13 came out to 74 mg, I decided to give him  60 mg (20 mg x 3 tabs) twice daily which is closer to 30 mg/M2 which is reasonable given his previous chemotherapy, #30 with 11 refills given as I only want to give him the 5 days worth each time  so he does not accidentally take too much, going to use a modified schedule where I give the Avastin and the Lonsurf every 2 weeks for less toxicity and ease of administration, advised home antiemetic premedication.   Urine analysis 8/29/2023 was okay, follow-up every 3 months on the Avastin.  Avastin and Lonsurf side effects were outlined in my note on 8/29/2023.  CBC with differential prior to each treatment, treat if ANC greater than 1500 and platelets greater  than 75, continue to monitor for drug interactions, CMP prior to each cycle (monthly).   No dose adjustment needed for mild hepatic impairment.  Maximum of 3 dose reductions are allowed for toxicity, discontinue if unable to tolerate 20 mg/M2.  Comes in 15 mg and  20 mg.  Patient was advised to take with food and swallow tablets whole.  Patient was advised to use Imodium at the first onset of diarrhea, call if symptoms not controlled.  Growth factor support or dose reduction could be considered for CBC toxicity.    I did advise the patient to be extra cautious especially as he is on a blood thinner. Discontinue bevacizumab for hypertensive crisis or hypertensive encephalopathy, serious hemorrhage, arterial thromboembolism, nephrotic syndrome, gastrointestinal  perforation, fistula, heart failure, wound healing complications requiring medical intervention, or RPLS or posterior reversible encephalopathy  syndrome.  Bevacizumab should not be administered within 28 days of surgery (before and after), should be suspended  prior to elective surgery, and should not resume after surgery until surgical wound is fully healed.  No dose adjustments for renal or hepatic impairment except if nephrotic syndrome discontinue the bevacizumab (proteinuria >= 2 g in 24 hours requires  holding bevacizumab). CBC, CMP, urinalysis, and magnesium should be monitored.  If proteinuria >=2+ by dipstick, assessed with a 24-hour urine protein.  Monitor blood pressure every 2-3 weeks during treatment and regularly after discontinuation if bevacizumab-induced  hypertension occurs.  Monitor for signs or symptoms of GI perforation or fistula, bleeding, thromboembolism, wound healing complications, and heart failure.    -CBC, CEA, vitamin D ordered and the order set today.  CBC, CMP, magnesium, iron studies noted 10/10/2023.  Phosphorus 9/12/2023 can be monitored.  Urine analysis 8/29/2023.     -Continue Imodium.  Can add back in probiotic and Lomotil if needed.  -Continue to monitor genetics recommendations, follow-up in 1-2 years from 7/2023.  I have recommend that family members get colonoscopies  starting at age 20.  -I explained that his disease is not curable but treatable on 2/7/2023, reviewed again 8/29/2023 and advised hope for the best but prepare for the worst.    -Dr. Lui did not recommend any additional tumor testing.  -He started taking over-the-counter B12, folate, iron in March 2023-Feraheme can be given again as needed.  Iron instructions were outlined  in my note on 2/21/2023, 8/29/2023 discussed discontinuing the oral iron but he seemed to want to continue to take that.  Side effects of IV iron were outlined in my note on 6/20/2023.  Endoscopy for iron deficiency could be considered.  B12 borderline  with normal methylmalonic acid 7/20/2023, follow-up labs in 3-4 months. Folate normal 6/20/2023, follow-up in 4-6  months.  -CT chest, abdomen, pelvis with contrast can be monitored most recently 8/25/2023-patient has been reluctant to do imaging due to cost  but I explained it is the only way I can truly see what is going on, need to monitor at least every 4 months for now.   If alkaline phosphatase increases, consider bone scan given normal GGT or  a PET scan.  -Zofran 8 mg every 8 hours as needed #30 with 11 refills was prescribed 6/20/2023, asked him to disregard the Zofran 4 mg that was ordered 4/18/2023.  Prochlorperazine 10 mg as needed #30 with  11 refills was refilled on 5/2/2023.   -Continue pain medications from PCP, but 10/10/2023 asked him to discontinue the ibuprofen given the blood thinner use.   -Tobacco cessation was again recommended 8/8/2023.  -I asked him to follow-up with the PCP for some wheezing, no shortness of breath, has albuterol.  - Minocycline 100 mg daily #30  was prescribed 11/17/2022 but discontinued 8/29/2023 as he stopped taking this and went off the cetuximab.   He can continue with antidandruff shampoo and facial lotion.  Dr. Zuniga recommended 1% hydrocortisone cream on his face twice daily but I have asked him to hold that and see how he does .  Dermatology consultation was offered 5/30/2023 but patient declined.  -Continue Imodium.  Lomotil 2 tabs 4 times a day as needed #80 with 3 refills, refilled 5/16/2023, no longer taking.    -Continue potassium and magnesium replacement, consider IV  if levels less than 3.8 and 1.8 respectively.  No oral magnesium because a tendency towards diarrhea.    Potassium 20 mEq, 5 tablets/day 8/8/2023 #150 with 11 refills, can take 2 in  the morning and 3 at night.  I highly encouraged a nephrology consultation for his electrolyte abnormalities but he declined 8/8/2023 understanding I have limitations in my expertise in this area.   -Continue Emla cream, refilled 6/22/2020 330 g with 3 refills.  -Port flushes will be done on treatment.  - Continue PPI for his  history of Day's esophagus with further EGD as needed.  -Continue Eliquis 5 mg twice daily, #60 with 11  refills refilled on 9/11/2023, 8/29/2023 explained the risk on the Avastin with potential bleeding.  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   Information on Eliquis was outlined in my note on 2/21/2023.  General healthy lifestyle with reduction of immobility  and prevention of blood clots was encouraged including prophylactic anticoagulation if ever hospitalized or has a surgery if not on anticoagulation , no smoking, optimization of weight, getting up and moving around regularly if on a plane or in a car, no hormonal agents.  Compression  stockings were recommended again 10/24/2023. Concurrent other blood thinning medications were discouraged unless clinically needed.   I again 10/24/2023 advised the patient to get a medical alert  bracelet while on anticoagulation.  -Appreciate PCP management of diabetes.  - Nutrition consultation was offered 6/20/2023 regarding his diarrhea as well as his diabetes but he declined.  -Labs are incorporated in my note which is to be sent to PCP. I have recommended routine health care maintenance and screening through PCP. The patient was reminded to followup with the PCP  for other medical problems and ongoing care. The patient was asked to return to clinic, or sooner as needed for new or concerning symptoms, in  2 weeks.    10/10/2023 I explained my upcoming departure from [facility], explained that I was told that [facility] is working on my replacement, notified patient to make sure appointments/orders are scheduled and kept as directed.  I wished the patient the best of luck.  I reminded patient to call if he does not get his Lonsurf today.

## 2023-10-25 ENCOUNTER — INFUSION (OUTPATIENT)
Dept: HEMATOLOGY/ONCOLOGY | Facility: HOSPITAL | Age: 38
End: 2023-10-25
Payer: MEDICARE

## 2023-10-25 VITALS
HEART RATE: 91 BPM | BODY MASS INDEX: 30.37 KG/M2 | WEIGHT: 205.03 LBS | RESPIRATION RATE: 18 BRPM | OXYGEN SATURATION: 100 % | SYSTOLIC BLOOD PRESSURE: 113 MMHG | DIASTOLIC BLOOD PRESSURE: 78 MMHG | HEIGHT: 69 IN | TEMPERATURE: 97.9 F

## 2023-10-25 DIAGNOSIS — C20 RECTAL CANCER (MULTI): ICD-10-CM

## 2023-10-25 LAB — CEA SERPL-MCNC: 236.6 UG/L

## 2023-10-25 PROCEDURE — 2500000004 HC RX 250 GENERAL PHARMACY W/ HCPCS (ALT 636 FOR OP/ED): Mod: SE | Performed by: NURSE PRACTITIONER

## 2023-10-25 PROCEDURE — 96413 CHEMO IV INFUSION 1 HR: CPT

## 2023-10-25 PROCEDURE — 2500000004 HC RX 250 GENERAL PHARMACY W/ HCPCS (ALT 636 FOR OP/ED): Mod: SE | Performed by: INTERNAL MEDICINE

## 2023-10-25 RX ORDER — HEPARIN SODIUM,PORCINE/PF 10 UNIT/ML
50 SYRINGE (ML) INTRAVENOUS AS NEEDED
Status: CANCELLED | OUTPATIENT
Start: 2023-10-25

## 2023-10-25 RX ORDER — DIPHENHYDRAMINE HYDROCHLORIDE 50 MG/ML
50 INJECTION INTRAMUSCULAR; INTRAVENOUS AS NEEDED
Status: DISCONTINUED | OUTPATIENT
Start: 2023-10-25 | End: 2023-10-25 | Stop reason: HOSPADM

## 2023-10-25 RX ORDER — ALBUTEROL SULFATE 0.83 MG/ML
3 SOLUTION RESPIRATORY (INHALATION) AS NEEDED
Status: DISCONTINUED | OUTPATIENT
Start: 2023-10-25 | End: 2023-10-25 | Stop reason: HOSPADM

## 2023-10-25 RX ORDER — PROCHLORPERAZINE EDISYLATE 5 MG/ML
10 INJECTION INTRAMUSCULAR; INTRAVENOUS EVERY 6 HOURS PRN
Status: DISCONTINUED | OUTPATIENT
Start: 2023-10-25 | End: 2023-10-25 | Stop reason: HOSPADM

## 2023-10-25 RX ORDER — EPINEPHRINE 0.3 MG/.3ML
0.3 INJECTION SUBCUTANEOUS EVERY 5 MIN PRN
Status: DISCONTINUED | OUTPATIENT
Start: 2023-10-25 | End: 2023-10-25 | Stop reason: HOSPADM

## 2023-10-25 RX ORDER — FAMOTIDINE 10 MG/ML
20 INJECTION INTRAVENOUS ONCE AS NEEDED
Status: DISCONTINUED | OUTPATIENT
Start: 2023-10-25 | End: 2023-10-25 | Stop reason: HOSPADM

## 2023-10-25 RX ORDER — HEPARIN 100 UNIT/ML
500 SYRINGE INTRAVENOUS AS NEEDED
Status: CANCELLED | OUTPATIENT
Start: 2023-10-25

## 2023-10-25 RX ORDER — PROCHLORPERAZINE MALEATE 10 MG
10 TABLET ORAL EVERY 6 HOURS PRN
Status: DISCONTINUED | OUTPATIENT
Start: 2023-10-25 | End: 2023-10-25 | Stop reason: HOSPADM

## 2023-10-25 RX ORDER — HEPARIN 100 UNIT/ML
500 SYRINGE INTRAVENOUS AS NEEDED
Status: DISCONTINUED | OUTPATIENT
Start: 2023-10-25 | End: 2023-10-25 | Stop reason: HOSPADM

## 2023-10-25 RX ADMIN — Medication 500 UNITS: at 10:35

## 2023-10-25 RX ADMIN — BEVACIZUMAB-AWWB 472 MG: 400 INJECTION, SOLUTION INTRAVENOUS at 10:23

## 2023-10-25 ASSESSMENT — PATIENT HEALTH QUESTIONNAIRE - PHQ9
SUM OF ALL RESPONSES TO PHQ9 QUESTIONS 1 & 2: 2
10. IF YOU CHECKED OFF ANY PROBLEMS, HOW DIFFICULT HAVE THESE PROBLEMS MADE IT FOR YOU TO DO YOUR WORK, TAKE CARE OF THINGS AT HOME, OR GET ALONG WITH OTHER PEOPLE: NOT DIFFICULT AT ALL
2. FEELING DOWN, DEPRESSED OR HOPELESS: SEVERAL DAYS
1. LITTLE INTEREST OR PLEASURE IN DOING THINGS: SEVERAL DAYS

## 2023-10-25 ASSESSMENT — LIFESTYLE VARIABLES
HOW MANY STANDARD DRINKS CONTAINING ALCOHOL DO YOU HAVE ON A TYPICAL DAY: PATIENT DOES NOT DRINK
HOW OFTEN DO YOU HAVE A DRINK CONTAINING ALCOHOL: NEVER
AUDIT-C TOTAL SCORE: 0
SKIP TO QUESTIONS 9-10: 1
HOW OFTEN DO YOU HAVE SIX OR MORE DRINKS ON ONE OCCASION: NEVER

## 2023-10-25 ASSESSMENT — ENCOUNTER SYMPTOMS
OCCASIONAL FEELINGS OF UNSTEADINESS: 0
DEPRESSION: 1
LOSS OF SENSATION IN FEET: 0

## 2023-10-25 ASSESSMENT — COLUMBIA-SUICIDE SEVERITY RATING SCALE - C-SSRS
6. HAVE YOU EVER DONE ANYTHING, STARTED TO DO ANYTHING, OR PREPARED TO DO ANYTHING TO END YOUR LIFE?: NO
2. HAVE YOU ACTUALLY HAD ANY THOUGHTS OF KILLING YOURSELF?: NO
1. IN THE PAST MONTH, HAVE YOU WISHED YOU WERE DEAD OR WISHED YOU COULD GO TO SLEEP AND NOT WAKE UP?: NO

## 2023-10-25 ASSESSMENT — PAIN SCALES - GENERAL: PAINLEVEL: 6

## 2023-10-25 NOTE — PROGRESS NOTES
"October 25, 2023 at 9:18 AM    Acetaminophen and Adhesive tape-silicones    Misael Hernandez is a 38 y.o. male   Blood pressure 119/79, pulse 95, temperature 36.4 °C (97.5 °F), resp. rate 18, height 1.753 m (5' 9.02\"), weight 93 kg (205 lb 0.4 oz), SpO2 94 %.  Body surface area is 2.13 meters squared.    Past Medical History:   Diagnosis Date    Other conditions influencing health status     Rectal Cancer       Encounter Diagnosis   Name Primary?    Rectal cancer (CMS/HCC)        Has the entire regimen been authorized by financial clearance (pre-certification)?  Yes     Prior to Admission medications    Medication Sig Start Date End Date Taking? Authorizing Provider   albuterol (Ventolin HFA) 90 mcg/actuation inhaler INHALE TWO PUFFS INTO THE LUNGS EVERY 6 HOURS AS NEEDED FOR SHORTNESS OF BREATH OR FOR WHEEZING FOR UP TO 30 DAYS 11/2/21   Historical Provider, MD   apixaban (Eliquis) 5 mg tablet Take 1 tablet (5 mg) by mouth 2 times a day.    Historical Provider, MD   buprenorphine-naloxone (Suboxone) 8-2 mg SL tablet Place 1 tablet under the tongue 2 times a day. 8/22/23 10/24/23  Historical Provider, MD   buprenorphine-naloxone (Suboxone) 8-2 mg SL tablet Place 0.5 tablets under the tongue once daily at bedtime. 8/22/23 10/10/23  Historical Provider, MD   cyanocobalamin (Vitamin B-12) 1,000 mcg tablet Take 1 tablet (1,000 mcg) by mouth once daily.    Historical Provider, MD   dapagliflozin propanediol (Farxiga) 5 mg Take by mouth.    Historical Provider, MD   divalproex (Depakote ER) 500 mg 24 hr tablet Take 2 tablets (1,000 mg) by mouth. 1/17/20   Historical Provider, MD   divalproex (Depakote) 250 mg EC tablet Take 1 tablet (250 mg) by mouth once daily at bedtime. 1/17/20   Historical Provider, MD   doxepin (SINEquan) 10 mg capsule Take 1 capsule (10 mg) by mouth once daily. Pt unsure of dose    Historical Provider, MD   ferrous sulfate 325 (65 Fe) MG tablet Take 1 tablet (65 mg of iron) by mouth once daily.    " Historical Provider, MD   folic acid (Folvite) 1 mg tablet Take 1 tablet (1 mg) by mouth once daily.    Historical Provider, MD   haloperidol (Haldol) 2 mg tablet TAKE ONE TABLET BY MOUTH ONCE DAILY FOR AGITATION. 10/10/23   Historical Provider, MD   ibuprofen 800 mg tablet Take 1 tablet (800 mg) by mouth.    Historical Provider, MD   lidocaine-prilocaine (Emla) 2.5-2.5 % cream apply topically to affected area if needed 12/21/21   Historical Provider, MD   loperamide (Imodium A-D) 2 mg tablet Take 1 tablet (2 mg) by mouth 4 times a day as needed for diarrhea.    Historical Provider, MD   losartan (Cozaar) 50 mg tablet Take 1 tablet (50 mg) by mouth once daily.    Historical Provider, MD   metoprolol succinate XL (Toprol-XL) 25 mg 24 hr tablet Take 1 tablet (25 mg) by mouth once daily.    Historical Provider, MD   naloxone (Narcan) 4 mg/0.1 mL nasal spray Administer 1 spray (4 mg) into affected nostril(s) see administration instructions. 1 nasal spray (4 mg) as a single dose in one nostril as needed for opioid reversal; may repeat in 2 to 3 minutes in the opposite nostril if there is no response to the first dose. Immediately call 911. 5/9/23 5/8/24  Historical Provider, MD   omeprazole (PriLOSEC) 40 mg DR capsule Take 1 capsule (40 mg) by mouth. 6/6/23   Historical Provider, MD   ondansetron (Zofran) 8 mg tablet Take 1 tablet (8 mg) by mouth every 8 hours if needed.    Historical Provider, MD   potassium chloride CR 20 mEq ER tablet Take 1 tablet (20 mEq) by mouth twice a day. 5/11/22   Historical Provider, MD   prochlorperazine (Compazine) 10 mg tablet TAKE ONE TABLET BY MOUTH EVERY 6 HOURS AS NEEDED FOR NAUSEA AND / OR VOMITING    Historical Provider, MD   trifluridine-tipiraciL (Lonsurf) 20-8.19 mg tablet TAKE THREE (3) TABLETS BY MOUTH 2 TIMES DAILY FOR 5 DAYS, EVERY 2 WEEKS 8/29/23 8/28/24  Willa Vega MD   dexlansoprazole (Dexilant) 60 mg DR capsule Take 1 capsule (60 mg) by mouth once daily. 8/12/11  10/24/23  Historical Provider, MD       Reviewed documented list of home medications.  No significant drug interactions identified between home medications and chemotherapy regimen.    Yes    WBC   Date Value Ref Range Status   10/24/2023 10.3 4.4 - 11.3 x10*3/uL Final   10/10/2023 8.7 4.4 - 11.3 x10*3/uL Final   09/26/2023 9.6 4.4 - 11.3 x10E9/L Final   09/12/2023 14.3 (H) 4.4 - 11.3 x10E9/L Final   08/29/2023 11.3 4.4 - 11.3 x10E9/L Final     Hemoglobin   Date Value Ref Range Status   10/24/2023 13.9 13.5 - 17.5 g/dL Final   10/10/2023 13.9 13.5 - 17.5 g/dL Final   09/26/2023 13.0 (L) 13.5 - 17.5 g/dL Final   09/12/2023 13.1 (L) 13.5 - 17.5 g/dL Final   08/29/2023 13.0 (L) 13.5 - 17.5 g/dL Final     Hematocrit   Date Value Ref Range Status   10/24/2023 43.7 41.0 - 52.0 % Final   10/10/2023 44.3 41.0 - 52.0 % Final   09/26/2023 41.4 41.0 - 52.0 % Final   09/12/2023 42.2 41.0 - 52.0 % Final   08/29/2023 41.1 41.0 - 52.0 % Final     Neutrophils Absolute   Date Value Ref Range Status   10/24/2023 6.82 1.20 - 7.70 x10*3/uL Final     Comment:     Percent differential counts (%) should be interpreted in the context of the absolute cell counts (cells/uL).   10/10/2023 5.04 1.20 - 7.70 x10*3/uL Final     Comment:     Percent differential counts (%) should be interpreted in the context of the absolute cell counts (cells/uL).   09/26/2023 6.10 1.20 - 7.70 x10E9/L Final   09/12/2023 10.02 (H) 1.20 - 7.70 x10E9/L Final   08/29/2023 7.51 1.20 - 7.70 x10E9/L Final     Platelets   Date Value Ref Range Status   10/24/2023 511 (H) 150 - 450 x10*3/uL Final   10/10/2023 488 (H) 150 - 450 x10*3/uL Final   09/26/2023 476 (H) 150 - 450 x10E9/L Final   09/12/2023 626 (H) 150 - 450 x10E9/L Final   08/29/2023 582 (H) 150 - 450 x10E9/L Final     Creatinine   Date Value Ref Range Status   10/10/2023 0.57 0.50 - 1.30 mg/dL Final   09/26/2023 0.58 0.50 - 1.30 mg/dL Final   09/12/2023 0.60 0.50 - 1.30 mg/dL Final   08/29/2023 0.54 0.50 - 1.30  mg/dL Final     Alkaline Phosphatase   Date Value Ref Range Status   10/10/2023 119 33 - 120 U/L Final   09/26/2023 114 33 - 120 U/L Final   09/12/2023 117 33 - 120 U/L Final   08/29/2023 126 (H) 33 - 120 U/L Final     AST   Date Value Ref Range Status   10/10/2023 13 9 - 39 U/L Final   09/26/2023 9 9 - 39 U/L Final   09/12/2023 10 9 - 39 U/L Final   08/29/2023 10 9 - 39 U/L Final     ALT   Date Value Ref Range Status   10/10/2023 8 (L) 10 - 52 U/L Final     Comment:     Patients treated with Sulfasalazine may generate falsely decreased results for ALT.     ALT (SGPT)   Date Value Ref Range Status   09/26/2023 7 (L) 10 - 52 U/L Final     Comment:      Patients treated with Sulfasalazine may generate    falsely decreased results for ALT.     09/12/2023 9 (L) 10 - 52 U/L Final     Comment:      Patients treated with Sulfasalazine may generate    falsely decreased results for ALT.     08/29/2023 11 10 - 52 U/L Final     Comment:      Patients treated with Sulfasalazine may generate    falsely decreased results for ALT.       Bilirubin, Total   Date Value Ref Range Status   10/10/2023 0.5 0.0 - 1.2 mg/dL Final     Total Bilirubin   Date Value Ref Range Status   09/26/2023 0.3 0.0 - 1.2 mg/dL Final   09/12/2023 0.3 0.0 - 1.2 mg/dL Final   08/29/2023 0.3 0.0 - 1.2 mg/dL Final         Does the patient meet the treatment conditions?  Yes    Are dosage calculations correct?  Yes    Are doses the same as previous cycle?   Yes    Were any doses modified?  No    If appropriate, was the Creatinine Clearance independently calculated based on [facility] standards?   Yes      Comments:      I Raj Ramirez, PharmD hereby acknowledge that the treatment plan indicated above has been verified for correctness to the best of my ability on 10/25/2023 at 9:18 AM.

## 2023-10-25 NOTE — SIGNIFICANT EVENT
10/25/23 0907   Prechemo Checklist   Has the patient been in the hospital, ED, or urgent care since last date of service No   Chemo/Immuno Consent Signed Yes   Protocol/Indications Verified Yes   Confirmed to previous date/time of medication Yes   Compared to previous dose Yes   All medications are dated accurately Yes   Pregnancy Test Negative Not applicable   Parameters Met Yes   BSA/Weight-Height Verified Yes   Dose Calculations Verified Yes

## 2023-10-25 NOTE — PROGRESS NOTES
Outstanding Clarification:     - Regimen: MVASI every 14 days  - Cycle/Day: Cycle 3 day 15  - Dose modifications: no  - Anti-emetics: no  - Drug interactions / Allergies:no  - Growth Factor: no  - Date change required: no    I Raj Ramirez, PharmD hereby acknowledge that the treatment plan indicated above has been verified for correctness to the best of my ability on 10/25/2023 at 8:29 AM.

## 2023-10-30 ENCOUNTER — TELEPHONE (OUTPATIENT)
Dept: HEMATOLOGY/ONCOLOGY | Facility: HOSPITAL | Age: 38
End: 2023-10-30
Payer: MEDICAID

## 2023-10-30 DIAGNOSIS — E55.9 VITAMIN D DEFICIENCY: Primary | ICD-10-CM

## 2023-10-30 RX ORDER — ERGOCALCIFEROL 1.25 MG/1
50000 CAPSULE ORAL
Qty: 12 CAPSULE | Refills: 0 | Status: SHIPPED | OUTPATIENT
Start: 2023-10-30 | End: 2024-01-15 | Stop reason: HOSPADM

## 2023-10-30 NOTE — TELEPHONE ENCOUNTER
Vitamin D weekly x12 ordered-please notify patient to pick this up. Per Dr. Vega staff message    Mailbox full, unable to leave message on mobile phone    Home number not in service.    10/31/23 contacted patient --instructed to take Vitamin D 50,000 units by mouth weekly x 12 weeks. Notified a prescription has been sent in for him. Patient expressed concerns about the cost, instructed him to call the office, if he is unable to afford the medication.

## 2023-10-31 ENCOUNTER — PHARMACY VISIT (OUTPATIENT)
Dept: PHARMACY | Facility: CLINIC | Age: 38
End: 2023-10-31
Payer: MEDICARE

## 2023-10-31 PROCEDURE — RXMED WILLOW AMBULATORY MEDICATION CHARGE

## 2023-11-07 ENCOUNTER — LAB (OUTPATIENT)
Dept: LAB | Facility: LAB | Age: 38
End: 2023-11-07
Payer: MEDICARE

## 2023-11-07 ENCOUNTER — TELEPHONE (OUTPATIENT)
Dept: HEMATOLOGY/ONCOLOGY | Facility: HOSPITAL | Age: 38
End: 2023-11-07

## 2023-11-07 ENCOUNTER — DOCUMENTATION (OUTPATIENT)
Dept: HEMATOLOGY/ONCOLOGY | Facility: HOSPITAL | Age: 38
End: 2023-11-07

## 2023-11-07 ENCOUNTER — OFFICE VISIT (OUTPATIENT)
Dept: HEMATOLOGY/ONCOLOGY | Facility: HOSPITAL | Age: 38
End: 2023-11-07
Payer: MEDICARE

## 2023-11-07 VITALS
OXYGEN SATURATION: 96 % | DIASTOLIC BLOOD PRESSURE: 61 MMHG | SYSTOLIC BLOOD PRESSURE: 89 MMHG | HEART RATE: 82 BPM | TEMPERATURE: 97.3 F | HEIGHT: 69 IN | RESPIRATION RATE: 20 BRPM | BODY MASS INDEX: 30.87 KG/M2 | WEIGHT: 208.44 LBS

## 2023-11-07 DIAGNOSIS — R74.8 ABNORMAL LIVER ENZYMES: ICD-10-CM

## 2023-11-07 DIAGNOSIS — Z72.0 TOBACCO ABUSE: ICD-10-CM

## 2023-11-07 DIAGNOSIS — I26.99 PULMONARY EMBOLISM, UNSPECIFIED CHRONICITY, UNSPECIFIED PULMONARY EMBOLISM TYPE, UNSPECIFIED WHETHER ACUTE COR PULMONALE PRESENT (MULTI): ICD-10-CM

## 2023-11-07 DIAGNOSIS — E87.6 HYPOKALEMIA: ICD-10-CM

## 2023-11-07 DIAGNOSIS — C20 RECTAL CANCER (MULTI): ICD-10-CM

## 2023-11-07 DIAGNOSIS — T45.1X5A CHEMOTHERAPY INDUCED DIARRHEA: ICD-10-CM

## 2023-11-07 DIAGNOSIS — E53.8 FOLATE DEFICIENCY: ICD-10-CM

## 2023-11-07 DIAGNOSIS — E53.8 B12 DEFICIENCY: ICD-10-CM

## 2023-11-07 DIAGNOSIS — K52.1 CHEMOTHERAPY INDUCED DIARRHEA: ICD-10-CM

## 2023-11-07 DIAGNOSIS — C20 RECTAL CANCER (MULTI): Primary | ICD-10-CM

## 2023-11-07 DIAGNOSIS — R11.2 CHEMOTHERAPY INDUCED NAUSEA AND VOMITING: ICD-10-CM

## 2023-11-07 DIAGNOSIS — I10 HYPERTENSION, UNSPECIFIED TYPE: ICD-10-CM

## 2023-11-07 DIAGNOSIS — F39 MOOD DISORDER (CMS-HCC): ICD-10-CM

## 2023-11-07 DIAGNOSIS — E55.9 VITAMIN D DEFICIENCY: ICD-10-CM

## 2023-11-07 DIAGNOSIS — T45.1X5A CHEMOTHERAPY INDUCED NAUSEA AND VOMITING: ICD-10-CM

## 2023-11-07 DIAGNOSIS — K76.0 HEPATIC STEATOSIS: ICD-10-CM

## 2023-11-07 DIAGNOSIS — E83.42 HYPOMAGNESEMIA: ICD-10-CM

## 2023-11-07 LAB
ALBUMIN SERPL BCP-MCNC: 3.7 G/DL (ref 3.4–5)
ALP SERPL-CCNC: 117 U/L (ref 33–120)
ALT SERPL W P-5'-P-CCNC: 8 U/L (ref 10–52)
ANION GAP SERPL CALC-SCNC: 13 MMOL/L (ref 10–20)
AST SERPL W P-5'-P-CCNC: 11 U/L (ref 9–39)
BASOPHILS # BLD MANUAL: 0.18 X10*3/UL (ref 0–0.1)
BASOPHILS NFR BLD MANUAL: 2 %
BILIRUB SERPL-MCNC: 0.4 MG/DL (ref 0–1.2)
BUN SERPL-MCNC: 7 MG/DL (ref 6–23)
CALCIUM SERPL-MCNC: 9.2 MG/DL (ref 8.6–10.3)
CHLORIDE SERPL-SCNC: 97 MMOL/L (ref 98–107)
CO2 SERPL-SCNC: 29 MMOL/L (ref 21–32)
CREAT SERPL-MCNC: 0.95 MG/DL (ref 0.5–1.3)
EOSINOPHIL # BLD MANUAL: 0.55 X10*3/UL (ref 0–0.7)
EOSINOPHIL NFR BLD MANUAL: 6 %
ERYTHROCYTE [DISTWIDTH] IN BLOOD BY AUTOMATED COUNT: 21.1 % (ref 11.5–14.5)
FOLATE SERPL-MCNC: 5 NG/ML
GFR SERPL CREATININE-BSD FRML MDRD: >90 ML/MIN/1.73M*2
GIANT PLATELETS BLD QL SMEAR: ABNORMAL
GLUCOSE SERPL-MCNC: 147 MG/DL (ref 74–99)
HCT VFR BLD AUTO: 40.4 % (ref 41–52)
HGB BLD-MCNC: 12.6 G/DL (ref 13.5–17.5)
IMM GRANULOCYTES # BLD AUTO: 0.05 X10*3/UL (ref 0–0.7)
IMM GRANULOCYTES NFR BLD AUTO: 0.5 % (ref 0–0.9)
LYMPHOCYTES # BLD MANUAL: 2.82 X10*3/UL (ref 1.2–4.8)
LYMPHOCYTES NFR BLD MANUAL: 31 %
MCH RBC QN AUTO: 27.6 PG (ref 26–34)
MCHC RBC AUTO-ENTMCNC: 31.2 G/DL (ref 32–36)
MCV RBC AUTO: 89 FL (ref 80–100)
MONOCYTES # BLD MANUAL: 1.37 X10*3/UL (ref 0.1–1)
MONOCYTES NFR BLD MANUAL: 15 %
NEUTROPHILS # BLD MANUAL: 4.09 X10*3/UL (ref 1.2–7.7)
NEUTS BAND # BLD MANUAL: 0.09 X10*3/UL (ref 0–0.7)
NEUTS BAND NFR BLD MANUAL: 1 %
NEUTS SEG # BLD MANUAL: 4 X10*3/UL (ref 1.2–7)
NEUTS SEG NFR BLD MANUAL: 44 %
NRBC BLD-RTO: 0 /100 WBCS (ref 0–0)
PLATELET # BLD AUTO: 372 X10*3/UL (ref 150–450)
POTASSIUM SERPL-SCNC: 5.5 MMOL/L (ref 3.5–5.3)
PROT SERPL-MCNC: 6.5 G/DL (ref 6.4–8.2)
RBC # BLD AUTO: 4.56 X10*6/UL (ref 4.5–5.9)
RBC MORPH BLD: ABNORMAL
SODIUM SERPL-SCNC: 133 MMOL/L (ref 136–145)
TOTAL CELLS COUNTED BLD: 100
VARIANT LYMPHS # BLD MANUAL: 0.09 X10*3/UL (ref 0–0.5)
VARIANT LYMPHS NFR BLD: 1 %
VIT B12 SERPL-MCNC: 453 PG/ML (ref 211–911)
WBC # BLD AUTO: 9.1 X10*3/UL (ref 4.4–11.3)

## 2023-11-07 PROCEDURE — 85027 COMPLETE CBC AUTOMATED: CPT

## 2023-11-07 PROCEDURE — 3074F SYST BP LT 130 MM HG: CPT | Performed by: INTERNAL MEDICINE

## 2023-11-07 PROCEDURE — 99215 OFFICE O/P EST HI 40 MIN: CPT | Performed by: INTERNAL MEDICINE

## 2023-11-07 PROCEDURE — 36415 COLL VENOUS BLD VENIPUNCTURE: CPT

## 2023-11-07 PROCEDURE — 85007 BL SMEAR W/DIFF WBC COUNT: CPT

## 2023-11-07 PROCEDURE — 82607 VITAMIN B-12: CPT

## 2023-11-07 PROCEDURE — 83921 ORGANIC ACID SINGLE QUANT: CPT

## 2023-11-07 PROCEDURE — 82746 ASSAY OF FOLIC ACID SERUM: CPT

## 2023-11-07 PROCEDURE — 80053 COMPREHEN METABOLIC PANEL: CPT

## 2023-11-07 PROCEDURE — 3078F DIAST BP <80 MM HG: CPT | Performed by: INTERNAL MEDICINE

## 2023-11-07 PROCEDURE — 4004F PT TOBACCO SCREEN RCVD TLK: CPT | Performed by: INTERNAL MEDICINE

## 2023-11-07 RX ORDER — ALBUTEROL SULFATE 0.83 MG/ML
3 SOLUTION RESPIRATORY (INHALATION) AS NEEDED
Status: CANCELLED | OUTPATIENT
Start: 2023-11-08

## 2023-11-07 RX ORDER — PROCHLORPERAZINE MALEATE 5 MG
10 TABLET ORAL EVERY 6 HOURS PRN
Status: CANCELLED | OUTPATIENT
Start: 2023-11-08

## 2023-11-07 RX ORDER — DIPHENHYDRAMINE HYDROCHLORIDE 50 MG/ML
50 INJECTION INTRAMUSCULAR; INTRAVENOUS AS NEEDED
Status: CANCELLED | OUTPATIENT
Start: 2023-11-08

## 2023-11-07 RX ORDER — PROCHLORPERAZINE EDISYLATE 5 MG/ML
10 INJECTION INTRAMUSCULAR; INTRAVENOUS EVERY 6 HOURS PRN
Status: CANCELLED | OUTPATIENT
Start: 2023-11-08

## 2023-11-07 RX ORDER — EPINEPHRINE 0.3 MG/.3ML
0.3 INJECTION SUBCUTANEOUS EVERY 5 MIN PRN
Status: CANCELLED | OUTPATIENT
Start: 2023-11-08

## 2023-11-07 RX ORDER — FAMOTIDINE 10 MG/ML
20 INJECTION INTRAVENOUS ONCE AS NEEDED
Status: CANCELLED | OUTPATIENT
Start: 2023-11-08

## 2023-11-07 ASSESSMENT — PAIN SCALES - GENERAL: PAINLEVEL: 7

## 2023-11-07 NOTE — PROGRESS NOTES
"Sent to JAKY June: \"Given the elevated potassium, tell patient to only take 2 potassium pills per day rather than 5/day.  Given only minimal elevation, I can follow-up on this next visit.\"  "

## 2023-11-07 NOTE — TELEPHONE ENCOUNTER
Given the elevated potassium, tell patient to only take 2 potassium pills per day rather than 5/day.  Given only minimal elevation, I can follow-up on this next visit. Per Dr. Vega's staff message.    Mailbox full, unable to leave message.    11/8/2023 spoke with patient in infusion center today. Patient instructed to take 2 potassium pills per day, rather than 5 potassium pills per day. Patient verbalized understanding.

## 2023-11-07 NOTE — TELEPHONE ENCOUNTER
Patient's PCP, Dr. Dominguez, notified of Bps of 89/61 & 88/59 in Dr. Vega's office today by staff message.

## 2023-11-07 NOTE — PROGRESS NOTES
"Interval History:    Patient is a 38 -year-old white man with metastatic rectal carcinoma , previously seen at [facility] with Dr. Zuniga, seen by me for the first time 2/7/2023 at [place].  He initially presented with stage III disease (T3 N2 M0) diagnosed in 2013 status post neoadjuvant Xeloda and radiation not completed secondary to noncompliance  but last treated November 2013 followed by resection followed by adjuvant FOLFOX not completed secondary to patient noncompliance.  He had biopsy-proven lung metastasis 6/22/2018 (RLL) , MMR normal, NRAS/KRAS/BRAF wild-type ; also adrenal mets but did not find a bx-metastatic disease was treated with FOLFOX with \" serious adverse\" reaction to oxaliplatin according to notes that patient  could not tell me what the reaction except possibly uncontrollable diarrhea, followed by Xeloda/Avastin with progressive disease,  started irinotecan/cetuximab 11/29/2021 complicated by skin and GI toxicity requiring dose reductions.  Patient was last seen by Dr. Zuniga 1/24/2023 noting status post 22 cycles of cetuximab  with irinotecan with dose reductions due to skin and GI toxicity, loose stools better, skin stable on minocycline, maintained on  Eliquis for recurrent thrombosis lower extremities.      11/7/2023 blood pressure is low, takes blood pressure medications, no dizziness, does not feel dehydrated, actually feels fine.  10/24/2023 vitamin D level was low, prescribed vitamin D weekly x12 but patient admits to not getting this due to cost.     Cetuximab/irinotecan was most recently given 8/9/2023, but unfortunately CT showed progressive disease 8/25/2023 in the lung/mediastinum and bilateral adrenal masses, luckily patient clinically  doing okay, ultimately decided on doing Lonsurf and Avastin given every 2 weeks, started 9/13/2023, did well except for a few episodes of nausea with vomiting while taking the Lonsurf but not every day, only took his Compazine or Zofran as needed but not  " prior to taking the Lonsurf which helped, no significant diarrhea, no wound healing issues, did get IV magnesium 9/13/2023.  He got his second dose of the Avastin/Lonsurf 9/27/2023 and took Zofran premedication for the Lonsurf with no nausea or vomiting, bowels did well.  He received the Lonsurf/Avastin 10/11/2023 and 10/25/2023 with no nausea or vomiting, bowels doing okay.   10/10/2023 he admitted to using marijuana daily.  7/2023 phosphorus rich foods were recommended, IV magnesium and potassium given, recommended referral to nephrology for electrolyte  abnormalities but patient refused.  No thrombotic or bleeding events on anticoagulation, multiple thrombotic events since his diagnosis  of cancer but not prior, compliant with his Eliquis.  I verified with patient that his last CT was in 2021, so I ordered another CT scan done 2/20/2023 which showed most lung nodules increased in size and increase in the right adrenal gland mass . He had an appt. with Dr. Lui 3/20/23-progression of disease but difficult to assess given only 2 time points over about 1.5 years, patient not interested in changing treatment to a 5-FU  based regimen and wanted to continue irinotecan/cetuximab but recommended CT after 4 cycles, offered a clinical trial but patient declined due to the travel, Dr. Lui available for recommendations upon progression.  CT 4/24/2023 thankfully showed  overall stable disease despite CEA rising-patient said he has had the CEA fluctuations in the past, was actually lower subsequently even though CT showed progression 8/2023.  2/2023 folate, iron, and B12 were borderline, prescription written but apparently  insurance denied so he had to get these over-the-counter which he eventually did March 2023-given persistently low iron Feraheme given 7/5/2023 and 7/24/2023-no issues, did not feel any different.     Patient is by himself today, previously accompanied by his mother.   He lives  with his mother  "and brother.  He is able to do light chores.  He has some sort of mood disorder followed by psychiatry on Haldol and Depakote.  He does take a PPI given his history of Day's.  He is taking potassium 5 tabs/day which was increased from 4 tablets/day 9/26/2023.  PCP manages his chronic pain medications for his \"lung and kidney\" pain which has been stable since 2018 including Suboxone (Zubsolv), used to take gabapentin but no longer and believes he last took this  in 2022, is on  medications because of a history of pain medication addiction, uses ibuprofen less than once per month.  He has Zofran and Compazine as needed for nausea, 6/20/2023 noted that his nausea was worse with 1 episode of vomiting and we finally figured out that he was using 8 mg of  Zofran instead of the 4 mg that I prescribed, had nausea from his chemotherapy as well as from the minocycline, alternated the Zofran and the Compazine.  8/29/2023 he said he recently stopped his minocycline because he noted more nausea and vomiting with  that medication with subsequent symptoms returned to baseline and no change in his facial rash.  2/21/2023 I felt that his facial rash was worse so I gave him clindamycin topical but it made his face more red so stopped, had been well controlled with  minocycline 1 tablet/day and a facial lotion but not a facial steroid-3/21/2023 I felt that he had some seborrheic dermatitis and recommended the antidandruff shampoo on his face which he has been using which is helping-8/29/2023->CURRENT facial rash  doing okay even off the minocycline, just using a facial lotion and the dandruff shampoo, October 2023 had only minimal flaking on his face, skin very clear 11/7/2023.  He was averaging about 2-3 bowel movements per day controlled with Imodium 4 tablets/day and Lomotil 1 tablet/day; March 2023 he tried Metamucil but this did not improve his bowel movements;  5/16/2023 he said that he was having more like 4-5 more watery " bowel movements not controlled with his current regimen; 5/30/2023 bowel movements were unchanged even though he increased his Lomotil to 3/day and was taking the Imodium only 3/day, no change  in his diet or sick contacts, negative C. difficile May 2023; June 2023 he started on a probiotic, bowel movements slightly better at 3/day, still using Lomotil and Imodium 3/day; 7/3/2023->current bowel movements are better at 1-2/day, was using the Imodium  and Lomotil 4 times a day as well as the probiotic but 10/24/2023 and 11/7/2023 he says he is only using the Imodium 4 times a day and no longer using the probiotic and Lomotil with bowels doing fine.   He has some left thigh numbness and burning since his rectal surgery, no neuropathy from chemotherapy.  He has an essential tremor which is unchanged since his cancer diagnosis.   Patient previously took Lasix which was held April 2022 due to dehydration and hypokalemia-chronic leg edema not bothering him lately.    Glucose has been elevated, no diabetes but he was diagnosed  with diabetes May 2023 and started on dapagliflozin by PCP at the end of May 2023, does not check his glucose.  7/20/2023 he showed me his varicose veins, going on for years, declined vascular consultation.     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     Prescriptions from Dr. Zuniga included Eliquis, potassium, Emla cream, Lomotil.     PCP is Dr. Jesse Dominguez, notes not in our old system but able to see in epic 9/26/2023 saw for chronic medical issues.   Patient declined a flu vaccine 9/12/2023.  Patient received 2 COVID vaccines but  no booster.  I recommended ongoing vaccine management and COVID management through PCP 9/12/2023. I have reviewed the available laboratory data, radiographic data, medications, and notes,  and have summarized them in this note 11/7/2023.  No other hospitalizations, major illness, or  procedures since his last appointment 10/24/2023.    Epic transition occurred 10/2/2023 from prior EMR system.  I did not go back in the epic system prior to this point unless patient brought up an issue.  I did review the EMR data in epic from 10/2/2023 going forward, but relied on our old EMR system for data prior to the transition.         No fevers, unintentional weight loss,  drenching sweats, headaches, sore throat, acute vision changes, chest pain, shortness breath, cough, abdominal pain, nausea or vomiting now, blood in stool, dysuria or hematuria, pain or neurologic symptoms except as above , abnormal bruising or bleeding, or thyroid disorder.    Hematology-oncology history (reviewed and updated 11/7/2023)   -At age 28 patient underwent colonoscopy August 2013 for abdominal pain, rectal bleeding, weight loss, nausea/vomiting, and painful small bowel movements found to have a circumferential fungating partially obstructive mass in the rectum with biopsy showing  invasive moderately differentiated adenocarcinoma.  Pathology showed invasive moderately differentiated adenocarcinoma, normal mismatch repair protein expression.  He was  felt to have a stage III = T3 N2 M0 rectal adenocarcinoma.  Initial CEA was 5.4.   -Patient underwent neoadjuvant Xeloda (according to Dr. Perico Benedict's note from 8/2013 plan 1500 mg twice daily throughout radiation) and radiation not completed secondary to noncompliance but  last treated November 2013 under Dr. Perico Benedict and Dr. Constantine Tong, 45 Gy to the pelvis including lymph nodes and an additional 3.6 Gy to the involved nodes and rectal mass, initially  planned to go to 50.4 to the involved nodes and 54 to the rectal masses but patient was noncompliant.  Treatment was complicated by loose stools, nausea, vomiting, anorectal pain, skin irritation.  Rectal bleeding and appetite improved.  He had what  was felt to be an excellent response on MRI.  -1/2/2014 Dr. Willa Khan performed open proctosigmoidectomy with coloanal anastomosis and diverting ileostomy.  Pathology  showed invasive moderately differentiated adenocarcinoma of the rectum, 2 of 31 lymph nodes positive, therapy effect, low-grade, tumor 3.5 x 3 x 0.5 cm, negative margins, lymphovascular invasion and perineural invasion present,  ypT3 ypN1b  -11/2014 patient underwent ileostomy takedown with resection of small bowel by Dr. Willa Khan, benign pathology.  -Patient received adjuvant FOLFOX not completed secondary to patient noncompliance.  -12/2015 Dr. Fernando attempted a colonoscopy but bowel prep was incomplete  -6/2018 biopsy-proven RLL lung  (no adrenal path seen)  metastasis, MMR normal, NRAS and BRAF wild-type, moderately differentiated adenocarcinoma, reviewed the pathology report, c/w CRC origin .  -6/2021 CT showed worsening metastasis including lungs, mediastinum, left adrenal.  -Metastatic rectal cancer was treated with FOLFOX with serious adverse reaction to oxaliplatin followed by Xeloda/Avastin with progressive disease .  -11/8/2021 CT chest, abdomen, pelvis with contrast showed progressive metastasis compared to 3 months prior  including pulmonary/mediastinal and left adrenal metastasis (right lower lobe at 7.4 x 5.8 cm up to 8 x 6.3 cm, left lower lobe 4.3 x 2.9  cm up to 4.7 x 3.3 cm, prevascular lymph node 27 x 15 mm increased to 29 x 22 mm, prevascular nodularity enlarged from 28 x 8 mm to 25 x 11 mm, left adrenal 5.2 x 2.1 cm increased to 5.7 x 3.0 cm), increased nodularity right adrenal gland 22 x 9 mm, stable  presacral soft tissue thickening 3.3 cm, avascular necrosis femoral heads.  -Patient started irinotecan/cetuximab 11/29/2021 at 180 mg/M2 and 500 mg/M2, ev sho 2 weeks.  -2/16/2022 fourth dose irinotecan/cetuximab was reduced in the cetuximab to 400 mg/M2 (20% dose reduction due to skin toxicity and diarrhea)  -3/10/2022 acute nonocclusive DVT left common femoral and proximal to mid femoral veins treated with heparin and subsequently Eliquis .  -4/20/2022 irinotecan was continued at 180 mg/M2 but cetuximab was decreased to 300 mg/M2 (due to diarrhea and dehydration)  -5/11/2022 irinotecan was decreased to 150 mg/M2 (20% reduction due to diarrhea), cetuximab continued at 300 mg/M2   -6/8/2022 irinotecan continued 150 mg/M2, cetuximab was decreased to 250 mg/M2 (due to skin toxicity)  -10/5/2022 irinotecan was continued at 150 mg/M2, cetuximab decreased to 200 mg/M2 (due to skin toxicity)  -10/19/2022 irinotecan was increased  to 165 mg/M2 (due to increased CEA), cetuximab continued 200 mg/M2  -1/11/2022 irinotecan was continued at 165 mg/M2, cetuximab given 445 mg flat dose which came out to 200 mg/M2  -January 2023: WBC 7.7-8.5, hemoglobin 11.6-12.1, MCV 80-81, platelet 341-285, ANC 5.0-4.5.  Glucose 117-133, normal sodium, potassium 3.6-3.7, creatinine 0.5-0.6, calcium 8.6-9.0, magnesium 1.8, albumin 3.4-3.5 otherwise normal LFTs.  -1/25/2023 patient received dexamethasone 12 mg, Benadryl 50 mg, Aloxi 0.25 mg, atropine 0.4 mg, irinotecan 165 mg/M2, cetuximab 445 mg .     -I initially saw the patient on 2/7/2023, previously  seen by other providers most recently Dr. Zuniga at [facility], not all of his information in my EMR system, somewhat difficult to sort through the notes.  I did not go through all of his extensive labs.  CEA trend: August 2013: 5.4, November 2014: 2.2, November 2021: 165, January 2022: 151, February 2022: 52.8, March 2022: 27.5, April-May 2022: 46-47.6 (rise felt secondary to dose reductions and missed doses), July 2022: 80, August-September 2022: 56-61,  October 2022: 60<-71, November 2022 64-69, December 2022: 85, January 2023: 71  Patient received IV magnesium most recently November 2022, October 2022, July 2022.     -2/7/2023 labs which went to Dr. Zuniga: WBC 7, hemoglobin 11.4, MCV 81, platelets 350, normal differential number.  Sodium 136, potassium 3.3, creatinine 0.5, calcium 8s, glucose 149.  Magnesium 1.8.  Normal LFTs.  Iron 7% with ferritin 96.  Normal phosphorus.   B12 = 328.  Folate 7.3.  CEA 79.   -2/20/2023 CT chest, abdomen, pelvis with contrast compared to November 2021 showed multiple lung nodules increased in size for example right upper lobe 26 x 24 mm previously 23 x 10 mm and left upper lobe 35 x 32 mm previously 22 x 17 mm, right lower  lobe slightly decreased to 65 x 59 x 52 mm previously 72 x 61 x 62 mm, enlarged mediastinal and hilar lymph nodes and cystic appearing area anterior mediastinal lymph node less obvious otherwise lymph nodes stable, no focal liver lesions, both adrenals  enlarged right measuring 39 x 31 mm previously 22 x 9 mm whereas left adrenal was similar, mixed attenuation presacral region 33 x 24 mm similar could be postoperative change or residual tumor, slightly prominent new pelvic and inguinal lymph nodes, hernia  stable   -2/2023 potassium was increased to 2 tablets/day, B12 and folate recommended along with iron which he had to get over-the-counter.   -2/21/2023: WBC 7.8, hemoglobin 12, MCV 82, platelet 399, ANC 4.4.  Sodium 135, potassium 3.9, creatinine 0.6, normal  calcium, glucose elevated 164.  Magnesium 1.9.  Normal LFTs, TSH, methylmalonic acid at 0.25.  Negative intrinsic factor antibody and  parietal cell antibody, celiac panel, hepatitis panel.    -3/2023: WBC 8s, hemoglobin 11.6-12, MCV 80-82, platelet 359-456, ANC 5.4-5.2.  Normal sodium, potassium 3.3 with 40 mEq IV given and increase to 2 tablets/day->3.5 , creatinine 0.6, normal calcium, glucose 166-178.  Magnesium 1.6-4 g given and 1.7.  Normal LFTs.  Iron 7-9% with ferritin 138  But he had not started the oral iron yet. 3/22 potassium 20 and magnesium 2 given.      -March 2023 he eventually got the B12, folate, iron.  -He had an appt. with Dr. Lui 3/20/23 (he missed 2/27/2023 due to car trouble)-progression of disease but difficult to assess given  only 2 time points over about 1.5 years, patient not interested in changing treatment to a 5-FU based regimen and wanted to continue irinotecan/cetuximab but recommended CT after 4 cycles, offered a clinical trial but patient declined due to the travel,  Dr. Lui available for recommendations upon progression   -4/4/2023 potassium was increased to 3 tablets/day.   -4/4/2023-4/18/23: WBC 7-8.3, hemoglobin 11.7-11.8, MCV 80, platelets 369-407, ANC 4.09-5.6.  Sodium 134-135, potassium 3.7, creatinine 0.6, calcium normal, glucose 144-172.  Magnesium 1.8-1.6.  Total protein 6.3-6.5, alkaline phosphatase 138-125, CEA  elevated 160 up from 79.  Ferritin increased 165.  Negative intrinsic factor antibody and parietal cell antibody.  Methylmalonic acid normal 0.34.  IV potassium 20 and magnesium 4 given on 4/19/2023.   -4/24/2023 CT chest, abdomen, pelvis with contrast showed stable bilateral pulmonary nodules (right upper lobe 2.7 cm, left upper lobe 3.4 cm, right lower lobe 6.5 cm), slight decrease in presacral mass 2.9 x 2.4 cm previously 3.5 x 2.7 cm, decreased  multiple bilateral adrenal nodules on the right 3.4 cm previously 3.9 cm and on the left 4.1 cm  previously 3.8 cm, no new or worsening metastatic disease, no lymphadenopathy, bilateral femoral head avascular necrosis and hernia stable, unremarkable liver,  fatty pancreas.   -5/2/2023-5/30/2023: WBC 12s-11.1, hemoglobin 11.8-12s, MCV 81, platelet 465-429, elevated neutrophils.  Sodium 134-135, potassium 3.6 down to 3.1 with potassium increased to 20 mEq 2 tabs twice  a day and potassium 20 meq given on 5/17 with potassium subsequently up to 3.9 and was 3.5 on 5/30/2023 at which time 20 mEq IV were given, creatinine 0.6-0.5, calcium 8.9-9s, glucose up to 222.   Magnesium 1.7 down to 1.5 status post 4 g of magnesium on 5/17 and improved at 1.9 but subsequently 1.8 and was given 2 g on 5/31/2023.  Normal phosphorus.  Negative C. difficile..  Alkaline phosphatase 137-126.  CEA was better at 137.  Labs sent to  PCP.  Potassium 20 and magnesium 2 IV given on several occasions in May 2023.  Normal GGT.   -6/20/2023: WBC 13.3, hemoglobin 11.8, MCV 80, platelets 644, elevated neutrophils.  Sodium 133, potassium 3.8, creatinine 0.6, calcium 9.2, glucose 198.  Magnesium 1.7.  Alkaline phosphatase 160.  Iron 8% with ferritin 343.  B12 = 396.  Folate 12.  Magnesium  2 and potassium 20 IV given.   -7/3/2023: WBC 11.7, hemoglobin 11.9, MCV 81, platelet 550, elevated neutrophils.  Sodium 133, potassium 3.2, creatinine 0.5, calcium 8.9, glucose 201.  Magnesium 1.6.  Normal LFTs.  Potassium 40 and magnesium 3 given.   -7/5/2023 and 7/24/2023 Feraheme was given.  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   -7/20/2023: WBC 12.5, hemoglobin 13, MCV 81, platelet 430, elevated neutrophils.  Sodium 135, potassium 3.2 with 20 mEq given IV, creatinine 0.5, glucose 188, magnesium 1.8 with 2 g magnesium given, phosphorus 2.3 with phosphorus rich foods recommended,  alkaline phosphatase 128, B12 = 336 with MMA normal 0.17.  -Irinotecan and cetuximab were given 7/24/2023,  slightly delayed due to an issue with the port but was subsequently functioning okay.   -8/8/2023: WBC 9.9, hemoglobin 12.6, MCV 82, platelet 452, normal differential number.  Sodium 134, potassium 3.2 with potassium increased to 5 tablets/day, creatinine 0.4, calcium 9.  Magnesium 1.7, normal phosphorus, alkaline phosphatase 124, iron 19%  with ferritin 524; potassium 20 and magnesium 2 given   -8/9/23 Cetuximab/irinotecan  200 mg/M2 and 165 mg/M2 with Benadryl 50 mg p.o., dexamethasone 12 mg p.o., Aloxi, and atropine 0.4 mg IV,  subsequently discontinued due to progression disease.   -8/25/2023 CT chest, abdomen, pelvis with contrast showed progressive disease in the lung and mediastinal masses (left upper lobe 4.3 x 4.4 cm, left lower lobe 4.4 x 6.4 cm, right lower lobe 6.6 x 5.5 cm, subcarinal 6.3 x 4 cm), increased bilateral adrenal  masses (left 4.9 cm), increased presacral density 3.1 x 2.9 cm.  -8/29/2023: WBC normal 11.3, hemoglobin 13, MCV 83, platelet 582, ANC 7.5.  Normal sodium, potassium 3.9, creatinine 0.5, normal calcium, glucose 174.  Magnesium 1.8.  Alkaline phosphatase 126 stable.  Urine analysis no blood  or protein.  .   -9/12/2023 patient admitted to only taking potassium 4 tabs per day instead of 5 tabs per day, 20 mEq each.   -9/12/2023: WBC 14.3, hemoglobin 13.1, MCV 83, platelet 626, elevated neutrophils, elevated monocytes 1.2.  Sodium 134, potassium 4, creatinine 0.6, calcium 9.6, glucose 258.  Magnesium 1.6 with 2 g given.  Normal LFTs and phosphorus.  -Given progressive disease, 9/13/2023 Avastin 5 mg/KG every 2 weeks was ordered along with Lonsurf usually 35 mg/M2 twice daily for 5 days but to make it easier on the patient I gave him 60 mg  (20 mg x 3 tabs) twice a day which was closer to 30 mg/M2.  -9/26/2023: WBC 9.6, hemoglobin 13, MCV 83, platelet 476, ANC 6.1.  Sodium 133, potassium 3.6 and with his potassium increased from 4/day up to 5/day, creatinine 0.5, calcium 9.1.  Magnesium 1.8, observed.  Normal LFTs.  CEA better 288 down from 383.  -10/10/2023: WBC 8.7, hemoglobin 13.9, MCV 85, platelet 488.  Iron 24% with ferritin 411.  Magnesium 2.3.  Sodium 134, potassium 4.6, creatinine 0.5, normal calcium, glucose 145, normal LFTs.  -10/24/2023: WBC 10.3, hemoglobin 13.9, normal MCV, platelet 511.  CEA was decreased at 236.  Vitamin D was low at 9 with 12 weeks of supplement given but 11/7/2023 patient admitted to not taking that due to cost.        Past medical history  -Rectal cancer as above  -Vitamin D deficiency as above  -Rectal cancer and treatment complicated by neoplasm related pain, acneiform rash, diarrhea, hypokalemia, hypomagnesemia, noncompliance,  abdominal cramping improved with atropine  -Noncompliance  -3/2022 left common femoral vein DVT treated with heparin and transitioned  to Eliquis.  7/2021 DVT right popliteal vein and right posterior tibial vein.  Pulmonary embolism June 2014 not on anticoagulation at that time but subsequently anticoagulated although noncompliant.  DVT  leg after his rectal surgery in 2014 treated with Coumadin with questionable compliance.  2/7/23  patient noted no clots while he was  on a blood thinner and no clots prior to his cancer diagnosis.  -7/2019 LVEF 30%, multiple wall motion abnormalities, ischemic cardiomyopathy. 7/2019 chest pain with cardiac catheterization  normal coronaries but Takotsubo or stress cardiomyopathy.  2/7/2023 patient was not followed by cardiology.  -Cholecystitis January 2019 managed conservatively given metastatic cancer but given progressive symptoms patient underwent cholecystectomy by Dr. Fernando 7/2019 complicated by pulmonary edema with evaluation showing cardiomyopathy  -Subarachnoid hemorrhage 9/2014 with cerebrospinal  fluid no malignant cells and was felt to be less likely a bleed based on CSF studies, EEG no seizure-like activity, had vasovagal syncope from pain  -Venous stasis, varicose veins   -Day's esophagus by EGD 8/2013, GERD  -Tobacco abuse  -Abdominal pain 2013/2014 managed with methadone  -Depression/anxiety  -Dyslipidemia  -Hypertension  -Avascular  necrosis femoral heads noted on imaging 2021  -hepatic steatosis noted on imaging 2020  -MRSA right hand infection status post I&D (before his diagnosis of rectal cancer)  -1/2019 admitted with neutropenic fever and pneumonia  -Microcytic anemia.  Patient reported taking oral iron for a few months in 2013/2014.  - Elevated glucose.  5/2023 started on dapagliflozin diabetes type 2 by PCP.  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 Fernando 1/2014, incision and drainage hand for abscess, EGD and colonoscopies, cardiac catheterization, complete dental extractions     Family history  Father with esophageal cancer.  Maternal grandfather with lung cancer.  Paternal  grandfather with esophageal  cancer.     Social history  Tobacco: 1 pack/day, started as a teenager.  Alcohol:  Denied.  Drugs: Marijuana.  Has 4 children.     Allergies   Allergen Reactions    Acetaminophen Nausea And Vomiting    Adhesive Tape-Silicones Unknown     Current Outpatient Medications on File Prior to Visit   Medication Sig Dispense Refill    albuterol (Ventolin HFA) 90 mcg/actuation inhaler INHALE TWO PUFFS INTO THE LUNGS EVERY 6 HOURS AS NEEDED FOR SHORTNESS OF BREATH OR FOR WHEEZING FOR UP TO 30 DAYS      apixaban (Eliquis) 5 mg tablet Take 1 tablet (5 mg) by mouth 2 times a day.      buprenorphine-naloxone (Suboxone) 8-2 mg SL tablet Place 1 tablet under the tongue 2 times a day.      cyanocobalamin (Vitamin B-12) 1,000 mcg tablet Take 1 tablet (1,000 mcg) by mouth once daily.      dapagliflozin propanediol (Farxiga) 5 mg Take by mouth.      divalproex (Depakote ER) 500 mg 24 hr tablet Take 2 tablets (1,000 mg) by mouth.      divalproex (Depakote) 250 mg EC tablet Take 1 tablet (250 mg) by mouth once daily at bedtime.      doxepin (SINEquan) 10 mg capsule Take 1 capsule (10 mg) by mouth once daily. Pt unsure of dose      ergocalciferol (Vitamin D-2) 1.25 MG (20056 UT) capsule Take 1 capsule (50,000 Units) by mouth 1 (one) time per week for 12 doses. 12 capsule 0    ferrous sulfate 325 (65 Fe) MG tablet Take 1 tablet (65 mg of iron) by mouth once daily.      folic acid (Folvite) 1 mg tablet Take 1 tablet (1 mg) by mouth once daily.      haloperidol (Haldol) 2 mg tablet TAKE ONE TABLET BY MOUTH ONCE DAILY FOR AGITATION.      ibuprofen 800 mg tablet Take 1 tablet (800 mg) by mouth.      lidocaine-prilocaine (Emla) 2.5-2.5 % cream apply topically to affected area if needed      loperamide (Imodium A-D) 2 mg tablet Take 1 tablet (2 mg) by mouth 4 times a day as needed for diarrhea.      losartan (Cozaar) 50 mg tablet Take 1 tablet (50 mg) by mouth once daily.      metoprolol succinate XL (Toprol-XL) 25 mg  24 hr tablet Take 1 tablet (25 mg) by mouth once daily.      naloxone (Narcan) 4 mg/0.1 mL nasal spray Administer 1 spray (4 mg) into affected nostril(s) see administration instructions. 1 nasal spray (4 mg) as a single dose in one nostril as needed for opioid reversal; may repeat in 2 to 3 minutes in the opposite nostril if there is no response to the first dose. Immediately call 911.      omeprazole (PriLOSEC) 40 mg DR capsule Take 1 capsule (40 mg) by mouth.      ondansetron (Zofran) 8 mg tablet Take 1 tablet (8 mg) by mouth every 8 hours if needed.      potassium chloride CR 20 mEq ER tablet Take 1 tablet (20 mEq) by mouth twice a day.      prochlorperazine (Compazine) 10 mg tablet TAKE ONE TABLET BY MOUTH EVERY 6 HOURS AS NEEDED FOR NAUSEA AND / OR VOMITING      trifluridine-tipiraciL (Lonsurf) 20-8.19 mg tablet TAKE THREE (3) TABLETS BY MOUTH 2 TIMES DAILY FOR 5 DAYS, EVERY 2 WEEKS 30 tablet 11    [DISCONTINUED] buprenorphine-naloxone (Suboxone) 8-2 mg SL tablet Place 0.5 tablets under the tongue once daily at bedtime.       No current facility-administered medications on file prior to visit.        Vitals:    11/07/23 0757   BP: 88/59   Pulse: 82   Resp: 20   Temp: 36.3 °C (97.3 °F)   SpO2: 96%     Physical Exam:      Constitutional: Performance status 1.  94.6  kg.  -General: Well-developed and well-nourished. No acute distress.  Somewhat odd affect, stands up during the visit.  Visible essential tremor-stable.  Looks well for him today.  -Lymphatics: No cervical or supraclavicular lymphadenopathy.  -Eyes:  Anicteric.  -HEENT: MMM , neck supple, no thrush.  Edentulous.  -Cardiovascular: RRR.    -Respiratory: CTAB.  No more faint wheeze scattered. Breathing is nonlabored.  -Abdomen: Soft, nontender, multiple well-healed scars.  Limited by body habitus.  Possible hernia near his scars but reducible.  -Back: No costovertebral angle tenderness. No spine tenderness.  -Extremities: Significant bilateral leg edema  with venous stasis changes and varicose veins-stable.    -Neurologic: Awake and interactive.  Gait steady.  Speech fluent.  Essential tremor.   -Skin: Venous stasis changes.  Port clean, dry, intact.  Face clear now, better.  Tattoos not fully examined today.  Hands clear.  -Psychiatric: Cooperative.  Calm.          Assessment and Plan:   Assessment:    #Metastatic rectal cancer to the lungs (biopsy-proven), mediastinum, adrenals, MMR intact, BRAF and NRAS/KRAS wild-type .    Patient initially had stage III disease diagnosed in 2013 status post neoadjuvant Xeloda and radiation followed by resection followed by adjuvant FOLFOX but treatment incomplete due to compliance  issues, developed a metastasis 6/2018 treated with FOLFOX with some sort of reaction (patient recalled uncontrollable diarrhea), progressive disease on Xeloda/Avastin, started irinotecan/cetuximab 11/2021 complicated by GI and skin toxicity requiring  dose reductions and modifications. Some of his CEA fluctuations were  felt secondary to decreased chemotherapy doses as well as missed doses, April 2023 CEA significantly increased at 160 but May 2022 back down to 137, CEA at the time of initiation of irinotecan/cetuximab  165 in November 2021.  May 2023 he had increased diarrhea of unknown etiology, better after probiotic started June 2023, even better after increasing Imodium and Lomotil June 2023.   Last CT was in 11/2021, so I ordered a follow-up CT 2/20/2023 which over a year showed worsening lung nodules and significant increase in right adrenal gland as well as mild enlargement of inguinal lymph nodes but no liver metastasis.  He had an appt.  with Dr. Lui 3/20/23 -progression of disease but difficult to assess given only 2 time points over about 1.5 years, patient not interested in changing treatment to a 5-FU based regimen and wanted to continue irinotecan/cetuximab but recommended  CT after 4 cycles, offered a clinical trial but patient  declined due to the travel.  CT was stable or improved on 4/24/2023.       Unfortunately CT 8/25/2023 showed disease progression in the lung/mediastinal masses, presacral density, and bilateral adrenal masses consistent with progressive disease.  He clinically was still doing quite well.  CEA significantly lora 8/29/2023  at 383.  We discussed options and decided on a dose modified Avastin and Lonsurf regimen started 9/13/2023 which was well-tolerated except for some nausea and vomiting but he did not take his premedications.  9/27/2023 he started taking the Zofran as a premedication and Lonsurf was much better tolerated.  CEA down to 288 on 9/26/2023, down to 236 on 10/24/2023 so he seems to be responding and tolerating the treatment.  Patient has been reluctant to do CT scans.  Electrolytes and bowels as well as skin seem better on this regimen.  Hepatitis panel negative 2/2023.     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     #Microcytic anemia suggestive of iron deficiency, also anemia on chemotherapy .  He remotely took iron at his original diagnosis  in 2013/2014.  February 2023 iron 7% and ferritin 96 with hemoglobin 11.4 likely iron deficiency, also borderline B12 = 328 and borderline folate 7.3.  Keep in mind possible  malabsorption on his PPI.   2/2023 negative/normal intrinsic factor antibody, parietal cell antibody, methylmalonic acid, celiac.  He eventually got iron, B12, folate  over-the-counter March 2023.  3/2023 thrombocytosis  also would go along with the iron deficiency.  April 2023 thrombocytosis resolved, hemoglobin stable 11s.  May 2023 hemoglobin stable, platelet count back up which again could be due to iron deficiency or inflammation or malignancy.  June 2023 hemoglobin  11.8, platelet count up to 644, persistent microcytosis, persistent iron deficiency anemia with iron 8% and ferritin 343 so possible malabsorption with IV iron ordered (Feraheme given 7/5/2023  and 7/24/2023), folate normal, B12 = 396 somewhat borderline.  7/20/2023 hemoglobin improved, platelet count down to 430, MCV improved, so he seemed to respond to the IV iron; also B12 336 but methylmalonic  acid normal so not frankly deficient.  8/2023 iron 19% with ferritin 524 with close to normal hemoglobin so maintained observation, still has thrombocytosis which could be due to his malignancy, basically normal WBC and hemoglobin 8/29/2023.  9/12/2023  hemoglobin okay, leukocytosis and thrombocytosis likely from his malignancy.  10/10/2023 iron 24% with ferritin 411, normal WBC and hemoglobin, platelet count mildly elevated 488 again likely secondary to malignancy and inflammation.    #Vitamin D deficiency 10/24/2023 with vitamin D weekly prescribed.  Patient reports noncompliance due to cost.     #Hypokalemia possibly secondary to treatment versus malabsorption versus diarrhea.  Still low March 2023 requiring IV potassium and  increased potassium to 2 tablets/day, potassium 3.7 on 4/4/2023 with potassium increased to 3 tablets/day.  April-May 2023 potassium borderline at 3.6-3.7  status post IV potassium but still persistently low at 3.1 so potassium was increased to a total  of 80 mEq daily and IV potassium given May 2023 with improvement on follow-up labs but still received IV potassium later in May 2023 and again June-July 2023.  Patient refused nephrology consultation July 2023.  August 2023 patient's potassium was increased  to 100 mEq daily and received IV potassium, level actually better 8/29/2023.  9/12/2023 patient admitted to only taking 4 tabs of his potassium rather than 5 tabs for unclear reasons, but actually potassium was okay at 4.0.  9/26/2023 potassium 3.6 so he was increased back up to 5 tablets today with normal potassium 10/10/2023.     #Hypomagnesemia likely from chemotherapy and/or diarrhea as well as possible  malabsorption from PPI.  Magnesium low March- September 2023 requiring IV magnesium.   No oral magnesium given diarrhea.  10/10/2023 magnesium normal, possibly due to being off the cetuximab.     #Mild hypophosphatemia July 2023 recommending dietary increase of phosphorus.  Normal phosphorus 8/8/2023 and 9/12/2023.     #Noncompliance     #Acneiform rash secondary to cetuximab managed with dose reductions/delays, topicals, minocycline.  He was doing well on minocycline 1 tablet daily and a facial lotion but not a facial steroid, seemed a little worse 2/21/2023 so I prescribed  topical  clindamycin lotion 1% but  this made his face more red  so stopped.  3/21/2023 his facial rash looked more consistent with seborrheic dermatitis improved with an antidandruff shampoo, fluctuates.  8/29/2023 he reported stopping minocycline due to nausea  and vomiting with GI symptoms improved and facial rash no worse.  9/12/2023 facial rash actually resolved after stopping the cetuximab.  October 2023 mild flaking around his face seemed 11/7/2023 skin actually very clear.  To get better with antidandruff shampoo.     #Diarrhea secondary to chemotherapy managed with dose  reductions/delays, Lomotil, Imodium; March 2023 he tried Metamucil which did not help his bowels.  May 2023 slightly increased bowel movements with more watery bowel movements, negative C. difficile,  not affecting his quality of life at this time and no dehydration, no clear etiology of the increased bowel movements, was still not taking his antidiarrheal medications at maximum.  June 2023 probiotic was started with improvement in bowels although  still baseline diarrhea, better July 2023 with increased Imodium and Lomotil use.  Bowels seem better off the cetuximab, even after he stopped the probiotic and Lomotil and is just using Imodium as of 10/24/2023.    #Multiple episodes of thrombosis since his cancer diagnosis likely secondary to malignancy most recently 3/2022 treated with Eliquis with no bleeding or thrombotic events with this.  He does have chronic venous stasis changes and varicose veins which  do not bother him, 7/2023 declined vascular consultation.     #Nausea secondary to chemotherapy and minocycline controlled with Zofran and prochlorperazine except June 2023 realized that he was previously using 8 mg of Zofran, 4 mg of Zofran not controlling his nausea and vomiting.  Nausea/vomiting doing well with  the Zofran 8 mg and Compazine except 8/29/2023 noted worsening symptoms so he stopped his minocycline with subsequent improved symptoms.  September 2023 nausea and vomiting on Avastin/Lonsurf as above, better with taking scheduled Zofran.     #Elevated glucose on labs-patient reported PCP April 2023 was going to follow-up on his labs from my office and decide about starting a medication.  I did send our labs to her PCP including May 2023 glucose up to 200s, and PCP started patient to May 2023  on dapagliflozin.     #Chronic pain apparently on pain medications even prior to his cancer diagnosis, could also have pain secondary to malignancy, managed by PCP with Suboxone (2022 took gabapentin but no  longer) with a history of pain medication addiction.  10/10/2023 patient admitted to taking ibuprofen which I asked him to stop even though he rarely took it, as he is on a blood thinner.     #Mood disorder, anxiety/depression, followed by psychiatry.     #Tobacco abuse     #Hypertension-this will be monitored on Avastin, so far doing okay, actually blood pressure low 11/7/2023 which is asymptomatic     #Hepatic steatosis on imaging     #Takotsubo or stress cardiomyopathy, Day's esophagus, dyslipidemia, asthma/COPD, left thigh neuropathy after surgery but not related to chemotherapy, essential tremor     #April-May 2023 only minimally elevated alkaline phosphatase with normal GGT May 2023, could be due to fatty liver versus chemotherapy with bone marrow stimulation, no known liver metastasis.  June 2023 alkaline phosphatase increased 160.  July 2023 LFTs  actually normal except minimally elevated alkaline phosphatase 120s likely not significant.  August 2023 LFTs with stable mild alkaline phosphatase 120s, no liver metastasis 8/2023.  LFTs normal September 2023 and 10/10/2023.     #Mild hyponatremia down to 134-135 in April-May 2023, 133 in June-July 2023, lowest 133 in August- October 2023- can be observed.     #May-June 2023 mild leukocytosis without signs or symptoms of infection at that time although slightly worsening diarrhea also could have had something else going on.  His new diabetic medication in 2023 does not have leukocytosis associated with it.   Tobacco abuse can cause leukocytosis.  July 2023 WBC count only minimally elevated.  August 2023 normal WBC.  September 2023 mild leukocytosis could be from malignancy.  No signs or symptoms of infection.    #Mild wheezing on exam 10/24/2023 with no shortness of breath.  11/7/2023 lungs clear.     -I requested blood pressure to be repeated and notify PCP if still low.  -I have explained his unfortunate progressive disease, gave him options of additional  treatment versus hospice and he wanted to go with additional treatment, again 8/29/2023 declined a clinical trial.  I did email Dr. Lui and notified her of my  plan, could email her again if issues, using NCCN guidelines version 4/2023 given his prior treatment I recommended Avastin and Lonsurf, has been studied in patients who previously received bevacizumab, after that could consider regorafenib.  Given his  questionable compliance I am somewhat concerned about the oral regimen but nursing and myself have gone over this with him and he appears to be taking it correctly.  -Avastin 5 mg/kilogram every 2 weeks.   8/29/2023 Lonsurf was ordered, usually 35 mg/M2 twice daily for 5 days but to make it easier on the patient as that dose using a BSA of 2.13 came out to 74 mg, I decided to give him  60 mg (20 mg x 3 tabs) twice daily which is closer to 30 mg/M2 which is reasonable given his previous chemotherapy, #30 with 11 refills given as I only want to give him the 5 days worth each time  so he does not accidentally take too much, going to use a modified schedule where I give the Avastin and the Lonsurf every 2 weeks for less toxicity and ease of administration, advised home antiemetic premedication.   11/7/2023 I discussed increasing his dose of Lonsurf and he declined, maintaining the course.  Urine analysis 8/29/2023 was okay, follow-up every 3 months on the Avastin.  Avastin and Lonsurf side effects were outlined in my note on 8/29/2023.  CBC with differential prior to each treatment, treat if ANC greater than 1500 and platelets greater  than 75, continue to monitor for drug interactions, CMP prior to each cycle (monthly).   No dose adjustment needed for mild hepatic impairment.  Maximum of 3 dose reductions are allowed for toxicity, discontinue if unable to tolerate 20 mg/M2.  Comes in 15 mg and  20 mg.  Patient was advised to take with food and swallow tablets whole.  Patient was advised to use Imodium at  the first onset of diarrhea, call if symptoms not controlled.  Growth factor support or dose reduction could be considered for CBC toxicity.    I did advise the patient to be extra cautious especially as he is on a blood thinner. Discontinue bevacizumab for hypertensive crisis or hypertensive encephalopathy, serious hemorrhage, arterial thromboembolism, nephrotic syndrome, gastrointestinal  perforation, fistula, heart failure, wound healing complications requiring medical intervention, or RPLS or posterior reversible encephalopathy syndrome.  Bevacizumab should not be administered within 28 days of surgery (before and after), should be suspended  prior to elective surgery, and should not resume after surgery until surgical wound is fully healed.  No dose adjustments for renal or hepatic impairment except if nephrotic syndrome discontinue the bevacizumab (proteinuria >= 2 g in 24 hours requires  holding bevacizumab). CBC, CMP, urinalysis, and magnesium should be monitored.  If proteinuria >=2+ by dipstick, assessed with a 24-hour urine protein.  Monitor blood pressure every 2-3 weeks during treatment and regularly after discontinuation if bevacizumab-induced  hypertension occurs.  Monitor for signs or symptoms of GI perforation or fistula, bleeding, thromboembolism, wound healing complications, and heart failure.    -CBC, CMP, B12, MMA, folic acid today.  Magnesium and phosphorus can be followed up next visit.  10/24/2023 CBC, CEA, vitamin D.  Iron studies noted 10/10/2023.   Urine analysis 8/29/2023.     -Continue Imodium.  Can add back in probiotic and Lomotil if needed.  -Continue PCP management of medications not prescribed by me with abnormal LFTs.  -11/7/2023 patient requested a letter for job and family, office working on that.  -10/30/2023 vitamin D weekly x8 was prescribed but patient has had financial issues, asked my nurse to check on this.  Follow-up levels with additional vitamin D daily as  needed.  -Continue to monitor genetics recommendations, follow-up in 1-2 years from 7/2023.  I have recommend that family members get colonoscopies  starting at age 20.  -I explained that his disease is not curable but treatable on 2/7/2023, reviewed again 8/29/2023 and advised hope for the best but prepare for the worst.    -Dr. Lui did not recommend any additional tumor testing.  -He started taking over-the-counter B12, folate, iron in March 2023-Feraheme can be given again as needed.  Iron instructions were outlined  in my note on 2/21/2023, 8/29/2023 discussed discontinuing the oral iron but he seemed to want to continue to take that.  Side effects of IV iron were outlined in my note on 6/20/2023.  Endoscopy for iron deficiency could be considered.  B12 borderline  with normal methylmalonic acid 7/20/2023, follow-up labs in 3-4 months. Folate normal 6/20/2023, follow-up in 4-6 months.  -CT chest, abdomen, pelvis with contrast can be monitored most recently 8/25/2023-patient has been reluctant to do imaging due to cost  but I explained it is the only way I can truly see what is going on, ideally need to monitor at least every 4 months for now.  11/7/2023 patient declined the CT scan understanding I am limited without that, continue to adjust.   If alkaline phosphatase increases, consider bone scan given normal GGT or  a PET scan.  -Zofran 8 mg every 8 hours as needed #30 with 11 refills was prescribed 6/20/2023, asked him to disregard the Zofran 4 mg that was ordered 4/18/2023.  Prochlorperazine 10 mg as needed #30 with  11 refills was refilled on 5/2/2023.   -Continue pain medications from PCP, but 10/10/2023 asked him to discontinue the ibuprofen given the blood thinner use.   -Tobacco cessation was again recommended 8/8/2023.  - Minocycline 100 mg daily #30  was prescribed 11/17/2022 but discontinued 8/29/2023 as he stopped taking this and went off the cetuximab.   He can continue with antidandruff  shampoo and facial lotion.  Dr. Zuniga recommended 1% hydrocortisone cream on his face twice daily but I have asked him to hold that and see how he does .  Dermatology consultation was offered 5/30/2023 but patient declined.  -Continue Imodium.  Lomotil 2 tabs 4 times a day as needed #80 with 3 refills, refilled 5/16/2023, no longer taking.    -Continue potassium and magnesium replacement, consider IV  if levels less than 3.8 and 1.8 respectively.  No oral magnesium because a tendency towards diarrhea.    Potassium 20 mEq, 5 tablets/day 8/8/2023 #150 with 11 refills, can take 2 in  the morning and 3 at night.  I highly encouraged a nephrology consultation for his electrolyte abnormalities but he declined 8/8/2023 understanding I have limitations in my expertise in this area.   -Continue Emla cream, refilled 6/22/2020 330 g with 3 refills.  -Port flushes will be done on treatment.  - Continue PPI for his history of Day's esophagus with further EGD as needed.  -Continue Eliquis 5 mg twice daily, #60 with 11  refills refilled on 9/11/2023, 8/29/2023 explained the risk on the Avastin with potential bleeding.  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   Information on Eliquis was outlined in my note on 2/21/2023.  General healthy lifestyle with reduction of immobility  and prevention of blood clots was encouraged including prophylactic anticoagulation if ever hospitalized or has a surgery if not on anticoagulation , no smoking, optimization of weight, getting up and moving around regularly if on a plane or in a car, no hormonal agents.  Compression  stockings were recommended again 10/24/2023. Concurrent other blood thinning medications were discouraged unless clinically needed.   I again 10/24/2023 advised the patient to get a medical alert  bracelet while on anticoagulation.  -Appreciate PCP management of diabetes.  - Nutrition consultation was  offered 6/20/2023 regarding his diarrhea as well as his diabetes but he declined.  -Labs are incorporated in my note which is to be sent to PCP. I have recommended routine health care maintenance and screening through PCP. The patient was reminded to followup with the PCP  for other medical problems and ongoing care. The patient was asked to return to clinic, or sooner as needed for new or concerning symptoms, in  2 weeks.    10/10/2023 I explained my upcoming departure from [facility], explained that I was told that [facility] is working on my replacement, notified patient to make sure appointments/orders are scheduled and kept as directed.  I wished the patient the best of luck.

## 2023-11-08 ENCOUNTER — INFUSION (OUTPATIENT)
Dept: HEMATOLOGY/ONCOLOGY | Facility: HOSPITAL | Age: 38
End: 2023-11-08
Payer: MEDICARE

## 2023-11-08 VITALS
TEMPERATURE: 97 F | BODY MASS INDEX: 31.03 KG/M2 | OXYGEN SATURATION: 97 % | HEART RATE: 90 BPM | SYSTOLIC BLOOD PRESSURE: 114 MMHG | WEIGHT: 210.21 LBS | DIASTOLIC BLOOD PRESSURE: 75 MMHG | RESPIRATION RATE: 18 BRPM

## 2023-11-08 DIAGNOSIS — C20 RECTAL CANCER (MULTI): ICD-10-CM

## 2023-11-08 PROCEDURE — 96413 CHEMO IV INFUSION 1 HR: CPT

## 2023-11-08 PROCEDURE — 2500000004 HC RX 250 GENERAL PHARMACY W/ HCPCS (ALT 636 FOR OP/ED): Mod: SE | Performed by: NURSE PRACTITIONER

## 2023-11-08 PROCEDURE — 2500000004 HC RX 250 GENERAL PHARMACY W/ HCPCS (ALT 636 FOR OP/ED): Mod: SE | Performed by: INTERNAL MEDICINE

## 2023-11-08 PROCEDURE — 96409 CHEMO IV PUSH SNGL DRUG: CPT

## 2023-11-08 RX ORDER — HEPARIN SODIUM,PORCINE/PF 10 UNIT/ML
50 SYRINGE (ML) INTRAVENOUS AS NEEDED
Status: DISCONTINUED | OUTPATIENT
Start: 2023-11-08 | End: 2023-11-08 | Stop reason: HOSPADM

## 2023-11-08 RX ORDER — DIPHENHYDRAMINE HYDROCHLORIDE 50 MG/ML
50 INJECTION INTRAMUSCULAR; INTRAVENOUS AS NEEDED
Status: DISCONTINUED | OUTPATIENT
Start: 2023-11-08 | End: 2023-11-08 | Stop reason: HOSPADM

## 2023-11-08 RX ORDER — HEPARIN SODIUM,PORCINE/PF 10 UNIT/ML
50 SYRINGE (ML) INTRAVENOUS AS NEEDED
Status: CANCELLED | OUTPATIENT
Start: 2023-11-08

## 2023-11-08 RX ORDER — FAMOTIDINE 10 MG/ML
20 INJECTION INTRAVENOUS ONCE AS NEEDED
Status: DISCONTINUED | OUTPATIENT
Start: 2023-11-08 | End: 2023-11-08 | Stop reason: HOSPADM

## 2023-11-08 RX ORDER — HEPARIN 100 UNIT/ML
500 SYRINGE INTRAVENOUS AS NEEDED
Status: CANCELLED | OUTPATIENT
Start: 2023-11-08

## 2023-11-08 RX ORDER — ALBUTEROL SULFATE 0.83 MG/ML
3 SOLUTION RESPIRATORY (INHALATION) AS NEEDED
Status: DISCONTINUED | OUTPATIENT
Start: 2023-11-08 | End: 2023-11-08 | Stop reason: HOSPADM

## 2023-11-08 RX ORDER — PROCHLORPERAZINE MALEATE 10 MG
10 TABLET ORAL EVERY 6 HOURS PRN
Status: DISCONTINUED | OUTPATIENT
Start: 2023-11-08 | End: 2023-11-08 | Stop reason: HOSPADM

## 2023-11-08 RX ORDER — HEPARIN 100 UNIT/ML
500 SYRINGE INTRAVENOUS AS NEEDED
Status: DISCONTINUED | OUTPATIENT
Start: 2023-11-08 | End: 2023-11-08 | Stop reason: HOSPADM

## 2023-11-08 RX ORDER — EPINEPHRINE 0.3 MG/.3ML
0.3 INJECTION SUBCUTANEOUS EVERY 5 MIN PRN
Status: DISCONTINUED | OUTPATIENT
Start: 2023-11-08 | End: 2023-11-08 | Stop reason: HOSPADM

## 2023-11-08 RX ORDER — PROCHLORPERAZINE EDISYLATE 5 MG/ML
10 INJECTION INTRAMUSCULAR; INTRAVENOUS EVERY 6 HOURS PRN
Status: DISCONTINUED | OUTPATIENT
Start: 2023-11-08 | End: 2023-11-08 | Stop reason: HOSPADM

## 2023-11-08 RX ADMIN — BEVACIZUMAB-AWWB 472 MG: 400 INJECTION, SOLUTION INTRAVENOUS at 10:24

## 2023-11-08 RX ADMIN — Medication 500 UNITS: at 10:36

## 2023-11-08 ASSESSMENT — PAIN SCALES - GENERAL: PAINLEVEL: 0-NO PAIN

## 2023-11-08 NOTE — PROGRESS NOTES
November 8, 2023 at 9:33 AM    Acetaminophen and Adhesive tape-silicones    Misael Hernandez is a 38 y.o. male   There were no vitals taken for this visit.  There is no height or weight on file to calculate BSA.    Past Medical History:   Diagnosis Date    Other conditions influencing health status     Rectal Cancer       Encounter Diagnosis   Name Primary?    Rectal cancer (CMS/HCC)        Has the entire regimen been authorized by financial clearance (pre-certification)?  Yes     Prior to Admission medications    Medication Sig Start Date End Date Taking? Authorizing Provider   albuterol (Ventolin HFA) 90 mcg/actuation inhaler INHALE TWO PUFFS INTO THE LUNGS EVERY 6 HOURS AS NEEDED FOR SHORTNESS OF BREATH OR FOR WHEEZING FOR UP TO 30 DAYS 11/2/21   Historical Provider, MD   apixaban (Eliquis) 5 mg tablet Take 1 tablet (5 mg) by mouth 2 times a day.    Historical Provider, MD   buprenorphine-naloxone (Suboxone) 8-2 mg SL tablet Place 1 tablet under the tongue 2 times a day. 8/22/23 11/7/23  Historical Provider, MD   cyanocobalamin (Vitamin B-12) 1,000 mcg tablet Take 1 tablet (1,000 mcg) by mouth once daily.    Historical Provider, MD   dapagliflozin propanediol (Farxiga) 5 mg Take by mouth.    Historical Provider, MD   divalproex (Depakote ER) 500 mg 24 hr tablet Take 2 tablets (1,000 mg) by mouth. 1/17/20   Historical Provider, MD   divalproex (Depakote) 250 mg EC tablet Take 1 tablet (250 mg) by mouth once daily at bedtime. 1/17/20   Historical Provider, MD   doxepin (SINEquan) 10 mg capsule Take 1 capsule (10 mg) by mouth once daily. Pt unsure of dose    Historical Provider, MD   ergocalciferol (Vitamin D-2) 1.25 MG (16686 UT) capsule Take 1 capsule (50,000 Units) by mouth 1 (one) time per week for 12 doses. 10/30/23 1/16/24  Willa Vega MD   ferrous sulfate 325 (65 Fe) MG tablet Take 1 tablet (65 mg of iron) by mouth once daily.    Historical Provider, MD   folic acid (Folvite) 1 mg tablet Take 1 tablet (1  mg) by mouth once daily.    Historical Provider, MD   haloperidol (Haldol) 2 mg tablet TAKE ONE TABLET BY MOUTH ONCE DAILY FOR AGITATION. 10/10/23   Historical Provider, MD   ibuprofen 800 mg tablet Take 1 tablet (800 mg) by mouth.    Historical Provider, MD   lidocaine-prilocaine (Emla) 2.5-2.5 % cream apply topically to affected area if needed 12/21/21   Historical Provider, MD   loperamide (Imodium A-D) 2 mg tablet Take 1 tablet (2 mg) by mouth 4 times a day as needed for diarrhea.    Historical Provider, MD   losartan (Cozaar) 50 mg tablet Take 1 tablet (50 mg) by mouth once daily.    Historical Provider, MD   metoprolol succinate XL (Toprol-XL) 25 mg 24 hr tablet Take 1 tablet (25 mg) by mouth once daily.    Historical Provider, MD   naloxone (Narcan) 4 mg/0.1 mL nasal spray Administer 1 spray (4 mg) into affected nostril(s) see administration instructions. 1 nasal spray (4 mg) as a single dose in one nostril as needed for opioid reversal; may repeat in 2 to 3 minutes in the opposite nostril if there is no response to the first dose. Immediately call 911. 5/9/23 5/8/24  Historical Provider, MD   omeprazole (PriLOSEC) 40 mg DR capsule Take 1 capsule (40 mg) by mouth. 6/6/23   Historical Provider, MD   ondansetron (Zofran) 8 mg tablet Take 1 tablet (8 mg) by mouth every 8 hours if needed.    Historical Provider, MD   potassium chloride CR 20 mEq ER tablet Take 1 tablet (20 mEq) by mouth twice a day. 5/11/22   Historical Provider, MD   prochlorperazine (Compazine) 10 mg tablet TAKE ONE TABLET BY MOUTH EVERY 6 HOURS AS NEEDED FOR NAUSEA AND / OR VOMITING    Historical Provider, MD   trifluridine-tipiraciL (Lonsurf) 20-8.19 mg tablet TAKE THREE (3) TABLETS BY MOUTH 2 TIMES DAILY FOR 5 DAYS, EVERY 2 WEEKS 8/29/23 8/28/24  Willa Vega MD   buprenorphine-naloxone (Suboxone) 8-2 mg SL tablet Place 0.5 tablets under the tongue once daily at bedtime. 8/22/23 11/7/23  Historical Provider, MD       Reviewed documented  list of home medications.  No significant drug interactions identified between home medications and chemotherapy regimen.    Yes    WBC   Date Value Ref Range Status   11/07/2023 9.1 4.4 - 11.3 x10*3/uL Final   10/24/2023 10.3 4.4 - 11.3 x10*3/uL Final   10/10/2023 8.7 4.4 - 11.3 x10*3/uL Final     Hemoglobin   Date Value Ref Range Status   11/07/2023 12.6 (L) 13.5 - 17.5 g/dL Final   10/24/2023 13.9 13.5 - 17.5 g/dL Final   10/10/2023 13.9 13.5 - 17.5 g/dL Final     Hematocrit   Date Value Ref Range Status   11/07/2023 40.4 (L) 41.0 - 52.0 % Final   10/24/2023 43.7 41.0 - 52.0 % Final   10/10/2023 44.3 41.0 - 52.0 % Final     Neutrophils Absolute   Date Value Ref Range Status   10/24/2023 6.82 1.20 - 7.70 x10*3/uL Final     Comment:     Percent differential counts (%) should be interpreted in the context of the absolute cell counts (cells/uL).   10/10/2023 5.04 1.20 - 7.70 x10*3/uL Final     Comment:     Percent differential counts (%) should be interpreted in the context of the absolute cell counts (cells/uL).   09/26/2023 6.10 1.20 - 7.70 x10E9/L Final   09/12/2023 10.02 (H) 1.20 - 7.70 x10E9/L Final   08/29/2023 7.51 1.20 - 7.70 x10E9/L Final     Platelets   Date Value Ref Range Status   11/07/2023 372 150 - 450 x10*3/uL Final   10/24/2023 511 (H) 150 - 450 x10*3/uL Final   10/10/2023 488 (H) 150 - 450 x10*3/uL Final     Creatinine   Date Value Ref Range Status   11/07/2023 0.95 0.50 - 1.30 mg/dL Final   10/10/2023 0.57 0.50 - 1.30 mg/dL Final   09/26/2023 0.58 0.50 - 1.30 mg/dL Final   09/12/2023 0.60 0.50 - 1.30 mg/dL Final   08/29/2023 0.54 0.50 - 1.30 mg/dL Final     Alkaline Phosphatase   Date Value Ref Range Status   11/07/2023 117 33 - 120 U/L Final   10/10/2023 119 33 - 120 U/L Final   09/26/2023 114 33 - 120 U/L Final   09/12/2023 117 33 - 120 U/L Final   08/29/2023 126 (H) 33 - 120 U/L Final     AST   Date Value Ref Range Status   11/07/2023 11 9 - 39 U/L Final   10/10/2023 13 9 - 39 U/L Final    09/26/2023 9 9 - 39 U/L Final   09/12/2023 10 9 - 39 U/L Final   08/29/2023 10 9 - 39 U/L Final     ALT   Date Value Ref Range Status   11/07/2023 8 (L) 10 - 52 U/L Final     Comment:     Patients treated with Sulfasalazine may generate falsely decreased results for ALT.   10/10/2023 8 (L) 10 - 52 U/L Final     Comment:     Patients treated with Sulfasalazine may generate falsely decreased results for ALT.     ALT (SGPT)   Date Value Ref Range Status   09/26/2023 7 (L) 10 - 52 U/L Final     Comment:      Patients treated with Sulfasalazine may generate    falsely decreased results for ALT.     09/12/2023 9 (L) 10 - 52 U/L Final     Comment:      Patients treated with Sulfasalazine may generate    falsely decreased results for ALT.     08/29/2023 11 10 - 52 U/L Final     Comment:      Patients treated with Sulfasalazine may generate    falsely decreased results for ALT.       Bilirubin, Total   Date Value Ref Range Status   11/07/2023 0.4 0.0 - 1.2 mg/dL Final   10/10/2023 0.5 0.0 - 1.2 mg/dL Final     Total Bilirubin   Date Value Ref Range Status   09/26/2023 0.3 0.0 - 1.2 mg/dL Final   09/12/2023 0.3 0.0 - 1.2 mg/dL Final   08/29/2023 0.3 0.0 - 1.2 mg/dL Final         Does the patient meet the treatment conditions?  Yes  ANC >= 1.5  Plt >= 75  Bili <= 1.5  CrCl >= 30  Are dosage calculations correct?  Yes    Are doses the same as previous cycle?   Yes    Were any doses modified?  No    If appropriate, was the Creatinine Clearance independently calculated based on [facility] standards?   Yes      Comments:      I Raj Ramirez, PharmD hereby acknowledge that the treatment plan indicated above has been verified for correctness to the best of my ability on 11/8/2023 at 9:33 AM.

## 2023-11-08 NOTE — PROGRESS NOTES
Outstanding Clarification:     Regimen: MVASI every 14 days  - Cycle/Day: Cycle 4 day 1  - Dose modifications: no  - Anti-emetics: no  - Drug interactions / Allergies: no  - Growth Factor: no  - Date change required: no    I Raj Ramirez, PharmD hereby acknowledge that the treatment plan indicated above has been verified for correctness to the best of my ability on 11/8/2023 at 9:27 AM.

## 2023-11-08 NOTE — SIGNIFICANT EVENT
11/08/23 0910   Prechemo Checklist   Has the patient been in the hospital, ED, or urgent care since last date of service Yes   Chemo/Immuno Consent Signed Yes   Protocol/Indications Verified Yes   Confirmed to previous date/time of medication Yes   Compared to previous dose Yes   All medications are dated accurately Yes   Pregnancy Test Negative Not applicable   Parameters Met Yes   BSA/Weight-Height Verified Yes   Dose Calculations Verified Yes

## 2023-11-10 LAB — METHYLMALONATE SERPL-SCNC: 0.42 UMOL/L (ref 0–0.4)

## 2023-11-13 ENCOUNTER — TELEPHONE (OUTPATIENT)
Dept: HEMATOLOGY/ONCOLOGY | Facility: HOSPITAL | Age: 38
End: 2023-11-13
Payer: MEDICAID

## 2023-11-13 DIAGNOSIS — E53.8 B12 DEFICIENCY: Primary | ICD-10-CM

## 2023-11-13 RX ORDER — EPINEPHRINE 0.3 MG/.3ML
0.3 INJECTION SUBCUTANEOUS EVERY 5 MIN PRN
Status: CANCELLED | OUTPATIENT
Start: 2023-11-22

## 2023-11-13 RX ORDER — ALBUTEROL SULFATE 0.83 MG/ML
3 SOLUTION RESPIRATORY (INHALATION) AS NEEDED
Status: CANCELLED | OUTPATIENT
Start: 2023-11-22

## 2023-11-13 RX ORDER — CYANOCOBALAMIN 1000 UG/ML
1000 INJECTION, SOLUTION INTRAMUSCULAR; SUBCUTANEOUS ONCE
Status: CANCELLED | OUTPATIENT
Start: 2023-11-22

## 2023-11-13 RX ORDER — FAMOTIDINE 10 MG/ML
20 INJECTION INTRAVENOUS ONCE AS NEEDED
Status: CANCELLED | OUTPATIENT
Start: 2023-11-22

## 2023-11-13 RX ORDER — DIPHENHYDRAMINE HYDROCHLORIDE 50 MG/ML
50 INJECTION INTRAMUSCULAR; INTRAVENOUS AS NEEDED
Status: CANCELLED | OUTPATIENT
Start: 2023-11-22

## 2023-11-13 NOTE — PROGRESS NOTES
"Message sent to RN June: \"B12 injections ordered monthly, to start next week when he comes for chemo.  Subcutaneous as he is on anticoagulation.  Done due to elevated MMA.  Folate is still low-tell patient to double his dose if he is already taking the folate, but if not make sure that he resumes his folic acid at the previous dose.\"  "

## 2023-11-13 NOTE — TELEPHONE ENCOUNTER
B12 injections ordered monthly, to start next week when he comes for chemo.  Subcutaneous as he is on anticoagulation.  Done due to elevated MMA.   Folate is still low-tell patient to double his dose if he is already taking the folate, but if not make sure that he resumes his folic acid at the previous dose. Per Dr. Vega staff message      Contacted patient, states he has been taking his folic acid. Instructed patient to take two folic acid 1 mg tablets daily. Notified he will be receiving his vitamin b12 injections monthly starting next week.

## 2023-11-15 ENCOUNTER — SPECIALTY PHARMACY (OUTPATIENT)
Dept: PHARMACY | Facility: CLINIC | Age: 38
End: 2023-11-15

## 2023-11-15 ENCOUNTER — PHARMACY VISIT (OUTPATIENT)
Dept: PHARMACY | Facility: CLINIC | Age: 38
End: 2023-11-15
Payer: MEDICARE

## 2023-11-15 PROCEDURE — RXMED WILLOW AMBULATORY MEDICATION CHARGE

## 2023-11-21 ENCOUNTER — OFFICE VISIT (OUTPATIENT)
Dept: HEMATOLOGY/ONCOLOGY | Facility: HOSPITAL | Age: 38
End: 2023-11-21
Payer: MEDICARE

## 2023-11-21 ENCOUNTER — LAB (OUTPATIENT)
Dept: LAB | Facility: LAB | Age: 38
End: 2023-11-21
Payer: MEDICARE

## 2023-11-21 VITALS
DIASTOLIC BLOOD PRESSURE: 72 MMHG | WEIGHT: 196.32 LBS | BODY MASS INDEX: 29.08 KG/M2 | HEART RATE: 98 BPM | OXYGEN SATURATION: 97 % | RESPIRATION RATE: 20 BRPM | TEMPERATURE: 97.2 F | SYSTOLIC BLOOD PRESSURE: 105 MMHG | HEIGHT: 69 IN

## 2023-11-21 DIAGNOSIS — I10 HYPERTENSION, UNSPECIFIED TYPE: ICD-10-CM

## 2023-11-21 DIAGNOSIS — Z72.0 TOBACCO ABUSE: ICD-10-CM

## 2023-11-21 DIAGNOSIS — K52.1 CHEMOTHERAPY INDUCED DIARRHEA: ICD-10-CM

## 2023-11-21 DIAGNOSIS — R11.2 CHEMOTHERAPY INDUCED NAUSEA AND VOMITING: ICD-10-CM

## 2023-11-21 DIAGNOSIS — K76.0 HEPATIC STEATOSIS: ICD-10-CM

## 2023-11-21 DIAGNOSIS — E87.6 HYPOKALEMIA: ICD-10-CM

## 2023-11-21 DIAGNOSIS — E83.42 HYPOMAGNESEMIA: ICD-10-CM

## 2023-11-21 DIAGNOSIS — F39 MOOD DISORDER (CMS-HCC): ICD-10-CM

## 2023-11-21 DIAGNOSIS — C20 RECTAL CANCER METASTASIZED TO LUNG (MULTI): ICD-10-CM

## 2023-11-21 DIAGNOSIS — C20 RECTAL CANCER (MULTI): ICD-10-CM

## 2023-11-21 DIAGNOSIS — R73.09 ELEVATED GLUCOSE: ICD-10-CM

## 2023-11-21 DIAGNOSIS — C78.00 RECTAL CANCER METASTASIZED TO LUNG (MULTI): ICD-10-CM

## 2023-11-21 DIAGNOSIS — E53.8 B12 DEFICIENCY: Primary | ICD-10-CM

## 2023-11-21 DIAGNOSIS — E53.8 FOLATE DEFICIENCY: ICD-10-CM

## 2023-11-21 DIAGNOSIS — I26.99 PULMONARY EMBOLISM, UNSPECIFIED CHRONICITY, UNSPECIFIED PULMONARY EMBOLISM TYPE, UNSPECIFIED WHETHER ACUTE COR PULMONALE PRESENT (MULTI): ICD-10-CM

## 2023-11-21 DIAGNOSIS — T45.1X5A CHEMOTHERAPY INDUCED NAUSEA AND VOMITING: ICD-10-CM

## 2023-11-21 DIAGNOSIS — T45.1X5A CHEMOTHERAPY INDUCED DIARRHEA: ICD-10-CM

## 2023-11-21 DIAGNOSIS — E55.9 VITAMIN D DEFICIENCY: ICD-10-CM

## 2023-11-21 DIAGNOSIS — C20 RECTAL CANCER (MULTI): Primary | ICD-10-CM

## 2023-11-21 DIAGNOSIS — E61.1 IRON DEFICIENCY: ICD-10-CM

## 2023-11-21 LAB
ALBUMIN SERPL BCP-MCNC: 3.7 G/DL (ref 3.4–5)
ALP SERPL-CCNC: 122 U/L (ref 33–120)
ALT SERPL W P-5'-P-CCNC: 10 U/L (ref 10–52)
ANION GAP SERPL CALC-SCNC: 14 MMOL/L (ref 10–20)
AST SERPL W P-5'-P-CCNC: 13 U/L (ref 9–39)
BASOPHILS # BLD AUTO: 0.1 X10*3/UL (ref 0–0.1)
BASOPHILS NFR BLD AUTO: 0.9 %
BILIRUB SERPL-MCNC: 0.5 MG/DL (ref 0–1.2)
BUN SERPL-MCNC: 5 MG/DL (ref 6–23)
CALCIUM SERPL-MCNC: 9.5 MG/DL (ref 8.6–10.3)
CHLORIDE SERPL-SCNC: 97 MMOL/L (ref 98–107)
CO2 SERPL-SCNC: 26 MMOL/L (ref 21–32)
CREAT SERPL-MCNC: 0.66 MG/DL (ref 0.5–1.3)
EOSINOPHIL # BLD AUTO: 0.25 X10*3/UL (ref 0–0.7)
EOSINOPHIL NFR BLD AUTO: 2.2 %
ERYTHROCYTE [DISTWIDTH] IN BLOOD BY AUTOMATED COUNT: 21.5 % (ref 11.5–14.5)
GFR SERPL CREATININE-BSD FRML MDRD: >90 ML/MIN/1.73M*2
GIANT PLATELETS BLD QL SMEAR: NORMAL
GLUCOSE SERPL-MCNC: 183 MG/DL (ref 74–99)
HCT VFR BLD AUTO: 43 % (ref 41–52)
HGB BLD-MCNC: 13.9 G/DL (ref 13.5–17.5)
IMM GRANULOCYTES # BLD AUTO: 0.1 X10*3/UL (ref 0–0.7)
IMM GRANULOCYTES NFR BLD AUTO: 0.9 % (ref 0–0.9)
LYMPHOCYTES # BLD AUTO: 1.75 X10*3/UL (ref 1.2–4.8)
LYMPHOCYTES NFR BLD AUTO: 15.7 %
MAGNESIUM SERPL-MCNC: 2.13 MG/DL (ref 1.6–2.4)
MCH RBC QN AUTO: 28.7 PG (ref 26–34)
MCHC RBC AUTO-ENTMCNC: 32.3 G/DL (ref 32–36)
MCV RBC AUTO: 89 FL (ref 80–100)
MONOCYTES # BLD AUTO: 1.17 X10*3/UL (ref 0.1–1)
MONOCYTES NFR BLD AUTO: 10.5 %
NEUTROPHILS # BLD AUTO: 7.81 X10*3/UL (ref 1.2–7.7)
NEUTROPHILS NFR BLD AUTO: 69.8 %
NRBC BLD-RTO: 0 /100 WBCS (ref 0–0)
PHOSPHATE SERPL-MCNC: 3.2 MG/DL (ref 2.5–4.9)
PLATELET # BLD AUTO: 481 X10*3/UL (ref 150–450)
POTASSIUM SERPL-SCNC: 4.5 MMOL/L (ref 3.5–5.3)
PROT SERPL-MCNC: 7.2 G/DL (ref 6.4–8.2)
RBC # BLD AUTO: 4.84 X10*6/UL (ref 4.5–5.9)
RBC MORPH BLD: NORMAL
SODIUM SERPL-SCNC: 132 MMOL/L (ref 136–145)
WBC # BLD AUTO: 11.2 X10*3/UL (ref 4.4–11.3)

## 2023-11-21 PROCEDURE — 83735 ASSAY OF MAGNESIUM: CPT

## 2023-11-21 PROCEDURE — 3074F SYST BP LT 130 MM HG: CPT | Performed by: INTERNAL MEDICINE

## 2023-11-21 PROCEDURE — 84100 ASSAY OF PHOSPHORUS: CPT

## 2023-11-21 PROCEDURE — 99215 OFFICE O/P EST HI 40 MIN: CPT | Performed by: INTERNAL MEDICINE

## 2023-11-21 PROCEDURE — 85025 COMPLETE CBC W/AUTO DIFF WBC: CPT

## 2023-11-21 PROCEDURE — 4004F PT TOBACCO SCREEN RCVD TLK: CPT | Performed by: INTERNAL MEDICINE

## 2023-11-21 PROCEDURE — 3078F DIAST BP <80 MM HG: CPT | Performed by: INTERNAL MEDICINE

## 2023-11-21 PROCEDURE — 80053 COMPREHEN METABOLIC PANEL: CPT

## 2023-11-21 RX ORDER — PROCHLORPERAZINE MALEATE 5 MG
10 TABLET ORAL EVERY 6 HOURS PRN
Status: CANCELLED | OUTPATIENT
Start: 2023-11-22

## 2023-11-21 RX ORDER — DIPHENHYDRAMINE HYDROCHLORIDE 50 MG/ML
50 INJECTION INTRAMUSCULAR; INTRAVENOUS AS NEEDED
Status: CANCELLED | OUTPATIENT
Start: 2023-11-22

## 2023-11-21 RX ORDER — PROCHLORPERAZINE EDISYLATE 5 MG/ML
10 INJECTION INTRAMUSCULAR; INTRAVENOUS EVERY 6 HOURS PRN
Status: CANCELLED | OUTPATIENT
Start: 2023-11-22

## 2023-11-21 RX ORDER — ALBUTEROL SULFATE 0.83 MG/ML
3 SOLUTION RESPIRATORY (INHALATION) AS NEEDED
Status: CANCELLED | OUTPATIENT
Start: 2023-11-22

## 2023-11-21 RX ORDER — FAMOTIDINE 10 MG/ML
20 INJECTION INTRAVENOUS ONCE AS NEEDED
Status: CANCELLED | OUTPATIENT
Start: 2023-11-22

## 2023-11-21 RX ORDER — LIDOCAINE AND PRILOCAINE 25; 25 MG/G; MG/G
CREAM TOPICAL
Qty: 1 G | Refills: 1 | Status: SHIPPED | OUTPATIENT
Start: 2023-11-21

## 2023-11-21 RX ORDER — EPINEPHRINE 0.3 MG/.3ML
0.3 INJECTION SUBCUTANEOUS EVERY 5 MIN PRN
Status: CANCELLED | OUTPATIENT
Start: 2023-11-22

## 2023-11-21 ASSESSMENT — PAIN SCALES - GENERAL: PAINLEVEL: 7

## 2023-11-21 NOTE — PROGRESS NOTES
"Interval History:    Patient is a 38 -year-old white man with metastatic rectal carcinoma , previously seen at [facility] with Dr. Zuniga, seen by me for the first time 2/7/2023 at [place].  He initially presented with stage III disease (T3 N2 M0) diagnosed in 2013 status post neoadjuvant Xeloda and radiation not completed secondary to noncompliance  but last treated November 2013 followed by resection followed by adjuvant FOLFOX not completed secondary to patient noncompliance.  He had biopsy-proven lung metastasis 6/22/2018 (RLL) , MMR normal, NRAS/KRAS/BRAF wild-type ; also adrenal mets but did not find a bx-metastatic disease was treated with FOLFOX with \" serious adverse\" reaction to oxaliplatin according to notes that patient  could not tell me what the reaction except possibly uncontrollable diarrhea, followed by Xeloda/Avastin with progressive disease,  started irinotecan/cetuximab 11/29/2021 complicated by skin and GI toxicity requiring dose reductions.  Patient was last seen by Dr. Zuniga 1/24/2023 noting status post 22 cycles of cetuximab  with irinotecan with dose reductions due to skin and GI toxicity, loose stools better, skin stable on minocycline, maintained on  Eliquis for recurrent thrombosis lower extremities.      11/7/2023 blood pressure was low, on blood pressure medication, no dizziness, 59, blood pressure okay subsequently.  10/24/2023 vitamin D level was low, prescribed vitamin D weekly x12 but patient was unable to get that due to the cost even after nursing checked into it for him.  11/21/2023 patient says he is feeling well except he has a mild URI with some cough and runny nose, no shortness of breath or fevers.  His weight is down 5 kg as well but he does not feel that much different.     Cetuximab/irinotecan was most recently given 8/9/2023, but unfortunately CT showed progressive disease 8/25/2023 in the lung/mediastinum and bilateral adrenal masses, luckily patient clinically  doing okay, " ultimately decided on doing Lonsurf and Avastin given every 2 weeks, started 9/13/2023, did well except for a few episodes of nausea with vomiting while taking the Lonsurf but not every day, only took his Compazine or Zofran as needed but not  prior to taking the Lonsurf which helped, no significant diarrhea, no wound healing issues, did get IV magnesium 9/13/2023.  He got his second dose of the Avastin/Lonsurf 9/27/2023 and took Zofran premedication for the Lonsurf with no nausea or vomiting, bowels did well.  He received the Lonsurf/Avastin 10/11/2023 every 2 weeks most recently 11/8/2023 with no nausea or vomiting, bowels doing okay.   10/10/2023 he admitted to using marijuana daily.  7/2023 phosphorus rich foods were recommended, IV magnesium and potassium given, recommended referral to nephrology for electrolyte  abnormalities but patient refused.  No thrombotic or bleeding events on anticoagulation, multiple thrombotic events since his diagnosis  of cancer but not prior, compliant with his Eliquis.  I verified with patient that his last CT was in 2021, so I ordered another CT scan done 2/20/2023 which showed most lung nodules increased in size and increase in the right adrenal gland mass . He had an appt. with Dr. Lui 3/20/23-progression of disease but difficult to assess given only 2 time points over about 1.5 years, patient not interested in changing treatment to a 5-FU  based regimen and wanted to continue irinotecan/cetuximab but recommended CT after 4 cycles, offered a clinical trial but patient declined due to the travel, Dr. Lui available for recommendations upon progression.  CT 4/24/2023 thankfully showed  overall stable disease despite CEA rising-patient said he has had the CEA fluctuations in the past, was actually lower subsequently even though CT showed progression 8/2023.  2/2023 folate, iron, and B12 were borderline, prescription written but apparently  insurance denied so he had to  "get these over-the-counter which he eventually did March 2023-given persistently low iron Feraheme given 7/5/2023 and 7/24/2023-no issues, did not feel any different.  11/7/2023 folic acid was still low, advised him to take 2 pills/day which she is doing.  Also 11/2023 B12 deficiency was still present with elevated methylmalonic acid so B12 injections were ordered and pending tomorrow.    Patient is by himself today, previously accompanied by his mother.   He lives  with his mother and brother.  He is able to do light chores.  He has some sort of mood disorder followed by psychiatry on Haldol and Depakote.  He does take a PPI given his history of Day's.  He was taking potassium 5 tabs/day which was increased from 4 tablets/day 9/26/2023, but as his potassium level was elevated 11/7/2023 I backed him down to 2 tabs per day which she is doing.  PCP manages his chronic pain medications for his \"lung and kidney\" pain which has been stable since 2018 including Suboxone (Zubsolv), used to take gabapentin but no longer and believes he last took this  in 2022, is on  medications because of a history of pain medication addiction, uses ibuprofen less than once per month.  He has Zofran and Compazine as needed for nausea, 6/20/2023 noted that his nausea was worse with 1 episode of vomiting and we finally figured out that he was using 8 mg of  Zofran instead of the 4 mg that I prescribed, had nausea from his chemotherapy as well as from the minocycline, alternated the Zofran and the Compazine.  8/29/2023 he said he recently stopped his minocycline because he noted more nausea and vomiting with  that medication with subsequent symptoms returned to baseline and no change in his facial rash.  2/21/2023 I felt that his facial rash was worse so I gave him clindamycin topical but it made his face more red so stopped, had been well controlled with  minocycline 1 tablet/day and a facial lotion but not a facial steroid-3/21/2023 I " felt that he had some seborrheic dermatitis and recommended the antidandruff shampoo on his face which he has been using which is helping-8/29/2023->CURRENT facial rash  doing okay even off the minocycline, just using a facial lotion and the dandruff shampoo, October 2023 had only minimal flaking on his face, skin very clear so far November 2023.  He was averaging about 2-3 bowel movements per day controlled with Imodium 4 tablets/day and Lomotil 1 tablet/day; March 2023 he tried Metamucil but this did not improve his bowel movements;  5/16/2023 he said that he was having more like 4-5 more watery bowel movements not controlled with his current regimen; 5/30/2023 bowel movements were unchanged even though he increased his Lomotil to 3/day and was taking the Imodium only 3/day, no change  in his diet or sick contacts, negative C. difficile May 2023; June 2023 he started on a probiotic, bowel movements slightly better at 3/day, still using Lomotil and Imodium 3/day; 7/3/2023->current bowel movements are better at 1-2/day, was using the Imodium  and Lomotil 4 times a day as well as the probiotic but 10/24/2023 and so far November 2023 he says he is only using the Imodium 4 times a day and no longer using the probiotic and Lomotil with bowels doing fine.   He has some left thigh numbness and burning since his rectal surgery, no neuropathy from chemotherapy.  He has an essential tremor which is unchanged since his cancer diagnosis.   Patient previously took Lasix which was held April 2022 due to dehydration and hypokalemia-chronic leg edema not bothering him lately.    Glucose has been elevated, no diabetes but he was diagnosed  with diabetes May 2023 and started on dapagliflozin by PCP at the end of May 2023, does not check his glucose.  7/20/2023 he showed me his varicose veins, going on for years, declined vascular consultation.     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     Prescriptions from Dr. Zuniga included Eliquis, potassium, Emla cream, Lomotil.     PCP is Dr. Jesse Dominguez, notes not in our old system but able to see in epic 9/26/2023 saw for chronic medical issues.   Patient declined a flu vaccine 9/12/2023.  Patient received 2 COVID vaccines but  no booster.  I recommended ongoing vaccine management and COVID management through PCP 9/12/2023. I have reviewed the available laboratory data, radiographic data, medications, and notes,  and have summarized them in this note 11/21/2023.  No other hospitalizations, major illness, or  procedures since his last appointment 11/7/2023.    Epic transition occurred 10/2/2023 from prior EMR system.  I did not go back in the epic system prior to this point unless patient brought up an issue.  I did review the EMR data in epic from 10/2/2023 going forward, but relied on our old EMR system for data prior to the transition.         No fevers, unintentional weight loss although weight loss noted as above,  drenching sweats, headaches, sore throat, acute vision changes, chest pain, shortness breath, cough except with  recent URI, abdominal pain, nausea or vomiting now, blood in stool, dysuria or hematuria, pain or neurologic symptoms except as above , abnormal bruising or bleeding, or thyroid disorder.    Hematology-oncology history (reviewed and updated 11/21/2023)   -At age 28 patient underwent colonoscopy August 2013 for abdominal pain, rectal bleeding, weight loss, nausea/vomiting, and painful small bowel movements found to have a circumferential fungating partially obstructive mass in the rectum with biopsy showing  invasive moderately differentiated adenocarcinoma.  Pathology showed invasive moderately differentiated adenocarcinoma, normal mismatch repair protein expression.  He was felt to have a stage III = T3 N2 M0 rectal adenocarcinoma.  Initial CEA was 5.4.   -Patient underwent neoadjuvant Xeloda (according to Dr. Perico Benedict's note from 8/2013 plan 1500 mg twice daily throughout radiation) and radiation not completed secondary to noncompliance but  last treated November 2013 under Dr. Perico Benedict and Dr. Constantine Tong, 45 Gy to the pelvis including lymph nodes and an additional 3.6 Gy to the involved nodes and rectal mass, initially  planned to go to 50.4 to the involved nodes and 54 to the rectal masses but patient was noncompliant.  Treatment was complicated by loose stools, nausea, vomiting, anorectal pain, skin irritation.  Rectal bleeding and appetite improved.  He had what  was felt to be an excellent response on MRI.  -1/2/2014 Dr. Willa Khan performed open proctosigmoidectomy with coloanal anastomosis and diverting ileostomy.  Pathology  showed invasive moderately differentiated adenocarcinoma of the rectum, 2 of 31 lymph nodes positive, therapy effect, low-grade, tumor 3.5 x 3 x 0.5 cm, negative margins, lymphovascular invasion and perineural invasion present,  ypT3 ypN1b  -11/2014 patient underwent ileostomy takedown with resection of small bowel by Dr. Willa Khan, benign pathology.  -Patient  received adjuvant FOLFOX not completed secondary to patient noncompliance.  -12/2015 Dr. Fernando attempted a colonoscopy but bowel prep was incomplete  -6/2018 biopsy-proven RLL lung  (no adrenal path seen)  metastasis, MMR normal, NRAS and BRAF wild-type, moderately differentiated adenocarcinoma, reviewed the pathology report, c/w CRC origin .  -6/2021 CT showed worsening metastasis including lungs, mediastinum, left adrenal.  -Metastatic rectal cancer was treated with FOLFOX with serious adverse reaction to oxaliplatin followed by Xeloda/Avastin with progressive disease .  -11/8/2021 CT chest, abdomen, pelvis with contrast showed progressive metastasis compared to 3 months prior including pulmonary/mediastinal and left adrenal metastasis (right lower lobe at 7.4 x 5.8 cm up to 8 x 6.3 cm, left lower lobe 4.3 x 2.9  cm up to 4.7 x 3.3 cm, prevascular lymph node 27 x 15 mm increased to 29 x 22 mm, prevascular nodularity enlarged from 28 x 8 mm to 25 x 11 mm, left adrenal 5.2 x 2.1 cm increased to 5.7 x 3.0 cm), increased nodularity right adrenal gland 22 x 9 mm, stable  presacral soft tissue thickening 3.3 cm, avascular necrosis femoral heads.  -Patient started irinotecan/cetuximab 11/29/2021 at 180 mg/M2 and 500 mg/M2, ev sho 2 weeks.  -2/16/2022 fourth dose irinotecan/cetuximab was reduced in the cetuximab to 400 mg/M2 (20% dose reduction due to skin toxicity and diarrhea)  -3/10/2022 acute nonocclusive DVT left common femoral and proximal to mid femoral veins treated with heparin and subsequently Eliquis .  -4/20/2022 irinotecan was continued at 180 mg/M2 but cetuximab was decreased to 300 mg/M2 (due to diarrhea and dehydration)  -5/11/2022 irinotecan was decreased to 150 mg/M2 (20% reduction due to diarrhea), cetuximab continued at 300 mg/M2   -6/8/2022 irinotecan continued 150 mg/M2, cetuximab was decreased to 250 mg/M2 (due to skin toxicity)  -10/5/2022 irinotecan was continued at 150 mg/M2, cetuximab decreased  to 200 mg/M2 (due to skin toxicity)  -10/19/2022 irinotecan was increased  to 165 mg/M2 (due to increased CEA), cetuximab continued 200 mg/M2  -1/11/2022 irinotecan was continued at 165 mg/M2, cetuximab given 445 mg flat dose which came out to 200 mg/M2  -January 2023: WBC 7.7-8.5, hemoglobin 11.6-12.1, MCV 80-81, platelet 341-285, ANC 5.0-4.5.  Glucose 117-133, normal sodium, potassium 3.6-3.7, creatinine 0.5-0.6, calcium 8.6-9.0, magnesium 1.8, albumin 3.4-3.5 otherwise normal LFTs.  -1/25/2023 patient received dexamethasone 12 mg, Benadryl 50 mg, Aloxi 0.25 mg, atropine 0.4 mg, irinotecan 165 mg/M2, cetuximab 445 mg .     -I initially saw the patient on 2/7/2023, previously seen by other providers most recently Dr. Zuniga at [facility], not all of his information in my EMR system, somewhat difficult to sort through the notes.  I did not go through all of his extensive labs.  CEA trend: August 2013: 5.4, November 2014: 2.2, November 2021: 165, January 2022: 151, February 2022: 52.8, March 2022: 27.5, April-May 2022: 46-47.6 (rise felt secondary to dose reductions and missed doses), July 2022: 80, August-September 2022: 56-61,  October 2022: 60<-71, November 2022 64-69, December 2022: 85, January 2023: 71  Patient received IV magnesium most recently November 2022, October 2022, July 2022.     -2/7/2023 labs which went to Dr. Zuniga: WBC 7, hemoglobin 11.4, MCV 81, platelets 350, normal differential number.  Sodium 136, potassium 3.3, creatinine 0.5, calcium 8s, glucose 149.  Magnesium 1.8.  Normal LFTs.  Iron 7% with ferritin 96.  Normal phosphorus.   B12 = 328.  Folate 7.3.  CEA 79.   -2/20/2023 CT chest, abdomen, pelvis with contrast compared to November 2021 showed multiple lung nodules increased in size for example right upper lobe 26 x 24 mm previously 23 x 10 mm and left upper lobe 35 x 32 mm previously 22 x 17 mm, right lower  lobe slightly decreased to 65 x 59 x 52 mm previously 72 x 61 x 62 mm, enlarged  mediastinal and hilar lymph nodes and cystic appearing area anterior mediastinal lymph node less obvious otherwise lymph nodes stable, no focal liver lesions, both adrenals  enlarged right measuring 39 x 31 mm previously 22 x 9 mm whereas left adrenal was similar, mixed attenuation presacral region 33 x 24 mm similar could be postoperative change or residual tumor, slightly prominent new pelvic and inguinal lymph nodes, hernia  stable   -2/2023 potassium was increased to 2 tablets/day, B12 and folate recommended along with iron which he had to get over-the-counter.   -2/21/2023: WBC 7.8, hemoglobin 12, MCV 82, platelet 399, ANC 4.4.  Sodium 135, potassium 3.9, creatinine 0.6, normal calcium, glucose elevated 164.  Magnesium 1.9.  Normal LFTs, TSH, methylmalonic acid at 0.25.  Negative intrinsic factor antibody and  parietal cell antibody, celiac panel, hepatitis panel.    -3/2023: WBC 8s, hemoglobin 11.6-12, MCV 80-82, platelet 359-456, ANC 5.4-5.2.  Normal sodium, potassium 3.3 with 40 mEq IV given and increase to 2 tablets/day->3.5 , creatinine 0.6, normal calcium, glucose 166-178.  Magnesium 1.6-4 g given and 1.7.  Normal LFTs.  Iron 7-9% with ferritin 138  But he had not started the oral iron yet. 3/22 potassium 20 and magnesium 2 given.      -March 2023 he eventually got the B12, folate, iron.  -He had an appt. with Dr. Lui 3/20/23 (he missed 2/27/2023 due to car trouble)-progression of disease but difficult to assess given  only 2 time points over about 1.5 years, patient not interested in changing treatment to a 5-FU based regimen and wanted to continue irinotecan/cetuximab but recommended CT after 4 cycles, offered a clinical trial but patient declined due to the travel,  Dr. Lui available for recommendations upon progression   -4/4/2023 potassium was increased to 3 tablets/day.   -4/4/2023-4/18/23: WBC 7-8.3, hemoglobin 11.7-11.8, MCV 80, platelets 369-407, ANC 4.09-5.6.  Sodium 134-135,  potassium 3.7, creatinine 0.6, calcium normal, glucose 144-172.  Magnesium 1.8-1.6.  Total protein 6.3-6.5, alkaline phosphatase 138-125, CEA  elevated 160 up from 79.  Ferritin increased 165.  Negative intrinsic factor antibody and parietal cell antibody.  Methylmalonic acid normal 0.34.  IV potassium 20 and magnesium 4 given on 4/19/2023.   -4/24/2023 CT chest, abdomen, pelvis with contrast showed stable bilateral pulmonary nodules (right upper lobe 2.7 cm, left upper lobe 3.4 cm, right lower lobe 6.5 cm), slight decrease in presacral mass 2.9 x 2.4 cm previously 3.5 x 2.7 cm, decreased  multiple bilateral adrenal nodules on the right 3.4 cm previously 3.9 cm and on the left 4.1 cm previously 3.8 cm, no new or worsening metastatic disease, no lymphadenopathy, bilateral femoral head avascular necrosis and hernia stable, unremarkable liver,  fatty pancreas.   -5/2/2023-5/30/2023: WBC 12s-11.1, hemoglobin 11.8-12s, MCV 81, platelet 465-429, elevated neutrophils.  Sodium 134-135, potassium 3.6 down to 3.1 with potassium increased to 20 mEq 2 tabs twice  a day and potassium 20 meq given on 5/17 with potassium subsequently up to 3.9 and was 3.5 on 5/30/2023 at which time 20 mEq IV were given, creatinine 0.6-0.5, calcium 8.9-9s, glucose up to 222.   Magnesium 1.7 down to 1.5 status post 4 g of magnesium on 5/17 and improved at 1.9 but subsequently 1.8 and was given 2 g on 5/31/2023.  Normal phosphorus.  Negative C. difficile..  Alkaline phosphatase 137-126.  CEA was better at 137.  Labs sent to  PCP.  Potassium 20 and magnesium 2 IV given on several occasions in May 2023.  Normal GGT.   -6/20/2023: WBC 13.3, hemoglobin 11.8, MCV 80, platelets 644, elevated neutrophils.  Sodium 133, potassium 3.8, creatinine 0.6, calcium 9.2, glucose 198.  Magnesium 1.7.  Alkaline phosphatase 160.  Iron 8% with ferritin 343.  B12 = 396.  Folate 12.  Magnesium  2 and potassium 20 IV given.   -7/3/2023: WBC 11.7, hemoglobin 11.9, MCV 81,  platelet 550, elevated neutrophils.  Sodium 133, potassium 3.2, creatinine 0.5, calcium 8.9, glucose 201.  Magnesium 1.6.  Normal LFTs.  Potassium 40 and magnesium 3 given.   -7/5/2023 and 7/24/2023 Feraheme was given.  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   -7/20/2023: WBC 12.5, hemoglobin 13, MCV 81, platelet 430, elevated neutrophils.  Sodium 135, potassium 3.2 with 20 mEq given IV, creatinine 0.5, glucose 188, magnesium 1.8 with 2 g magnesium given, phosphorus 2.3 with phosphorus rich foods recommended,  alkaline phosphatase 128, B12 = 336 with MMA normal 0.17.  -Irinotecan and cetuximab were given 7/24/2023,  slightly delayed due to an issue with the port but was subsequently functioning okay.   -8/8/2023: WBC 9.9, hemoglobin 12.6, MCV 82, platelet 452, normal differential number.  Sodium 134, potassium 3.2 with potassium increased to 5 tablets/day, creatinine 0.4, calcium 9.  Magnesium 1.7, normal phosphorus, alkaline phosphatase 124, iron 19%  with ferritin 524; potassium 20 and magnesium 2 given   -8/9/23 Cetuximab/irinotecan  200 mg/M2 and 165  mg/M2 with Benadryl 50 mg p.o., dexamethasone 12 mg p.o., Aloxi, and atropine 0.4 mg IV,  subsequently discontinued due to progression disease.   -8/25/2023 CT chest, abdomen, pelvis with contrast showed progressive disease in the lung and mediastinal masses (left upper lobe 4.3 x 4.4 cm, left lower lobe 4.4 x 6.4 cm, right lower lobe 6.6 x 5.5 cm, subcarinal 6.3 x 4 cm), increased bilateral adrenal  masses (left 4.9 cm), increased presacral density 3.1 x 2.9 cm.  -8/29/2023: WBC normal 11.3, hemoglobin 13, MCV 83, platelet 582, ANC 7.5.  Normal sodium, potassium 3.9, creatinine 0.5, normal calcium, glucose 174.  Magnesium 1.8.  Alkaline phosphatase 126 stable.  Urine analysis no blood or protein.  .   -9/12/2023 patient admitted to only taking potassium 4 tabs per day instead of 5 tabs per day, 20 mEq each.   -9/12/2023: WBC 14.3, hemoglobin 13.1, MCV 83, platelet 626, elevated neutrophils, elevated monocytes 1.2.  Sodium 134, potassium 4, creatinine 0.6, calcium 9.6, glucose 258.  Magnesium 1.6 with 2 g given.  Normal LFTs and phosphorus.  -Given progressive disease, 9/13/2023 Avastin 5 mg/KG every 2 weeks was ordered along with Lonsurf usually 35 mg/M2 twice daily for 5 days but to make it easier on the patient I gave him 60 mg  (20 mg x 3 tabs) twice a day which was closer to 30 mg/M2.  -9/26/2023: WBC 9.6, hemoglobin 13, MCV 83, platelet 476, ANC 6.1.  Sodium 133, potassium 3.6 and with his potassium increased from 4/day up to 5/day, creatinine 0.5, calcium 9.1.  Magnesium 1.8, observed.  Normal LFTs.  CEA better 288 down from 383.  -10/10/2023: WBC 8.7, hemoglobin 13.9, MCV 85, platelet 488.  Iron 24% with ferritin 411.  Magnesium 2.3.  Sodium 134, potassium 4.6, creatinine 0.5, normal calcium, glucose 145, normal LFTs.  -10/24/2023: WBC 10.3, hemoglobin 13.9, normal MCV, platelet 511.  CEA was decreased at 236.  Vitamin D was low at 9 with 12 weeks of supplement given but 11/7/2023 patient admitted to  not taking that due to cost.  Folate 5.0.  Methylmalonic acid elevated 0.42.  B12 = 453.  Sodium 133, potassium elevated 5.5, creatinine 0.9, calcium 9.2.  Normal LFTs.  Potassium was decreased from 5 tabs per day down to 2 tabs per day.  Folic acid was doubled to 2 tabs per day.  B12 1000 mcg subcutaneous monthly was ordered.-11/7/2023: WBC 9.1, hemoglobin 12.6, MCV 89, platelet 372.        Past medical history  -Rectal cancer as above  -Vitamin D deficiency as above  -Rectal cancer and treatment complicated by neoplasm related pain, acneiform rash, diarrhea, hypokalemia, hypomagnesemia, noncompliance,  abdominal cramping improved with atropine  -Noncompliance  -3/2022 left common femoral vein DVT treated with heparin and transitioned  to Eliquis.  7/2021 DVT right popliteal vein and right posterior tibial vein.  Pulmonary embolism June 2014 not on anticoagulation at that time but subsequently anticoagulated although noncompliant.  DVT  leg after his rectal surgery in 2014 treated with Coumadin with questionable compliance.  2/7/23 patient noted no clots while he was  on a blood thinner and no clots prior to his cancer diagnosis.  -7/2019 LVEF 30%, multiple wall motion abnormalities, ischemic cardiomyopathy. 7/2019 chest pain with cardiac catheterization  normal coronaries but Takotsubo or stress cardiomyopathy.  2/7/2023 patient was not followed by cardiology.  -Cholecystitis January 2019 managed conservatively given metastatic cancer but given progressive symptoms patient underwent cholecystectomy by Dr. Fernando 7/2019 complicated by pulmonary edema with evaluation showing cardiomyopathy  -Subarachnoid hemorrhage 9/2014 with cerebrospinal  fluid no malignant cells and was felt to be less likely a bleed based on CSF studies, EEG no seizure-like activity, had vasovagal syncope from pain  -Venous stasis, varicose veins   -Day's esophagus by EGD 8/2013, GERD  -Tobacco abuse  -Abdominal pain 2013/2014 managed with  methadone  -Depression/anxiety  -Dyslipidemia  -Hypertension  -Avascular  necrosis femoral heads noted on imaging 2021  -hepatic steatosis noted on imaging 2020  -MRSA right hand infection status post I&D (before his diagnosis of rectal cancer)  -1/2019 admitted with neutropenic fever and pneumonia  -Microcytic anemia.  Patient reported taking oral iron for a few months in 2013/2014.  - Elevated glucose.  5/2023 started on dapagliflozin diabetes type 2 by PCP.  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 Fernando 1/2014, incision and drainage hand for abscess, EGD and colonoscopies, cardiac catheterization, complete dental extractions     Family history  Father with esophageal cancer.  Maternal grandfather with lung cancer.  Paternal grandfather with esophageal  cancer.     Social history  Tobacco: 1 pack/day, started as a teenager.  Alcohol:  Denied.  Drugs: Marijuana.  Has 4 children.     Allergies   Allergen Reactions    Acetaminophen Nausea And Vomiting    Adhesive Tape-Silicones Unknown     Current Outpatient Medications on File Prior to Visit   Medication Sig Dispense Refill    albuterol (Ventolin HFA) 90 mcg/actuation inhaler INHALE TWO PUFFS INTO THE LUNGS EVERY 6 HOURS AS NEEDED FOR SHORTNESS OF BREATH OR FOR WHEEZING FOR UP TO 30 DAYS      apixaban (Eliquis) 5 mg tablet Take 1 tablet (5 mg) by mouth 2 times a day.      buprenorphine-naloxone (Suboxone) 8-2 mg SL tablet Place 1 tablet under the tongue 2 times a day.      cyanocobalamin (Vitamin B-12) 1,000 mcg tablet Take 1 tablet (1,000 mcg) by mouth once daily.      dapagliflozin propanediol (Farxiga) 5 mg Take by mouth.      divalproex (Depakote ER) 500 mg 24 hr  tablet Take 2 tablets (1,000 mg) by mouth.      divalproex (Depakote) 250 mg EC tablet Take 1 tablet (250 mg) by mouth once daily at bedtime.      doxepin (SINEquan) 10 mg capsule Take 1 capsule (10 mg) by mouth once daily. Pt unsure of dose      ergocalciferol (Vitamin D-2) 1.25 MG (49624 UT) capsule Take 1 capsule (50,000 Units) by mouth 1 (one) time per week for 12 doses. 12 capsule 0    ferrous sulfate 325 (65 Fe) MG tablet Take 1 tablet (65 mg of iron) by mouth once daily.      folic acid (Folvite) 1 mg tablet Take 1 tablet (1 mg) by mouth once daily.      haloperidol (Haldol) 2 mg tablet TAKE ONE TABLET BY MOUTH ONCE DAILY FOR AGITATION.      ibuprofen 800 mg tablet Take 1 tablet (800 mg) by mouth.      lidocaine-prilocaine (Emla) 2.5-2.5 % cream apply topically to affected area if needed      loperamide (Imodium A-D) 2 mg tablet Take 1 tablet (2 mg) by mouth 4 times a day as needed for diarrhea.      losartan (Cozaar) 50 mg tablet Take 1 tablet (50 mg) by mouth once daily.      metoprolol succinate XL (Toprol-XL) 25 mg 24 hr tablet Take 1 tablet (25 mg) by mouth once daily.      naloxone (Narcan) 4 mg/0.1 mL nasal spray Administer 1 spray (4 mg) into affected nostril(s) see administration instructions. 1 nasal spray (4 mg) as a single dose in one nostril as needed for opioid reversal; may repeat in 2 to 3 minutes in the opposite nostril if there is no response to the first dose. Immediately call 911.      omeprazole (PriLOSEC) 40 mg DR capsule Take 1 capsule (40 mg) by mouth.      ondansetron (Zofran) 8 mg tablet Take 1 tablet (8 mg) by mouth every 8 hours if needed.      potassium chloride CR 20 mEq ER tablet Take 1 tablet (20 mEq) by mouth twice a day.      prochlorperazine (Compazine) 10 mg tablet TAKE ONE TABLET BY MOUTH EVERY 6 HOURS AS NEEDED FOR NAUSEA AND / OR VOMITING      trifluridine-tipiraciL (Lonsurf) 20-8.19 mg tablet TAKE THREE (3) TABLETS BY MOUTH 2 TIMES DAILY FOR 5 DAYS, EVERY 2 WEEKS 30  tablet 11     No current facility-administered medications on file prior to visit.        Vitals:    11/21/23 0831   BP: 105/72   Pulse: 98   Resp: 20   Temp: 36.2 °C (97.2 °F)   SpO2: 97%     Physical Exam:      Constitutional: Performance status 1.  89  kg.  -General: Well-developed and well-nourished. No acute distress.  Somewhat odd affect, stands up during the visit, but pleasant.  Visible essential tremor-stable.  Looks well for him today except mild intermittent cough.  -Lymphatics: No cervical or supraclavicular lymphadenopathy.  -Eyes:  Anicteric.  -HEENT: MMM , neck supple, no thrush.  Edentulous.  -Cardiovascular: RRR.    -Respiratory: Very scattered faint wheeze which she has had in the past. Breathing is nonlabored.  -Abdomen: Soft, nontender, multiple well-healed scars.  Limited by body habitus.  Possible hernia near his scars but reducible.  -Back: No costovertebral angle tenderness. No spine tenderness.  -Extremities: Significant bilateral leg edema with venous stasis changes and varicose veins-stable.    -Neurologic: Awake and interactive.  Gait steady.  Speech fluent.  Essential tremor.   -Skin: Venous stasis changes.  Port clean, dry, intact.  Face basically clear.  Tattoos not fully examined today.  Hands clear.  -Psychiatric: Cooperative.  Calm.          Assessment and Plan:   Assessment:    #Metastatic rectal cancer to the lungs (biopsy-proven), mediastinum, adrenals, MMR intact, BRAF and NRAS/KRAS wild-type .    Patient initially had stage III disease diagnosed in 2013 status post neoadjuvant Xeloda and radiation followed by resection followed by adjuvant FOLFOX but treatment incomplete due to compliance  issues, developed a metastasis 6/2018 treated with FOLFOX with some sort of reaction (patient recalled uncontrollable diarrhea), progressive disease on Xeloda/Avastin, started irinotecan/cetuximab 11/2021 complicated by GI and skin toxicity requiring  dose reductions and modifications. Some  of his CEA fluctuations were  felt secondary to decreased chemotherapy doses as well as missed doses, April 2023 CEA significantly increased at 160 but May 2022 back down to 137, CEA at the time of initiation of irinotecan/cetuximab  165 in November 2021.  May 2023 he had increased diarrhea of unknown etiology, better after probiotic started June 2023, even better after increasing Imodium and Lomotil June 2023.   Last CT was in 11/2021, so I ordered a follow-up CT 2/20/2023 which over a year showed worsening lung nodules and significant increase in right adrenal gland as well as mild enlargement of inguinal lymph nodes but no liver metastasis.  He had an appt.  with Dr. Lui 3/20/23 -progression of disease but difficult to assess given only 2 time points over about 1.5 years, patient not interested in changing treatment to a 5-FU based regimen and wanted to continue irinotecan/cetuximab but recommended  CT after 4 cycles, offered a clinical trial but patient declined due to the travel.  CT was stable or improved on 4/24/2023.       Unfortunately CT 8/25/2023 showed disease progression in the lung/mediastinal masses, presacral density, and bilateral adrenal masses consistent with progressive disease.  He clinically was still doing quite well.  CEA significantly lora 8/29/2023  at 383.  We discussed options and decided on a dose modified Avastin and Lonsurf regimen started 9/13/2023 which was well-tolerated except for some nausea and vomiting but he did not take his premedications.  9/27/2023 he started taking the Zofran as a premedication and Lonsurf was much better tolerated.  CEA down to 288 on 9/26/2023, down to 236 on 10/24/2023 so he seems to be responding and tolerating the treatment.  Patient has been reluctant to do CT scans.  Electrolytes and bowels as well as skin seem better on this regimen.  Hepatitis panel negative 2/2023.     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     #Microcytic anemia suggestive of iron deficiency, also anemia on chemotherapy .  He remotely took iron at his original diagnosis in 2013/2014.  February 2023 iron 7% and ferritin 96 with hemoglobin 11.4 likely iron deficiency, also borderline B12 = 328 and borderline folate 7.3.  Keep in mind possible  malabsorption on his PPI.   2/2023 negative/normal intrinsic factor antibody, parietal cell antibody, methylmalonic acid, celiac.  He eventually got iron, B12, folate  over-the-counter March 2023.  3/2023 thrombocytosis  also would go along with the iron deficiency.  April 2023 thrombocytosis resolved, hemoglobin stable 11s.  May 2023 hemoglobin stable, platelet count back up which again could be due to iron deficiency or inflammation or malignancy.  June 2023 hemoglobin  11.8, platelet count up to 644, persistent microcytosis, persistent iron deficiency anemia with iron 8% and ferritin 343 so possible malabsorption with IV iron ordered (Feraheme given 7/5/2023  and 7/24/2023), folate normal, B12 = 396 somewhat borderline.  7/20/2023 hemoglobin improved, platelet count down to  430, MCV improved, so he seemed to respond to the IV iron; also B12 336 but methylmalonic  acid normal so not frankly deficient.  8/2023 iron 19% with ferritin 524 with close to normal hemoglobin so maintained observation, still has thrombocytosis which could be due to his malignancy, basically normal WBC and hemoglobin 8/29/2023.  9/12/2023  hemoglobin okay, leukocytosis and thrombocytosis likely from his malignancy.  10/10/2023 iron 24% with ferritin 411, normal WBC and hemoglobin, platelet count mildly elevated 488 again likely secondary to malignancy and inflammation.  11/7/2023 normal WBC, hemoglobin 12.6, platelet count normal, folate borderline at 5.0 and he was increased to 2 tabs per day, methylmalonic acid elevated 0.42 with B12 453 with subcutaneous injections ordered to start at the end of November 2023.    #Vitamin D deficiency 10/24/2023 with vitamin D weekly prescribed.  Patient reports noncompliance due to cost.     #Hypokalemia possibly secondary to treatment versus malabsorption versus diarrhea.  Still low March 2023 requiring IV potassium and  increased potassium to 2 tablets/day, potassium 3.7 on 4/4/2023 with potassium increased to 3 tablets/day.  April-May 2023 potassium borderline at 3.6-3.7 status post IV potassium but still persistently low at 3.1 so potassium was increased to a total  of 80 mEq daily and IV potassium given May 2023 with improvement on follow-up labs but still received IV potassium later in May 2023 and again June-July 2023.  Patient refused nephrology consultation July 2023.  August 2023 patient's potassium was increased  to 100 mEq daily and received IV potassium, level actually better 8/29/2023.  9/12/2023 patient admitted to only taking 4 tabs of his potassium rather than 5 tabs for unclear reasons, but actually potassium was okay at 4.0.  9/26/2023 potassium 3.6 so he was increased back up to 5 tablets today with normal potassium 10/10/2023.  11/7/2023 potassium was  elevated 5.5 so potassium was decreased from 5 tablets/day down to 2 tablets/day.     #Hypomagnesemia likely from chemotherapy and/or diarrhea as well as possible  malabsorption from PPI.  Magnesium low March- September 2023 requiring IV magnesium.   No oral magnesium given diarrhea.  10/10/2023 magnesium normal, possibly due to being off the cetuximab and improved diarrhea.     #Mild hypophosphatemia July 2023 recommending dietary increase of phosphorus.  Normal phosphorus 8/8/2023 and 9/12/2023.     #Noncompliance     #Acneiform rash secondary to cetuximab managed with dose reductions/delays, topicals, minocycline.  He was doing well on minocycline 1 tablet daily and a facial lotion but not a facial steroid, seemed a little worse 2/21/2023 so I prescribed  topical  clindamycin lotion 1% but  this made his face more red  so stopped.  3/21/2023 his facial rash looked more consistent with seborrheic dermatitis improved with an antidandruff shampoo, fluctuates.  8/29/2023 he reported stopping minocycline due to nausea  and vomiting with GI symptoms improved and facial rash no worse.  9/12/2023 facial rash actually resolved after stopping the cetuximab.  October 2023 mild flaking around his face seemed to get better with antidandruff shampoo.  November 2023 face clear.     #Diarrhea secondary to chemotherapy managed with dose reductions/delays, Lomotil, Imodium; March 2023 he tried Metamucil which did not help his bowels.  May 2023 slightly increased bowel movements with more watery bowel movements, negative C. difficile,  not affecting his quality of life at this time and no dehydration, no clear etiology of the increased bowel movements, was still not taking his antidiarrheal medications at maximum.  June 2023 probiotic was started with improvement in bowels although  still baseline diarrhea, better July 2023 with increased Imodium and Lomotil use.  Bowels seem better off the cetuximab, even after he stopped the  probiotic and Lomotil and is just using Imodium as of 10/24/2023.    #Multiple episodes of thrombosis since his cancer diagnosis likely secondary to malignancy most recently 3/2022 treated with Eliquis with no bleeding or thrombotic events with this.  He does have chronic venous stasis changes and varicose veins which  do not bother him, 7/2023 declined vascular consultation.     #Nausea secondary to chemotherapy and minocycline controlled with Zofran and prochlorperazine except June 2023 realized that he was previously using 8 mg of Zofran, 4 mg of Zofran not controlling his nausea and vomiting.  Nausea/vomiting doing well with  the Zofran 8 mg and Compazine except 8/29/2023 noted worsening symptoms so he stopped his minocycline with subsequent improved symptoms.  September 2023 nausea and vomiting on Avastin/Lonsurf as above, better with taking scheduled Zofran.     #Elevated glucose on labs-patient reported PCP April 2023 was going to follow-up on his labs from my office and decide about starting a medication.  I did send our labs to her PCP including May 2023 glucose up to 200s, and PCP started patient to May 2023  on dapagliflozin.     #Chronic pain apparently on pain medications even prior to his cancer diagnosis, could also have pain secondary to malignancy, managed by PCP with Suboxone (2022 took gabapentin but no longer) with a history of pain medication addiction.  10/10/2023 patient admitted to taking ibuprofen which I asked him to stop even though he rarely took it, as he is on a blood thinner.     #Mood disorder, anxiety/depression, followed by psychiatry.     #Tobacco abuse     #Hypertension-this will be monitored on Avastin, so far doing okay, actually blood pressure low 11/7/2023 but asymptomatic.  Blood pressure okay now.     #Hepatic steatosis on imaging     #Takotsubo or stress cardiomyopathy, Day's esophagus, dyslipidemia, asthma/COPD, left thigh neuropathy after surgery but not related to  chemotherapy, essential tremor     #April-May 2023 only minimally elevated alkaline phosphatase with normal GGT May 2023, could be due to fatty liver versus chemotherapy with bone marrow stimulation, no known liver metastasis.  June 2023 alkaline phosphatase increased 160.  July 2023 LFTs  actually normal except minimally elevated alkaline phosphatase 120s likely not significant.  August 2023 LFTs with stable mild alkaline phosphatase 120s, no liver metastasis 8/2023.  LFTs normal September 2023 and most recently 11/7/2023.     #Mild hyponatremia down to 134-135 in April-May 2023, 133 in June-July 2023, lowest 133 in August- November 2023 so far- can be observed.     #May-June 2023 mild leukocytosis without signs or symptoms of infection at that time although slightly worsening diarrhea also could have had something else going on.  His new diabetic medication in 2023 does not have leukocytosis associated with it.   Tobacco abuse can cause leukocytosis.  July 2023 WBC count only minimally elevated.  August 2023 normal WBC.  September 2023 mild leukocytosis could be from malignancy.  Normal WBC 11/7/2023.    #Mild wheezing on exam 10/24/2023 with no shortness of breath.  11/7/2023 lungs clear.  11/21/2023 mild URI with lungs pretty good for him, does not feel particularly ill just a mild cough and runny nose.     -I advised follow-up with PCP or call me if his URI does not get better, so far appears to be viral so no antibiotics given, recommended over-the-counter medications if he reviews them with pharmacy with his current medications.  -Subcutaneous B12 1000 mcg monthly to be given at our office tomorrow.  I did tell him he could discontinue his oral B12 11/21/2023.  -CBC, CMP, magnesium, phosphorus, urine analysis ordered today.  11/7/2023 B12, folic acid, and methylmalonic acid can be followed up in 2-3 months.  10/24/2023 CEA and vitamin D were done, monitor every 2-3 months.  10/10/2023 iron studies done,  monitor in 2-3 months.  -I have explained his unfortunate progressive disease, gave him options of additional treatment versus hospice and he wanted to go with additional treatment, again 8/29/2023 declined a clinical trial.  I did email Dr. Lui and notified her of my  plan, could email her again if issues, using NCCN guidelines version 4/2023 given his prior treatment I recommended Avastin and Lonsurf, has been studied in patients who previously received bevacizumab, after that could consider regorafenib.  Given his  questionable compliance I am somewhat concerned about the oral regimen but nursing and myself have gone over this with him and he appears to be taking it correctly.  -Avastin 5 mg/kilogram every 2 weeks.   8/29/2023 Lonsurf was ordered, usually 35 mg/M2 twice daily for 5 days but to make it easier on the patient as that dose using a BSA of 2.13 came out to 74 mg, I decided to give him  60 mg (20 mg x 3 tabs) twice daily which is closer to 30 mg/M2 which is reasonable given his previous chemotherapy, #30 with 11 refills given as I only want to give him the 5 days worth each time  so he does not accidentally take too much, going to use a modified schedule where I give the Avastin and the Lonsurf every 2 weeks for less toxicity and ease of administration, advised home antiemetic premedication.   11/7/2023 I discussed increasing his dose of Lonsurf and he declined, maintaining the course.  Urine analysis 8/29/2023 was okay, follow-up every 3 months on the Avastin.  Avastin and Lonsurf side effects were outlined in my note on 8/29/2023.  CBC with differential prior to each treatment, treat if ANC greater than 1500 and platelets greater  than 75, continue to monitor for drug interactions, CMP prior to each cycle (monthly).   No dose adjustment needed for mild hepatic impairment.  Maximum of 3 dose reductions are allowed for toxicity, discontinue if unable to tolerate 20 mg/M2.  Comes in 15 mg and  20  mg.  Patient was advised to take with food and swallow tablets whole.  Patient was advised to use Imodium at the first onset of diarrhea, call if symptoms not controlled.  Growth factor support or dose reduction could be considered for CBC toxicity.  I have advised the patient to be extra cautious especially as he is on a blood thinner. Discontinue bevacizumab for hypertensive crisis or hypertensive encephalopathy, serious hemorrhage, arterial thromboembolism, nephrotic syndrome, gastrointestinal  perforation, fistula, heart failure, wound healing complications requiring medical intervention, or RPLS or posterior reversible encephalopathy syndrome.  Bevacizumab should not be administered within 28 days of surgery (before and after), should be suspended  prior to elective surgery, and should not resume after surgery until surgical wound is fully healed.  No dose adjustments for renal or hepatic impairment except if nephrotic syndrome discontinue the bevacizumab (proteinuria >= 2 g in 24 hours requires  holding bevacizumab). CBC, CMP, urinalysis, and magnesium should be monitored.  If proteinuria >=2+ by dipstick, assessed with a 24-hour urine protein.  Monitor blood pressure every 2-3 weeks during treatment and regularly after discontinuation if bevacizumab-induced  hypertension occurs.  Monitor for signs or symptoms of GI perforation or fistula, bleeding, thromboembolism, wound healing complications, and heart failure.    -Continue Imodium.  Can add back in probiotic and Lomotil if needed.  -Continue PCP management of medications not prescribed by me with abnormal LFTs.  -11/7/2023 patient requested a letter for job and family, office gave to the patient 11/21/2023.  -10/30/2023 vitamin D weekly x8 was prescribed but patient has had financial issues, asked the patient to get an over-the-counter supplement instead.  Follow-up levels with additional vitamin D daily as needed.  -Continue to monitor genetics  recommendations, follow-up in 1-2 years from 7/2023.  I have recommend that family members get colonoscopies  starting at age 20.  -I explained that his disease is not curable but treatable on 2/7/2023, reviewed again 8/29/2023 and advised hope for the best but prepare for the worst.    -Dr. Lui did not recommend any additional tumor testing.  -He started taking over-the-counter B12, folate, iron in March 2023, 11/21/2023 told him he could discontinue the oral B12 as he is going on injections-Feraheme can be given again as needed.  Iron instructions were outlined  in my note on 2/21/2023, 8/29/2023 discussed discontinuing the oral iron but he seemed to want to continue to take that.  Side effects of IV iron were outlined in my note on 6/20/2023.  Endoscopy for iron deficiency could be considered.    -CT chest, abdomen, pelvis with contrast can be monitored most recently 8/25/2023-patient has been reluctant to do imaging due to cost  but I explained it is the only way I can truly see what is going on, ideally need to monitor at least every 4 months for now.  11/7/2023 and again 11/21/2023 patient declined the CT scan understanding I am limited without that, but he agrees to discuss possible imaging January 2024 with his new provider.  -  If alkaline phosphatase increases, consider bone scan given normal GGT or  a PET scan.  -Zofran 8 mg every 8 hours as needed #30 with 11 refills was prescribed 6/20/2023, asked him to disregard the Zofran 4 mg that was ordered 4/18/2023.  Prochlorperazine 10 mg as needed #30 with  11 refills was refilled on 5/2/2023.   -Continue pain medications from PCP, but 10/10/2023 asked him to discontinue the ibuprofen given the blood thinner use.   -Tobacco cessation was again recommended 8/8/2023.  - Minocycline 100 mg daily #30  was prescribed 11/17/2022 but discontinued 8/29/2023 as he stopped taking this and went off the cetuximab.   He can continue with antidandruff shampoo and  facial lotion.  Dr. Zuniga recommended 1% hydrocortisone cream on his face twice daily but I have asked him to hold that and see how he does .  Dermatology consultation was offered 5/30/2023 but patient declined.  -Continue Imodium.  Lomotil 2 tabs 4 times a day as needed #80 with 3 refills, refilled 5/16/2023, no longer taking.    -Continue potassium and magnesium replacement, consider IV  if levels less than 3.8 and 1.8 respectively.  No oral magnesium because a tendency towards diarrhea.    Potassium 20 mEq, 5 tablets/day 8/8/2023 #150 with 11 refills, was taking  2 in  the morning and 3 at night, 11/7/2023 changed to just 2 tablets/day.  I highly encouraged a nephrology consultation for his electrolyte abnormalities but he declined 8/8/2023 understanding I have limitations in my expertise in this area.   -Continue Emla cream, refilled 6/22/2020 330 g with 3 refills.  -Port flushes will be done on treatment.  - Continue PPI for his history of Day's esophagus with further EGD as needed.  -Continue Eliquis 5 mg twice daily, #60 with 11  refills refilled on 9/11/2023, 8/29/2023 explained the risk on the Avastin with potential bleeding.  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   Information on Eliquis was outlined in my note on 2/21/2023.  General healthy lifestyle with reduction of immobility  and prevention of blood clots was encouraged including prophylactic anticoagulation if ever hospitalized or has a surgery if not on anticoagulation , no smoking, optimization of weight, getting up and moving around regularly if on a plane or in a car, no hormonal agents.  Compression  stockings were recommended again 10/24/2023. Concurrent other blood thinning medications were discouraged unless clinically needed.   I again 10/24/2023 advised the patient to get a medical alert  bracelet while on anticoagulation.  -Appreciate PCP management of diabetes.  -  Nutrition consultation was offered 6/20/2023 regarding his diarrhea as well as his diabetes but he declined.  -Labs are incorporated in my note which is to be sent to PCP. I have recommended routine health care maintenance and screening through PCP. The patient was reminded to followup with the PCP  for other medical problems and ongoing care. The patient was asked to return to clinic, or sooner as needed for new or concerning symptoms, in  2 weeks.    10/10/2023 I explained my upcoming departure from [facility], explained that I was told that [facility] is working on my replacement, notified patient to make sure appointments/orders are scheduled and kept as directed.  I wished the patient the best of luck.

## 2023-11-22 ENCOUNTER — INFUSION (OUTPATIENT)
Dept: HEMATOLOGY/ONCOLOGY | Facility: HOSPITAL | Age: 38
End: 2023-11-22
Payer: MEDICARE

## 2023-11-22 VITALS
WEIGHT: 199.74 LBS | RESPIRATION RATE: 18 BRPM | TEMPERATURE: 96.6 F | OXYGEN SATURATION: 98 % | BODY MASS INDEX: 29.48 KG/M2 | SYSTOLIC BLOOD PRESSURE: 123 MMHG | HEART RATE: 109 BPM | DIASTOLIC BLOOD PRESSURE: 83 MMHG

## 2023-11-22 DIAGNOSIS — C20 RECTAL CANCER (MULTI): ICD-10-CM

## 2023-11-22 DIAGNOSIS — E53.8 B12 DEFICIENCY: ICD-10-CM

## 2023-11-22 LAB
APPEARANCE UR: CLEAR
BILIRUB UR STRIP.AUTO-MCNC: NEGATIVE MG/DL
COLOR UR: YELLOW
GLUCOSE UR STRIP.AUTO-MCNC: ABNORMAL MG/DL
KETONES UR STRIP.AUTO-MCNC: NEGATIVE MG/DL
LEUKOCYTE ESTERASE UR QL STRIP.AUTO: NEGATIVE
NITRITE UR QL STRIP.AUTO: NEGATIVE
PH UR STRIP.AUTO: 6 [PH]
PROT UR STRIP.AUTO-MCNC: NEGATIVE MG/DL
RBC # UR STRIP.AUTO: NEGATIVE /UL
SP GR UR STRIP.AUTO: 1.03
UROBILINOGEN UR STRIP.AUTO-MCNC: 2 MG/DL

## 2023-11-22 PROCEDURE — 96372 THER/PROPH/DIAG INJ SC/IM: CPT | Mod: 59

## 2023-11-22 PROCEDURE — 96409 CHEMO IV PUSH SNGL DRUG: CPT

## 2023-11-22 PROCEDURE — 2500000004 HC RX 250 GENERAL PHARMACY W/ HCPCS (ALT 636 FOR OP/ED): Mod: SE | Performed by: INTERNAL MEDICINE

## 2023-11-22 PROCEDURE — 2500000004 HC RX 250 GENERAL PHARMACY W/ HCPCS (ALT 636 FOR OP/ED): Mod: SE | Performed by: NURSE PRACTITIONER

## 2023-11-22 PROCEDURE — 81003 URINALYSIS AUTO W/O SCOPE: CPT | Performed by: INTERNAL MEDICINE

## 2023-11-22 RX ORDER — FAMOTIDINE 10 MG/ML
20 INJECTION INTRAVENOUS ONCE AS NEEDED
OUTPATIENT
Start: 2023-12-20

## 2023-11-22 RX ORDER — FAMOTIDINE 10 MG/ML
20 INJECTION INTRAVENOUS ONCE AS NEEDED
Status: DISCONTINUED | OUTPATIENT
Start: 2023-11-22 | End: 2023-11-22 | Stop reason: HOSPADM

## 2023-11-22 RX ORDER — ALBUTEROL SULFATE 0.83 MG/ML
3 SOLUTION RESPIRATORY (INHALATION) AS NEEDED
OUTPATIENT
Start: 2023-12-20

## 2023-11-22 RX ORDER — CYANOCOBALAMIN 1000 UG/ML
1000 INJECTION, SOLUTION INTRAMUSCULAR; SUBCUTANEOUS ONCE
Status: COMPLETED | OUTPATIENT
Start: 2023-11-22 | End: 2023-11-22

## 2023-11-22 RX ORDER — DIPHENHYDRAMINE HYDROCHLORIDE 50 MG/ML
50 INJECTION INTRAMUSCULAR; INTRAVENOUS AS NEEDED
Status: DISCONTINUED | OUTPATIENT
Start: 2023-11-22 | End: 2023-11-22 | Stop reason: HOSPADM

## 2023-11-22 RX ORDER — EPINEPHRINE 0.3 MG/.3ML
0.3 INJECTION SUBCUTANEOUS EVERY 5 MIN PRN
OUTPATIENT
Start: 2023-12-20

## 2023-11-22 RX ORDER — PROCHLORPERAZINE MALEATE 10 MG
10 TABLET ORAL EVERY 6 HOURS PRN
Status: DISCONTINUED | OUTPATIENT
Start: 2023-11-22 | End: 2023-11-22 | Stop reason: HOSPADM

## 2023-11-22 RX ORDER — DIPHENHYDRAMINE HYDROCHLORIDE 50 MG/ML
50 INJECTION INTRAMUSCULAR; INTRAVENOUS AS NEEDED
OUTPATIENT
Start: 2023-12-20

## 2023-11-22 RX ORDER — HEPARIN SODIUM,PORCINE/PF 10 UNIT/ML
50 SYRINGE (ML) INTRAVENOUS AS NEEDED
Status: DISCONTINUED | OUTPATIENT
Start: 2023-11-22 | End: 2023-11-22 | Stop reason: HOSPADM

## 2023-11-22 RX ORDER — CYANOCOBALAMIN 1000 UG/ML
1000 INJECTION, SOLUTION INTRAMUSCULAR; SUBCUTANEOUS ONCE
OUTPATIENT
Start: 2023-12-20

## 2023-11-22 RX ORDER — PROCHLORPERAZINE EDISYLATE 5 MG/ML
10 INJECTION INTRAMUSCULAR; INTRAVENOUS EVERY 6 HOURS PRN
Status: DISCONTINUED | OUTPATIENT
Start: 2023-11-22 | End: 2023-11-22 | Stop reason: HOSPADM

## 2023-11-22 RX ORDER — HEPARIN 100 UNIT/ML
500 SYRINGE INTRAVENOUS AS NEEDED
Status: CANCELLED | OUTPATIENT
Start: 2023-11-22

## 2023-11-22 RX ORDER — HEPARIN 100 UNIT/ML
500 SYRINGE INTRAVENOUS AS NEEDED
Status: DISCONTINUED | OUTPATIENT
Start: 2023-11-22 | End: 2023-11-22 | Stop reason: HOSPADM

## 2023-11-22 RX ORDER — EPINEPHRINE 0.3 MG/.3ML
0.3 INJECTION SUBCUTANEOUS EVERY 5 MIN PRN
Status: DISCONTINUED | OUTPATIENT
Start: 2023-11-22 | End: 2023-11-22 | Stop reason: HOSPADM

## 2023-11-22 RX ORDER — HEPARIN SODIUM,PORCINE/PF 10 UNIT/ML
50 SYRINGE (ML) INTRAVENOUS AS NEEDED
Status: CANCELLED | OUTPATIENT
Start: 2023-11-22

## 2023-11-22 RX ORDER — ALBUTEROL SULFATE 0.83 MG/ML
3 SOLUTION RESPIRATORY (INHALATION) AS NEEDED
Status: DISCONTINUED | OUTPATIENT
Start: 2023-11-22 | End: 2023-11-22 | Stop reason: HOSPADM

## 2023-11-22 RX ADMIN — CYANOCOBALAMIN 1000 MCG: 1000 INJECTION, SOLUTION INTRAMUSCULAR at 10:04

## 2023-11-22 RX ADMIN — Medication 500 UNITS: at 10:43

## 2023-11-22 RX ADMIN — BEVACIZUMAB-AWWB 472 MG: 400 INJECTION, SOLUTION INTRAVENOUS at 10:05

## 2023-11-22 ASSESSMENT — PATIENT HEALTH QUESTIONNAIRE - PHQ9
2. FEELING DOWN, DEPRESSED OR HOPELESS: NOT AT ALL
10. IF YOU CHECKED OFF ANY PROBLEMS, HOW DIFFICULT HAVE THESE PROBLEMS MADE IT FOR YOU TO DO YOUR WORK, TAKE CARE OF THINGS AT HOME, OR GET ALONG WITH OTHER PEOPLE: NOT DIFFICULT AT ALL
SUM OF ALL RESPONSES TO PHQ9 QUESTIONS 1 & 2: 0
1. LITTLE INTEREST OR PLEASURE IN DOING THINGS: NOT AT ALL

## 2023-11-22 ASSESSMENT — PAIN SCALES - GENERAL: PAINLEVEL: 7

## 2023-11-22 NOTE — SIGNIFICANT EVENT
11/22/23 0920   Prechemo Checklist   Has the patient been in the hospital, ED, or urgent care since last date of service No   Chemo/Immuno Consent Signed Yes   Protocol/Indications Verified Yes   Confirmed to previous date/time of medication Yes   Compared to previous dose Yes   All medications are dated accurately Yes   Pregnancy Test Negative Not applicable   Parameters Met Yes   BSA/Weight-Height Verified Yes   Dose Calculations Verified Yes

## 2023-11-22 NOTE — PROGRESS NOTES
November 22, 2023 at 9:32 AM    Acetaminophen and Adhesive tape-silicones    Misael Hernandez is a 38 y.o. male   Blood pressure 129/83, pulse 110, temperature 35.8 °C (96.4 °F), temperature source Temporal, resp. rate 18, weight 90.6 kg (199 lb 11.8 oz), SpO2 95 %.  Body surface area is 2.1 meters squared.    Past Medical History:   Diagnosis Date    Other conditions influencing health status     Rectal Cancer       Encounter Diagnoses   Name Primary?    B12 deficiency     Rectal cancer (CMS/[facility])        Has the entire regimen been authorized by financial clearance (pre-certification)?  Yes     Prior to Admission medications    Medication Sig Start Date End Date Taking? Authorizing Provider   albuterol (Ventolin HFA) 90 mcg/actuation inhaler INHALE TWO PUFFS INTO THE LUNGS EVERY 6 HOURS AS NEEDED FOR SHORTNESS OF BREATH OR FOR WHEEZING FOR UP TO 30 DAYS 11/2/21   Historical Provider, MD   apixaban (Eliquis) 5 mg tablet Take 1 tablet (5 mg) by mouth 2 times a day.    Historical Provider, MD   buprenorphine-naloxone (Suboxone) 8-2 mg SL tablet Place 1 tablet under the tongue 2 times a day. 8/22/23 11/21/23  Historical Provider, MD   cyanocobalamin (Vitamin B-12) 1,000 mcg tablet Take 1 tablet (1,000 mcg) by mouth once daily.    Historical Provider, MD   dapagliflozin propanediol (Farxiga) 5 mg Take by mouth.    Historical Provider, MD   divalproex (Depakote ER) 500 mg 24 hr tablet Take 2 tablets (1,000 mg) by mouth. 1/17/20   Historical Provider, MD   divalproex (Depakote) 250 mg EC tablet Take 1 tablet (250 mg) by mouth once daily at bedtime. 1/17/20   Historical Provider, MD   doxepin (SINEquan) 10 mg capsule Take 1 capsule (10 mg) by mouth once daily. Pt unsure of dose    Historical Provider, MD   ergocalciferol (Vitamin D-2) 1.25 MG (72616 UT) capsule Take 1 capsule (50,000 Units) by mouth 1 (one) time per week for 12 doses. 10/30/23 1/16/24  Willa Vega MD   ferrous sulfate 325 (65 Fe) MG tablet Take 1 tablet  (65 mg of iron) by mouth once daily.    Historical Provider, MD   folic acid (Folvite) 1 mg tablet Take 1 tablet (1 mg) by mouth once daily.    Historical Provider, MD   haloperidol (Haldol) 2 mg tablet TAKE ONE TABLET BY MOUTH ONCE DAILY FOR AGITATION. 10/10/23   Historical Provider, MD   ibuprofen 800 mg tablet Take 1 tablet (800 mg) by mouth.    Historical Provider, MD   lidocaine-prilocaine (Emla) 2.5-2.5 % cream apply topically to affected area if needed 11/21/23   Willa Vega MD   loperamide (Imodium A-D) 2 mg tablet Take 1 tablet (2 mg) by mouth 4 times a day as needed for diarrhea.    Historical Provider, MD   losartan (Cozaar) 50 mg tablet Take 1 tablet (50 mg) by mouth once daily.    Historical Provider, MD   metoprolol succinate XL (Toprol-XL) 25 mg 24 hr tablet Take 1 tablet (25 mg) by mouth once daily.    Historical Provider, MD   naloxone (Narcan) 4 mg/0.1 mL nasal spray Administer 1 spray (4 mg) into affected nostril(s) see administration instructions. 1 nasal spray (4 mg) as a single dose in one nostril as needed for opioid reversal; may repeat in 2 to 3 minutes in the opposite nostril if there is no response to the first dose. Immediately call 911. 5/9/23 5/8/24  Historical Provider, MD   omeprazole (PriLOSEC) 40 mg DR capsule Take 1 capsule (40 mg) by mouth. 6/6/23   Historical Provider, MD   ondansetron (Zofran) 8 mg tablet Take 1 tablet (8 mg) by mouth every 8 hours if needed.    Historical Provider, MD   potassium chloride CR 20 mEq ER tablet Take 1 tablet (20 mEq) by mouth twice a day. 5/11/22   Historical Provider, MD   prochlorperazine (Compazine) 10 mg tablet TAKE ONE TABLET BY MOUTH EVERY 6 HOURS AS NEEDED FOR NAUSEA AND / OR VOMITING    Historical Provider, MD   trifluridine-tipiraciL (Lonsurf) 20-8.19 mg tablet TAKE THREE (3) TABLETS BY MOUTH 2 TIMES DAILY FOR 5 DAYS, EVERY 2 WEEKS 8/29/23 8/28/24  Willa Vega MD   lidocaine-prilocaine (Emla) 2.5-2.5 % cream apply topically to  affected area if needed 12/21/21 11/21/23  Historical Provider, MD       Reviewed documented list of home medications.  No significant drug interactions identified between home medications and chemotherapy regimen.    Yes    WBC   Date Value Ref Range Status   11/21/2023 11.2 4.4 - 11.3 x10*3/uL Final   11/07/2023 9.1 4.4 - 11.3 x10*3/uL Final   10/24/2023 10.3 4.4 - 11.3 x10*3/uL Final     Hemoglobin   Date Value Ref Range Status   11/21/2023 13.9 13.5 - 17.5 g/dL Final   11/07/2023 12.6 (L) 13.5 - 17.5 g/dL Final   10/24/2023 13.9 13.5 - 17.5 g/dL Final     Hematocrit   Date Value Ref Range Status   11/21/2023 43.0 41.0 - 52.0 % Final   11/07/2023 40.4 (L) 41.0 - 52.0 % Final   10/24/2023 43.7 41.0 - 52.0 % Final     Neutrophils Absolute   Date Value Ref Range Status   11/21/2023 7.81 (H) 1.20 - 7.70 x10*3/uL Final     Comment:     Percent differential counts (%) should be interpreted in the context of the absolute cell counts (cells/uL).   10/24/2023 6.82 1.20 - 7.70 x10*3/uL Final     Comment:     Percent differential counts (%) should be interpreted in the context of the absolute cell counts (cells/uL).   10/10/2023 5.04 1.20 - 7.70 x10*3/uL Final     Comment:     Percent differential counts (%) should be interpreted in the context of the absolute cell counts (cells/uL).     Platelets   Date Value Ref Range Status   11/21/2023 481 (H) 150 - 450 x10*3/uL Final   11/07/2023 372 150 - 450 x10*3/uL Final   10/24/2023 511 (H) 150 - 450 x10*3/uL Final     Creatinine   Date Value Ref Range Status   11/21/2023 0.66 0.50 - 1.30 mg/dL Final   11/07/2023 0.95 0.50 - 1.30 mg/dL Final   10/10/2023 0.57 0.50 - 1.30 mg/dL Final     Alkaline Phosphatase   Date Value Ref Range Status   11/21/2023 122 (H) 33 - 120 U/L Final   11/07/2023 117 33 - 120 U/L Final   10/10/2023 119 33 - 120 U/L Final     AST   Date Value Ref Range Status   11/21/2023 13 9 - 39 U/L Final   11/07/2023 11 9 - 39 U/L Final   10/10/2023 13 9 - 39 U/L Final      ALT   Date Value Ref Range Status   11/21/2023 10 10 - 52 U/L Final     Comment:     Patients treated with Sulfasalazine may generate falsely decreased results for ALT.   11/07/2023 8 (L) 10 - 52 U/L Final     Comment:     Patients treated with Sulfasalazine may generate falsely decreased results for ALT.   10/10/2023 8 (L) 10 - 52 U/L Final     Comment:     Patients treated with Sulfasalazine may generate falsely decreased results for ALT.     Bilirubin, Total   Date Value Ref Range Status   11/21/2023 0.5 0.0 - 1.2 mg/dL Final   11/07/2023 0.4 0.0 - 1.2 mg/dL Final   10/10/2023 0.5 0.0 - 1.2 mg/dL Final         Does the patient meet the treatment conditions?  Yes    Are dosage calculations correct?  Yes    Are doses the same as previous cycle?   Yes    Were any doses modified?  No    If appropriate, was the Creatinine Clearance independently calculated based on [facility] standards?   Yes      Comments:      I Raj Ramirez, PharmD hereby acknowledge that the treatment plan indicated above has been verified for correctness to the best of my ability on 11/22/2023 at 9:32 AM.

## 2023-11-22 NOTE — PROGRESS NOTES
Outstanding Clarification:     - Regimen: MVASI Days 1 + 15; Cycle repeats every 28 days   - Cycle/Day: Cycle 3 Day 15   - Dose modifications: no   - Anti-emetics: no   - Drug interactions / Allergies: no  - Growth Factor: no   - Date change required: no    I Raj Ramirez, PharmD hereby acknowledge that the treatment plan indicated above has been verified for correctness to the best of my ability on 11/22/2023 at 8:30 AM.

## 2023-11-29 ENCOUNTER — PHARMACY VISIT (OUTPATIENT)
Dept: PHARMACY | Facility: CLINIC | Age: 38
End: 2023-11-29
Payer: MEDICARE

## 2023-11-29 PROCEDURE — RXMED WILLOW AMBULATORY MEDICATION CHARGE

## 2023-12-05 ENCOUNTER — OFFICE VISIT (OUTPATIENT)
Dept: HEMATOLOGY/ONCOLOGY | Facility: HOSPITAL | Age: 38
End: 2023-12-05
Payer: MEDICARE

## 2023-12-05 ENCOUNTER — LAB (OUTPATIENT)
Dept: LAB | Facility: LAB | Age: 38
End: 2023-12-05
Payer: MEDICARE

## 2023-12-05 VITALS
OXYGEN SATURATION: 98 % | BODY MASS INDEX: 29.37 KG/M2 | SYSTOLIC BLOOD PRESSURE: 110 MMHG | RESPIRATION RATE: 20 BRPM | DIASTOLIC BLOOD PRESSURE: 77 MMHG | HEART RATE: 102 BPM | HEIGHT: 69 IN | TEMPERATURE: 97 F | WEIGHT: 198.3 LBS

## 2023-12-05 DIAGNOSIS — E53.8 B12 DEFICIENCY: ICD-10-CM

## 2023-12-05 DIAGNOSIS — E83.42 HYPOMAGNESEMIA: ICD-10-CM

## 2023-12-05 DIAGNOSIS — R74.8 ABNORMAL LIVER ENZYMES: ICD-10-CM

## 2023-12-05 DIAGNOSIS — C78.00 RECTAL CANCER METASTASIZED TO LUNG (MULTI): ICD-10-CM

## 2023-12-05 DIAGNOSIS — Z72.0 TOBACCO ABUSE: ICD-10-CM

## 2023-12-05 DIAGNOSIS — I10 HYPERTENSION, UNSPECIFIED TYPE: ICD-10-CM

## 2023-12-05 DIAGNOSIS — C20 RECTAL CANCER (MULTI): ICD-10-CM

## 2023-12-05 DIAGNOSIS — C20 RECTAL CANCER METASTASIZED TO LUNG (MULTI): ICD-10-CM

## 2023-12-05 DIAGNOSIS — K76.0 HEPATIC STEATOSIS: ICD-10-CM

## 2023-12-05 DIAGNOSIS — E55.9 VITAMIN D DEFICIENCY: ICD-10-CM

## 2023-12-05 DIAGNOSIS — I26.99 PULMONARY EMBOLISM, UNSPECIFIED CHRONICITY, UNSPECIFIED PULMONARY EMBOLISM TYPE, UNSPECIFIED WHETHER ACUTE COR PULMONALE PRESENT (MULTI): ICD-10-CM

## 2023-12-05 DIAGNOSIS — F39 MOOD DISORDER (CMS-HCC): ICD-10-CM

## 2023-12-05 DIAGNOSIS — E53.8 FOLATE DEFICIENCY: ICD-10-CM

## 2023-12-05 DIAGNOSIS — C20 RECTAL CANCER (MULTI): Primary | ICD-10-CM

## 2023-12-05 DIAGNOSIS — E87.6 HYPOKALEMIA: ICD-10-CM

## 2023-12-05 LAB
BASOPHILS # BLD AUTO: 0.09 X10*3/UL (ref 0–0.1)
BASOPHILS NFR BLD AUTO: 0.8 %
EOSINOPHIL # BLD AUTO: 0.26 X10*3/UL (ref 0–0.7)
EOSINOPHIL NFR BLD AUTO: 2.3 %
ERYTHROCYTE [DISTWIDTH] IN BLOOD BY AUTOMATED COUNT: 19.4 % (ref 11.5–14.5)
HCT VFR BLD AUTO: 38 % (ref 41–52)
HGB BLD-MCNC: 12.2 G/DL (ref 13.5–17.5)
IMM GRANULOCYTES # BLD AUTO: 0.1 X10*3/UL (ref 0–0.7)
IMM GRANULOCYTES NFR BLD AUTO: 0.9 % (ref 0–0.9)
LYMPHOCYTES # BLD AUTO: 1.58 X10*3/UL (ref 1.2–4.8)
LYMPHOCYTES NFR BLD AUTO: 14.2 %
MCH RBC QN AUTO: 28 PG (ref 26–34)
MCHC RBC AUTO-ENTMCNC: 32.1 G/DL (ref 32–36)
MCV RBC AUTO: 87 FL (ref 80–100)
MONOCYTES # BLD AUTO: 1.21 X10*3/UL (ref 0.1–1)
MONOCYTES NFR BLD AUTO: 10.9 %
NEUTROPHILS # BLD AUTO: 7.86 X10*3/UL (ref 1.2–7.7)
NEUTROPHILS NFR BLD AUTO: 70.9 %
NRBC BLD-RTO: 0 /100 WBCS (ref 0–0)
PLATELET # BLD AUTO: 457 X10*3/UL (ref 150–450)
RBC # BLD AUTO: 4.36 X10*6/UL (ref 4.5–5.9)
WBC # BLD AUTO: 11.1 X10*3/UL (ref 4.4–11.3)

## 2023-12-05 PROCEDURE — 99215 OFFICE O/P EST HI 40 MIN: CPT | Performed by: INTERNAL MEDICINE

## 2023-12-05 PROCEDURE — 85025 COMPLETE CBC W/AUTO DIFF WBC: CPT

## 2023-12-05 PROCEDURE — 3078F DIAST BP <80 MM HG: CPT | Performed by: INTERNAL MEDICINE

## 2023-12-05 PROCEDURE — 3074F SYST BP LT 130 MM HG: CPT | Performed by: INTERNAL MEDICINE

## 2023-12-05 PROCEDURE — 4004F PT TOBACCO SCREEN RCVD TLK: CPT | Performed by: INTERNAL MEDICINE

## 2023-12-05 RX ORDER — FAMOTIDINE 10 MG/ML
20 INJECTION INTRAVENOUS ONCE AS NEEDED
Status: CANCELLED | OUTPATIENT
Start: 2023-12-06

## 2023-12-05 RX ORDER — EPINEPHRINE 0.3 MG/.3ML
0.3 INJECTION SUBCUTANEOUS EVERY 5 MIN PRN
Status: CANCELLED | OUTPATIENT
Start: 2023-12-06

## 2023-12-05 RX ORDER — PROCHLORPERAZINE EDISYLATE 5 MG/ML
10 INJECTION INTRAMUSCULAR; INTRAVENOUS EVERY 6 HOURS PRN
Status: CANCELLED | OUTPATIENT
Start: 2023-12-06

## 2023-12-05 RX ORDER — PROCHLORPERAZINE MALEATE 5 MG
10 TABLET ORAL EVERY 6 HOURS PRN
Status: CANCELLED | OUTPATIENT
Start: 2023-12-06

## 2023-12-05 RX ORDER — ALBUTEROL SULFATE 0.83 MG/ML
3 SOLUTION RESPIRATORY (INHALATION) AS NEEDED
Status: CANCELLED | OUTPATIENT
Start: 2023-12-06

## 2023-12-05 RX ORDER — DIPHENHYDRAMINE HYDROCHLORIDE 50 MG/ML
50 INJECTION INTRAMUSCULAR; INTRAVENOUS AS NEEDED
Status: CANCELLED | OUTPATIENT
Start: 2023-12-06

## 2023-12-05 NOTE — PROGRESS NOTES
"Interval History:    Patient is a 38 -year-old white man with metastatic rectal carcinoma , previously seen at [facility] with Dr. Zuniga, seen by me for the first time 2/7/2023 at [place].  He initially presented with stage III disease (T3 N2 M0) diagnosed in 2013 status post neoadjuvant Xeloda and radiation not completed secondary to noncompliance  but last treated November 2013 followed by resection followed by adjuvant FOLFOX not completed secondary to patient noncompliance.  He had biopsy-proven lung metastasis 6/22/2018 (RLL) , MMR normal, NRAS/KRAS/BRAF wild-type ; also adrenal mets but did not find a bx-metastatic disease was treated with FOLFOX with \" serious adverse\" reaction to oxaliplatin according to notes that patient  could not tell me what the reaction except possibly uncontrollable diarrhea, followed by Xeloda/Avastin with progressive disease,  started irinotecan/cetuximab 11/29/2021 complicated by skin and GI toxicity requiring dose reductions.  Patient was last seen by Dr. Zuniga 1/24/2023 noting status post 22 cycles of cetuximab  with irinotecan with dose reductions due to skin and GI toxicity, loose stools better, skin stable on minocycline, maintained on  Eliquis for recurrent thrombosis lower extremities.      11/7/2023 blood pressure was low, on blood pressure medication, no dizziness, blood pressure okay subsequently.  10/24/2023 vitamin D level was low, prescribed vitamin D weekly x12 but patient was unable to get that due to the cost even after nursing checked into it for him, still not taking the daily supplement I recommended.  11/21/2023 patient said he was feeling well except he had a mild URI with some cough and runny nose, no shortness of breath or fevers-symptoms subsequently resolved without intervention.  His weight was down 5 kg 11/21/2023, subsequently stabilized.     Cetuximab/irinotecan was most recently given 8/9/2023, but unfortunately CT showed progressive disease 8/25/2023 in the " lung/mediastinum and bilateral adrenal masses, luckily patient clinically  doing okay, ultimately decided on doing Lonsurf and Avastin given every 2 weeks, started 9/13/2023, did well except for a few episodes of nausea with vomiting while taking the Lonsurf but not every day, only took his Compazine or Zofran as needed but not  prior to taking the Lonsurf which helped, no significant diarrhea, no wound healing issues, did get IV magnesium 9/13/2023.  He got his second dose of the Avastin/Lonsurf 9/27/2023 and took Zofran premedication for the Lonsurf with no nausea or vomiting, bowels did well.  He received the Lonsurf/Avastin 10/11/2023 every 2 weeks most recently 11/21/2023 with no nausea or vomiting, bowels doing okay.   10/10/2023 he admitted to using marijuana daily.  7/2023 phosphorus rich foods were recommended, IV magnesium and potassium given, recommended referral to nephrology for electrolyte  abnormalities but patient refused.  No thrombotic or bleeding events on anticoagulation, multiple thrombotic events since his diagnosis  of cancer but not prior, compliant with his Eliquis.  I verified with patient that his last CT was in 2021, so I ordered another CT scan done 2/20/2023 which showed most lung nodules increased in size and increase in the right adrenal gland mass . He had an appt. with Dr. Lui 3/20/23-progression of disease but difficult to assess given only 2 time points over about 1.5 years, patient not interested in changing treatment to a 5-FU  based regimen and wanted to continue irinotecan/cetuximab but recommended CT after 4 cycles, offered a clinical trial but patient declined due to the travel, Dr. Lui available for recommendations upon progression.  CT 4/24/2023 thankfully showed  overall stable disease despite CEA rising-patient said he has had the CEA fluctuations in the past, was actually lower subsequently even though CT showed progression 8/2023.  2/2023 folate, iron, and  "B12 were borderline, prescription written but apparently  insurance denied so he had to get these over-the-counter which he eventually did March 2023-given persistently low iron Feraheme given 7/5/2023 and 7/24/2023-no issues, did not feel any different.  11/7/2023 folic acid was still low, advised him to take 2 pills/day which he is doing.  Also 11/2023 B12 deficiency was still present with elevated methylmalonic acid so B12 injections were ordered with first B12 injection given 11/21/2023.    Patient is by himself today, previously accompanied by his mother.   He lives  with his mother and brother.  He is able to do light chores.  He has some sort of mood disorder followed by psychiatry on Haldol and Depakote.  He does take a PPI given his history of Day's.  He was taking potassium 5 tabs/day which was increased from 4 tablets/day 9/26/2023, but as his potassium level was elevated 11/7/2023 I backed him down to 2 tabs per day which he is doing.  PCP manages his chronic pain medications for his \"lung and kidney\" pain which has been stable since 2018 including Suboxone (Zubsolv), used to take gabapentin but no longer and believes he last took this  in 2022, is on  medications because of a history of pain medication addiction, uses ibuprofen less than once per month.  He has Zofran and Compazine as needed for nausea, 6/20/2023 noted that his nausea was worse with 1 episode of vomiting and we finally figured out that he was using 8 mg of  Zofran instead of the 4 mg that I prescribed, had nausea from his chemotherapy as well as from the minocycline, alternated the Zofran and the Compazine.  8/29/2023 he said he recently stopped his minocycline because he noted more nausea and vomiting with  that medication with subsequent symptoms returned to baseline and no change in his facial rash.  2/21/2023 I felt that his facial rash was worse so I gave him clindamycin topical but it made his face more red so stopped, had " been well controlled with  minocycline 1 tablet/day and a facial lotion but not a facial steroid-3/21/2023 I felt that he had some seborrheic dermatitis and recommended the antidandruff shampoo on his face which he has been using which is helping-8/29/2023->CURRENT facial rash  doing okay even off the minocycline, just using a facial lotion and the dandruff shampoo, October 2023 had only minimal flaking on his face, skin very clear so far November-December 2023.  He was averaging about 2-3 bowel movements per day controlled with Imodium 4 tablets/day and Lomotil 1 tablet/day; March 2023 he tried Metamucil but this did not improve his bowel movements;  5/16/2023 he said that he was having more like 4-5 more watery bowel movements not controlled with his current regimen; 5/30/2023 bowel movements were unchanged even though he increased his Lomotil to 3/day and was taking the Imodium only 3/day, no change  in his diet or sick contacts, negative C. difficile May 2023; June 2023 he started on a probiotic, bowel movements slightly better at 3/day, still using Lomotil and Imodium 3/day; 7/3/2023->current bowel movements are better at 1-2/day, was using the Imodium  and Lomotil 4 times a day as well as the probiotic but 10/24/2023 and so far November 2023 he says he is only using the Imodium 4 times a day and no longer using the probiotic and Lomotil with bowels doing fine.   He has some left thigh numbness and burning since his rectal surgery, no neuropathy from chemotherapy.  He has an essential tremor which is unchanged since his cancer diagnosis.   Patient previously took Lasix which was held April 2022 due to dehydration and hypokalemia-chronic leg edema not bothering him lately.    Glucose has been elevated, no diabetes but he was diagnosed  with diabetes May 2023 and started on dapagliflozin by PCP at the end of May 2023, does not check his glucose.  7/20/2023 he showed me his varicose veins, going on for years,  declined vascular consultation.     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     Prescriptions from Dr. Zuniga included Eliquis, potassium, Emla cream, Lomotil.     PCP is Dr. Jesse Dominguez, notes not in our old system but able to see in [facility] 9/26/2023 saw for chronic medical issues.   Patient declined a flu vaccine 9/12/2023.  Patient received 2 COVID vaccines but  no booster.  I recommended ongoing vaccine management and COVID management through PCP 9/12/2023. I have reviewed the available laboratory data, radiographic data, medications, and notes,  and have summarized them in this note 12/5/2023.  No other hospitalizations, major illness, or  procedures since his last appointment 11/21/2023.    Epic transition occurred 10/2/2023 from prior EMR system.  I did not go back in the epic system prior to this point unless patient brought up an issue.  I did review the EMR data in epic from 10/2/2023 going forward, but relied on our old EMR system for data prior to the transition.         No fevers, unintentional weight loss now,  drenching sweats, headaches, sore  throat, acute vision changes, chest pain, shortness breath, cough now , abdominal pain, nausea or vomiting now, blood in stool, dysuria or hematuria, pain or neurologic symptoms except as above , abnormal bruising or bleeding, or thyroid disorder.    Hematology-oncology history (reviewed and updated 12/5/2023)   -At age 28 patient underwent colonoscopy August 2013 for abdominal pain, rectal bleeding, weight loss, nausea/vomiting, and painful small bowel movements found to have a circumferential fungating partially obstructive mass in the rectum with biopsy showing  invasive moderately differentiated adenocarcinoma.  Pathology showed invasive moderately differentiated adenocarcinoma, normal mismatch repair protein expression.  He was felt to have a stage III = T3 N2 M0 rectal adenocarcinoma.  Initial CEA was 5.4.   -Patient underwent neoadjuvant Xeloda (according to Dr. Perico Benedict's note from 8/2013 plan 1500 mg twice daily throughout radiation) and radiation not completed secondary to noncompliance but  last treated November 2013 under Dr. Perico Benedict and Dr. Constantine Tong, 45 Gy to the pelvis including lymph nodes and an additional 3.6 Gy to the involved nodes and rectal mass, initially  planned to go to 50.4 to the involved nodes and 54 to the rectal masses but patient was noncompliant.  Treatment was complicated by loose stools, nausea, vomiting, anorectal pain, skin irritation.  Rectal bleeding and appetite improved.  He had what  was felt to be an excellent response on MRI.  -1/2/2014 Dr. Willa Khan performed open proctosigmoidectomy with coloanal anastomosis and diverting ileostomy.  Pathology  showed invasive moderately differentiated adenocarcinoma of the rectum, 2 of 31 lymph nodes positive, therapy effect, low-grade, tumor 3.5 x 3 x 0.5 cm, negative margins, lymphovascular invasion and perineural invasion present,  ypT3 ypN1b  -11/2014 patient underwent ileostomy takedown with resection of small  bowel by Dr. Willa Khan, benign pathology.  -Patient received adjuvant FOLFOX not completed secondary to patient noncompliance.  -12/2015 Dr. Fernando attempted a colonoscopy but bowel prep was incomplete  -6/2018 biopsy-proven RLL lung  (no adrenal path seen)  metastasis, MMR normal, NRAS and BRAF wild-type, moderately differentiated adenocarcinoma, reviewed the pathology report, c/w CRC origin .  -6/2021 CT showed worsening metastasis including lungs, mediastinum, left adrenal.  -Metastatic rectal cancer was treated with FOLFOX with serious adverse reaction to oxaliplatin followed by Xeloda/Avastin with progressive disease .  -11/8/2021 CT chest, abdomen, pelvis with contrast showed progressive metastasis compared to 3 months prior including pulmonary/mediastinal and left adrenal metastasis (right lower lobe at 7.4 x 5.8 cm up to 8 x 6.3 cm, left lower lobe 4.3 x 2.9  cm up to 4.7 x 3.3 cm, prevascular lymph node 27 x 15 mm increased to 29 x 22 mm, prevascular nodularity enlarged from 28 x 8 mm to 25 x 11 mm, left adrenal 5.2 x 2.1 cm increased to 5.7 x 3.0 cm), increased nodularity right adrenal gland 22 x 9 mm, stable  presacral soft tissue thickening 3.3 cm, avascular necrosis femoral heads.  -Patient started irinotecan/cetuximab 11/29/2021 at 180 mg/M2 and 500 mg/M2, ev sho 2 weeks.  -2/16/2022 fourth dose irinotecan/cetuximab was reduced in the cetuximab to 400 mg/M2 (20% dose reduction due to skin toxicity and diarrhea)  -3/10/2022 acute nonocclusive DVT left common femoral and proximal to mid femoral veins treated with heparin and subsequently Eliquis .  -4/20/2022 irinotecan was continued at 180 mg/M2 but cetuximab was decreased to 300 mg/M2 (due to diarrhea and dehydration)  -5/11/2022 irinotecan was decreased to 150 mg/M2 (20% reduction due to diarrhea), cetuximab continued at 300 mg/M2   -6/8/2022 irinotecan continued 150 mg/M2, cetuximab was decreased to 250 mg/M2 (due to skin toxicity)  -10/5/2022  irinotecan was continued at 150 mg/M2, cetuximab decreased to 200 mg/M2 (due to skin toxicity)  -10/19/2022 irinotecan was increased  to 165 mg/M2 (due to increased CEA), cetuximab continued 200 mg/M2  -1/11/2022 irinotecan was continued at 165 mg/M2, cetuximab given 445 mg flat dose which came out to 200 mg/M2  -January 2023: WBC 7.7-8.5, hemoglobin 11.6-12.1, MCV 80-81, platelet 341-285, ANC 5.0-4.5.  Glucose 117-133, normal sodium, potassium 3.6-3.7, creatinine 0.5-0.6, calcium 8.6-9.0, magnesium 1.8, albumin 3.4-3.5 otherwise normal LFTs.  -1/25/2023 patient received dexamethasone 12 mg, Benadryl 50 mg, Aloxi 0.25 mg, atropine 0.4 mg, irinotecan 165 mg/M2, cetuximab 445 mg .     -I initially saw the patient on 2/7/2023, previously seen by other providers most recently Dr. Zuniga at [place], not all of his information in my EMR system, somewhat difficult to sort through the notes.  I did not go through all of his extensive labs.  CEA trend: August 2013: 5.4, November 2014: 2.2, November 2021: 165, January 2022: 151, February 2022: 52.8, March 2022: 27.5, April-May 2022: 46-47.6 (rise felt secondary to dose reductions and missed doses), July 2022: 80, August-September 2022: 56-61,  October 2022: 60<-71, November 2022 64-69, December 2022: 85, January 2023: 71  Patient received IV magnesium most recently November 2022, October 2022, July 2022.     -2/7/2023 labs which went to Dr. Zuniga: WBC 7, hemoglobin 11.4, MCV 81, platelets 350, normal differential number.  Sodium 136, potassium 3.3, creatinine 0.5, calcium 8s, glucose 149.  Magnesium 1.8.  Normal LFTs.  Iron 7% with ferritin 96.  Normal phosphorus.   B12 = 328.  Folate 7.3.  CEA 79.   -2/20/2023 CT chest, abdomen, pelvis with contrast compared to November 2021 showed multiple lung nodules increased in size for example right upper lobe 26 x 24 mm previously 23 x 10 mm and left upper lobe 35 x 32 mm previously 22 x 17 mm, right lower  lobe slightly decreased to 65 x  59 x 52 mm previously 72 x 61 x 62 mm, enlarged mediastinal and hilar lymph nodes and cystic appearing area anterior mediastinal lymph node less obvious otherwise lymph nodes stable, no focal liver lesions, both adrenals  enlarged right measuring 39 x 31 mm previously 22 x 9 mm whereas left adrenal was similar, mixed attenuation presacral region 33 x 24 mm similar could be postoperative change or residual tumor, slightly prominent new pelvic and inguinal lymph nodes, hernia  stable   -2/2023 potassium was increased to 2 tablets/day, B12 and folate recommended along with iron which he had to get over-the-counter.   -2/21/2023: WBC 7.8, hemoglobin 12, MCV 82, platelet 399, ANC 4.4.  Sodium 135, potassium 3.9, creatinine 0.6, normal calcium, glucose elevated 164.  Magnesium 1.9.  Normal LFTs, TSH, methylmalonic acid at 0.25.  Negative intrinsic factor antibody and  parietal cell antibody, celiac panel, hepatitis panel.    -3/2023: WBC 8s, hemoglobin 11.6-12, MCV 80-82, platelet 359-456, ANC 5.4-5.2.  Normal sodium, potassium 3.3 with 40 mEq IV given and increase to 2 tablets/day->3.5 , creatinine 0.6, normal calcium, glucose 166-178.  Magnesium 1.6-4 g given and 1.7.  Normal LFTs.  Iron 7-9% with ferritin 138  But he had not started the oral iron yet. 3/22 potassium 20 and magnesium 2 given.      -March 2023 he eventually got the B12, folate, iron.  -He had an appt. with Dr. Lui 3/20/23 (he missed 2/27/2023 due to car trouble)-progression of disease but difficult to assess given  only 2 time points over about 1.5 years, patient not interested in changing treatment to a 5-FU based regimen and wanted to continue irinotecan/cetuximab but recommended CT after 4 cycles, offered a clinical trial but patient declined due to the travel,  Dr. Lui available for recommendations upon progression   -4/4/2023 potassium was increased to 3 tablets/day.   -4/4/2023-4/18/23: WBC 7-8.3, hemoglobin 11.7-11.8, MCV 80, platelets  369-407, ANC 4.09-5.6.  Sodium 134-135, potassium 3.7, creatinine 0.6, calcium normal, glucose 144-172.  Magnesium 1.8-1.6.  Total protein 6.3-6.5, alkaline phosphatase 138-125, CEA  elevated 160 up from 79.  Ferritin increased 165.  Negative intrinsic factor antibody and parietal cell antibody.  Methylmalonic acid normal 0.34.  IV potassium 20 and magnesium 4 given on 4/19/2023.   -4/24/2023 CT chest, abdomen, pelvis with contrast showed stable bilateral pulmonary nodules (right upper lobe 2.7 cm, left upper lobe 3.4 cm, right lower lobe 6.5 cm), slight decrease in presacral mass 2.9 x 2.4 cm previously 3.5 x 2.7 cm, decreased  multiple bilateral adrenal nodules on the right 3.4 cm previously 3.9 cm and on the left 4.1 cm previously 3.8 cm, no new or worsening metastatic disease, no lymphadenopathy, bilateral femoral head avascular necrosis and hernia stable, unremarkable liver,  fatty pancreas.   -5/2/2023-5/30/2023: WBC 12s-11.1, hemoglobin 11.8-12s, MCV 81, platelet 465-429, elevated neutrophils.  Sodium 134-135, potassium 3.6 down to 3.1 with potassium increased to 20 mEq 2 tabs twice  a day and potassium 20 meq given on 5/17 with potassium subsequently up to 3.9 and was 3.5 on 5/30/2023 at which time 20 mEq IV were given, creatinine 0.6-0.5, calcium 8.9-9s, glucose up to 222.   Magnesium 1.7 down to 1.5 status post 4 g of magnesium on 5/17 and improved at 1.9 but subsequently 1.8 and was given 2 g on 5/31/2023.  Normal phosphorus.  Negative C. difficile..  Alkaline phosphatase 137-126.  CEA was better at 137.  Labs sent to  PCP.  Potassium 20 and magnesium 2 IV given on several occasions in May 2023.  Normal GGT.   -6/20/2023: WBC 13.3, hemoglobin 11.8, MCV 80, platelets 644, elevated neutrophils.  Sodium 133, potassium 3.8, creatinine 0.6, calcium 9.2, glucose 198.  Magnesium 1.7.  Alkaline phosphatase 160.  Iron 8% with ferritin 343.  B12 = 396.  Folate 12.  Magnesium  2 and potassium 20 IV given.    -7/3/2023: WBC 11.7, hemoglobin 11.9, MCV 81, platelet 550, elevated neutrophils.  Sodium 133, potassium 3.2, creatinine 0.5, calcium 8.9, glucose 201.  Magnesium 1.6.  Normal LFTs.  Potassium 40 and magnesium 3 given.   -7/5/2023 and 7/24/2023 Feraheme was given.  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   -7/20/2023: WBC 12.5, hemoglobin 13, MCV 81, platelet 430, elevated neutrophils.  Sodium 135, potassium 3.2 with 20 mEq given IV, creatinine 0.5, glucose 188, magnesium 1.8 with 2 g magnesium given, phosphorus 2.3 with phosphorus rich foods recommended,  alkaline phosphatase 128, B12 = 336 with MMA normal 0.17.  -Irinotecan and cetuximab were given 7/24/2023,  slightly delayed due to an issue with the port but was subsequently functioning okay.   -8/8/2023: WBC 9.9, hemoglobin 12.6, MCV 82, platelet 452, normal differential number.  Sodium 134, potassium 3.2 with potassium increased to 5 tablets/day, creatinine 0.4, calcium 9.  Magnesium 1.7, normal phosphorus, alkaline phosphatase 124, iron 19%  with ferritin 524; potassium 20 and magnesium 2 given    -8/9/23 Cetuximab/irinotecan  200 mg/M2 and 165 mg/M2 with Benadryl 50 mg p.o., dexamethasone 12 mg p.o., Aloxi, and atropine 0.4 mg IV,  subsequently discontinued due to progression disease.   -8/25/2023 CT chest, abdomen, pelvis with contrast showed progressive disease in the lung and mediastinal masses (left upper lobe 4.3 x 4.4 cm, left lower lobe 4.4 x 6.4 cm, right lower lobe 6.6 x 5.5 cm, subcarinal 6.3 x 4 cm), increased bilateral adrenal  masses (left 4.9 cm), increased presacral density 3.1 x 2.9 cm.  -8/29/2023: WBC normal 11.3, hemoglobin 13, MCV 83, platelet 582, ANC 7.5.  Normal sodium, potassium 3.9, creatinine 0.5, normal calcium, glucose 174.  Magnesium 1.8.  Alkaline phosphatase 126 stable.  Urine analysis no blood or protein.  .   -9/12/2023 patient admitted to only taking potassium 4 tabs per day instead of 5 tabs per day, 20 mEq each.   -9/12/2023: WBC 14.3, hemoglobin 13.1, MCV 83, platelet 626, elevated neutrophils, elevated monocytes 1.2.  Sodium 134, potassium 4, creatinine 0.6, calcium 9.6, glucose 258.  Magnesium 1.6 with 2 g given.  Normal LFTs and phosphorus.  -Given progressive disease, 9/13/2023 Avastin 5 mg/KG every 2 weeks was ordered along with Lonsurf usually 35 mg/M2 twice daily for 5 days but to make it easier on the patient I gave him 60 mg  (20 mg x 3 tabs) twice a day which was closer to 30 mg/M2.  -9/26/2023: WBC 9.6, hemoglobin 13, MCV 83, platelet 476, ANC 6.1.  Sodium 133, potassium 3.6 and with his potassium increased from 4/day up to 5/day, creatinine 0.5, calcium 9.1.  Magnesium 1.8, observed.  Normal LFTs.  CEA better 288 down from 383.  -10/10/2023: WBC 8.7, hemoglobin 13.9, MCV 85, platelet 488.  Iron 24% with ferritin 411.  Magnesium 2.3.  Sodium 134, potassium 4.6, creatinine 0.5, normal calcium, glucose 145, normal LFTs.  -10/24/2023: WBC 10.3, hemoglobin 13.9, normal MCV, platelet 511.  CEA was decreased at 236.  Vitamin D was low at 9 with 12 weeks of  supplement given but 11/7/2023 patient admitted to not taking that due to cost.  Folate 5.0.  Methylmalonic acid elevated 0.42.  B12 = 453.  Sodium 133, potassium elevated 5.5, creatinine 0.9, calcium 9.2.  Normal LFTs.  Potassium was decreased from 5 tabs per day down to 2 tabs per day.  Folic acid was doubled to 2 tabs per day.  B12 1000 mcg subcutaneous monthly was ordered.  -11/7/2023: WBC 9.1, hemoglobin 12.6, MCV 89, platelet 372.  -11/21/2023 B12 injections 1000 mcg subcutaneous monthly were started.  -11/21/2023-11/22/2023: WBC 11.2 normal, hemoglobin 13.9 normal, MCV normal, platelet 481, elevated neutrophils.  Sodium 132, potassium 4.5, creatinine 0.6, normal calcium, glucose 183.  Urinalysis with glucose but negative protein.  Alkaline phosphatase 122.  Magnesium 2.1 and normal phosphorus.        Past medical history  -Rectal cancer as above  -Vitamin D deficiency as above  -Rectal cancer and treatment complicated by neoplasm related pain, acneiform rash, diarrhea, hypokalemia, hypomagnesemia, noncompliance,  abdominal cramping improved with atropine  -Noncompliance  -3/2022 left common femoral vein DVT treated with heparin and transitioned  to Eliquis.  7/2021 DVT right popliteal vein and right posterior tibial vein.  Pulmonary embolism June 2014 not on anticoagulation at that time but subsequently anticoagulated although noncompliant.  DVT  leg after his rectal surgery in 2014 treated with Coumadin with questionable compliance.  2/7/23 patient noted no clots while he was  on a blood thinner and no clots prior to his cancer diagnosis.  -7/2019 LVEF 30%, multiple wall motion abnormalities, ischemic cardiomyopathy. 7/2019 chest pain with cardiac catheterization  normal coronaries but Takotsubo or stress cardiomyopathy.  2/7/2023 patient was not followed by cardiology.  -Cholecystitis January 2019 managed conservatively given metastatic cancer but given progressive symptoms patient underwent  cholecystectomy by Dr. Fernando 7/2019 complicated by pulmonary edema with evaluation showing cardiomyopathy  -Subarachnoid hemorrhage 9/2014 with cerebrospinal  fluid no malignant cells and was felt to be less likely a bleed based on CSF studies, EEG no seizure-like activity, had vasovagal syncope from pain  -Venous stasis, varicose veins   -Day's esophagus by EGD 8/2013, GERD  -Tobacco abuse  -Abdominal pain 2013/2014 managed with methadone  -Depression/anxiety  -Dyslipidemia  -Hypertension  -Avascular  necrosis femoral heads noted on imaging 2021  -hepatic steatosis noted on imaging 2020  -MRSA right hand infection status post I&D (before his diagnosis of rectal cancer)  -1/2019 admitted with neutropenic fever and pneumonia  -Microcytic anemia.  Patient reported taking oral iron for a few months in 2013/2014.  - Elevated glucose.  5/2023 started on dapagliflozin diabetes type 2 by PCP.  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 Fernando 1/2014, incision and drainage hand for abscess, EGD and colonoscopies, cardiac catheterization, complete dental extractions     Family history  Father with esophageal cancer.  Maternal grandfather with lung cancer.  Paternal grandfather with esophageal  cancer.     Social history  Tobacco: 1 pack/day, started as a teenager.  Alcohol:  Denied.  Drugs: Marijuana.  Has 4 children.     Allergies   Allergen Reactions    Acetaminophen Nausea And Vomiting    Adhesive Tape-Silicones Unknown     Current Outpatient Medications on File Prior to Visit   Medication Sig Dispense Refill    albuterol (Ventolin HFA) 90 mcg/actuation inhaler INHALE TWO PUFFS INTO THE LUNGS EVERY 6 HOURS AS NEEDED FOR  SHORTNESS OF BREATH OR FOR WHEEZING FOR UP TO 30 DAYS      apixaban (Eliquis) 5 mg tablet Take 1 tablet (5 mg) by mouth 2 times a day.      buprenorphine-naloxone (Suboxone) 8-2 mg SL tablet Place 1 tablet under the tongue 2 times a day.      cyanocobalamin (Vitamin B-12) 1,000 mcg tablet Take 1 tablet (1,000 mcg) by mouth once daily.      dapagliflozin propanediol (Farxiga) 5 mg Take by mouth.      divalproex (Depakote ER) 500 mg 24 hr tablet Take 2 tablets (1,000 mg) by mouth.      divalproex (Depakote) 250 mg EC tablet Take 1 tablet (250 mg) by mouth once daily at bedtime.      doxepin (SINEquan) 10 mg capsule Take 1 capsule (10 mg) by mouth once daily. Pt unsure of dose      ergocalciferol (Vitamin D-2) 1.25 MG (44432 UT) capsule Take 1 capsule (50,000 Units) by mouth 1 (one) time per week for 12 doses. 12 capsule 0    ferrous sulfate 325 (65 Fe) MG tablet Take 1 tablet by mouth once daily.      folic acid (Folvite) 1 mg tablet Take 1 tablet (1 mg) by mouth once daily.      haloperidol (Haldol) 2 mg tablet TAKE ONE TABLET BY MOUTH ONCE DAILY FOR AGITATION.      ibuprofen 800 mg tablet Take 1 tablet (800 mg) by mouth.      lidocaine-prilocaine (Emla) 2.5-2.5 % cream apply topically to affected area if needed 1 g 1    loperamide (Imodium A-D) 2 mg tablet Take 1 tablet (2 mg) by mouth 4 times a day as needed for diarrhea.      losartan (Cozaar) 50 mg tablet Take 1 tablet (50 mg) by mouth once daily.      metoprolol succinate XL (Toprol-XL) 25 mg 24 hr tablet Take 1 tablet (25 mg) by mouth once daily.      naloxone (Narcan) 4 mg/0.1 mL nasal spray Administer 1 spray (4 mg) into affected nostril(s) see administration instructions. 1 nasal spray (4 mg) as a single dose in one nostril as needed for opioid reversal; may repeat in 2 to 3 minutes in the opposite nostril if there is no response to the first dose. Immediately call 911.      omeprazole (PriLOSEC) 40 mg DR capsule Take 1 capsule (40 mg) by mouth.       ondansetron (Zofran) 8 mg tablet Take 1 tablet (8 mg) by mouth every 8 hours if needed.      potassium chloride CR 20 mEq ER tablet Take 1 tablet (20 mEq) by mouth twice a day.      prochlorperazine (Compazine) 10 mg tablet TAKE ONE TABLET BY MOUTH EVERY 6 HOURS AS NEEDED FOR NAUSEA AND / OR VOMITING      trifluridine-tipiraciL (Lonsurf) 20-8.19 mg tablet TAKE THREE (3) TABLETS BY MOUTH 2 TIMES DAILY FOR 5 DAYS, EVERY 2 WEEKS 30 tablet 11     No current facility-administered medications on file prior to visit.        Vitals:    12/05/23 0823   BP: 110/77   Pulse: 102   Resp: 20   Temp: 36.1 °C (97 °F)   SpO2: 98%     Physical Exam:      Constitutional: Performance status 1.  89.9  kg.  -General: Well-developed and well-nourished. No acute distress.  Somewhat odd affect, stands up during the visit, but pleasant.  Visible essential tremor-stable.  Looks well for him today.  -Lymphatics: No cervical or supraclavicular lymphadenopathy.  -Eyes:  Anicteric.  -HEENT: MMM , neck supple, no thrush.  Edentulous.  -Cardiovascular: RRR.    -Respiratory: CTAB, no longer wheezing.  Breathing is nonlabored.  -Abdomen: Soft, nontender, multiple well-healed scars.  Limited by body habitus.  Possible hernia near his scars but reducible-stable.  -Back: No costovertebral angle tenderness. No spine tenderness.  -Extremities: Significant bilateral leg edema with venous stasis changes and varicose veins-actually may be a little better.    -Neurologic: Awake and interactive.  Gait steady.  Speech fluent.  Essential tremor.   -Skin: Venous stasis changes.  Port clean, dry, intact.  Face basically clear.  Tattoos not fully examined today.  Hands clear.  -Psychiatric: Cooperative.  Calm.          Assessment and Plan:   Assessment:    #Metastatic rectal cancer to the lungs (biopsy-proven), mediastinum, adrenals, MMR intact, BRAF and NRAS/KRAS wild-type .    Patient initially had stage III disease diagnosed in 2013 status post neoadjuvant  Xeloda and radiation followed by resection followed by adjuvant FOLFOX but treatment incomplete due to compliance  issues, developed a metastasis 6/2018 treated with FOLFOX with some sort of reaction (patient recalled uncontrollable diarrhea), progressive disease on Xeloda/Avastin, started irinotecan/cetuximab 11/2021 complicated by GI and skin toxicity requiring  dose reductions and modifications. Some of his CEA fluctuations were  felt secondary to decreased chemotherapy doses as well as missed doses, April 2023 CEA significantly increased at 160 but May 2022 back down to 137, CEA at the time of initiation of irinotecan/cetuximab  165 in November 2021.  May 2023 he had increased diarrhea of unknown etiology, better after probiotic started June 2023, even better after increasing Imodium and Lomotil June 2023.   Last CT was in 11/2021, so I ordered a follow-up CT 2/20/2023 which over a year showed worsening lung nodules and significant increase in right adrenal gland as well as mild enlargement of inguinal lymph nodes but no liver metastasis.  He had an appt.  with Dr. Lui 3/20/23 -progression of disease but difficult to assess given only 2 time points over about 1.5 years, patient not interested in changing treatment to a 5-FU based regimen and wanted to continue irinotecan/cetuximab but recommended  CT after 4 cycles, offered a clinical trial but patient declined due to the travel.  CT was stable or improved on 4/24/2023.       Unfortunately CT 8/25/2023 showed disease progression in the lung/mediastinal masses, presacral density, and bilateral adrenal masses consistent with progressive disease.  He clinically was still doing quite well.  CEA significantly lora 8/29/2023  at 383.  We discussed options and decided on a dose modified Avastin and Lonsurf regimen started 9/13/2023 which was well-tolerated except for some nausea and vomiting but he did not take his premedications.  9/27/2023 he started taking the  Zofran as a premedication and Lonsurf was much better tolerated.  CEA down to 288 on 9/26/2023, down to 236 on 10/24/2023 so he seems to be responding and tolerating the treatment.  Patient has been reluctant to do CT scans.  Electrolytes and bowels as well as skin seem better on this regimen.  Hepatitis panel negative 2/2023.     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     #Microcytic anemia suggestive of iron deficiency, also anemia on chemotherapy .  He remotely took iron at his original diagnosis in 2013/2014.  February 2023 iron 7% and ferritin 96 with hemoglobin 11.4 likely iron deficiency, also borderline B12 = 328 and borderline folate 7.3.  Keep in mind possible  malabsorption on his PPI.   2/2023 negative/normal intrinsic factor antibody, parietal cell antibody, methylmalonic acid, celiac.  He eventually got iron, B12, folate  over-the-counter March 2023.  3/2023 thrombocytosis  also would go along with the iron deficiency.  April 2023 thrombocytosis resolved, hemoglobin stable 11s.  May 2023 hemoglobin stable, platelet count back up which again  could be due to iron deficiency or inflammation or malignancy.  June 2023 hemoglobin  11.8, platelet count up to 644, persistent microcytosis, persistent iron deficiency anemia with iron 8% and ferritin 343 so possible malabsorption with IV iron ordered (Feraheme given 7/5/2023  and 7/24/2023), folate normal, B12 = 396 somewhat borderline.  7/20/2023 hemoglobin improved, platelet count down to 430, MCV improved, so he seemed to respond to the IV iron; also B12 336 but methylmalonic  acid normal so not frankly deficient.  8/2023 iron 19% with ferritin 524 with close to normal hemoglobin so maintained observation, still has thrombocytosis which could be due to his malignancy, basically normal WBC and hemoglobin 8/29/2023.  9/12/2023  hemoglobin okay, leukocytosis and thrombocytosis likely from his malignancy.  10/10/2023 iron 24% with ferritin 411, normal WBC and hemoglobin, platelet count mildly elevated 488 again likely secondary to malignancy and inflammation.  11/7/2023 normal WBC, hemoglobin 12.6, platelet count normal, folate borderline at 5.0 and he was increased to 2 tabs per day, methylmalonic acid elevated 0.42 with B12 453 with subcutaneous injections ordered monthly which started 11/21/2023.  End of November 2023 normal WBC and hemoglobin, platelets slightly up 481 which could be reactive.    #Vitamin D deficiency 10/24/2023 with vitamin D weekly prescribed.  Patient reports noncompliance due to cost.     #Hypokalemia possibly secondary to treatment versus malabsorption versus diarrhea.  Still low March 2023 requiring IV potassium and  increased potassium to 2 tablets/day, potassium 3.7 on 4/4/2023 with potassium increased to 3 tablets/day.  April-May 2023 potassium borderline at 3.6-3.7 status post IV potassium but still persistently low at 3.1 so potassium was increased to a total  of 80 mEq daily and IV potassium given May 2023 with improvement on follow-up labs but still received IV potassium later in  May 2023 and again June-July 2023.  Patient refused nephrology consultation July 2023.  August 2023 patient's potassium was increased  to 100 mEq daily and received IV potassium, level actually better 8/29/2023.  9/12/2023 patient admitted to only taking 4 tabs of his potassium rather than 5 tabs for unclear reasons, but actually potassium was okay at 4.0.  9/26/2023 potassium 3.6 so he was increased back up to 5 tablets today with normal potassium 10/10/2023.  11/7/2023 potassium was elevated 5.5 so potassium was decreased from 5 tablets/day down to 2 tablets/day with potassium 4.5 at the end of November 2023.     #Hypomagnesemia likely from chemotherapy and/or diarrhea as well as possible  malabsorption from PPI.  Magnesium low March- September 2023 requiring IV magnesium.   No oral magnesium given diarrhea.  10/10/2023 and 11/21/2023 magnesium normal, possibly due to being off the cetuximab and improved diarrhea.     #Mild hypophosphatemia July 2023 recommending dietary increase of phosphorus.  Normal phosphorus 8/8/2023 and 9/12/2023 and 11/21/2023.     #Noncompliance     #Acneiform rash secondary to cetuximab managed with dose reductions/delays, topicals, minocycline.  He was doing well on minocycline 1 tablet daily and a facial lotion but not a facial steroid, seemed a little worse 2/21/2023 so I prescribed  topical  clindamycin lotion 1% but  this made his face more red  so stopped.  3/21/2023 his facial rash looked more consistent with seborrheic dermatitis improved with an antidandruff shampoo, fluctuates.  8/29/2023 he reported stopping minocycline due to nausea  and vomiting with GI symptoms improved and facial rash no worse.  9/12/2023 facial rash actually resolved after stopping the cetuximab.  October 2023 mild flaking around his face seemed to get better with antidandruff shampoo.  November-December 2023 face clear so far.     #Diarrhea secondary to chemotherapy managed with dose reductions/delays,  Lomotil, Imodium; March 2023 he tried Metamucil which did not help his bowels.  May 2023 slightly increased bowel movements with more watery bowel movements, negative C. difficile,  not affecting his quality of life at this time and no dehydration, no clear etiology of the increased bowel movements, was still not taking his antidiarrheal medications at maximum.  June 2023 probiotic was started with improvement in bowels although  still baseline diarrhea, better July 2023 with increased Imodium and Lomotil use.  Bowels seem better off the cetuximab, even after he stopped the probiotic and Lomotil and is just using Imodium as of 10/24/2023.    #Multiple episodes of thrombosis since his cancer diagnosis likely secondary to malignancy most recently 3/2022 treated with Eliquis with no bleeding or thrombotic events with this.  He does have chronic venous stasis changes and varicose veins which  do not bother him, 7/2023 declined vascular consultation.     #Nausea secondary to chemotherapy and minocycline controlled with Zofran and prochlorperazine except June 2023 realized that he was previously using 8 mg of Zofran, 4 mg of Zofran not controlling his nausea and vomiting.  Nausea/vomiting doing well with  the Zofran 8 mg and Compazine except 8/29/2023 noted worsening symptoms so he stopped his minocycline with subsequent improved symptoms.  September 2023 nausea and vomiting on Avastin/Lonsurf as above, better with taking scheduled Zofran.     #Elevated glucose on labs-patient reported PCP April 2023 was going to follow-up on his labs from my office and decide about starting a medication.  I did send our labs to her PCP including May 2023 glucose up to 200s, and PCP started patient to May 2023  on dapagliflozin.     #Chronic pain apparently on pain medications even prior to his cancer diagnosis, could also have pain secondary to malignancy, managed by PCP with Suboxone (2022 took gabapentin but no longer) with a history  of pain medication addiction.  10/10/2023 patient admitted to taking ibuprofen which I asked him to stop even though he rarely took it, as he is on a blood thinner.     #Mood disorder, anxiety/depression, followed by psychiatry.     #Tobacco abuse     #Hypertension-this will be monitored on Avastin, so far doing okay, actually blood pressure low 11/7/2023 but asymptomatic.  Blood pressure okay now.     #Hepatic steatosis on imaging     #Takotsubo or stress cardiomyopathy, Day's esophagus, dyslipidemia, asthma/COPD, left thigh neuropathy after surgery but not related to chemotherapy, essential tremor     #April-May 2023 only minimally elevated alkaline phosphatase with normal GGT May 2023, could be due to fatty liver versus chemotherapy with bone marrow stimulation, no known liver metastasis.  June 2023 alkaline phosphatase increased 160.  July 2023 LFTs  actually normal except minimally elevated alkaline phosphatase 120s likely not significant.  August 2023 LFTs with stable mild alkaline phosphatase 120s, no liver metastasis 8/2023.  LFTs normal September 2023 and most recently 11/7/2023.  Alkaline phosphatase minimally elevated 11/21/2023 likely not significant.     #Mild hyponatremia down to 134-135 in April-May 2023, 133 in June-July 2023, lowest 132 in August- November 2023- can be observed.     #May-June 2023 mild leukocytosis without signs or symptoms of infection at that time although slightly worsening diarrhea also could have had something else going on.  His new diabetic medication in 2023 does not have leukocytosis associated with it.   Tobacco abuse can cause leukocytosis.  July 2023 WBC count only minimally elevated.  August 2023 normal WBC.  September 2023 mild leukocytosis could be from malignancy.  Normal WBC 11/2023.    #Mild wheezing on exam 10/24/2023 with no shortness of breath.  11/7/2023 lungs clear.  11/21/2023 mild URI with lungs pretty good for him, did not feel particularly ill just a  mild cough and runny nose.  URI symptoms resolved on their own.     -Subcutaneous B12 1000 mcg monthly to be given at our office, most recently 11/21/2023.  I did tell him he could discontinue his oral B12 11/21/2023 and again 12/5/2023.  -CBC only today.  11/21/2023 CBC, CMP, magnesium, phosphorus, urine analysis can be monitored.  11/7/2023 B12, folic acid, and methylmalonic acid can be followed up in 2-3 months.  10/24/2023 CEA and vitamin D were done, monitor every 2-3 months.  10/10/2023 iron studies done, monitor in 2-3 months.  -I have explained his unfortunate progressive disease, gave him options of additional treatment versus hospice and he wanted to go with additional treatment, again 8/29/2023 declined a clinical trial.  I did email Dr. Lui and notified her of my  plan, could email her again if issues, using NCCN guidelines version 4/2023 given his prior treatment I recommended Avastin and Lonsurf, has been studied in patients who previously received bevacizumab, after that could consider regorafenib.  Given his  questionable compliance I am somewhat concerned about the oral regimen but nursing and myself have gone over this with him and he appears to be taking it correctly.  -Avastin 5 mg/kilogram every 2 weeks.   8/29/2023 Lonsurf was ordered, usually 35 mg/M2 twice daily for 5 days but to make it easier on the patient as that dose using a BSA of 2.13 came out to 74 mg, I decided to give him  60 mg (20 mg x 3 tabs) twice daily which is closer to 30 mg/M2 which is reasonable given his previous chemotherapy, #30 with 11 refills given as I only want to give him the 5 days worth each time  so he does not accidentally take too much, going to use a modified schedule where I give the Avastin and the Lonsurf every 2 weeks for less toxicity and ease of administration, advised home antiemetic premedication.   11/7/2023 I discussed increasing his dose of Lonsurf and he declined, maintaining the course.   Urine analysis 11/22/2023 was okay, follow-up every 3 months on the Avastin.  Avastin and Lonsurf side effects were outlined in my note on 8/29/2023.  CBC with differential prior to each treatment, treat if ANC greater than 1500 and platelets greater  than 75, continue to monitor for drug interactions, CMP prior to each cycle (monthly).   No dose adjustment needed for mild hepatic impairment.  Maximum of 3 dose reductions are allowed for toxicity, discontinue if unable to tolerate 20 mg/M2.  Comes in 15 mg and  20 mg.  Patient was advised to take with food and swallow tablets whole.  Patient was advised to use Imodium at the first onset of diarrhea, call if symptoms not controlled.  Growth factor support or dose reduction could be considered for CBC toxicity.  I have advised the patient to be extra cautious especially as he is on a blood thinner. Discontinue bevacizumab for hypertensive crisis or hypertensive encephalopathy, serious hemorrhage, arterial thromboembolism, nephrotic syndrome, gastrointestinal  perforation, fistula, heart failure, wound healing complications requiring medical intervention, or RPLS or posterior reversible encephalopathy syndrome.  Bevacizumab should not be administered within 28 days of surgery (before and after), should be suspended  prior to elective surgery, and should not resume after surgery until surgical wound is fully healed.  No dose adjustments for renal or hepatic impairment except if nephrotic syndrome discontinue the bevacizumab (proteinuria >= 2 g in 24 hours requires  holding bevacizumab). CBC, CMP, urinalysis, and magnesium should be monitored.  If proteinuria >=2+ by dipstick, assessed with a 24-hour urine protein.  Monitor blood pressure every 2-3 weeks during treatment and regularly after discontinuation if bevacizumab-induced  hypertension occurs.  Monitor for signs or symptoms of GI perforation or fistula, bleeding, thromboembolism, wound healing complications, and  heart failure.    -Continue Imodium.  Can add back in probiotic and Lomotil if needed.  -Continue PCP management of medications not prescribed by me with abnormal LFTs.  -11/7/2023 patient requested a letter for job and family, office gave to the patient 11/21/2023.  -10/30/2023 vitamin D weekly x8 was prescribed but patient has had financial issues, asked the patient to get an over-the-counter supplement instead again.  Follow-up levels periodically.  -Continue to monitor genetics recommendations, follow-up in 1-2 years from 7/2023.  I have recommend that family members get colonoscopies  starting at age 20.  -I explained that his disease is not curable but treatable on 2/7/2023, reviewed again 8/29/2023 and advised hope for the best but prepare for the worst.    -Dr. Lui did not recommend any additional tumor testing.  -He started taking over-the-counter B12, folate, iron in March 2023, 11/21/2023 and again 12/5/2023 told him he could discontinue the oral B12 as he is going on injections-Feraheme can be given again as needed.  Iron instructions were outlined  in my note on 2/21/2023, 8/29/2023 discussed discontinuing the oral iron but he seemed to want to continue to take that.  Side effects of IV iron were outlined in my note on 6/20/2023.  Endoscopy for iron deficiency could be considered.    -CT chest, abdomen, pelvis with contrast can be monitored most recently 8/25/2023-patient has been reluctant to do imaging due to cost  but I explained it is the only way I can truly see what is going on, ideally need to monitor at least every 4 months for now.  11/7/2023 and again 11/21/2023 patient declined the CT scan understanding I am limited without that, but he agrees to discuss possible imaging January 2024 with his new provider.  -  If alkaline phosphatase increases, consider bone scan given normal GGT or  a PET scan.  -Zofran 8 mg every 8 hours as needed #30 with 11 refills was prescribed 6/20/2023, asked  him to disregard the Zofran 4 mg that was ordered 4/18/2023.  Prochlorperazine 10 mg as needed #30 with  11 refills was refilled on 5/2/2023.   -Continue pain medications from PCP, but 10/10/2023 asked him to discontinue the ibuprofen given the blood thinner use.   -Tobacco cessation was again recommended 8/8/2023.  - Minocycline 100 mg daily #30  was prescribed 11/17/2022 but discontinued 8/29/2023 as he stopped taking this and went off the cetuximab.   He can continue with antidandruff shampoo and facial lotion.  Dr. Zuniga recommended 1% hydrocortisone cream on his face twice daily but I have asked him to hold that and see how he does .  Dermatology consultation was offered 5/30/2023 but patient declined.  -Continue Imodium.  Lomotil 2 tabs 4 times a day as needed #80 with 3 refills, refilled 5/16/2023, no longer taking.    -Continue potassium and magnesium replacement, consider IV  if levels less than 3.8 and 1.8 respectively.  No oral magnesium because a tendency towards diarrhea.    Potassium 20 mEq, 5 tablets/day 8/8/2023 #150 with 11 refills, was taking  2 in  the morning and 3 at night, 11/7/2023 changed to just 2 tablets/day.  I highly encouraged a nephrology consultation for his electrolyte abnormalities but he declined 8/8/2023 understanding I have limitations in my expertise in this area.   -Continue Emla cream, refilled 11/21/2023.  -Port flushes will be done on treatment.  - Continue PPI for his history of Day's esophagus with further EGD as needed.  -Continue Eliquis 5 mg twice daily, #60 with 11  refills refilled on 9/11/2023, 8/29/2023 explained the risk on the Avastin with potential bleeding.  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   Information on Eliquis was outlined in my note on 2/21/2023.  General healthy lifestyle with reduction of immobility  and prevention of blood clots was encouraged including prophylactic  anticoagulation if ever hospitalized or has a surgery if not on anticoagulation , no smoking, optimization of weight, getting up and moving around regularly if on a plane or in a car, no hormonal agents.  Compression  stockings were recommended again 10/24/2023. Concurrent other blood thinning medications were discouraged unless clinically needed.   I again 10/24/2023 advised the patient to get a medical alert  bracelet while on anticoagulation.  -Appreciate PCP management of diabetes.  - Nutrition consultation was offered 6/20/2023 regarding his diarrhea as well as his diabetes but he declined.  -Labs are incorporated in my note which is to be sent to PCP. I have recommended routine health care maintenance and screening through PCP. The patient was reminded to followup with the PCP  for other medical problems and ongoing care. The patient was asked to return to clinic, or sooner as needed for new or concerning symptoms, in  2 weeks.    10/10/2023 I explained my upcoming departure from [facility], explained that I was told that [facility] is working on my replacement, notified patient to make sure appointments/orders are scheduled and kept as directed.  I wished the patient the best of luck.

## 2023-12-06 ENCOUNTER — INFUSION (OUTPATIENT)
Dept: HEMATOLOGY/ONCOLOGY | Facility: HOSPITAL | Age: 38
End: 2023-12-06
Payer: MEDICARE

## 2023-12-06 VITALS
TEMPERATURE: 96.8 F | SYSTOLIC BLOOD PRESSURE: 110 MMHG | RESPIRATION RATE: 18 BRPM | WEIGHT: 203.48 LBS | HEART RATE: 93 BPM | OXYGEN SATURATION: 96 % | DIASTOLIC BLOOD PRESSURE: 75 MMHG | BODY MASS INDEX: 30.04 KG/M2

## 2023-12-06 DIAGNOSIS — C20 RECTAL CANCER (MULTI): ICD-10-CM

## 2023-12-06 PROCEDURE — 96409 CHEMO IV PUSH SNGL DRUG: CPT

## 2023-12-06 PROCEDURE — 2500000004 HC RX 250 GENERAL PHARMACY W/ HCPCS (ALT 636 FOR OP/ED): Mod: SE | Performed by: NURSE PRACTITIONER

## 2023-12-06 PROCEDURE — 2500000004 HC RX 250 GENERAL PHARMACY W/ HCPCS (ALT 636 FOR OP/ED): Mod: SE | Performed by: INTERNAL MEDICINE

## 2023-12-06 RX ORDER — DIPHENHYDRAMINE HYDROCHLORIDE 50 MG/ML
50 INJECTION INTRAMUSCULAR; INTRAVENOUS AS NEEDED
Status: DISCONTINUED | OUTPATIENT
Start: 2023-12-06 | End: 2023-12-06 | Stop reason: HOSPADM

## 2023-12-06 RX ORDER — HEPARIN 100 UNIT/ML
500 SYRINGE INTRAVENOUS AS NEEDED
Status: DISCONTINUED | OUTPATIENT
Start: 2023-12-06 | End: 2023-12-06 | Stop reason: HOSPADM

## 2023-12-06 RX ORDER — HEPARIN 100 UNIT/ML
500 SYRINGE INTRAVENOUS AS NEEDED
Status: CANCELLED | OUTPATIENT
Start: 2023-12-06

## 2023-12-06 RX ORDER — HEPARIN SODIUM,PORCINE/PF 10 UNIT/ML
50 SYRINGE (ML) INTRAVENOUS AS NEEDED
Status: CANCELLED | OUTPATIENT
Start: 2023-12-06

## 2023-12-06 RX ORDER — FAMOTIDINE 10 MG/ML
20 INJECTION INTRAVENOUS ONCE AS NEEDED
Status: DISCONTINUED | OUTPATIENT
Start: 2023-12-06 | End: 2023-12-06 | Stop reason: HOSPADM

## 2023-12-06 RX ORDER — PROCHLORPERAZINE EDISYLATE 5 MG/ML
10 INJECTION INTRAMUSCULAR; INTRAVENOUS EVERY 6 HOURS PRN
Status: DISCONTINUED | OUTPATIENT
Start: 2023-12-06 | End: 2023-12-06 | Stop reason: HOSPADM

## 2023-12-06 RX ORDER — EPINEPHRINE 0.3 MG/.3ML
0.3 INJECTION SUBCUTANEOUS EVERY 5 MIN PRN
Status: DISCONTINUED | OUTPATIENT
Start: 2023-12-06 | End: 2023-12-06 | Stop reason: HOSPADM

## 2023-12-06 RX ORDER — ALBUTEROL SULFATE 0.83 MG/ML
3 SOLUTION RESPIRATORY (INHALATION) AS NEEDED
Status: DISCONTINUED | OUTPATIENT
Start: 2023-12-06 | End: 2023-12-06 | Stop reason: HOSPADM

## 2023-12-06 RX ORDER — PROCHLORPERAZINE MALEATE 10 MG
10 TABLET ORAL EVERY 6 HOURS PRN
Status: DISCONTINUED | OUTPATIENT
Start: 2023-12-06 | End: 2023-12-06 | Stop reason: HOSPADM

## 2023-12-06 RX ORDER — HEPARIN SODIUM,PORCINE/PF 10 UNIT/ML
50 SYRINGE (ML) INTRAVENOUS AS NEEDED
Status: DISCONTINUED | OUTPATIENT
Start: 2023-12-06 | End: 2023-12-06 | Stop reason: HOSPADM

## 2023-12-06 RX ADMIN — Medication 500 UNITS: at 11:00

## 2023-12-06 RX ADMIN — BEVACIZUMAB-AWWB 472 MG: 400 INJECTION, SOLUTION INTRAVENOUS at 10:37

## 2023-12-06 ASSESSMENT — PATIENT HEALTH QUESTIONNAIRE - PHQ9
1. LITTLE INTEREST OR PLEASURE IN DOING THINGS: NOT AT ALL
2. FEELING DOWN, DEPRESSED OR HOPELESS: NOT AT ALL
SUM OF ALL RESPONSES TO PHQ9 QUESTIONS 1 & 2: 0

## 2023-12-06 ASSESSMENT — PAIN SCALES - GENERAL
PAINLEVEL_OUTOF10: 5
PAINLEVEL: 7

## 2023-12-06 NOTE — PROGRESS NOTES
Outstanding Clarification:     - Regimen: MVASI Days 1 + 15; Cycle repeats every 28 days   - Cycle/Day: Cycle 4 Day 1  - Dose modifications: no   - Anti-emetics: no   - Drug interactions / Allergies: no  - Growth Factor: no   - Date change required: no    I Raj Ramirez, PharmD hereby acknowledge that the treatment plan indicated above has been verified for correctness to the best of my ability on 12/6/2023 at 9:39 AM.

## 2023-12-06 NOTE — PROGRESS NOTES
December 6, 2023 at 9:39 AM    Acetaminophen and Adhesive tape-silicones    Misael Hernandez is a 38 y.o. male   There were no vitals taken for this visit.  There is no height or weight on file to calculate BSA.    Past Medical History:   Diagnosis Date    Other conditions influencing health status     Rectal Cancer       Encounter Diagnosis   Name Primary?    Rectal cancer (CMS/[facility])        Has the entire regimen been authorized by financial clearance (pre-certification)?  Yes     Prior to Admission medications    Medication Sig Start Date End Date Taking? Authorizing Provider   albuterol (Ventolin HFA) 90 mcg/actuation inhaler INHALE TWO PUFFS INTO THE LUNGS EVERY 6 HOURS AS NEEDED FOR SHORTNESS OF BREATH OR FOR WHEEZING FOR UP TO 30 DAYS 11/2/21   Historical Provider, MD   apixaban (Eliquis) 5 mg tablet Take 1 tablet (5 mg) by mouth 2 times a day.    Historical Provider, MD   buprenorphine-naloxone (Suboxone) 8-2 mg SL tablet Place 1 tablet under the tongue 2 times a day. 8/22/23 12/5/23  Historical Provider, MD   cyanocobalamin (Vitamin B-12) 1,000 mcg tablet Take 1 tablet (1,000 mcg) by mouth once daily.    Historical Provider, MD   dapagliflozin propanediol (Farxiga) 5 mg Take by mouth.    Historical Provider, MD   divalproex (Depakote ER) 500 mg 24 hr tablet Take 2 tablets (1,000 mg) by mouth. 1/17/20   Historical Provider, MD   divalproex (Depakote) 250 mg EC tablet Take 1 tablet (250 mg) by mouth once daily at bedtime. 1/17/20   Historical Provider, MD   doxepin (SINEquan) 10 mg capsule Take 1 capsule (10 mg) by mouth once daily. Pt unsure of dose    Historical Provider, MD   ergocalciferol (Vitamin D-2) 1.25 MG (65917 UT) capsule Take 1 capsule (50,000 Units) by mouth 1 (one) time per week for 12 doses. 10/30/23 1/16/24  Willa Vega MD   ferrous sulfate 325 (65 Fe) MG tablet Take 1 tablet by mouth once daily.    Historical Provider, MD   folic acid (Folvite) 1 mg tablet Take 1 tablet (1 mg) by mouth once  daily.    Historical Provider, MD   haloperidol (Haldol) 2 mg tablet TAKE ONE TABLET BY MOUTH ONCE DAILY FOR AGITATION. 10/10/23   Historical Provider, MD   ibuprofen 800 mg tablet Take 1 tablet (800 mg) by mouth.    Historical Provider, MD   lidocaine-prilocaine (Emla) 2.5-2.5 % cream apply topically to affected area if needed 11/21/23   Willa Vega MD   loperamide (Imodium A-D) 2 mg tablet Take 1 tablet (2 mg) by mouth 4 times a day as needed for diarrhea.    Historical Provider, MD   losartan (Cozaar) 50 mg tablet Take 1 tablet (50 mg) by mouth once daily.    Historical Provider, MD   metoprolol succinate XL (Toprol-XL) 25 mg 24 hr tablet Take 1 tablet (25 mg) by mouth once daily.    Historical Provider, MD   naloxone (Narcan) 4 mg/0.1 mL nasal spray Administer 1 spray (4 mg) into affected nostril(s) see administration instructions. 1 nasal spray (4 mg) as a single dose in one nostril as needed for opioid reversal; may repeat in 2 to 3 minutes in the opposite nostril if there is no response to the first dose. Immediately call 911. 5/9/23 5/8/24  Historical Provider, MD   omeprazole (PriLOSEC) 40 mg DR capsule Take 1 capsule (40 mg) by mouth. 6/6/23   Historical Provider, MD   ondansetron (Zofran) 8 mg tablet Take 1 tablet (8 mg) by mouth every 8 hours if needed.    Historical Provider, MD   potassium chloride CR 20 mEq ER tablet Take 1 tablet (20 mEq) by mouth twice a day. 5/11/22   Historical Provider, MD   prochlorperazine (Compazine) 10 mg tablet TAKE ONE TABLET BY MOUTH EVERY 6 HOURS AS NEEDED FOR NAUSEA AND / OR VOMITING    Historical Provider, MD   trifluridine-tipiraciL (Lonsurf) 20-8.19 mg tablet TAKE THREE (3) TABLETS BY MOUTH 2 TIMES DAILY FOR 5 DAYS, EVERY 2 WEEKS 8/29/23 8/28/24  Willa Vega MD       Reviewed documented list of home medications.  No significant drug interactions identified between home medications and chemotherapy regimen.    Yes    WBC   Date Value Ref Range Status    12/05/2023 11.1 4.4 - 11.3 x10*3/uL Final   11/21/2023 11.2 4.4 - 11.3 x10*3/uL Final   11/07/2023 9.1 4.4 - 11.3 x10*3/uL Final     Hemoglobin   Date Value Ref Range Status   12/05/2023 12.2 (L) 13.5 - 17.5 g/dL Final   11/21/2023 13.9 13.5 - 17.5 g/dL Final   11/07/2023 12.6 (L) 13.5 - 17.5 g/dL Final     Hematocrit   Date Value Ref Range Status   12/05/2023 38.0 (L) 41.0 - 52.0 % Final   11/21/2023 43.0 41.0 - 52.0 % Final   11/07/2023 40.4 (L) 41.0 - 52.0 % Final     Neutrophils Absolute   Date Value Ref Range Status   12/05/2023 7.86 (H) 1.20 - 7.70 x10*3/uL Final     Comment:     Percent differential counts (%) should be interpreted in the context of the absolute cell counts (cells/uL).   11/21/2023 7.81 (H) 1.20 - 7.70 x10*3/uL Final     Comment:     Percent differential counts (%) should be interpreted in the context of the absolute cell counts (cells/uL).   10/24/2023 6.82 1.20 - 7.70 x10*3/uL Final     Comment:     Percent differential counts (%) should be interpreted in the context of the absolute cell counts (cells/uL).     Platelets   Date Value Ref Range Status   12/05/2023 457 (H) 150 - 450 x10*3/uL Final   11/21/2023 481 (H) 150 - 450 x10*3/uL Final   11/07/2023 372 150 - 450 x10*3/uL Final     Creatinine   Date Value Ref Range Status   11/21/2023 0.66 0.50 - 1.30 mg/dL Final   11/07/2023 0.95 0.50 - 1.30 mg/dL Final   10/10/2023 0.57 0.50 - 1.30 mg/dL Final     Alkaline Phosphatase   Date Value Ref Range Status   11/21/2023 122 (H) 33 - 120 U/L Final   11/07/2023 117 33 - 120 U/L Final   10/10/2023 119 33 - 120 U/L Final     AST   Date Value Ref Range Status   11/21/2023 13 9 - 39 U/L Final   11/07/2023 11 9 - 39 U/L Final   10/10/2023 13 9 - 39 U/L Final     ALT   Date Value Ref Range Status   11/21/2023 10 10 - 52 U/L Final     Comment:     Patients treated with Sulfasalazine may generate falsely decreased results for ALT.   11/07/2023 8 (L) 10 - 52 U/L Final     Comment:     Patients treated  with Sulfasalazine may generate falsely decreased results for ALT.   10/10/2023 8 (L) 10 - 52 U/L Final     Comment:     Patients treated with Sulfasalazine may generate falsely decreased results for ALT.     Bilirubin, Total   Date Value Ref Range Status   11/21/2023 0.5 0.0 - 1.2 mg/dL Final   11/07/2023 0.4 0.0 - 1.2 mg/dL Final   10/10/2023 0.5 0.0 - 1.2 mg/dL Final         Does the patient meet the treatment conditions?  Yes    Are dosage calculations correct?  Yes    Are doses the same as previous cycle?   Yes    Were any doses modified?  No    If appropriate, was the Creatinine Clearance independently calculated based on [facility] standards?   Yes      Comments:      I Raj Ramirez, PharmD hereby acknowledge that the treatment plan indicated above has been verified for correctness to the best of my ability on 12/6/2023 at 9:39 AM.

## 2023-12-13 ENCOUNTER — PHARMACY VISIT (OUTPATIENT)
Dept: PHARMACY | Facility: CLINIC | Age: 38
End: 2023-12-13
Payer: MEDICARE

## 2023-12-13 PROBLEM — F41.1 GENERALIZED ANXIETY DISORDER: Status: ACTIVE | Noted: 2021-11-15

## 2023-12-13 PROBLEM — F11.20 OPIOID USE DISORDER, SEVERE, ON MAINTENANCE THERAPY (MULTI): Chronic | Status: ACTIVE | Noted: 2021-07-20

## 2023-12-13 PROBLEM — F33.1 MODERATE EPISODE OF RECURRENT MAJOR DEPRESSIVE DISORDER (MULTI): Chronic | Status: ACTIVE | Noted: 2021-06-08

## 2023-12-13 PROBLEM — K21.9 GASTROESOPHAGEAL REFLUX DISEASE WITHOUT ESOPHAGITIS: Status: ACTIVE | Noted: 2022-01-04

## 2023-12-13 PROBLEM — E78.5 HYPERLIPIDEMIA: Status: ACTIVE | Noted: 2023-12-13

## 2023-12-13 PROBLEM — F12.20 CANNABIS DEPENDENCE, DAILY USE (MULTI): Chronic | Status: ACTIVE | Noted: 2023-07-25

## 2023-12-13 PROBLEM — I10 PRIMARY HYPERTENSION: Status: ACTIVE | Noted: 2022-07-19

## 2023-12-13 PROBLEM — F11.20 OPIOID DEPENDENCE, UNCOMPLICATED (MULTI): Chronic | Status: ACTIVE | Noted: 2021-06-08

## 2023-12-13 PROBLEM — C18.9: Chronic | Status: ACTIVE | Noted: 2022-01-04

## 2023-12-13 PROCEDURE — RXMED WILLOW AMBULATORY MEDICATION CHARGE

## 2023-12-13 RX ORDER — DOXEPIN HYDROCHLORIDE 50 MG/1
10 CAPSULE ORAL NIGHTLY PRN
COMMUNITY
Start: 2023-10-10

## 2023-12-19 ENCOUNTER — LAB (OUTPATIENT)
Dept: LAB | Facility: LAB | Age: 38
End: 2023-12-19
Payer: MEDICARE

## 2023-12-19 ENCOUNTER — DOCUMENTATION (OUTPATIENT)
Dept: HEMATOLOGY/ONCOLOGY | Facility: HOSPITAL | Age: 38
End: 2023-12-19

## 2023-12-19 ENCOUNTER — DOCUMENTATION (OUTPATIENT)
Dept: HEMATOLOGY/ONCOLOGY | Facility: HOSPITAL | Age: 38
End: 2023-12-19
Payer: MEDICAID

## 2023-12-19 DIAGNOSIS — E87.6 HYPOKALEMIA: ICD-10-CM

## 2023-12-19 DIAGNOSIS — C20 RECTAL CANCER (MULTI): ICD-10-CM

## 2023-12-19 DIAGNOSIS — C20 RECTAL CANCER (MULTI): Primary | ICD-10-CM

## 2023-12-19 DIAGNOSIS — E83.42 HYPOMAGNESEMIA: ICD-10-CM

## 2023-12-19 DIAGNOSIS — I10 HYPERTENSION, UNSPECIFIED TYPE: ICD-10-CM

## 2023-12-19 LAB
ALBUMIN SERPL BCP-MCNC: 3.3 G/DL (ref 3.4–5)
ALP SERPL-CCNC: 142 U/L (ref 33–120)
ALT SERPL W P-5'-P-CCNC: 9 U/L (ref 10–52)
ANION GAP SERPL CALC-SCNC: 17 MMOL/L (ref 10–20)
AST SERPL W P-5'-P-CCNC: 9 U/L (ref 9–39)
BASOPHILS # BLD AUTO: 0.1 X10*3/UL (ref 0–0.1)
BASOPHILS NFR BLD AUTO: 0.8 %
BILIRUB SERPL-MCNC: 0.5 MG/DL (ref 0–1.2)
BUN SERPL-MCNC: 5 MG/DL (ref 6–23)
CALCIUM SERPL-MCNC: 9.1 MG/DL (ref 8.6–10.3)
CHLORIDE SERPL-SCNC: 95 MMOL/L (ref 98–107)
CO2 SERPL-SCNC: 26 MMOL/L (ref 21–32)
CREAT SERPL-MCNC: 0.53 MG/DL (ref 0.5–1.3)
EOSINOPHIL # BLD AUTO: 0.35 X10*3/UL (ref 0–0.7)
EOSINOPHIL NFR BLD AUTO: 2.7 %
ERYTHROCYTE [DISTWIDTH] IN BLOOD BY AUTOMATED COUNT: 18.7 % (ref 11.5–14.5)
GFR SERPL CREATININE-BSD FRML MDRD: >90 ML/MIN/1.73M*2
GLUCOSE SERPL-MCNC: 218 MG/DL (ref 74–99)
HCT VFR BLD AUTO: 39.5 % (ref 41–52)
HGB BLD-MCNC: 12.7 G/DL (ref 13.5–17.5)
IMM GRANULOCYTES # BLD AUTO: 0.1 X10*3/UL (ref 0–0.7)
IMM GRANULOCYTES NFR BLD AUTO: 0.8 % (ref 0–0.9)
LYMPHOCYTES # BLD AUTO: 1.89 X10*3/UL (ref 1.2–4.8)
LYMPHOCYTES NFR BLD AUTO: 14.5 %
MAGNESIUM SERPL-MCNC: 1.89 MG/DL (ref 1.6–2.4)
MCH RBC QN AUTO: 28.5 PG (ref 26–34)
MCHC RBC AUTO-ENTMCNC: 32.2 G/DL (ref 32–36)
MCV RBC AUTO: 89 FL (ref 80–100)
MONOCYTES # BLD AUTO: 1.07 X10*3/UL (ref 0.1–1)
MONOCYTES NFR BLD AUTO: 8.2 %
NEUTROPHILS # BLD AUTO: 9.53 X10*3/UL (ref 1.2–7.7)
NEUTROPHILS NFR BLD AUTO: 73 %
NRBC BLD-RTO: 0 /100 WBCS (ref 0–0)
PLATELET # BLD AUTO: 448 X10*3/UL (ref 150–450)
POTASSIUM SERPL-SCNC: 4 MMOL/L (ref 3.5–5.3)
PROT SERPL-MCNC: 6.6 G/DL (ref 6.4–8.2)
RBC # BLD AUTO: 4.46 X10*6/UL (ref 4.5–5.9)
SODIUM SERPL-SCNC: 134 MMOL/L (ref 136–145)
WBC # BLD AUTO: 13 X10*3/UL (ref 4.4–11.3)

## 2023-12-19 PROCEDURE — 85025 COMPLETE CBC W/AUTO DIFF WBC: CPT

## 2023-12-19 PROCEDURE — 80053 COMPREHEN METABOLIC PANEL: CPT

## 2023-12-19 PROCEDURE — 83735 ASSAY OF MAGNESIUM: CPT

## 2023-12-19 RX ORDER — EPINEPHRINE 0.3 MG/.3ML
0.3 INJECTION SUBCUTANEOUS EVERY 5 MIN PRN
Status: CANCELLED | OUTPATIENT
Start: 2024-01-31

## 2023-12-19 RX ORDER — FAMOTIDINE 10 MG/ML
20 INJECTION INTRAVENOUS ONCE AS NEEDED
Status: CANCELLED | OUTPATIENT
Start: 2024-01-31

## 2023-12-19 RX ORDER — PROCHLORPERAZINE MALEATE 5 MG
10 TABLET ORAL EVERY 6 HOURS PRN
Status: CANCELLED | OUTPATIENT
Start: 2024-01-31

## 2023-12-19 RX ORDER — ALBUTEROL SULFATE 0.83 MG/ML
3 SOLUTION RESPIRATORY (INHALATION) AS NEEDED
Status: CANCELLED | OUTPATIENT
Start: 2024-01-31

## 2023-12-19 RX ORDER — PROCHLORPERAZINE EDISYLATE 5 MG/ML
10 INJECTION INTRAMUSCULAR; INTRAVENOUS EVERY 6 HOURS PRN
Status: CANCELLED | OUTPATIENT
Start: 2024-01-31

## 2023-12-19 RX ORDER — DIPHENHYDRAMINE HYDROCHLORIDE 50 MG/ML
50 INJECTION INTRAMUSCULAR; INTRAVENOUS AS NEEDED
Status: CANCELLED | OUTPATIENT
Start: 2024-01-31

## 2023-12-19 NOTE — PROGRESS NOTES
Patient was late, unable to see today.  Asked for labs to be done, chemotherapy ordered, nursing assessment to be done.  Asked that patient be scheduled for his treatment and office visit in 2 weeks.

## 2023-12-19 NOTE — PROGRESS NOTES
"Message sent to RN June: \"WBC count slightly up from previous-make sure patient does not have any signs or symptoms of an infection.  Note to myself that a GGT can be followed up depending on his alkaline phosphatase.\"  "

## 2023-12-20 ENCOUNTER — HOSPITAL ENCOUNTER (EMERGENCY)
Facility: HOSPITAL | Age: 38
Discharge: LEFT BEFORE DISCHARGE | End: 2023-12-20
Attending: EMERGENCY MEDICINE
Payer: MEDICARE

## 2023-12-20 ENCOUNTER — APPOINTMENT (OUTPATIENT)
Dept: RADIOLOGY | Facility: HOSPITAL | Age: 38
End: 2023-12-20
Payer: MEDICARE

## 2023-12-20 ENCOUNTER — INFUSION (OUTPATIENT)
Dept: HEMATOLOGY/ONCOLOGY | Facility: HOSPITAL | Age: 38
End: 2023-12-20
Payer: MEDICARE

## 2023-12-20 ENCOUNTER — APPOINTMENT (OUTPATIENT)
Dept: CARDIOLOGY | Facility: HOSPITAL | Age: 38
End: 2023-12-20
Payer: MEDICARE

## 2023-12-20 VITALS
OXYGEN SATURATION: 98 % | BODY MASS INDEX: 28.83 KG/M2 | WEIGHT: 195.33 LBS | HEART RATE: 95 BPM | DIASTOLIC BLOOD PRESSURE: 80 MMHG | RESPIRATION RATE: 16 BRPM | SYSTOLIC BLOOD PRESSURE: 113 MMHG | TEMPERATURE: 96.3 F

## 2023-12-20 VITALS
WEIGHT: 195.33 LBS | TEMPERATURE: 95 F | BODY MASS INDEX: 31.39 KG/M2 | OXYGEN SATURATION: 98 % | HEIGHT: 66 IN | DIASTOLIC BLOOD PRESSURE: 86 MMHG | HEART RATE: 98 BPM | RESPIRATION RATE: 17 BRPM | SYSTOLIC BLOOD PRESSURE: 128 MMHG

## 2023-12-20 DIAGNOSIS — C18.9 COLON CANCER METASTASIZED TO LUNG (MULTI): Primary | ICD-10-CM

## 2023-12-20 DIAGNOSIS — I26.99 OTHER ACUTE PULMONARY EMBOLISM WITHOUT ACUTE COR PULMONALE (MULTI): ICD-10-CM

## 2023-12-20 DIAGNOSIS — C78.00 COLON CANCER METASTASIZED TO LUNG (MULTI): Primary | ICD-10-CM

## 2023-12-20 DIAGNOSIS — E53.8 B12 DEFICIENCY: ICD-10-CM

## 2023-12-20 LAB
ALBUMIN SERPL BCP-MCNC: 3.5 G/DL (ref 3.4–5)
ALP SERPL-CCNC: 134 U/L (ref 33–120)
ALT SERPL W P-5'-P-CCNC: 9 U/L (ref 10–52)
ANION GAP SERPL CALC-SCNC: 15 MMOL/L (ref 10–20)
APPEARANCE UR: CLEAR
AST SERPL W P-5'-P-CCNC: 9 U/L (ref 9–39)
BASOPHILS # BLD AUTO: 0.07 X10*3/UL (ref 0–0.1)
BASOPHILS NFR BLD AUTO: 0.8 %
BILIRUB SERPL-MCNC: 0.4 MG/DL (ref 0–1.2)
BILIRUB UR STRIP.AUTO-MCNC: NEGATIVE MG/DL
BUN SERPL-MCNC: 5 MG/DL (ref 6–23)
CALCIUM SERPL-MCNC: 9.7 MG/DL (ref 8.6–10.3)
CARDIAC TROPONIN I PNL SERPL HS: 3 NG/L (ref 0–20)
CARDIAC TROPONIN I PNL SERPL HS: 3 NG/L (ref 0–20)
CHLORIDE SERPL-SCNC: 93 MMOL/L (ref 98–107)
CO2 SERPL-SCNC: 27 MMOL/L (ref 21–32)
COLOR UR: YELLOW
CREAT SERPL-MCNC: 0.64 MG/DL (ref 0.5–1.3)
D DIMER PPP FEU-MCNC: 2154 NG/ML FEU
EOSINOPHIL # BLD AUTO: 0.27 X10*3/UL (ref 0–0.7)
EOSINOPHIL NFR BLD AUTO: 3 %
ERYTHROCYTE [DISTWIDTH] IN BLOOD BY AUTOMATED COUNT: 19.2 % (ref 11.5–14.5)
GFR SERPL CREATININE-BSD FRML MDRD: >90 ML/MIN/1.73M*2
GLUCOSE SERPL-MCNC: 261 MG/DL (ref 74–99)
GLUCOSE UR STRIP.AUTO-MCNC: ABNORMAL MG/DL
HCT VFR BLD AUTO: 40.4 % (ref 41–52)
HGB BLD-MCNC: 13 G/DL (ref 13.5–17.5)
IMM GRANULOCYTES # BLD AUTO: 0.09 X10*3/UL (ref 0–0.7)
IMM GRANULOCYTES NFR BLD AUTO: 1 % (ref 0–0.9)
KETONES UR STRIP.AUTO-MCNC: NEGATIVE MG/DL
LACTATE SERPL-SCNC: 2.7 MMOL/L (ref 0.4–2)
LEUKOCYTE ESTERASE UR QL STRIP.AUTO: NEGATIVE
LIPASE SERPL-CCNC: 28 U/L (ref 9–82)
LYMPHOCYTES # BLD AUTO: 1.48 X10*3/UL (ref 1.2–4.8)
LYMPHOCYTES NFR BLD AUTO: 16.2 %
MCH RBC QN AUTO: 28.8 PG (ref 26–34)
MCHC RBC AUTO-ENTMCNC: 32.2 G/DL (ref 32–36)
MCV RBC AUTO: 89 FL (ref 80–100)
MONOCYTES # BLD AUTO: 0.84 X10*3/UL (ref 0.1–1)
MONOCYTES NFR BLD AUTO: 9.2 %
NEUTROPHILS # BLD AUTO: 6.4 X10*3/UL (ref 1.2–7.7)
NEUTROPHILS NFR BLD AUTO: 69.8 %
NITRITE UR QL STRIP.AUTO: NEGATIVE
NRBC BLD-RTO: 0 /100 WBCS (ref 0–0)
PH UR STRIP.AUTO: 5 [PH]
PLATELET # BLD AUTO: 425 X10*3/UL (ref 150–450)
POTASSIUM SERPL-SCNC: 3.6 MMOL/L (ref 3.5–5.3)
PROT SERPL-MCNC: 7.9 G/DL (ref 6.4–8.2)
PROT UR STRIP.AUTO-MCNC: NEGATIVE MG/DL
RBC # BLD AUTO: 4.52 X10*6/UL (ref 4.5–5.9)
RBC # UR STRIP.AUTO: NEGATIVE /UL
SODIUM SERPL-SCNC: 131 MMOL/L (ref 136–145)
SP GR UR STRIP.AUTO: 1.04
UROBILINOGEN UR STRIP.AUTO-MCNC: 2 MG/DL
WBC # BLD AUTO: 9.2 X10*3/UL (ref 4.4–11.3)

## 2023-12-20 PROCEDURE — 83690 ASSAY OF LIPASE: CPT | Performed by: EMERGENCY MEDICINE

## 2023-12-20 PROCEDURE — 96374 THER/PROPH/DIAG INJ IV PUSH: CPT

## 2023-12-20 PROCEDURE — 71275 CT ANGIOGRAPHY CHEST: CPT | Mod: FOREIGN READ | Performed by: RADIOLOGY

## 2023-12-20 PROCEDURE — 96361 HYDRATE IV INFUSION ADD-ON: CPT

## 2023-12-20 PROCEDURE — 84075 ASSAY ALKALINE PHOSPHATASE: CPT | Performed by: EMERGENCY MEDICINE

## 2023-12-20 PROCEDURE — 84484 ASSAY OF TROPONIN QUANT: CPT | Performed by: EMERGENCY MEDICINE

## 2023-12-20 PROCEDURE — 85025 COMPLETE CBC W/AUTO DIFF WBC: CPT | Performed by: EMERGENCY MEDICINE

## 2023-12-20 PROCEDURE — 99285 EMERGENCY DEPT VISIT HI MDM: CPT | Performed by: EMERGENCY MEDICINE

## 2023-12-20 PROCEDURE — 2500000004 HC RX 250 GENERAL PHARMACY W/ HCPCS (ALT 636 FOR OP/ED): Mod: SE | Performed by: EMERGENCY MEDICINE

## 2023-12-20 PROCEDURE — 74177 CT ABD & PELVIS W/CONTRAST: CPT

## 2023-12-20 PROCEDURE — 93005 ELECTROCARDIOGRAM TRACING: CPT

## 2023-12-20 PROCEDURE — 81003 URINALYSIS AUTO W/O SCOPE: CPT | Performed by: EMERGENCY MEDICINE

## 2023-12-20 PROCEDURE — 83605 ASSAY OF LACTIC ACID: CPT | Performed by: EMERGENCY MEDICINE

## 2023-12-20 PROCEDURE — 85379 FIBRIN DEGRADATION QUANT: CPT | Performed by: EMERGENCY MEDICINE

## 2023-12-20 PROCEDURE — 2500000001 HC RX 250 WO HCPCS SELF ADMINISTERED DRUGS (ALT 637 FOR MEDICARE OP): Mod: SE | Performed by: EMERGENCY MEDICINE

## 2023-12-20 PROCEDURE — 74177 CT ABD & PELVIS W/CONTRAST: CPT | Mod: FOREIGN READ | Performed by: RADIOLOGY

## 2023-12-20 PROCEDURE — 2550000001 HC RX 255 CONTRASTS: Mod: SE | Performed by: EMERGENCY MEDICINE

## 2023-12-20 PROCEDURE — 71275 CT ANGIOGRAPHY CHEST: CPT

## 2023-12-20 RX ORDER — ENOXAPARIN SODIUM 100 MG/ML
1 INJECTION SUBCUTANEOUS ONCE
Status: DISCONTINUED | OUTPATIENT
Start: 2023-12-20 | End: 2023-12-20 | Stop reason: HOSPADM

## 2023-12-20 RX ORDER — TRAMADOL HYDROCHLORIDE 50 MG/1
50 TABLET ORAL EVERY 6 HOURS PRN
Status: DISCONTINUED | OUTPATIENT
Start: 2023-12-20 | End: 2023-12-20 | Stop reason: HOSPADM

## 2023-12-20 RX ORDER — SODIUM CHLORIDE 9 MG/ML
125 INJECTION, SOLUTION INTRAVENOUS CONTINUOUS
Status: DISCONTINUED | OUTPATIENT
Start: 2023-12-20 | End: 2023-12-20 | Stop reason: HOSPADM

## 2023-12-20 RX ORDER — ONDANSETRON 4 MG/1
8 TABLET, ORALLY DISINTEGRATING ORAL EVERY 8 HOURS PRN
Status: DISCONTINUED | OUTPATIENT
Start: 2023-12-20 | End: 2023-12-20 | Stop reason: HOSPADM

## 2023-12-20 RX ORDER — ONDANSETRON HYDROCHLORIDE 2 MG/ML
8 INJECTION, SOLUTION INTRAVENOUS ONCE
Status: COMPLETED | OUTPATIENT
Start: 2023-12-20 | End: 2023-12-20

## 2023-12-20 RX ADMIN — SODIUM CHLORIDE 500 ML: 9 INJECTION, SOLUTION INTRAVENOUS at 10:29

## 2023-12-20 RX ADMIN — IOHEXOL 100 ML: 350 INJECTION, SOLUTION INTRAVENOUS at 11:20

## 2023-12-20 RX ADMIN — SODIUM CHLORIDE 125 ML/HR: 9 INJECTION, SOLUTION INTRAVENOUS at 10:29

## 2023-12-20 RX ADMIN — TRAMADOL HYDROCHLORIDE 50 MG: 50 TABLET, COATED ORAL at 11:33

## 2023-12-20 RX ADMIN — ONDANSETRON 8 MG: 2 INJECTION INTRAMUSCULAR; INTRAVENOUS at 10:28

## 2023-12-20 ASSESSMENT — PAIN - FUNCTIONAL ASSESSMENT
PAIN_FUNCTIONAL_ASSESSMENT: 0-10
PAIN_FUNCTIONAL_ASSESSMENT: 0-10

## 2023-12-20 ASSESSMENT — PAIN DESCRIPTION - LOCATION: LOCATION: CHEST

## 2023-12-20 ASSESSMENT — PAIN SCALES - GENERAL
PAINLEVEL_OUTOF10: 9
PAINLEVEL: 9
PAINLEVEL_OUTOF10: 0 - NO PAIN

## 2023-12-20 NOTE — ED PROVIDER NOTES
[facility]  ED  Provider Note  12/20/2023  9:46 AM  AC10/AC10                          History of Present Illness:   Misael Hernandez is a 38 y.o. male presenting to the ED for persistent nausea vomiting and shortness of breath, beginning 1 month ago when he started a new chemotherapy agent at home for his metastatic colon cancer.  The complaint has been persistent, moderate in severity, and worsened by nothing.  Patient is lost 8 pounds in the past 2 weeks due to nausea and vomiting.  He tries to eat but cannot keep anything down.  He denies any hematemesis or melena.  He denies any other change in medication.  He was sent in by his oncologist for evaluation and possible admission.      Review of Systems:   Pertinent positives and review of systems as noted above.  Remaining 10 review of systems is negative or noncontributory to today's episode of care.  Review of Systems       --------------------------------------------- PAST HISTORY ---------------------------------------------  Past Medical History:  has a past medical history of Other conditions influencing health status.    Past Surgical History:  has a past surgical history that includes Colon surgery (01/22/2014); Hand surgery (01/22/2014); Esophagogastroduodenoscopy (01/22/2014); Other surgical history (07/07/2014); CT angio head w and wo IV contrast (9/10/2014); MR head angio w IV contrast (9/10/2014); and CT guided imaging for needle placement (6/27/2018).    Social History:  reports that he has been smoking cigarettes. He has been smoking an average of 1 pack per day. He has never used smokeless tobacco. He reports that he does not currently use alcohol. He reports current drug use. Drug: Marijuana.    Family History: family history includes ADENOCARCINOMA OF THE ESOPHAGUS in his father; Cancer in his maternal grandfather and another family member; Diabetes in his father's brother and maternal grandfather. Unless otherwise noted, family history  is non contributory    Discharge Medication List as of 12/20/2023 12:36 PM        CONTINUE these medications which have NOT CHANGED    Details   albuterol (Ventolin HFA) 90 mcg/actuation inhaler INHALE TWO PUFFS INTO THE LUNGS EVERY 6 HOURS AS NEEDED FOR SHORTNESS OF BREATH OR FOR WHEEZING FOR UP TO 30 DAYS, Historical Med      apixaban (Eliquis) 5 mg tablet Take 1 tablet (5 mg) by mouth 2 times a day., Historical Med      buprenorphine-naloxone (Suboxone) 8-2 mg SL tablet Place 1 tablet under the tongue 2 times a day., Starting Tue 8/22/2023, Until Tue 12/5/2023, Historical Med      cyanocobalamin (Vitamin B-12) 1,000 mcg tablet Take 1 tablet (1,000 mcg) by mouth once daily., Historical Med      dapagliflozin propanediol (Farxiga) 5 mg Take by mouth., Historical Med      divalproex (Depakote ER) 500 mg 24 hr tablet Take 2 tablets (1,000 mg) by mouth., Starting Fri 1/17/2020, Historical Med      divalproex (Depakote) 250 mg EC tablet Take 1 tablet (250 mg) by mouth once daily at bedtime., Starting Fri 1/17/2020, Historical Med      !! doxepin (SINEquan) 10 mg capsule Take 1 capsule (10 mg) by mouth once daily. Pt unsure of dose, Historical Med      !! doxepin (SINEquan) 50 mg capsule Take 10 mg by mouth as needed at bedtime for sleep., Starting Tue 10/10/2023, Historical Med      ergocalciferol (Vitamin D-2) 1.25 MG (40513 UT) capsule Take 1 capsule (50,000 Units) by mouth 1 (one) time per week for 12 doses., Starting Mon 10/30/2023, Until Tue 1/16/2024, Normal      ferrous sulfate 325 (65 Fe) MG tablet Take 1 tablet by mouth once daily., Historical Med      folic acid (Folvite) 1 mg tablet Take 1 tablet (1 mg) by mouth once daily., Historical Med      haloperidol (Haldol) 2 mg tablet TAKE ONE TABLET BY MOUTH ONCE DAILY FOR AGITATION., Historical Med      ibuprofen 800 mg tablet Take 1 tablet (800 mg) by mouth., Historical Med      lidocaine-prilocaine (Emla) 2.5-2.5 % cream apply topically to affected area if  needed, Normal      loperamide (Imodium A-D) 2 mg tablet Take 1 tablet (2 mg) by mouth 4 times a day as needed for diarrhea., Historical Med      losartan (Cozaar) 50 mg tablet Take 1 tablet (50 mg) by mouth once daily., Historical Med      metoprolol succinate XL (Toprol-XL) 25 mg 24 hr tablet Take 1 tablet (25 mg) by mouth once daily., Historical Med      naloxone (Narcan) 4 mg/0.1 mL nasal spray Administer 1 spray (4 mg) into affected nostril(s) see administration instructions. 1 nasal spray (4 mg) as a single dose in one nostril as needed for opioid reversal; may repeat in 2 to 3 minutes in the opposite nostril if there is no response to the fi rst dose. Immediately call 911., Starting Tue 5/9/2023, Until Wed 5/8/2024 at 2359, Historical Med      omeprazole (PriLOSEC) 40 mg DR capsule Take 1 capsule (40 mg) by mouth., Starting Tue 6/6/2023, Historical Med      ondansetron (Zofran) 8 mg tablet Take 1 tablet (8 mg) by mouth every 8 hours if needed., Historical Med      potassium chloride CR 20 mEq ER tablet Take 1 tablet (20 mEq) by mouth twice a day., Starting Wed 5/11/2022, Historical Med      prochlorperazine (Compazine) 10 mg tablet TAKE ONE TABLET BY MOUTH EVERY 6 HOURS AS NEEDED FOR NAUSEA AND / OR VOMITING, Historical Med      trifluridine-tipiraciL (Lonsurf) 20-8.19 mg tablet TAKE THREE (3) TABLETS BY MOUTH 2 TIMES DAILY FOR 5 DAYS, EVERY 2 WEEKS, Starting Tue 8/29/2023, Until Wed 8/28/2024 at 2359, Normal       !! - Potential duplicate medications found. Please discuss with provider.         The patient’s home medications have been reviewed.    Allergies: Acetaminophen and Adhesive tape-silicones    -------------------------------------------------- RESULTS -------------------------------------------------  All laboratory and radiology results have been personally reviewed by myself   LABS:  Labs Reviewed   D-DIMER, VTE EXCLUSION - Abnormal       Result Value    D-Dimer, Quantitative VTE Exclusion 2,154  (*)     Narrative:     The VTE Exclusion D-Dimer assay is reported in ng/mL Fibrinogen Equivalent Units (FEU).    Per 's instructions for use, a value of less than 500 ng/mL (FEU) may help to exclude DVT or PE in outpatients when the assay is used with a clinical pretest probability assessment.([facility] must utilize and document eCalc 'Wells Score Deep Vein Thrombosis Risk' for DVT exclusion only. Emergency Department should utilize  Guidelines for Emergency Department Use of the VTE Exclusion D-Dimer and Clinical Pretest probability assessment model for DVT or PE exclusion.)   COMPREHENSIVE METABOLIC PANEL - Abnormal    Glucose 261 (*)     Sodium 131 (*)     Potassium 3.6      Chloride 93 (*)     Bicarbonate 27      Anion Gap 15      Urea Nitrogen 5 (*)     Creatinine 0.64      eGFR >90      Calcium 9.7      Albumin 3.5      Alkaline Phosphatase 134 (*)     Total Protein 7.9      AST 9      Bilirubin, Total 0.4      ALT 9 (*)    CBC WITH AUTO DIFFERENTIAL - Abnormal    WBC 9.2      nRBC 0.0      RBC 4.52      Hemoglobin 13.0 (*)     Hematocrit 40.4 (*)     MCV 89      MCH 28.8      MCHC 32.2      RDW 19.2 (*)     Platelets 425      Neutrophils % 69.8      Immature Granulocytes %, Automated 1.0 (*)     Lymphocytes % 16.2      Monocytes % 9.2      Eosinophils % 3.0      Basophils % 0.8      Neutrophils Absolute 6.40      Immature Granulocytes Absolute, Automated 0.09      Lymphocytes Absolute 1.48      Monocytes Absolute 0.84      Eosinophils Absolute 0.27      Basophils Absolute 0.07     URINALYSIS WITH REFLEX MICROSCOPIC - Abnormal    Color, Urine Yellow      Appearance, Urine Clear      Specific Gravity, Urine 1.044 (*)     pH, Urine 5.0      Protein, Urine NEGATIVE      Glucose, Urine >=500 (3+) (*)     Blood, Urine NEGATIVE      Ketones, Urine NEGATIVE      Bilirubin, Urine NEGATIVE      Urobilinogen, Urine 2.0 (*)     Nitrite, Urine NEGATIVE      Leukocyte Esterase, Urine NEGATIVE     LACTATE -  Abnormal    Lactate 2.7 (*)     Narrative:     Venipuncture immediately after or during the administration of Metamizole may lead to falsely low results. Testing should be performed immediately  prior to Metamizole dosing.   LIPASE - Normal    Lipase 28      Narrative:     Venipuncture immediately after or during the administration of Metamizole may lead to falsely low results. Testing should be performed immediately prior to Metamizole dosing.   SERIAL TROPONIN-INITIAL - Normal    Troponin I, High Sensitivity 3      Narrative:     Less than 99th percentile of normal range cutoff-  Female and children under 18 years old <14 ng/L; Male <21 ng/L: Negative  Repeat testing should be performed if clinically indicated.     Female and children under 18 years old 14-50 ng/L; Male 21-50 ng/L:  Consistent with possible cardiac damage and possible increased clinical   risk. Serial measurements may help to assess extent of myocardial damage.     >50 ng/L: Consistent with cardiac damage, increased clinical risk and  myocardial infarction. Serial measurements may help assess extent of   myocardial damage.      NOTE: Children less than 1 year old may have higher baseline troponin   levels and results should be interpreted in conjunction with the overall   clinical context.     NOTE: Troponin I testing is performed using a different   testing methodology at [facility] than at other   [facility]. Direct result comparisons should only   be made within the same method.   SERIAL TROPONIN, 1 HOUR - Normal    Troponin I, High Sensitivity 3      Narrative:     Less than 99th percentile of normal range cutoff-  Female and children under 18 years old <14 ng/L; Male <21 ng/L: Negative  Repeat testing should be performed if clinically indicated.     Female and children under 18 years old 14-50 ng/L; Male 21-50 ng/L:  Consistent with possible cardiac damage and possible increased clinical   risk. Serial measurements may  help to assess extent of myocardial damage.     >50 ng/L: Consistent with cardiac damage, increased clinical risk and  myocardial infarction. Serial measurements may help assess extent of   myocardial damage.      NOTE: Children less than 1 year old may have higher baseline troponin   levels and results should be interpreted in conjunction with the overall   clinical context.     NOTE: Troponin I testing is performed using a different   testing methodology at [facility] than at other   [facility]. Direct result comparisons should only   be made within the same method.   TROPONIN SERIES- (INITIAL, 1 HR)    Narrative:     The following orders were created for panel order Troponin I Series, High Sensitivity (0, 1 HR).  Procedure                               Abnormality         Status                     ---------                               -----------         ------                     Troponin I, High Sensiti...[[id number]]  Normal              Final result               Troponin, High Sensitivi...[[id number]]  Normal              Final result                 Please view results for these tests on the individual orders.     Sinus rhythm at 87 bpm, right axis deviation, nonspecific ST changes, borderline EKG, interpreted by LEA Marti MD    RADIOLOGY:  Interpreted by Radiologist.  CT angio chest for pulmonary embolism   Final Result   Pulmonary embolism in the right upper lobe.  This is a new finding   since August 25, 2023.  There is no evidence of right heart strain.   Bilateral pulmonary masses with mediastinal and hilar adenopathy not   significantly changed from the prior exam.   Edema surrounding the tail the pancreas.  Correlation with the   patient's pancreatic enzymes is recommended.   Bilateral adrenal masses not significantly changed from the prior   exam.   Presacral thick-walled fluid collection.  This may represent a   centrally necrotic mass.  It was seen on the prior exam is  "unchanged.   Critical results were discussed with Dr Adalid Nunez on December 20, 2023 at 12:20 PM   Signed by Deandre Rodriguez MD      CT abdomen pelvis w IV contrast   Final Result   Pulmonary embolism in the right upper lobe.  This is a new finding   since August 25, 2023.  There is no evidence of right heart strain.   Bilateral pulmonary masses with mediastinal and hilar adenopathy not   significantly changed from the prior exam.   Edema surrounding the tail the pancreas.  Correlation with the   patient's pancreatic enzymes is recommended.   Bilateral adrenal masses not significantly changed from the prior   exam.   Presacral thick-walled fluid collection.  This may represent a   centrally necrotic mass.  It was seen on the prior exam is unchanged.   Critical results were discussed with Dr Adalid Nunez on December 20, 2023 at 12:20 PM   Signed by Deandre Rodriguez MD          No results found for this or any previous visit (from the past 4464 hour(s)).  ------------------------- NURSING NOTES AND VITALS REVIEWED ---------------------------   The nursing notes within the ED encounter and vital signs as below have been reviewed.   /86 (BP Location: Left arm)   Pulse 98   Temp 35 °C (95 °F) (Temporal)   Resp 17   Ht 1.676 m (5' 6\")   Wt 88.6 kg (195 lb 5.2 oz)   SpO2 98%   BMI 31.53 kg/m²   Oxygen Saturation Interpretation: Normal      ---------------------------------------------------PHYSICAL EXAM--------------------------------------  Physical Exam   Constitutional/General: Alert and oriented x3, well appearing, non toxic in NAD  Head: Normocephalic and atraumatic  Eyes: PERRL, EOMI, conjunctiva normal, sclera non icteric  Mouth: Oropharynx clear, handling secretions, no trismus, no asymmetry of the posterior oropharynx or uvular edema  Neck: Supple, full ROM, non tender to palpation in the midline, no stridor, no crepitus, no meningeal signs  Respiratory: Lungs clear to auscultation bilaterally, " no wheezes, rales, or rhonchi. Not in respiratory distress  Cardiovascular:  Regular rate. Regular rhythm. No murmurs, gallops, or rubs. 2+ distal pulses  Chest: No chest wall tenderness  GI:  Abdomen Soft, Non tender, Non distended.  +BS. No organomegaly, no palpable masses,  No rebound, guarding, or rigidity.   Musculoskeletal: Moves all extremities x 4. Warm and well perfused, no clubbing, cyanosis, or edema. Capillary refill <3 seconds  Integument: skin warm and dry. No rashes.   Lymphatic: no lymphadenopathy noted  Neurologic: GCS 15, no focal deficits, symmetric strength 5/5 in the upper and lower extremities bilaterally  Psychiatric: Normal Affect    Procedures    ------------------------------ ED COURSE/MEDICAL DECISION MAKING----------------------    Medical Decision Making:   Patient has colon cancer with mets to the liver and lungs.  He has had increasing nausea vomiting for the past month and is unable to keep things down.  He is lost more than 8 pounds in the past 2 weeks.  His D-dimer test is markedly evaded greater than 2000.  He is dyspneic.  He does have cancer.  His CTA of the chest with abdomen pelvis runoff has been scheduled.  His CT reveals a right upper lobe pneumonia.  He has old findings related to his metastatic cancer.  The patient has not been taking his Eliquis due to the profound nausea and vomiting over the past 10 days.  He is feeling better after Zofran IV here in the ED.  Is drinking fluids and eating lunch.  He refuses hospital admission.  I explained the danger of the  pulm embolism including sudden death and he remains adamant in his refusal.  I have insisted he start taking his Eliquis as prescribed and follow-up with his primary care doctor or oncology specialist as soon as possible.  He was advised return the ER if he changes mind or if his condition worsens.  He was given 1 shot of Lovenox prior to discharge.  I have texted his oncology nurse practitioner.  Dr. Luong was  not available.  Diagnoses as of 12/20/23 1827   Colon cancer metastasized to lung (CMS/HCC)   Other acute pulmonary embolism without acute cor pulmonale (CMS/HCC)      Counseling:   The emergency provider has spoken with the patient and discussed today’s results, in addition to providing specific details for the plan of care and counseling regarding the diagnosis and prognosis.  Questions are answered at this time and they are agreeable with the plan.      --------------------------------- IMPRESSION AND DISPOSITION ---------------------------------        IMPRESSION  1. Colon cancer metastasized to lung (CMS/HCC)    2. Other acute pulmonary embolism without acute cor pulmonale (CMS/HCC)        DISPOSITION  Disposition: Other Disposition: Left AMA  Patient condition is poor      Billing Provider Critical Care Time: 0 minutes     Adalid Marti MD  12/20/23 1827

## 2023-12-20 NOTE — DISCHARGE INSTRUCTIONS
Continue home medications.  Do not forget to take your Eliquis as prescribed.    Follow-up with Dr. Woo as soon as possible.    Encourage oral fluids.    Take Zofran 8 mg ODT tablets every 8 hours as needed for nausea.    Return the ER for worsening symptoms or if you change your mind regarding treatment for your pulm embolism.

## 2023-12-20 NOTE — PROGRESS NOTES
"Misael came in this morning c/o vomiting x 2 weeks, not being able to tolerate solids, his \"lungs hurt\" when he coughs and he has an 8lb weight loss since he was last with us on 12/6.  Contacted Dr. Flores and he said to hold treatment and send to the ER.  Took Misael down to the ER and gave report to the ER nurse.  "

## 2023-12-20 NOTE — PROGRESS NOTES
Outstanding Clarification:     Treatment Pre-Review    Diagnosis:  No matching staging information was found for the patient.    Regimen: MVASI Days 1 + 15; Cycle repeats every 28 days     Current Cycle/Day: Cycle 4 Day 15   Treatment Date Appropriate: Yes; Last Treatment: 12/6/23  Date change required: none    Dose or Regimen Modifications: None noted during pre-review    Med Rec/Drug Interactions: None noted during pre-review    Growth Factor: none    Financial Clearance/Authorization: Yes    Echo:  No results found for this or any previous visit from the past 1095 days.     Lifetime Dose Tracking   No doses have been documented on this patient for the following tracked chemicals: doxorubicin, epirubicin, idarubicin, daunorubicin, mitoxantrone, bleomycin, mitomycin, carmustine, doxorubicin HCl pegylated liposomal, daunorubicin citrate liposomal     Comments: none    I Raj Ramirez, PharmD hereby acknowledge that the treatment plan indicated above has been verified for correctness to the best of my ability on 12/20/2023 at 8:19 AM.

## 2023-12-28 ENCOUNTER — TELEPHONE (OUTPATIENT)
Dept: HEMATOLOGY/ONCOLOGY | Facility: HOSPITAL | Age: 38
End: 2023-12-28
Payer: MEDICAID

## 2023-12-28 ENCOUNTER — DOCUMENTATION (OUTPATIENT)
Dept: HEMATOLOGY/ONCOLOGY | Facility: HOSPITAL | Age: 38
End: 2023-12-28
Payer: MEDICAID

## 2023-12-28 NOTE — PROGRESS NOTES
"Message sent to RN June: \"Please check on the status of the patient as he was in the ER last week, just aware of this today.  \"  "

## 2023-12-28 NOTE — TELEPHONE ENCOUNTER
Please check on the status of the patient as he was in the ER last week, just aware of this today. Per Dr. Vega staff message.          Patient states he is feeling better. States he has a script ready for Eliquis available for pickup at the pharmacy--going to pick it up today. States he had been taking the Eliquis.

## 2023-12-30 PROCEDURE — RXMED WILLOW AMBULATORY MEDICATION CHARGE

## 2024-01-01 ENCOUNTER — HOSPITAL ENCOUNTER (INPATIENT)
Facility: HOSPITAL | Age: 39
LOS: 3 days | Discharge: HOME | DRG: 439 | End: 2024-01-04
Attending: FAMILY MEDICINE | Admitting: INTERNAL MEDICINE
Payer: MEDICARE

## 2024-01-01 ENCOUNTER — APPOINTMENT (OUTPATIENT)
Dept: RADIOLOGY | Facility: HOSPITAL | Age: 39
DRG: 439 | End: 2024-01-01
Payer: MEDICARE

## 2024-01-01 DIAGNOSIS — K85.00 IDIOPATHIC ACUTE PANCREATITIS WITHOUT INFECTION OR NECROSIS (HHS-HCC): Primary | ICD-10-CM

## 2024-01-01 DIAGNOSIS — K86.3 PSEUDOCYST OF PANCREAS DUE TO ACUTE PANCREATITIS (HHS-HCC): ICD-10-CM

## 2024-01-01 DIAGNOSIS — R00.0 SINUS TACHYCARDIA: ICD-10-CM

## 2024-01-01 DIAGNOSIS — E78.5 HYPERLIPIDEMIA, UNSPECIFIED HYPERLIPIDEMIA TYPE: ICD-10-CM

## 2024-01-01 DIAGNOSIS — K85.90 PSEUDOCYST OF PANCREAS DUE TO ACUTE PANCREATITIS (HHS-HCC): ICD-10-CM

## 2024-01-01 DIAGNOSIS — C18.9 MALIGNANT NEOPLASM OF COLON, UNSPECIFIED PART OF COLON (MULTI): ICD-10-CM

## 2024-01-01 DIAGNOSIS — I26.99 PULMONARY EMBOLISM WITHOUT ACUTE COR PULMONALE, UNSPECIFIED CHRONICITY, UNSPECIFIED PULMONARY EMBOLISM TYPE (MULTI): ICD-10-CM

## 2024-01-01 LAB
ALBUMIN SERPL BCP-MCNC: 3.5 G/DL (ref 3.4–5)
ALP SERPL-CCNC: 165 U/L (ref 33–120)
ALT SERPL W P-5'-P-CCNC: 5 U/L (ref 10–52)
ANION GAP SERPL CALC-SCNC: 17 MMOL/L (ref 10–20)
AST SERPL W P-5'-P-CCNC: 8 U/L (ref 9–39)
BASOPHILS # BLD AUTO: 0.04 X10*3/UL (ref 0–0.1)
BASOPHILS NFR BLD AUTO: 0.3 %
BILIRUB SERPL-MCNC: 0.6 MG/DL (ref 0–1.2)
BUN SERPL-MCNC: 3 MG/DL (ref 6–23)
CALCIUM SERPL-MCNC: 9.3 MG/DL (ref 8.6–10.3)
CARDIAC TROPONIN I PNL SERPL HS: 5 NG/L (ref 0–20)
CARDIAC TROPONIN I PNL SERPL HS: 5 NG/L (ref 0–20)
CHLORIDE SERPL-SCNC: 92 MMOL/L (ref 98–107)
CHOLEST SERPL-MCNC: 185 MG/DL (ref 0–199)
CHOLEST SERPL-MCNC: 232 MG/DL (ref 0–199)
CHOLESTEROL/HDL RATIO: 4.9
CHOLESTEROL/HDL RATIO: 4.9
CO2 SERPL-SCNC: 27 MMOL/L (ref 21–32)
CREAT SERPL-MCNC: 0.67 MG/DL (ref 0.5–1.3)
EOSINOPHIL # BLD AUTO: 0.6 X10*3/UL (ref 0–0.7)
EOSINOPHIL NFR BLD AUTO: 4.2 %
ERYTHROCYTE [DISTWIDTH] IN BLOOD BY AUTOMATED COUNT: 18.8 % (ref 11.5–14.5)
FLUAV RNA RESP QL NAA+PROBE: NOT DETECTED
FLUBV RNA RESP QL NAA+PROBE: NOT DETECTED
GFR SERPL CREATININE-BSD FRML MDRD: >90 ML/MIN/1.73M*2
GLUCOSE SERPL-MCNC: 258 MG/DL (ref 74–99)
HCT VFR BLD AUTO: 45.5 % (ref 41–52)
HDLC SERPL-MCNC: 37.5 MG/DL
HDLC SERPL-MCNC: 47 MG/DL
HGB BLD-MCNC: 14.7 G/DL (ref 13.5–17.5)
IMM GRANULOCYTES # BLD AUTO: 0.08 X10*3/UL (ref 0–0.7)
IMM GRANULOCYTES NFR BLD AUTO: 0.6 % (ref 0–0.9)
LDLC SERPL CALC-MCNC: 126 MG/DL
LIPASE SERPL-CCNC: 106 U/L (ref 9–82)
LYMPHOCYTES # BLD AUTO: 1.88 X10*3/UL (ref 1.2–4.8)
LYMPHOCYTES NFR BLD AUTO: 13.1 %
MAGNESIUM SERPL-MCNC: 1.58 MG/DL (ref 1.6–2.4)
MCH RBC QN AUTO: 28.2 PG (ref 26–34)
MCHC RBC AUTO-ENTMCNC: 32.3 G/DL (ref 32–36)
MCV RBC AUTO: 87 FL (ref 80–100)
MONOCYTES # BLD AUTO: 1.02 X10*3/UL (ref 0.1–1)
MONOCYTES NFR BLD AUTO: 7.1 %
NEUTROPHILS # BLD AUTO: 10.76 X10*3/UL (ref 1.2–7.7)
NEUTROPHILS NFR BLD AUTO: 74.7 %
NON HDL CHOLESTEROL: 148 MG/DL (ref 0–149)
NON-HDL CHOLESTEROL: 185 MG/DL (ref 0–149)
NRBC BLD-RTO: ABNORMAL /100{WBCS}
PLATELET # BLD AUTO: 723 X10*3/UL (ref 150–450)
POTASSIUM SERPL-SCNC: 3.2 MMOL/L (ref 3.5–5.3)
PROT SERPL-MCNC: 8 G/DL (ref 6.4–8.2)
RBC # BLD AUTO: 5.22 X10*6/UL (ref 4.5–5.9)
SARS-COV-2 RNA RESP QL NAA+PROBE: NOT DETECTED
SODIUM SERPL-SCNC: 133 MMOL/L (ref 136–145)
TRIGL SERPL-MCNC: 110 MG/DL (ref 0–149)
VLDL: 22 MG/DL (ref 0–40)
WBC # BLD AUTO: 14.4 X10*3/UL (ref 4.4–11.3)

## 2024-01-01 PROCEDURE — 82465 ASSAY BLD/SERUM CHOLESTEROL: CPT

## 2024-01-01 PROCEDURE — 99222 1ST HOSP IP/OBS MODERATE 55: CPT | Performed by: SURGERY

## 2024-01-01 PROCEDURE — 85025 COMPLETE CBC W/AUTO DIFF WBC: CPT | Performed by: FAMILY MEDICINE

## 2024-01-01 PROCEDURE — 83690 ASSAY OF LIPASE: CPT

## 2024-01-01 PROCEDURE — 84484 ASSAY OF TROPONIN QUANT: CPT | Performed by: FAMILY MEDICINE

## 2024-01-01 PROCEDURE — 80053 COMPREHEN METABOLIC PANEL: CPT | Performed by: FAMILY MEDICINE

## 2024-01-01 PROCEDURE — 96376 TX/PRO/DX INJ SAME DRUG ADON: CPT | Mod: 59

## 2024-01-01 PROCEDURE — 2500000004 HC RX 250 GENERAL PHARMACY W/ HCPCS (ALT 636 FOR OP/ED): Mod: SE,MUE | Performed by: FAMILY MEDICINE

## 2024-01-01 PROCEDURE — 87636 SARSCOV2 & INF A&B AMP PRB: CPT | Performed by: FAMILY MEDICINE

## 2024-01-01 PROCEDURE — 2020000001 HC ICU ROOM DAILY: Mod: IPSPLIT

## 2024-01-01 PROCEDURE — 2500000004 HC RX 250 GENERAL PHARMACY W/ HCPCS (ALT 636 FOR OP/ED): Mod: SE

## 2024-01-01 PROCEDURE — 71275 CT ANGIOGRAPHY CHEST: CPT | Performed by: RADIOLOGY

## 2024-01-01 PROCEDURE — 83718 ASSAY OF LIPOPROTEIN: CPT | Performed by: FAMILY MEDICINE

## 2024-01-01 PROCEDURE — 83735 ASSAY OF MAGNESIUM: CPT | Performed by: FAMILY MEDICINE

## 2024-01-01 PROCEDURE — 71275 CT ANGIOGRAPHY CHEST: CPT

## 2024-01-01 PROCEDURE — 96374 THER/PROPH/DIAG INJ IV PUSH: CPT | Mod: 59

## 2024-01-01 PROCEDURE — 96375 TX/PRO/DX INJ NEW DRUG ADDON: CPT | Mod: 59

## 2024-01-01 PROCEDURE — 2500000001 HC RX 250 WO HCPCS SELF ADMINISTERED DRUGS (ALT 637 FOR MEDICARE OP): Mod: IPSPLIT | Performed by: NURSE PRACTITIONER

## 2024-01-01 PROCEDURE — S4991 NICOTINE PATCH NONLEGEND: HCPCS | Mod: IPSPLIT | Performed by: NURSE PRACTITIONER

## 2024-01-01 PROCEDURE — 2500000002 HC RX 250 W HCPCS SELF ADMINISTERED DRUGS (ALT 637 FOR MEDICARE OP, ALT 636 FOR OP/ED): Mod: IPSPLIT | Performed by: NURSE PRACTITIONER

## 2024-01-01 PROCEDURE — 2550000001 HC RX 255 CONTRASTS: Mod: SE | Performed by: FAMILY MEDICINE

## 2024-01-01 PROCEDURE — 2500000004 HC RX 250 GENERAL PHARMACY W/ HCPCS (ALT 636 FOR OP/ED): Mod: IPSPLIT | Performed by: NURSE PRACTITIONER

## 2024-01-01 PROCEDURE — 99285 EMERGENCY DEPT VISIT HI MDM: CPT | Mod: 25,27 | Performed by: FAMILY MEDICINE

## 2024-01-01 RX ORDER — PANTOPRAZOLE SODIUM 40 MG/10ML
40 INJECTION, POWDER, LYOPHILIZED, FOR SOLUTION INTRAVENOUS
Status: DISCONTINUED | OUTPATIENT
Start: 2024-01-02 | End: 2024-01-04 | Stop reason: HOSPADM

## 2024-01-01 RX ORDER — ONDANSETRON 4 MG/1
4 TABLET, FILM COATED ORAL EVERY 8 HOURS PRN
Status: DISCONTINUED | OUTPATIENT
Start: 2024-01-01 | End: 2024-01-04 | Stop reason: HOSPADM

## 2024-01-01 RX ORDER — KETOROLAC TROMETHAMINE 15 MG/ML
15 INJECTION, SOLUTION INTRAMUSCULAR; INTRAVENOUS EVERY 6 HOURS SCHEDULED
Status: DISCONTINUED | OUTPATIENT
Start: 2024-01-01 | End: 2024-01-04 | Stop reason: HOSPADM

## 2024-01-01 RX ORDER — POLYETHYLENE GLYCOL 3350 17 G/17G
17 POWDER, FOR SOLUTION ORAL DAILY PRN
Status: DISCONTINUED | OUTPATIENT
Start: 2024-01-01 | End: 2024-01-04 | Stop reason: HOSPADM

## 2024-01-01 RX ORDER — ENOXAPARIN SODIUM 100 MG/ML
40 INJECTION SUBCUTANEOUS EVERY 24 HOURS
Status: DISCONTINUED | OUTPATIENT
Start: 2024-01-01 | End: 2024-01-01

## 2024-01-01 RX ORDER — ERGOCALCIFEROL 1.25 MG/1
50000 CAPSULE ORAL
Status: DISCONTINUED | OUTPATIENT
Start: 2024-01-02 | End: 2024-01-02

## 2024-01-01 RX ORDER — TRAZODONE HYDROCHLORIDE 50 MG/1
50 TABLET ORAL NIGHTLY PRN
Status: DISCONTINUED | OUTPATIENT
Start: 2024-01-01 | End: 2024-01-04 | Stop reason: HOSPADM

## 2024-01-01 RX ORDER — SODIUM CHLORIDE 9 MG/ML
125 INJECTION, SOLUTION INTRAVENOUS CONTINUOUS
Status: DISCONTINUED | OUTPATIENT
Start: 2024-01-01 | End: 2024-01-04 | Stop reason: HOSPADM

## 2024-01-01 RX ORDER — IBUPROFEN 200 MG
1 TABLET ORAL DAILY
Status: DISCONTINUED | OUTPATIENT
Start: 2024-01-01 | End: 2024-01-04 | Stop reason: HOSPADM

## 2024-01-01 RX ORDER — LOSARTAN POTASSIUM 50 MG/1
50 TABLET ORAL DAILY
Status: DISCONTINUED | OUTPATIENT
Start: 2024-01-01 | End: 2024-01-04 | Stop reason: HOSPADM

## 2024-01-01 RX ORDER — MAGNESIUM SULFATE HEPTAHYDRATE 40 MG/ML
4 INJECTION, SOLUTION INTRAVENOUS ONCE
Status: COMPLETED | OUTPATIENT
Start: 2024-01-01 | End: 2024-01-01

## 2024-01-01 RX ORDER — ONDANSETRON HYDROCHLORIDE 2 MG/ML
4 INJECTION, SOLUTION INTRAVENOUS EVERY 8 HOURS PRN
Status: DISCONTINUED | OUTPATIENT
Start: 2024-01-01 | End: 2024-01-04 | Stop reason: HOSPADM

## 2024-01-01 RX ORDER — HYDROMORPHONE HYDROCHLORIDE 1 MG/ML
1 INJECTION, SOLUTION INTRAMUSCULAR; INTRAVENOUS; SUBCUTANEOUS EVERY 2 HOUR PRN
Status: DISCONTINUED | OUTPATIENT
Start: 2024-01-01 | End: 2024-01-03

## 2024-01-01 RX ORDER — HYDROMORPHONE HYDROCHLORIDE 1 MG/ML
1 INJECTION, SOLUTION INTRAMUSCULAR; INTRAVENOUS; SUBCUTANEOUS
Status: DISCONTINUED | OUTPATIENT
Start: 2024-01-01 | End: 2024-01-01

## 2024-01-01 RX ORDER — POTASSIUM CHLORIDE 20 MEQ/1
40 TABLET, EXTENDED RELEASE ORAL DAILY
Status: DISCONTINUED | OUTPATIENT
Start: 2024-01-01 | End: 2024-01-04 | Stop reason: HOSPADM

## 2024-01-01 RX ORDER — ONDANSETRON HYDROCHLORIDE 2 MG/ML
4 INJECTION, SOLUTION INTRAVENOUS ONCE
Status: COMPLETED | OUTPATIENT
Start: 2024-01-01 | End: 2024-01-01

## 2024-01-01 RX ORDER — MEROPENEM 1 G/1
1 INJECTION, POWDER, FOR SOLUTION INTRAVENOUS EVERY 8 HOURS
Status: DISCONTINUED | OUTPATIENT
Start: 2024-01-01 | End: 2024-01-04 | Stop reason: HOSPADM

## 2024-01-01 RX ORDER — DOXEPIN HYDROCHLORIDE 10 MG/1
10 CAPSULE ORAL NIGHTLY PRN
Status: DISCONTINUED | OUTPATIENT
Start: 2024-01-01 | End: 2024-01-04 | Stop reason: HOSPADM

## 2024-01-01 RX ORDER — METOPROLOL SUCCINATE 25 MG/1
50 TABLET, EXTENDED RELEASE ORAL DAILY
Status: DISCONTINUED | OUTPATIENT
Start: 2024-01-01 | End: 2024-01-04 | Stop reason: HOSPADM

## 2024-01-01 RX ORDER — PANTOPRAZOLE SODIUM 40 MG/1
40 TABLET, DELAYED RELEASE ORAL
Status: DISCONTINUED | OUTPATIENT
Start: 2024-01-02 | End: 2024-01-04 | Stop reason: HOSPADM

## 2024-01-01 RX ADMIN — SODIUM CHLORIDE 1000 ML: 9 INJECTION, SOLUTION INTRAVENOUS at 08:24

## 2024-01-01 RX ADMIN — MEROPENEM 1 G: 1 INJECTION, POWDER, FOR SOLUTION INTRAVENOUS at 15:58

## 2024-01-01 RX ADMIN — HYDROMORPHONE HYDROCHLORIDE 0.5 MG: 1 INJECTION, SOLUTION INTRAMUSCULAR; INTRAVENOUS; SUBCUTANEOUS at 09:41

## 2024-01-01 RX ADMIN — KETOROLAC TROMETHAMINE 15 MG: 15 INJECTION, SOLUTION INTRAMUSCULAR; INTRAVENOUS at 17:35

## 2024-01-01 RX ADMIN — SODIUM CHLORIDE 200 ML/HR: 9 INJECTION, SOLUTION INTRAVENOUS at 15:58

## 2024-01-01 RX ADMIN — MAGNESIUM SULFATE IN WATER 2 G: 40 INJECTION, SOLUTION INTRAVENOUS at 17:30

## 2024-01-01 RX ADMIN — HYDROMORPHONE HYDROCHLORIDE 1 MG: 1 INJECTION, SOLUTION INTRAMUSCULAR; INTRAVENOUS; SUBCUTANEOUS at 19:14

## 2024-01-01 RX ADMIN — POTASSIUM CHLORIDE 40 MEQ: 1500 TABLET, EXTENDED RELEASE ORAL at 09:41

## 2024-01-01 RX ADMIN — APIXABAN 5 MG: 5 TABLET, FILM COATED ORAL at 21:05

## 2024-01-01 RX ADMIN — IOHEXOL 75 ML: 350 INJECTION, SOLUTION INTRAVENOUS at 09:10

## 2024-01-01 RX ADMIN — SODIUM CHLORIDE 200 ML/HR: 9 INJECTION, SOLUTION INTRAVENOUS at 21:30

## 2024-01-01 RX ADMIN — KETOROLAC TROMETHAMINE 15 MG: 15 INJECTION, SOLUTION INTRAMUSCULAR; INTRAVENOUS at 23:56

## 2024-01-01 RX ADMIN — MEROPENEM 1 G: 1 INJECTION, POWDER, FOR SOLUTION INTRAVENOUS at 23:20

## 2024-01-01 RX ADMIN — NICOTINE 1 PATCH: 21 PATCH, EXTENDED RELEASE TRANSDERMAL at 15:57

## 2024-01-01 RX ADMIN — ONDANSETRON 4 MG: 2 INJECTION INTRAMUSCULAR; INTRAVENOUS at 09:41

## 2024-01-01 RX ADMIN — HYDROMORPHONE HYDROCHLORIDE 0.5 MG: 1 INJECTION, SOLUTION INTRAMUSCULAR; INTRAVENOUS; SUBCUTANEOUS at 14:04

## 2024-01-01 RX ADMIN — HYDROMORPHONE HYDROCHLORIDE 1 MG: 1 INJECTION, SOLUTION INTRAMUSCULAR; INTRAVENOUS; SUBCUTANEOUS at 16:31

## 2024-01-01 RX ADMIN — METOPROLOL SUCCINATE 50 MG: 25 TABLET, FILM COATED, EXTENDED RELEASE ORAL at 15:57

## 2024-01-01 RX ADMIN — MAGNESIUM SULFATE IN WATER 2 G: 40 INJECTION, SOLUTION INTRAVENOUS at 15:30

## 2024-01-01 RX ADMIN — LOSARTAN POTASSIUM 50 MG: 50 TABLET, FILM COATED ORAL at 15:57

## 2024-01-01 SDOH — SOCIAL STABILITY: SOCIAL INSECURITY: WERE YOU ABLE TO COMPLETE ALL THE BEHAVIORAL HEALTH SCREENINGS?: YES

## 2024-01-01 SDOH — SOCIAL STABILITY: SOCIAL INSECURITY: HAVE YOU HAD THOUGHTS OF HARMING ANYONE ELSE?: NO

## 2024-01-01 ASSESSMENT — COGNITIVE AND FUNCTIONAL STATUS - GENERAL
DAILY ACTIVITIY SCORE: 24
MOBILITY SCORE: 24
MOBILITY SCORE: 24
DAILY ACTIVITIY SCORE: 24
PATIENT BASELINE BEDBOUND: NO
DAILY ACTIVITIY SCORE: 24
MOBILITY SCORE: 24

## 2024-01-01 ASSESSMENT — LIFESTYLE VARIABLES
AUDIT-C TOTAL SCORE: 0
HOW OFTEN DO YOU HAVE 6 OR MORE DRINKS ON ONE OCCASION: NEVER
SKIP TO QUESTIONS 9-10: 1
HOW MANY STANDARD DRINKS CONTAINING ALCOHOL DO YOU HAVE ON A TYPICAL DAY: PATIENT DOES NOT DRINK
HOW OFTEN DO YOU HAVE A DRINK CONTAINING ALCOHOL: NEVER
AUDIT-C TOTAL SCORE: 0

## 2024-01-01 ASSESSMENT — PAIN SCALES - GENERAL
PAINLEVEL_OUTOF10: 10 - WORST POSSIBLE PAIN
PAINLEVEL_OUTOF10: 0 - NO PAIN
PAINLEVEL_OUTOF10: 10 - WORST POSSIBLE PAIN
PAINLEVEL_OUTOF10: 8
PAINLEVEL_OUTOF10: 6
PAINLEVEL_OUTOF10: 0 - NO PAIN
PAINLEVEL_OUTOF10: 10 - WORST POSSIBLE PAIN

## 2024-01-01 ASSESSMENT — ACTIVITIES OF DAILY LIVING (ADL)
ADEQUATE_TO_COMPLETE_ADL: YES
HEARING - LEFT EAR: FUNCTIONAL
FEEDING YOURSELF: INDEPENDENT
GROOMING: INDEPENDENT
DRESSING YOURSELF: INDEPENDENT
JUDGMENT_ADEQUATE_SAFELY_COMPLETE_DAILY_ACTIVITIES: YES
LACK_OF_TRANSPORTATION: NO
BATHING: INDEPENDENT
WALKS IN HOME: INDEPENDENT
HEARING - RIGHT EAR: FUNCTIONAL
TOILETING: INDEPENDENT
PATIENT'S MEMORY ADEQUATE TO SAFELY COMPLETE DAILY ACTIVITIES?: YES

## 2024-01-01 ASSESSMENT — PAIN DESCRIPTION - LOCATION
LOCATION: CHEST
LOCATION: ABDOMEN
LOCATION: ABDOMEN

## 2024-01-01 ASSESSMENT — PAIN - FUNCTIONAL ASSESSMENT
PAIN_FUNCTIONAL_ASSESSMENT: 0-10

## 2024-01-01 ASSESSMENT — PATIENT HEALTH QUESTIONNAIRE - PHQ9
SUM OF ALL RESPONSES TO PHQ9 QUESTIONS 1 & 2: 0
2. FEELING DOWN, DEPRESSED OR HOPELESS: NOT AT ALL
1. LITTLE INTEREST OR PLEASURE IN DOING THINGS: NOT AT ALL

## 2024-01-01 ASSESSMENT — COLUMBIA-SUICIDE SEVERITY RATING SCALE - C-SSRS
2. HAVE YOU ACTUALLY HAD ANY THOUGHTS OF KILLING YOURSELF?: NO
6. HAVE YOU EVER DONE ANYTHING, STARTED TO DO ANYTHING, OR PREPARED TO DO ANYTHING TO END YOUR LIFE?: NO
1. IN THE PAST MONTH, HAVE YOU WISHED YOU WERE DEAD OR WISHED YOU COULD GO TO SLEEP AND NOT WAKE UP?: NO

## 2024-01-01 ASSESSMENT — PAIN DESCRIPTION - FREQUENCY: FREQUENCY: CONSTANT/CONTINUOUS

## 2024-01-01 ASSESSMENT — PAIN DESCRIPTION - DESCRIPTORS
DESCRIPTORS: STABBING
DESCRIPTORS: STABBING

## 2024-01-01 ASSESSMENT — ENCOUNTER SYMPTOMS: ABDOMINAL PAIN: 1

## 2024-01-01 ASSESSMENT — PAIN DESCRIPTION - ORIENTATION: ORIENTATION: LEFT

## 2024-01-01 ASSESSMENT — PAIN DESCRIPTION - ONSET: ONSET: PROGRESSIVE

## 2024-01-01 NOTE — NURSING NOTE
01/01/24 1453: Patient admitted to med surg floor. Notified provider of mag level, home eliquis and next chemo date of this Wednesday.     1530: Notified by provider that patient will be transferred to ICU.    1600: IV fluids started per orders. Meds administered per MAR.     1631: PRN diluadid given for complaints of pain 8/10.     1649: Patient transferred to ICU

## 2024-01-01 NOTE — ED TRIAGE NOTES
Pt having L sided chest pain radiating into L shoulder, worsening over 2 weeks. When pain first started, pt was seen in ED and diagnosed with a PE. Pt left AMA and las been taking is eliquis at home, thinking it would help. Pt takes 25mg 2x daily. Pt also being treated for colon cancer currently and is undergoing chemo tx

## 2024-01-01 NOTE — CARE PLAN
The patient's goals for the shift include      The clinical goals for the shift include pain level to maintain less than 5 on scale    Patient  admitted to icu from m/s to room 4. Iv fluids infusing and mag replacement. Pain6/10 after dilaudid given earlier. Family at bedside update given. Will continue to monitor

## 2024-01-01 NOTE — ED PROVIDER NOTES
HPI   No chief complaint on file.      HPI  This 38-year-old male patient who is undergoing chemotherapy due to colon cancer he has history of PE about 2 years ago was seen here before [place] when he was found to have PE patient was recommended hospitalization but he signed AMA and he was taking Eliquis at home continue chest pain and decided come to the ER for evaluation today on 1 January patient stated that he continue clear pain ever since being diagnosed with PE 2 weeks ago.  Denies any hemoptysis.  Denies any pain or swelling legs.  Denies any abdominal pain vomiting or diarrhea.  Denies blackout or pass out.  Denies any narcotic abuse.  He has been under care of oncologist here at [place].  He wants to be treated and admitted here instead of transfer of the facility.    Patient denies substance abuse.      Family history: Reviewed    Social history: Reviewed, denies substance abuse.    Review of system: 10 review of system obtained, review of system as per HPI otherwise negative.                    No data recorded                Patient History   Past Medical History:   Diagnosis Date    Other conditions influencing health status     Rectal Cancer     Past Surgical History:   Procedure Laterality Date    COLON SURGERY  01/22/2014    Colon Surgery    CT GUIDED IMAGING FOR NEEDLE PLACEMENT  6/27/2018    CT GUIDED IMAGING FOR NEEDLE PLACEMENT [facility]    CT HEAD ANGIO W AND WO IV CONTRAST  9/10/2014    CT HEAD ANGIO W AND WO IV CONTRAST 9/10/2014 [facility] INPATIENT LEGACY    ESOPHAGOGASTRODUODENOSCOPY  01/22/2014    Diagnostic Esophagogastroduodenoscopy    HAND SURGERY  01/22/2014    Hand Surgery                                                                                                                                                          MR HEAD ANGIO W IV CONTRAST  9/10/2014    MR HEAD ANGIO W IV CONTRAST 9/10/2014 [facility] INPATIENT LEGACY    OTHER SURGICAL HISTORY  07/07/2014    Laparoscopy  Total Colectomy W/ Proctectomy, Ileostomy     Family History   Problem Relation Name Age of Onset    Other (ADENOCARCINOMA OF THE ESOPHAGUS) Father      Diabetes Father's Brother      Diabetes Maternal Grandfather      Cancer Maternal Grandfather      Cancer Other UNCLE      Social History     Tobacco Use    Smoking status: Every Day     Packs/day: 1     Types: Cigarettes    Smokeless tobacco: Never   Vaping Use    Vaping Use: Never used   Substance Use Topics    Alcohol use: Not Currently    Drug use: Yes     Types: Marijuana       Physical Exam   ED Triage Vitals   Temp Pulse Resp BP   -- -- -- --      SpO2 Temp src Heart Rate Source Patient Position   -- -- -- --      BP Location FiO2 (%)     -- --       Physical Exam    CONSTITUTIONAL: EPAT recommended admission EPAT recommended,Chronically sick looking male patient noted to be in tachycardia complain of chest pain no hypoxemia noted awake alert oriented x 3.     HENMT: The airway was intact. There was no ear or nose discharge. No drooling or stridor. Neck was supple. Talking and breathing comfortably.      EYES: Clear bilaterally, pupils equal, round and reactive to light.     CARDIOVASCULAR: Tachycardia with heart rate of 130 136.,  Regular rate and rhythm.  No friction rub.  No murmur no JVD.  Calf is nontender Homans' sign negative.No friction rub or murmur good peripheral pulses no peripheral edema.     RESPIRATORY: Patient was breathing comfortably. No tachypnea no respiratory distress.  On auscultation he has diminished breath sounds crackles in the lung bases.  However has good skin perfusion.     GASTROINTESTINAL: Abdomen soft positive bowel sounds patient also noted upper abdominal discomfort upon palpation lower abdomen nontender no guarding, rebound surgery.  No CVA tenderness noted.  No pulsatile mass appreciated.      GENITOURINARY:  No costovertebral angle tenderness.     MUSCULOSKELETAL: Head was normocephalic atraumatic cervical thoracic lumbar  spine nontender.  Chest was nontender  Both upper extremity good motion nontender intact distal pulse intact sensation.     NEUROLOGICAL: Awake alert pleasant and cooperative. No motor or sensory deficit no arms selective noted. Intact neurovascular function and motor function. No facial drooping or drooling. Talking and breathing comfortably.  Cranial nerves II to XII grossly intact. No arms selective noted. No nystagmus.      SKIN: Skin normal color for race, warm, dry and intact. No evidence of trauma or lesions.     PSYCHIATRIC: Awake alert and without acute distress. No obvious depression, no suicidal thoughts or ideation. Appropriate mood. Talking and normal tone.     HEME/LYMPH: No adenopathy or splenomegaly.        CRITICAL CARE    VITAL SIGNS:       Blood pressure 121/86 pulse rate is 135 respirate is 18 pulse ox 98% temperature 36.4         DISPOSITION      ED Course & MDM   Diagnoses as of 01/01/24 1257   Idiopathic acute pancreatitis without infection or necrosis   Pseudocyst of pancreas due to acute pancreatitis   Pulmonary embolism without acute cor pulmonale, unspecified chronicity, unspecified pulmonary embolism type (CMS/HCC)   Malignant neoplasm of colon, unspecified part of colon (CMS/HCC)   Sinus tachycardia   EKG obtained at 753 hours showed sinus tachycardia rate of 135 right superior axis deviation.  Inferior infarct age undetermined.  No ST-T evaluation.  No STEMI.  Abnormal EKG [place] at this EKG.  No STEMI.    Second EKG done at 859 hours shows sinus tachycardia rate of 114 right superior axis deviation.  Inferior infarct age undetermined.  No ST-T wave elevation.  No STEMI.  Steve Cortes MD  01/01/24 1221    This 38-year-old male patient history of colon cancer, history of pulmonary embolism in the past and diagnosed with recurrent pulmonary embolism per patient description about 2 weeks ago he was recommended hospitalization refused hospitalization left AMA he has been treated for PE by his  oncologist with a Eliquis came to the ER with a complaint of chest pain and nausea and is unable to eat or drink for the last few days decided come to ER for evaluation denies vomiting blood blood in stool or black stool denies coughing up blood.  Denies any fever or chills.  He however admitted with chest pain.  Pain is most for last few days.  He reported the ER on the ER today.  Denies blood in stool or black stool or vomiting blood.  Denies coughing up blood.      Upon examination he was chronically sick looking male patient was awake and alert did not look sick (mild abdominal distention noted.  No facial drooping or drooling or stridor neck was supple HEENT examination unremarkable.  As lungs are clear regular with noted heart regular rate and rhythm with abdomen soft positive bowel sound nontender no guarding rebound rigidity.  Calf muscle nontender Homans' sign negative good range of motion arms and legs.  Neck was supple.  Neuro examination grossly within normal range.  Patient EKG showed no ST-T evaluation no STEMI as described above with EKG report troponin negative chest CT showed finding consistent with PE however decreased in size when compared to prior  CT report and other findings as CTA Chest.  Patient However Was Found with Acute Pancreatitis with Pseudocyst to CT Report for Details.  His Labs Are Slightly Elevated.  Labs Are Reviewed and Are Unremarkable except HypoKalemia Hypomagnesemia Slightly Abnormal Kidney Function.  Patient Still Demonstrating Some Abdominal Discomfort and Evidence of PE Even Though Decreasing in Size.  I Did Discuss Case with the Hospitalist Service.  Initially They Recommended Transfer However Oncologist As Well As the Primary Care Service at [facility] Refused to Take the Patient  Patient Will Be Kept Here and Treated Locally and Set up Transfer.  Insert Transfer They Are Fully Aware We Do Not Have Expert Suggest Gastroenteritis    Staff.  Patient Also Want to Be Admitted.   He Understood Risk and Benefit Well I Talked with Admitting Team Again and They Agreed to Admit the Patient Patient Is Being Kept N.P.O. Being Admitted for Further Care       Steve Cortes MD  01/01/24 [id number]       Steve Cortes MD  01/01/24 [id number]

## 2024-01-02 ENCOUNTER — TELEPHONE (OUTPATIENT)
Dept: HEMATOLOGY/ONCOLOGY | Facility: HOSPITAL | Age: 39
End: 2024-01-02
Payer: MEDICARE

## 2024-01-02 ENCOUNTER — APPOINTMENT (OUTPATIENT)
Dept: CARDIOLOGY | Facility: HOSPITAL | Age: 39
End: 2024-01-02
Payer: MEDICARE

## 2024-01-02 ENCOUNTER — SPECIALTY PHARMACY (OUTPATIENT)
Dept: PHARMACY | Facility: CLINIC | Age: 39
End: 2024-01-02

## 2024-01-02 ENCOUNTER — DOCUMENTATION (OUTPATIENT)
Dept: HEMATOLOGY/ONCOLOGY | Facility: HOSPITAL | Age: 39
End: 2024-01-02
Payer: MEDICARE

## 2024-01-02 ENCOUNTER — APPOINTMENT (OUTPATIENT)
Dept: HEMATOLOGY/ONCOLOGY | Facility: HOSPITAL | Age: 39
End: 2024-01-02
Payer: MEDICARE

## 2024-01-02 LAB
ALBUMIN SERPL BCP-MCNC: 2.6 G/DL (ref 3.4–5)
ALP SERPL-CCNC: 115 U/L (ref 33–120)
ALT SERPL W P-5'-P-CCNC: 4 U/L (ref 10–52)
ANION GAP SERPL CALC-SCNC: 12 MMOL/L (ref 10–20)
AST SERPL W P-5'-P-CCNC: 7 U/L (ref 9–39)
BILIRUB DIRECT SERPL-MCNC: 0.1 MG/DL (ref 0–0.3)
BILIRUB SERPL-MCNC: 0.4 MG/DL (ref 0–1.2)
BUN SERPL-MCNC: 5 MG/DL (ref 6–23)
CALCIUM SERPL-MCNC: 8 MG/DL (ref 8.6–10.3)
CHLORIDE SERPL-SCNC: 101 MMOL/L (ref 98–107)
CO2 SERPL-SCNC: 25 MMOL/L (ref 21–32)
CREAT SERPL-MCNC: 0.43 MG/DL (ref 0.5–1.3)
ERYTHROCYTE [DISTWIDTH] IN BLOOD BY AUTOMATED COUNT: 18.3 % (ref 11.5–14.5)
GFR SERPL CREATININE-BSD FRML MDRD: >90 ML/MIN/1.73M*2
GLUCOSE SERPL-MCNC: 128 MG/DL (ref 74–99)
HCT VFR BLD AUTO: 35.3 % (ref 41–52)
HGB BLD-MCNC: 11.6 G/DL (ref 13.5–17.5)
HOLD SPECIMEN: NORMAL
LIPASE SERPL-CCNC: 54 U/L (ref 9–82)
MAGNESIUM SERPL-MCNC: 2.05 MG/DL (ref 1.6–2.4)
MCH RBC QN AUTO: 28.6 PG (ref 26–34)
MCHC RBC AUTO-ENTMCNC: 32.9 G/DL (ref 32–36)
MCV RBC AUTO: 87 FL (ref 80–100)
NRBC BLD-RTO: 0 /100 WBCS (ref 0–0)
PLATELET # BLD AUTO: 460 X10*3/UL (ref 150–450)
POTASSIUM SERPL-SCNC: 4.2 MMOL/L (ref 3.5–5.3)
PROT SERPL-MCNC: 5.9 G/DL (ref 6.4–8.2)
RBC # BLD AUTO: 4.05 X10*6/UL (ref 4.5–5.9)
SODIUM SERPL-SCNC: 134 MMOL/L (ref 136–145)
WBC # BLD AUTO: 9.7 X10*3/UL (ref 4.4–11.3)

## 2024-01-02 PROCEDURE — 2500000001 HC RX 250 WO HCPCS SELF ADMINISTERED DRUGS (ALT 637 FOR MEDICARE OP): Mod: IPSPLIT

## 2024-01-02 PROCEDURE — 2500000001 HC RX 250 WO HCPCS SELF ADMINISTERED DRUGS (ALT 637 FOR MEDICARE OP): Mod: IPSPLIT | Performed by: NURSE PRACTITIONER

## 2024-01-02 PROCEDURE — 86301 IMMUNOASSAY TUMOR CA 19-9: CPT | Mod: GENLAB | Performed by: SURGERY

## 2024-01-02 PROCEDURE — 93005 ELECTROCARDIOGRAM TRACING: CPT

## 2024-01-02 PROCEDURE — 99222 1ST HOSP IP/OBS MODERATE 55: CPT

## 2024-01-02 PROCEDURE — 93005 ELECTROCARDIOGRAM TRACING: CPT | Mod: MUE

## 2024-01-02 PROCEDURE — 82248 BILIRUBIN DIRECT: CPT | Mod: IPSPLIT

## 2024-01-02 PROCEDURE — 85027 COMPLETE CBC AUTOMATED: CPT | Mod: IPSPLIT | Performed by: NURSE PRACTITIONER

## 2024-01-02 PROCEDURE — 37799 UNLISTED PX VASCULAR SURGERY: CPT | Mod: IPSPLIT | Performed by: NURSE PRACTITIONER

## 2024-01-02 PROCEDURE — S4991 NICOTINE PATCH NONLEGEND: HCPCS | Mod: IPSPLIT | Performed by: NURSE PRACTITIONER

## 2024-01-02 PROCEDURE — 2500000004 HC RX 250 GENERAL PHARMACY W/ HCPCS (ALT 636 FOR OP/ED): Mod: IPSPLIT | Performed by: NURSE PRACTITIONER

## 2024-01-02 PROCEDURE — 80053 COMPREHEN METABOLIC PANEL: CPT | Mod: IPSPLIT | Performed by: NURSE PRACTITIONER

## 2024-01-02 PROCEDURE — 2500000002 HC RX 250 W HCPCS SELF ADMINISTERED DRUGS (ALT 637 FOR MEDICARE OP, ALT 636 FOR OP/ED): Mod: IPSPLIT | Performed by: NURSE PRACTITIONER

## 2024-01-02 PROCEDURE — 2500000004 HC RX 250 GENERAL PHARMACY W/ HCPCS (ALT 636 FOR OP/ED): Mod: IPSPLIT | Performed by: INTERNAL MEDICINE

## 2024-01-02 PROCEDURE — 83690 ASSAY OF LIPASE: CPT | Mod: IPSPLIT | Performed by: NURSE PRACTITIONER

## 2024-01-02 PROCEDURE — 99232 SBSQ HOSP IP/OBS MODERATE 35: CPT | Performed by: SURGERY

## 2024-01-02 PROCEDURE — 1100000001 HC PRIVATE ROOM DAILY: Mod: IPSPLIT

## 2024-01-02 PROCEDURE — 83735 ASSAY OF MAGNESIUM: CPT | Mod: IPSPLIT | Performed by: NURSE PRACTITIONER

## 2024-01-02 RX ORDER — ATORVASTATIN CALCIUM 40 MG/1
40 TABLET, FILM COATED ORAL NIGHTLY
Status: DISCONTINUED | OUTPATIENT
Start: 2024-01-02 | End: 2024-01-04 | Stop reason: HOSPADM

## 2024-01-02 RX ADMIN — METOPROLOL SUCCINATE 50 MG: 25 TABLET, FILM COATED, EXTENDED RELEASE ORAL at 08:46

## 2024-01-02 RX ADMIN — HYDROMORPHONE HYDROCHLORIDE 1 MG: 1 INJECTION, SOLUTION INTRAMUSCULAR; INTRAVENOUS; SUBCUTANEOUS at 03:03

## 2024-01-02 RX ADMIN — KETOROLAC TROMETHAMINE 15 MG: 15 INJECTION, SOLUTION INTRAMUSCULAR; INTRAVENOUS at 05:50

## 2024-01-02 RX ADMIN — APIXABAN 5 MG: 5 TABLET, FILM COATED ORAL at 20:06

## 2024-01-02 RX ADMIN — LOSARTAN POTASSIUM 50 MG: 50 TABLET, FILM COATED ORAL at 08:46

## 2024-01-02 RX ADMIN — HYDROMORPHONE HYDROCHLORIDE 1 MG: 1 INJECTION, SOLUTION INTRAMUSCULAR; INTRAVENOUS; SUBCUTANEOUS at 22:29

## 2024-01-02 RX ADMIN — APIXABAN 5 MG: 5 TABLET, FILM COATED ORAL at 08:46

## 2024-01-02 RX ADMIN — KETOROLAC TROMETHAMINE 15 MG: 15 INJECTION, SOLUTION INTRAMUSCULAR; INTRAVENOUS at 17:46

## 2024-01-02 RX ADMIN — HYDROMORPHONE HYDROCHLORIDE 1 MG: 1 INJECTION, SOLUTION INTRAMUSCULAR; INTRAVENOUS; SUBCUTANEOUS at 15:03

## 2024-01-02 RX ADMIN — ATORVASTATIN CALCIUM 40 MG: 40 TABLET, FILM COATED ORAL at 20:06

## 2024-01-02 RX ADMIN — NICOTINE 1 PATCH: 21 PATCH, EXTENDED RELEASE TRANSDERMAL at 08:51

## 2024-01-02 RX ADMIN — ERGOCALCIFEROL 50000 UNITS: 1.25 CAPSULE ORAL at 08:46

## 2024-01-02 RX ADMIN — HYDROMORPHONE HYDROCHLORIDE 1 MG: 1 INJECTION, SOLUTION INTRAMUSCULAR; INTRAVENOUS; SUBCUTANEOUS at 07:43

## 2024-01-02 RX ADMIN — MEROPENEM 1 G: 1 INJECTION, POWDER, FOR SOLUTION INTRAVENOUS at 06:30

## 2024-01-02 RX ADMIN — SODIUM CHLORIDE 125 ML/HR: 9 INJECTION, SOLUTION INTRAVENOUS at 17:49

## 2024-01-02 RX ADMIN — SODIUM CHLORIDE 125 ML/HR: 9 INJECTION, SOLUTION INTRAVENOUS at 09:33

## 2024-01-02 RX ADMIN — KETOROLAC TROMETHAMINE 15 MG: 15 INJECTION, SOLUTION INTRAMUSCULAR; INTRAVENOUS at 11:30

## 2024-01-02 RX ADMIN — PANTOPRAZOLE SODIUM 40 MG: 40 TABLET, DELAYED RELEASE ORAL at 06:30

## 2024-01-02 RX ADMIN — HYDROMORPHONE HYDROCHLORIDE 1 MG: 1 INJECTION, SOLUTION INTRAMUSCULAR; INTRAVENOUS; SUBCUTANEOUS at 19:54

## 2024-01-02 RX ADMIN — SODIUM CHLORIDE 200 ML/HR: 9 INJECTION, SOLUTION INTRAVENOUS at 03:03

## 2024-01-02 RX ADMIN — TRAZODONE HYDROCHLORIDE 50 MG: 50 TABLET ORAL at 20:50

## 2024-01-02 RX ADMIN — MEROPENEM 1 G: 1 INJECTION, POWDER, FOR SOLUTION INTRAVENOUS at 14:37

## 2024-01-02 RX ADMIN — MEROPENEM 1 G: 1 INJECTION, POWDER, FOR SOLUTION INTRAVENOUS at 22:29

## 2024-01-02 SDOH — ECONOMIC STABILITY: FOOD INSECURITY: WITHIN THE PAST 12 MONTHS, YOU WORRIED THAT YOUR FOOD WOULD RUN OUT BEFORE YOU GOT THE MONEY TO BUY MORE.: NEVER TRUE

## 2024-01-02 SDOH — ECONOMIC STABILITY: HOUSING INSECURITY
IN THE LAST 12 MONTHS, WAS THERE A TIME WHEN YOU DID NOT HAVE A STEADY PLACE TO SLEEP OR SLEPT IN A SHELTER (INCLUDING NOW)?: NO

## 2024-01-02 SDOH — ECONOMIC STABILITY: INCOME INSECURITY: IN THE PAST 12 MONTHS, HAS THE ELECTRIC, GAS, OIL, OR WATER COMPANY THREATENED TO SHUT OFF SERVICE IN YOUR HOME?: NO

## 2024-01-02 SDOH — ECONOMIC STABILITY: HOUSING INSECURITY: IN THE LAST 12 MONTHS, HOW MANY PLACES HAVE YOU LIVED?: 1

## 2024-01-02 SDOH — ECONOMIC STABILITY: FOOD INSECURITY: WITHIN THE PAST 12 MONTHS, THE FOOD YOU BOUGHT JUST DIDN'T LAST AND YOU DIDN'T HAVE MONEY TO GET MORE.: NEVER TRUE

## 2024-01-02 SDOH — ECONOMIC STABILITY: FOOD INSECURITY: WITHIN THE PAST 12 MONTHS, YOU WORRIED THAT YOUR FOOD WOULD RUN OUT BEFORE YOU GOT MONEY TO BUY MORE.: NEVER TRUE

## 2024-01-02 SDOH — ECONOMIC STABILITY: INCOME INSECURITY: IN THE PAST 12 MONTHS HAS THE ELECTRIC, GAS, OIL, OR WATER COMPANY THREATENED TO SHUT OFF SERVICES IN YOUR HOME?: NO

## 2024-01-02 SDOH — ECONOMIC STABILITY: HOUSING INSECURITY: IN THE LAST 12 MONTHS, WAS THERE A TIME WHEN YOU WERE NOT ABLE TO PAY THE MORTGAGE OR RENT ON TIME?: NO

## 2024-01-02 SDOH — ECONOMIC STABILITY: TRANSPORTATION INSECURITY: IN THE PAST 12 MONTHS, HAS LACK OF TRANSPORTATION KEPT YOU FROM MEDICAL APPOINTMENTS OR FROM GETTING MEDICATIONS?: NO

## 2024-01-02 SDOH — ECONOMIC STABILITY: TRANSPORTATION INSECURITY
IN THE PAST 12 MONTHS, HAS LACK OF TRANSPORTATION KEPT YOU FROM MEETINGS, WORK, OR FROM GETTING THINGS NEEDED FOR DAILY LIVING?: NO

## 2024-01-02 SDOH — ECONOMIC STABILITY: INCOME INSECURITY: IN THE LAST 12 MONTHS, WAS THERE A TIME WHEN YOU WERE NOT ABLE TO PAY THE MORTGAGE OR RENT ON TIME?: NO

## 2024-01-02 SDOH — ECONOMIC STABILITY: TRANSPORTATION INSECURITY
IN THE PAST 12 MONTHS, HAS THE LACK OF TRANSPORTATION KEPT YOU FROM MEDICAL APPOINTMENTS OR FROM GETTING MEDICATIONS?: NO

## 2024-01-02 SDOH — ECONOMIC STABILITY: INCOME INSECURITY: HOW HARD IS IT FOR YOU TO PAY FOR THE VERY BASICS LIKE FOOD, HOUSING, MEDICAL CARE, AND HEATING?: NOT HARD AT ALL

## 2024-01-02 SDOH — ECONOMIC STABILITY: FOOD INSECURITY: HOW HARD IS IT FOR YOU TO PAY FOR THE VERY BASICS LIKE FOOD, HOUSING, MEDICAL CARE, AND HEATING?: NOT HARD AT ALL

## 2024-01-02 ASSESSMENT — ACTIVITIES OF DAILY LIVING (ADL)
LACK_OF_TRANSPORTATION: NO
LACK_OF_TRANSPORTATION: NO

## 2024-01-02 ASSESSMENT — COGNITIVE AND FUNCTIONAL STATUS - GENERAL
DAILY ACTIVITIY SCORE: 24
MOBILITY SCORE: 24

## 2024-01-02 ASSESSMENT — PAIN SCALES - GENERAL
PAINLEVEL_OUTOF10: 6
PAINLEVEL_OUTOF10: 6
PAINLEVEL_OUTOF10: 10 - WORST POSSIBLE PAIN
PAINLEVEL_OUTOF10: 8
PAINLEVEL_OUTOF10: 0 - NO PAIN
PAINLEVEL_OUTOF10: 2
PAINLEVEL_OUTOF10: 8
PAINLEVEL_OUTOF10: 9
PAINLEVEL_OUTOF10: 10 - WORST POSSIBLE PAIN
PAINLEVEL_OUTOF10: 4

## 2024-01-02 ASSESSMENT — PAIN DESCRIPTION - LOCATION
LOCATION: ABDOMEN
LOCATION: ABDOMEN

## 2024-01-02 ASSESSMENT — ENCOUNTER SYMPTOMS
FEVER: 0
VOMITING: 0
DIARRHEA: 0
NAUSEA: 0
APPETITE CHANGE: 1
CHILLS: 0
CONSTIPATION: 0
ABDOMINAL PAIN: 1

## 2024-01-02 ASSESSMENT — PAIN - FUNCTIONAL ASSESSMENT
PAIN_FUNCTIONAL_ASSESSMENT: 0-10

## 2024-01-02 ASSESSMENT — PAIN DESCRIPTION - ORIENTATION: ORIENTATION: LEFT

## 2024-01-02 ASSESSMENT — PAIN DESCRIPTION - DESCRIPTORS
DESCRIPTORS: ACHING;TENDER;SHARP
DESCRIPTORS: ACHING;SORE;TENDER

## 2024-01-02 NOTE — NURSING NOTE
Pt. Denies having an appetite to eat. Pt. Ordered pudding & chocolate milk for lunch & ate 75% of pudding. ANNALEE Yung  oral intake.aware of low oral intake.

## 2024-01-02 NOTE — PROGRESS NOTES
TCC spoke with patient regarding discharge planning and home going needs. Patient lives  with his mom, brother and sister in law.  Patient says he doesn't work.  He is independent with ADLs and ambulation.  He drives.  He denies any need for DME or home oxygen..  Confirmed with patient PCP is  Jesse Dominguez.  Discharge Plan is for patient to discharge home.  TCC will continue to follow for changes in discharge planning needs.

## 2024-01-02 NOTE — H&P
History Of Present Illness  Misael Hernandez is a 38 y.o. male presenting with abdominal pain. Patient has a known history of rectal cancer with metastases and is on chemotherapy. Patient reports that he first woke up with this pain a few weeks ago. He has not had any nausea, vomiting, diarrhea, fevers, or chills. His appetite fluctuates due to chemo but he says that it has been poor recently; he has been tolerating fluids at home. He is on apixaban for PE diagnosed 12/20/23. ED workup on admission was significant for Na 133, K 3.2, alk phos 165, mag 1.58, WBC 14.4. CTA chest showed a slight decrease in size of right upper lobe PE and upper abdominal/peripancreatic fat stranding with a fluid collection, concerning for acute pancreatitis with developing pseudocyst. Patient was given Dilaudid, Zofran, mag sulfate 4g IV x 1 dose, and a 1L NS bolus. He was admitted to ICU for further treatment of acute pancreatitis. On exam this morning, patient reports some interval improvement in his pain. He tolerated clear liquids this morning without worsening pain or nausea. He remains on room air at this time.      Past Medical History  Past Medical History:   Diagnosis Date    Other conditions influencing health status     Rectal Cancer   Multiple DVTs and PEs, most recent PE 12/20/2023, on apixaban   HFrEF with ischemic cardiomyopathy (LVEF 30% in 2019)  Day's esophagus  Opioid use disorder   Depression  Anxiety  HLD  Avascular necrosis of femoral heads  Iron deficiency anemia   Asthma  Essential tremor  Migraines   Tobacco use     Surgical History  Past Surgical History:   Procedure Laterality Date    COLON SURGERY  01/22/2014    Colon Surgery    CT GUIDED IMAGING FOR NEEDLE PLACEMENT  6/27/2018    CT GUIDED IMAGING FOR NEEDLE PLACEMENT [facility] INPATIENT LEGAC[facility]    CT HEAD ANGIO W AND WO IV CONTRAST  9/10/2014    CT HEAD ANGIO W AND WO IV CONTRAST [place] [facility] [place]    ESOPHAGOGASTRODUODENOSCOPY  01/22/2014     Diagnostic Esophagogastroduodenoscopy    HAND SURGERY  01/22/2014    Hand Surgery                                                                                                                                                          MR HEAD ANGIO W IV CONTRAST  9/10/2014    MR HEAD ANGIO W IV CONTRAST 9/10/2014 [facility] [place] [facility]PATIENT LEGACY    OTHER SURGICAL HISTORY  07/07/2014    Laparoscopy Total Colectomy W/ Proctectomy, Ileostomy   Cardiac catheterization    Social History  He reports that he has been smoking cigarettes. He has been smoking an average of 1 pack per day. He has never used smokeless tobacco. He reports that he does not currently use alcohol. He reports current drug use. Drug: Marijuana.    Family History  Family History   Problem Relation Name Age of Onset    Other (ADENOCARCINOMA OF THE ESOPHAGUS) Father      Diabetes Father's Brother      Diabetes Maternal Grandfather      Cancer Maternal Grandfather      Cancer Other UNCLE         Allergies  Acetaminophen and Adhesive tape-silicones    Review of Systems   Constitutional:  Positive for appetite change (poor appetite). Negative for chills and fever.   Gastrointestinal:  Positive for abdominal pain. Negative for constipation, diarrhea, nausea and vomiting.   All other systems reviewed and are negative.       Physical Exam  Constitutional:       General: He is not in acute distress.     Appearance: He is not toxic-appearing.   HENT:      Head: Normocephalic and atraumatic.      Mouth/Throat:      Mouth: Mucous membranes are moist.   Eyes:      Conjunctiva/sclera: Conjunctivae normal.   Cardiovascular:      Rate and Rhythm: Normal rate and regular rhythm.   Pulmonary:      Effort: No respiratory distress.      Breath sounds: Normal breath sounds.   Abdominal:      General: Bowel sounds are normal. There is no distension.      Palpations: Abdomen is soft. There is no mass.      Tenderness: There is abdominal tenderness (diffusely tender). There is no  "guarding.   Musculoskeletal:         General: No swelling.   Skin:     General: Skin is warm and dry.      Findings: No rash.   Neurological:      Mental Status: He is alert and oriented to person, place, and time.   Psychiatric:         Mood and Affect: Mood normal.         Behavior: Behavior normal.          Last Recorded Vitals  Blood pressure (!) 138/92, pulse 88, temperature 36 °C (96.8 °F), temperature source Temporal, resp. rate 19, height 1.753 m (5' 9\"), weight 86.4 kg (190 lb 7.6 oz), SpO2 92 %.    Relevant Results      Scheduled medications  apixaban, 5 mg, oral, BID  ergocalciferol, 50,000 Units, oral, Weekly  ketorolac, 15 mg, intravenous, q6h JULIO  losartan, 50 mg, oral, Daily  meropenem, 1 g, intravenous, q8h  metoprolol succinate XL, 50 mg, oral, Daily  nicotine, 1 patch, transdermal, Daily  pantoprazole, 40 mg, oral, Daily before breakfast   Or  pantoprazole, 40 mg, intravenous, Daily before breakfast  potassium chloride CR, 40 mEq, oral, Daily      Continuous medications  sodium chloride 0.9%, 125 mL/hr, Last Rate: 125 mL/hr (01/02/24 0645)      PRN medications  PRN medications: doxepin, HYDROmorphone, ondansetron **OR** ondansetron, polyethylene glycol, traZODone    Results for orders placed or performed during the hospital encounter of 01/01/24 (from the past 24 hour(s))   Troponin, High Sensitivity, 1 Hour   Result Value Ref Range    Troponin I, High Sensitivity 5 0 - 20 ng/L   Lipase   Result Value Ref Range    Lipase 106 (H) 9 - 82 U/L   Lipid Panel Non-Fasting   Result Value Ref Range    Cholesterol 232 (H) 0 - 199 mg/dL    HDL-Cholesterol 47.0 mg/dL    Cholesterol/HDL Ratio 4.9     Non-HDL Cholesterol 185 (H) 0 - 149 mg/dL   Lipid panel   Result Value Ref Range    Cholesterol 185 0 - 199 mg/dL    HDL-Cholesterol 37.5 mg/dL    Cholesterol/HDL Ratio 4.9     LDL Calculated 126 (H) <=99 mg/dL    VLDL 22 0 - 40 mg/dL    Triglycerides 110 0 - 149 mg/dL    Non HDL Cholesterol 148 0 - 149 mg/dL   CBC "   Result Value Ref Range    WBC 9.7 4.4 - 11.3 x10*3/uL    nRBC 0.0 0.0 - 0.0 /100 WBCs    RBC 4.05 (L) 4.50 - 5.90 x10*6/uL    Hemoglobin 11.6 (L) 13.5 - 17.5 g/dL    Hematocrit 35.3 (L) 41.0 - 52.0 %    MCV 87 80 - 100 fL    MCH 28.6 26.0 - 34.0 pg    MCHC 32.9 32.0 - 36.0 g/dL    RDW 18.3 (H) 11.5 - 14.5 %    Platelets 460 (H) 150 - 450 x10*3/uL   Basic metabolic panel   Result Value Ref Range    Glucose 128 (H) 74 - 99 mg/dL    Sodium 134 (L) 136 - 145 mmol/L    Potassium 4.2 3.5 - 5.3 mmol/L    Chloride 101 98 - 107 mmol/L    Bicarbonate 25 21 - 32 mmol/L    Anion Gap 12 10 - 20 mmol/L    Urea Nitrogen 5 (L) 6 - 23 mg/dL    Creatinine 0.43 (L) 0.50 - 1.30 mg/dL    eGFR >90 >60 mL/min/1.73m*2    Calcium 8.0 (L) 8.6 - 10.3 mg/dL   Lipase   Result Value Ref Range    Lipase 54 9 - 82 U/L   Magnesium   Result Value Ref Range    Magnesium 2.05 1.60 - 2.40 mg/dL     CT angio chest for pulmonary embolism    Result Date: 1/1/2024  Interpreted By:  Shira Mendiola, STUDY: CT ANGIO CHEST FOR PULMONARY EMBOLISM;  1/1/2024 9:12 am   INDICATION: Signs/Symptoms:Chest pain history of PE on Eliquis.   COMPARISON: 12/20/2023   ACCESSION NUMBER(S): [id number]   ORDERING CLINICIAN: DIAMOND FOLEY   TECHNIQUE: CT of the chest was performed. Sagittal and coronal reconstructions were generated.  75 cc Omnipaque 350 intravenous contrast given for the examination.  Multiplanar reconstructions of the pulmonary vessels were created on an independent workstation and provided for review.   FINDINGS:     CHEST WALL AND LOWER NECK: Right anterior chest wall port remains in place with catheter extending to the distal SVC. Mild bilateral gynecomastia. No significant axillary lymphadenopathy.   MEDIASTINUM AND LAUREN:  Lymphadenopathy including dominant probable konstantin mass in the subcarinal space measuring 4.9 x 3.2 cm similar to the previous exam.   HEART AND VESSELS:  Slight decreased size of nonocclusive right upper lobe PE since the prior exam  (image 114/305. No new pulmonary artery filling defect identified. The heart is normal in size. No significant pericardial effusion.   LUNGS, PLEURA, LARGE AIRWAYS: Multiple bilateral lobulated pulmonary nodules/masses, some with calcifications and or cavitation, similar to the previous exam. Subtle aerated debris layer in the dependent right main bronchus. Central airways are otherwise patent. No significant pleural effusion.   UPPER ABDOMEN:  Increased stranding along included portions of the pancreas and in the left upper quadrant including 1.7 cm low-density nodular component image 224/305. Increased size and definition of 4.5 x 3.1 cm rounded hypodensity abutting the inferior aspect of the stomach. Partially calcified left adrenal mass and heterogenous right adrenal mass similar to the prior exam.   BONES:  Degenerative endplate spurring in the thoracic spine.       Slight decreased size of right upper lobe PE since 12/20/2023. No new pulmonary embolism identified.   Multiple bilateral pulmonary nodules/masses and lymphadenopathy without significant change from 12/20/2023.   Increased upper abdominal/peripancreatic fat stranding and 4.5 cm fluid collection indenting the stomach, most consistent with worsening acute pancreatitis with developing pseudocyst. Clinical correlation and further evaluation/follow-up recommended.   Additional findings as described above.   MACRO: None.   Signed by: Shira Mendiola 1/1/2024 9:39 AM Dictation workstation:   [id number]    ECG 12 Lead    Result Date: 12/20/2023  Normal sinus rhythm Rightward axis Borderline ECG When compared with ECG of 20-DEC-2023 10:23, (unconfirmed) Sinus rhythm has replaced Atrial flutter    CT angio chest for pulmonary embolism    Result Date: 12/20/2023  STUDY: CT Angiogram of the Chest, CT Abdomen and Pelvis with IV Contrast; 12/20/2023, 11:22AM. INDICATION: Chest and abdominal pain with persistent nausea and vomiting. COMPARISON: CT CAP 8/25/2023.  ACCESSION NUMBER(S): [id number], [id number] ORDERING CLINICIAN: BANDAR RUFF TECHNIQUE:  CTA of the chest was performed following rapid injection of intravenous contrast.  Images are reviewed and processed at a workstation according to the CT angiogram protocol with 3-D and/or MIP post processing imaging generated.  CT of the abdomen and pelvis was performed with intravenous contrast.  Omnipaque 350 100 mL was administered intravenously. Automated mA/kV exposure control was utilized and patient examination was performed in strict accordance with principles of ALARA. FINDINGS: There is a port entering from the right with the tip at the junction of the superior vena cava with right atrium. CTA CHEST: Pulmonary arteries are adequately opacified.  There is a filling defect in the pulmonary artery anteriorly in the right upper lobe. This is best seen on series 401, slice 120 and is consistent with a pulmonary embolism. The heart is normal in size without pericardial effusion.  There is mediastinal adenopathy with a subcarinal lymph nodes measuring 2.8 cm.  There is bilateral hilar adenopathy. There is no pleural effusion, pleural thickening, or pneumothorax. The airways are patent. There are numerous bilateral pulmonary masses.  Compared to August 25, 2023, these are unchanged. ABDOMEN:  LIVER: No hepatomegaly.  Smooth surface contour.  Normal attenuation.  BILE DUCTS: No intrahepatic or extrahepatic biliary ductal dilatation.  GALLBLADDER: The gallbladder is absent.  STOMACH: No abnormalities identified.  PANCREAS: There is edema surrounding the tail the pancreas.  Correlation with the patient's pancreatic enzymes is recommended.  SPLEEN: No splenomegaly or focal splenic lesion.  ADRENAL GLANDS: There are bilateral adrenal masses.  There is calcification in the mass in the left adrenal.  These are seen in August 2023 and are nonsignificantly changed.  KIDNEYS AND URETERS: Kidneys are normal in size and  location.  No renal or ureteral calculi.  PELVIS:  BLADDER: No abnormalities identified.  REPRODUCTIVE ORGANS: No abnormalities identified.  BOWEL: There is a duodenal diverticulum.  The appendix is identified and is normal.  VESSELS: No abnormalities identified.  Abdominal aorta is normal in caliber.  PERITONEUM/RETROPERITONEUM/LYMPH NODES: No free fluid.  No pneumoperitoneum.  There is thick rimmed fluid collection in the presacral space.  It measures 2.6 x 3.0 cm and is unchanged from the prior exam. No lymphadenopathy.  ABDOMINAL WALL: There is a ventral hernia containing fat.  There is an umbilical hernia containing fat. SOFT TISSUES: No abnormalities identified.  BONES: No acute fracture or aggressive osseous lesion.    Pulmonary embolism in the right upper lobe.  This is a new finding since August 25, 2023.  There is no evidence of right heart strain. Bilateral pulmonary masses with mediastinal and hilar adenopathy not significantly changed from the prior exam. Edema surrounding the tail the pancreas.  Correlation with the patient's pancreatic enzymes is recommended. Bilateral adrenal masses not significantly changed from the prior exam. Presacral thick-walled fluid collection.  This may represent a centrally necrotic mass.  It was seen on the prior exam is unchanged. Critical results were discussed with Dr Adalid Nunez on December 20, 2023 at 12:20 PM Signed by Deandre Rodriguez MD    CT abdomen pelvis w IV contrast    Result Date: 12/20/2023  STUDY: CT Angiogram of the Chest, CT Abdomen and Pelvis with IV Contrast; 12/20/2023, 11:22AM. INDICATION: Chest and abdominal pain with persistent nausea and vomiting. COMPARISON: CT CAP 8/25/2023. ACCESSION NUMBER(S): [id number], [id number] ORDERING CLINICIAN: ADALID RUFF TECHNIQUE:  CTA of the chest was performed following rapid injection of intravenous contrast.  Images are reviewed and processed at a workstation according to the CT angiogram protocol  with 3-D and/or MIP post processing imaging generated.  CT of the abdomen and pelvis was performed with intravenous contrast.  Omnipaque 350 100 mL was administered intravenously. Automated mA/kV exposure control was utilized and patient examination was performed in strict accordance with principles of ALARA. FINDINGS: There is a port entering from the right with the tip at the junction of the superior vena cava with right atrium. CTA CHEST: Pulmonary arteries are adequately opacified.  There is a filling defect in the pulmonary artery anteriorly in the right upper lobe. This is best seen on series 401, slice 120 and is consistent with a pulmonary embolism. The heart is normal in size without pericardial effusion.  There is mediastinal adenopathy with a subcarinal lymph nodes measuring 2.8 cm.  There is bilateral hilar adenopathy. There is no pleural effusion, pleural thickening, or pneumothorax. The airways are patent. There are numerous bilateral pulmonary masses.  Compared to August 25, 2023, these are unchanged. ABDOMEN:  LIVER: No hepatomegaly.  Smooth surface contour.  Normal attenuation.  BILE DUCTS: No intrahepatic or extrahepatic biliary ductal dilatation.  GALLBLADDER: The gallbladder is absent.  STOMACH: No abnormalities identified.  PANCREAS: There is edema surrounding the tail the pancreas.  Correlation with the patient's pancreatic enzymes is recommended.  SPLEEN: No splenomegaly or focal splenic lesion.  ADRENAL GLANDS: There are bilateral adrenal masses.  There is calcification in the mass in the left adrenal.  These are seen in August 2023 and are nonsignificantly changed.  KIDNEYS AND URETERS: Kidneys are normal in size and location.  No renal or ureteral calculi.  PELVIS:  BLADDER: No abnormalities identified.  REPRODUCTIVE ORGANS: No abnormalities identified.  BOWEL: There is a duodenal diverticulum.  The appendix is identified and is normal.  VESSELS: No abnormalities identified.  Abdominal  aorta is normal in caliber.  PERITONEUM/RETROPERITONEUM/LYMPH NODES: No free fluid.  No pneumoperitoneum.  There is thick rimmed fluid collection in the presacral space.  It measures 2.6 x 3.0 cm and is unchanged from the prior exam. No lymphadenopathy.  ABDOMINAL WALL: There is a ventral hernia containing fat.  There is an umbilical hernia containing fat. SOFT TISSUES: No abnormalities identified.  BONES: No acute fracture or aggressive osseous lesion.    Pulmonary embolism in the right upper lobe.  This is a new finding since August 25, 2023.  There is no evidence of right heart strain. Bilateral pulmonary masses with mediastinal and hilar adenopathy not significantly changed from the prior exam. Edema surrounding the tail the pancreas.  Correlation with the patient's pancreatic enzymes is recommended. Bilateral adrenal masses not significantly changed from the prior exam. Presacral thick-walled fluid collection.  This may represent a centrally necrotic mass.  It was seen on the prior exam is unchanged. Critical results were discussed with Dr Adalid Nunez on December 20, 2023 at 12:20 PM Signed by Deandre Rodriguez MD        Assessment/Plan   Principal Problem:    Idiopathic acute pancreatitis without infection or necrosis  Active Problems:    Sinus tachycardia    Malignant neoplasm of colon (CMS/HCC)    Pseudocyst of pancreas due to acute pancreatitis      #Acute pancreatitis with pseudocyst (improving)  #HLD  - Patient reports that he has had abdominal pain for a few weeks now; no nausea, vomiting, or diarrhea  - WBC 14.4 on admission > 9.7 today  - Lipase 106 > 54  - LFTs on admission: alk phos 165, ALT 5, AST 8, TSB 0.6  - Lipid panel: total cholesterol 232, , HDL 37.5, VLDL 22   - Trop 5 > 5  - CTA for PE: Increased upper abdominal/peripancreatic fat stranding and 4.5 cm fluid collection indenting the stomach, most consistent with worsening acute pancreatitis with developing pseudocyst. Clinical    correlation and further evaluation/follow-up recommended.   - Continue meropenem (day 2)  - Zofran PRN for nausea/vomiting  - Pain control: Toradol 15 mg IVP q6h scheduled x 5 days, hydromorphone 1 mg IVP q2h PRN for severe pain   - 2g Na/full liquid diet, advance as tolerated  - Continue NS @ 125 ml/hr - rate reduced from 200 ml/hr   - Repeat LFTs pending; start statin if not significantly elevated   - Nutrition consult  - Surgery following, appreciate recs     #Hypokalemia (resolved)   #Hypomagnesemia (resolved)  - K 3.2 > 4.2   - Mag 1.58 > 2.05  - Encourage PO intake as tolerated  - Daily BMP and mag level     #Rectal cancer with metastases, on chemotherapy  - Patient follows with Dr. Vega, currently on PO and IV chemotherapy- Lonsurf PO   - CTA chest 1/1/24: Multiple bilateral pulmonary nodules/masses and lymphadenopathy without significant change from 12/20/2023.   - CA 19-9 pending per surgery  - Resume outpatient follow up with hem/onc on discharge     #Recent pulmonary embolism   - Patient diagnosed with PE on 12/20/23 in [facility] ED  - CTA chest 1/1/24: Slight decreased size of right upper lobe PE since 12/20/2023. No new pulmonary embolism identified.   - Continue apixaban  - SCDs ordered  - Remains on room air with no respiratory distress    #HTN  #HFrEF, not in acute exacerbation  - Trop 5 > 5 on admission   - Continue losartan and metoprolol succinate   - Echo from 2019 reviewed, LVEF 30% with ICM, patient does not follow with cardiology  - Currently receiving NS @ 125 ml/hr for acute pancreatitis; monitor volume status closely  - On room air, appears euvolemic on exam  - Strict I&Os   - 2g Na diet   - Monitor HR and BP     #Tobacco use    - Nicotine patch ordered  - Continue to encourage cessation on discharge     #Insomnia  - Continue doxepin at bedtime- home med     DVT PPx: Apixaban   GI PPx: Protonix   Diet: 2g Na, full liquid   Code Status: Full code     Disposition: Patient requires inpatient  management at this time. Downgraded to med/surg level of care 1/2/24.     Total accumulated time spent face to face and not face to face preparing to see the patient, obtaining and reviewing separately obtained history; performing a medically appropriate examination and/or evaluation; counseling and educating the patient, family; ordering medications, tests, or procedures; referring and communicating with other health care professionals; documenting clinical information in the patient's medical record; independently interpreting results and communicating the results to the patient, family; and care coordination was 65 minutes.       Radha Oleary PA-C

## 2024-01-02 NOTE — PROGRESS NOTES
"Message sent to RN June: \"Multiple abnormal results came to me, not ordered by me but patient admitted.  Please cancel appointment with me today as he is admitted and verify his appointment with another provider on 1/11 given my upcoming departure.\"  "

## 2024-01-02 NOTE — PROGRESS NOTES
"Misael Hernandez is a 38 y.o. male on day 1 of admission presenting with Idiopathic acute pancreatitis without infection or necrosis.    Subjective   Pain is improved.       Objective     Physical Exam  Constitutional:       Appearance: Normal appearance.   Abdominal:      Palpations: Abdomen is soft. There is no mass.      Tenderness: There is no abdominal tenderness. There is no guarding.      Hernia: No hernia is present.         Last Recorded Vitals  Blood pressure 147/83, pulse 88, temperature 36 °C (96.8 °F), temperature source Temporal, resp. rate 21, height 1.753 m (5' 9\"), weight 86.4 kg (190 lb 7.6 oz), SpO2 94 %.  Intake/Output last 3 Shifts:  I/O last 3 completed shifts:  In: 650 (7.7 mL/kg) [I.V.:550 (6.5 mL/kg); IV Piggyback:100]  Out: 25 (0.3 mL/kg) [Urine:25 (0 mL/kg/hr)]  Weight: 84.4 kg     Relevant Results      Assessment/Plan   Principal Problem:    Idiopathic acute pancreatitis without infection or necrosis  Active Problems:    Sinus tachycardia    Malignant neoplasm of colon (CMS/HCC)    Pseudocyst of pancreas due to acute pancreatitis    Plan-advance diet.  Continue to follow clinically.           Cedric Fernando MD      "

## 2024-01-02 NOTE — NURSING NOTE
Elevated blood reading for 1400 & 1500 reported to ANNALEE Shearer. No new orders. Pt. Resting with eyes closed.

## 2024-01-02 NOTE — CARE PLAN
The patient's goals for the shift include      The clinical goals for the shift include rate pain <3 by end of shift    Over the shift, the patient did not make progress toward the following goals. Pain level was 6 at the lowest on the 1-10 scale Barriers to progression include maintaining medication for pain control. Recommendations to address these barriers include medicate for pain control.

## 2024-01-02 NOTE — CONSULTS
Chest Pain   Associated symptoms include abdominal pain.     This encounter for patient who presented with abdominal pain and noted to have pancreatic inflammation on CT scan.  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  He been nauseated and stopped his Eliquis and presented emergency room on December 20.  He was noted to have a pulmonary embolus and restarted on his Eliquis.  He then presented with abdominal pain which radiated to the chest the pain was constant and did not radiate to his back.  He had nausea.  He had a repeat CT scan which confirmed likely acute pancreatitis with a 4.5 cm collection abutting the stomach.  He denies drinking alcohol.  He has  had a cholecystectomy.  Previous CT scan on 1220 did confirm some edema in the tail of the pancreas.  Past Medical History:   Diagnosis Date    Other conditions influencing health status     Rectal Cancer          Current Facility-Administered Medications:     apixaban (Eliquis) tablet 5 mg, 5 mg, oral, BID, DARWIN Salas-CNP, 5 mg at 01/01/24 2105    doxepin (SINEquan) capsule 10 mg, 10 mg, oral, Nightly PRN, DARWIN Salas-CNP    [START ON 1/2/2024] ergocalciferol (Vitamin D-2) capsule 50,000 Units, 50,000 Units, oral, Weekly, DARWIN Salas-CNP    HYDROmorphone (Dilaudid) injection 1 mg, 1 mg, intravenous, q2h PRN, DARWIN Salas-CNP, 1 mg at 01/01/24 1914    ketorolac (Toradol) injection 15 mg, 15 mg, intravenous, q6h JULIO, DARWIN Salas-CNP, 15 mg at 01/01/24 1735    losartan (Cozaar) tablet 50 mg, 50 mg, oral, Daily, DARWIN Salas-CNP, 50 mg at 01/01/24 1557    meropenem (Merrem) in sodium chloride 0.9 % 100 mL IV 1 g, 1 g, intravenous, q8h, DARWIN Salas-CNP, Stopped at 01/01/24 1628    metoprolol succinate XL (Toprol-XL) 24 hr tablet 50 mg, 50 mg, oral, Daily, Bethany Ramirez APRN-CNP, 50 mg at  01/01/24 1557    nicotine (Nicoderm CQ) 21 mg/24 hr patch 1 patch, 1 patch, transdermal, Daily, RON Salas, 1 patch at 01/01/24 1557    ondansetron (Zofran) tablet 4 mg, 4 mg, oral, q8h PRN **OR** ondansetron (Zofran) injection 4 mg, 4 mg, intravenous, q8h PRN, RON Salas    [START ON 1/2/2024] pantoprazole (ProtoNix) EC tablet 40 mg, 40 mg, oral, Daily before breakfast **OR** [START ON 1/2/2024] pantoprazole (ProtoNix) injection 40 mg, 40 mg, intravenous, Daily before breakfast, RON Salas    polyethylene glycol (Glycolax, Miralax) packet 17 g, 17 g, oral, Daily PRN, RON Salas    potassium chloride CR (Klor-Con M20) ER tablet 40 mEq, 40 mEq, oral, Daily, RON Salas, 40 mEq at 01/01/24 0941    sodium chloride 0.9% infusion, 200 mL/hr, intravenous, Continuous, RON Salas, Last Rate: 200 mL/hr at 01/01/24 2130, 200 mL/hr at 01/01/24 2130    traZODone (Desyrel) tablet 50 mg, 50 mg, oral, Nightly PRN, RON Salas     Allergies   Allergen Reactions    Acetaminophen Nausea And Vomiting    Adhesive Tape-Silicones Unknown        Review of Systems  Review of Systems   Cardiovascular:  Positive for chest pain.   Gastrointestinal:  Positive for abdominal pain.       Objective     Vital signs for last 24 hours:  Temp:  [35.8 °C (96.4 °F)-36.4 °C (97.5 °F)] 36 °C (96.8 °F)  Heart Rate:  [] 95  Resp:  [17-22] 18  BP: (116-138)/(76-98) 132/81    Intake/Output this shift:  I/O this shift:  In: 820 [P.O.:120; I.V.:700]  Out: 200 [Urine:200]    Physical Exam  Physical Exam  Vitals reviewed.   Constitutional:       Appearance: Normal appearance. He is underweight.   HENT:      Head: Normocephalic.   Cardiovascular:      Rate and Rhythm: Normal rate and regular rhythm.      Heart sounds: Normal heart sounds.   Pulmonary:      Effort: Pulmonary effort is normal.      Breath sounds: Normal breath sounds.    Abdominal:      General: Abdomen is flat. There is no distension.      Palpations: Abdomen is soft. There is no mass.      Tenderness: There is abdominal tenderness in the epigastric area. There is no guarding.      Hernia: No hernia is present.      Comments: Midline incision   Genitourinary:     Testes: Normal.   Musculoskeletal:         General: Normal range of motion.      Cervical back: Normal range of motion.   Skin:     General: Skin is warm.   Neurological:      General: No focal deficit present.   Psychiatric:         Mood and Affect: Mood normal.         Labs & Radiology   Labs reviewed.  White blood cell count 14.4.  Alkaline phos is 158.  Lipase 106.    CT abdomen pelvis w IV contrast 12/20/2023    Impression  Pulmonary embolism in the right upper lobe.  This is a new finding  since August 25, 2023.  There is no evidence of right heart strain.  Bilateral pulmonary masses with mediastinal and hilar adenopathy not  significantly changed from the prior exam.  Edema surrounding the tail the pancreas.  Correlation with the  patient's pancreatic enzymes is recommended.  Bilateral adrenal masses not significantly changed from the prior  exam.  Presacral thick-walled fluid collection.  This may represent a  centrally necrotic mass.  It was seen on the prior exam is unchanged.  Critical results were discussed with Dr Adalid Nunez on December 20, 2023 at 12:20 PM  Signed by Deandre Rodriguez MD      Impression  Mild acute pancreatitis.  Plan   Continue IV fluids.  Bowel rest.  Can have clear liquids.  Check CA 19-9.  Will follow.

## 2024-01-02 NOTE — CARE PLAN
The patient's goals for the shift include      The clinical goals for the shift include rate pain < 3 by end of shift    Pt remains NPO except ice chips/ popsicles per order. Intermittent pain and meds given per order. Denies N/V. Remains on antibiotic and IV fluids.

## 2024-01-02 NOTE — DISCHARGE INSTR - OTHER ORDERS
Thank you for choosing [facility] for your Health Care needs.  Also, thank you for allowing us to take you and your families preferences into account when determining your discharge plan.  Stay well!    Your Care Transitions Team Member: Nereyda Wren Amanda or Robin [phone]

## 2024-01-02 NOTE — NURSING NOTE
Pt. Gown changed & bed pad due to urine overflow with urinal.Pt. stated his sweat pants were soiled. Pt. offered & given fresh pajama pants to change into , with disposable underwear & leanna-care products. Offer refused to remove soiled clothing & disposable underwear.

## 2024-01-02 NOTE — TELEPHONE ENCOUNTER
Advise patient not to take his [place]f until he sees his new oncologist 1/11. Per Dr. Vega staff message.        Spoke with patient in [facility]. Advised him not to take his [place]f until he sees his new oncologist on 1/11/24. Provided patient with a copy of the note to remind him when he returns home.

## 2024-01-03 ENCOUNTER — APPOINTMENT (OUTPATIENT)
Dept: HEMATOLOGY/ONCOLOGY | Facility: HOSPITAL | Age: 39
End: 2024-01-03
Payer: MEDICARE

## 2024-01-03 LAB
ALBUMIN SERPL BCP-MCNC: 2.6 G/DL (ref 3.4–5)
ALP SERPL-CCNC: 110 U/L (ref 33–120)
ALT SERPL W P-5'-P-CCNC: 5 U/L (ref 10–52)
ANION GAP SERPL CALC-SCNC: 13 MMOL/L (ref 10–20)
AST SERPL W P-5'-P-CCNC: 7 U/L (ref 9–39)
BILIRUB SERPL-MCNC: 0.4 MG/DL (ref 0–1.2)
BUN SERPL-MCNC: 4 MG/DL (ref 6–23)
CALCIUM SERPL-MCNC: 8.1 MG/DL (ref 8.6–10.3)
CANCER AG19-9 SERPL-ACNC: 332.18 U/ML
CHLORIDE SERPL-SCNC: 101 MMOL/L (ref 98–107)
CO2 SERPL-SCNC: 24 MMOL/L (ref 21–32)
CREAT SERPL-MCNC: 0.35 MG/DL (ref 0.5–1.3)
ERYTHROCYTE [DISTWIDTH] IN BLOOD BY AUTOMATED COUNT: 17.7 % (ref 11.5–14.5)
GFR SERPL CREATININE-BSD FRML MDRD: >90 ML/MIN/1.73M*2
GLUCOSE SERPL-MCNC: 142 MG/DL (ref 74–99)
HCT VFR BLD AUTO: 35.1 % (ref 41–52)
HGB BLD-MCNC: 11.4 G/DL (ref 13.5–17.5)
MAGNESIUM SERPL-MCNC: 1.65 MG/DL (ref 1.6–2.4)
MCH RBC QN AUTO: 28 PG (ref 26–34)
MCHC RBC AUTO-ENTMCNC: 32.5 G/DL (ref 32–36)
MCV RBC AUTO: 86 FL (ref 80–100)
NRBC BLD-RTO: 0 /100 WBCS (ref 0–0)
PLATELET # BLD AUTO: 411 X10*3/UL (ref 150–450)
POTASSIUM SERPL-SCNC: 4 MMOL/L (ref 3.5–5.3)
PROT SERPL-MCNC: 6 G/DL (ref 6.4–8.2)
RBC # BLD AUTO: 4.07 X10*6/UL (ref 4.5–5.9)
SODIUM SERPL-SCNC: 134 MMOL/L (ref 136–145)
WBC # BLD AUTO: 9 X10*3/UL (ref 4.4–11.3)

## 2024-01-03 PROCEDURE — G0378 HOSPITAL OBSERVATION PER HR: HCPCS

## 2024-01-03 PROCEDURE — 37799 UNLISTED PX VASCULAR SURGERY: CPT | Mod: IPSPLIT | Performed by: NURSE PRACTITIONER

## 2024-01-03 PROCEDURE — 85027 COMPLETE CBC AUTOMATED: CPT | Mod: IPSPLIT | Performed by: NURSE PRACTITIONER

## 2024-01-03 PROCEDURE — 1100000001 HC PRIVATE ROOM DAILY: Mod: IPSPLIT

## 2024-01-03 PROCEDURE — C9113 INJ PANTOPRAZOLE SODIUM, VIA: HCPCS | Mod: IPSPLIT | Performed by: NURSE PRACTITIONER

## 2024-01-03 PROCEDURE — 2500000004 HC RX 250 GENERAL PHARMACY W/ HCPCS (ALT 636 FOR OP/ED): Mod: IPSPLIT | Performed by: NURSE PRACTITIONER

## 2024-01-03 PROCEDURE — 2500000002 HC RX 250 W HCPCS SELF ADMINISTERED DRUGS (ALT 637 FOR MEDICARE OP, ALT 636 FOR OP/ED): Mod: IPSPLIT | Performed by: NURSE PRACTITIONER

## 2024-01-03 PROCEDURE — 2500000001 HC RX 250 WO HCPCS SELF ADMINISTERED DRUGS (ALT 637 FOR MEDICARE OP): Mod: IPSPLIT

## 2024-01-03 PROCEDURE — 99233 SBSQ HOSP IP/OBS HIGH 50: CPT

## 2024-01-03 PROCEDURE — 2500000004 HC RX 250 GENERAL PHARMACY W/ HCPCS (ALT 636 FOR OP/ED): Mod: IPSPLIT | Performed by: INTERNAL MEDICINE

## 2024-01-03 PROCEDURE — 83735 ASSAY OF MAGNESIUM: CPT | Mod: IPSPLIT | Performed by: NURSE PRACTITIONER

## 2024-01-03 PROCEDURE — 2500000004 HC RX 250 GENERAL PHARMACY W/ HCPCS (ALT 636 FOR OP/ED): Mod: IPSPLIT

## 2024-01-03 PROCEDURE — 80053 COMPREHEN METABOLIC PANEL: CPT | Mod: IPSPLIT

## 2024-01-03 PROCEDURE — 99232 SBSQ HOSP IP/OBS MODERATE 35: CPT | Performed by: SURGERY

## 2024-01-03 PROCEDURE — 2500000001 HC RX 250 WO HCPCS SELF ADMINISTERED DRUGS (ALT 637 FOR MEDICARE OP): Mod: IPSPLIT | Performed by: NURSE PRACTITIONER

## 2024-01-03 PROCEDURE — S4991 NICOTINE PATCH NONLEGEND: HCPCS | Mod: IPSPLIT | Performed by: NURSE PRACTITIONER

## 2024-01-03 RX ORDER — HYDROMORPHONE HYDROCHLORIDE 1 MG/ML
1 INJECTION, SOLUTION INTRAMUSCULAR; INTRAVENOUS; SUBCUTANEOUS EVERY 4 HOURS PRN
Status: DISCONTINUED | OUTPATIENT
Start: 2024-01-03 | End: 2024-01-04 | Stop reason: HOSPADM

## 2024-01-03 RX ADMIN — HYDROMORPHONE HYDROCHLORIDE 1 MG: 1 INJECTION, SOLUTION INTRAMUSCULAR; INTRAVENOUS; SUBCUTANEOUS at 19:36

## 2024-01-03 RX ADMIN — PANTOPRAZOLE SODIUM 40 MG: 40 INJECTION, POWDER, FOR SOLUTION INTRAVENOUS at 07:00

## 2024-01-03 RX ADMIN — ATORVASTATIN CALCIUM 40 MG: 40 TABLET, FILM COATED ORAL at 21:03

## 2024-01-03 RX ADMIN — APIXABAN 5 MG: 5 TABLET, FILM COATED ORAL at 08:58

## 2024-01-03 RX ADMIN — SODIUM CHLORIDE 125 ML/HR: 9 INJECTION, SOLUTION INTRAVENOUS at 21:03

## 2024-01-03 RX ADMIN — KETOROLAC TROMETHAMINE 15 MG: 15 INJECTION, SOLUTION INTRAMUSCULAR; INTRAVENOUS at 05:45

## 2024-01-03 RX ADMIN — POTASSIUM CHLORIDE 40 MEQ: 1500 TABLET, EXTENDED RELEASE ORAL at 08:58

## 2024-01-03 RX ADMIN — HYDROMORPHONE HYDROCHLORIDE 1 MG: 1 INJECTION, SOLUTION INTRAMUSCULAR; INTRAVENOUS; SUBCUTANEOUS at 03:57

## 2024-01-03 RX ADMIN — HYDROMORPHONE HYDROCHLORIDE 1 MG: 1 INJECTION, SOLUTION INTRAMUSCULAR; INTRAVENOUS; SUBCUTANEOUS at 00:48

## 2024-01-03 RX ADMIN — KETOROLAC TROMETHAMINE 15 MG: 15 INJECTION, SOLUTION INTRAMUSCULAR; INTRAVENOUS at 00:47

## 2024-01-03 RX ADMIN — APIXABAN 5 MG: 5 TABLET, FILM COATED ORAL at 21:03

## 2024-01-03 RX ADMIN — KETOROLAC TROMETHAMINE 15 MG: 15 INJECTION, SOLUTION INTRAMUSCULAR; INTRAVENOUS at 17:00

## 2024-01-03 RX ADMIN — KETOROLAC TROMETHAMINE 15 MG: 15 INJECTION, SOLUTION INTRAMUSCULAR; INTRAVENOUS at 11:36

## 2024-01-03 RX ADMIN — HYDROMORPHONE HYDROCHLORIDE 1 MG: 1 INJECTION, SOLUTION INTRAMUSCULAR; INTRAVENOUS; SUBCUTANEOUS at 08:58

## 2024-01-03 RX ADMIN — METOPROLOL SUCCINATE 50 MG: 25 TABLET, FILM COATED, EXTENDED RELEASE ORAL at 08:58

## 2024-01-03 RX ADMIN — HYDROMORPHONE HYDROCHLORIDE 1 MG: 1 INJECTION, SOLUTION INTRAMUSCULAR; INTRAVENOUS; SUBCUTANEOUS at 06:38

## 2024-01-03 RX ADMIN — HYDROMORPHONE HYDROCHLORIDE 1 MG: 1 INJECTION, SOLUTION INTRAMUSCULAR; INTRAVENOUS; SUBCUTANEOUS at 15:09

## 2024-01-03 RX ADMIN — MEROPENEM 1 G: 1 INJECTION, POWDER, FOR SOLUTION INTRAVENOUS at 15:09

## 2024-01-03 RX ADMIN — MEROPENEM 1 G: 1 INJECTION, POWDER, FOR SOLUTION INTRAVENOUS at 06:36

## 2024-01-03 RX ADMIN — NICOTINE 1 PATCH: 21 PATCH, EXTENDED RELEASE TRANSDERMAL at 08:59

## 2024-01-03 RX ADMIN — SODIUM CHLORIDE 125 ML/HR: 9 INJECTION, SOLUTION INTRAVENOUS at 11:39

## 2024-01-03 RX ADMIN — TRAZODONE HYDROCHLORIDE 50 MG: 50 TABLET ORAL at 21:03

## 2024-01-03 RX ADMIN — LOSARTAN POTASSIUM 50 MG: 50 TABLET, FILM COATED ORAL at 08:58

## 2024-01-03 ASSESSMENT — COGNITIVE AND FUNCTIONAL STATUS - GENERAL
DAILY ACTIVITIY SCORE: 24
MOBILITY SCORE: 24

## 2024-01-03 ASSESSMENT — PAIN SCALES - GENERAL
PAINLEVEL_OUTOF10: 5 - MODERATE PAIN
PAINLEVEL_OUTOF10: 5 - MODERATE PAIN
PAINLEVEL_OUTOF10: 7
PAINLEVEL_OUTOF10: 10 - WORST POSSIBLE PAIN
PAINLEVEL_OUTOF10: 6
PAINLEVEL_OUTOF10: 5 - MODERATE PAIN
PAINLEVEL_OUTOF10: 6

## 2024-01-03 ASSESSMENT — PAIN - FUNCTIONAL ASSESSMENT
PAIN_FUNCTIONAL_ASSESSMENT: 0-10
PAIN_FUNCTIONAL_ASSESSMENT: CPOT (CRITICAL CARE PAIN OBSERVATION TOOL)
PAIN_FUNCTIONAL_ASSESSMENT: 0-10

## 2024-01-03 ASSESSMENT — PAIN DESCRIPTION - LOCATION: LOCATION: ABDOMEN

## 2024-01-03 NOTE — NURSING NOTE
Pt appetite remains poor. Pt. Ate one-half of oatmeal this morning & one-half of a hamburger for lunch. Pt. Medicated for pain control per request.

## 2024-01-03 NOTE — CARE PLAN
The patient's goals for the shift include      The clinical goals for the shift include pt will rate pain at less than 3 this shift    Over the shift, the patient did not make progress toward the following goals. Pt required pain mediciation for stated pain every 2 hours prn as well as scheduled torodol

## 2024-01-03 NOTE — CARE PLAN
The patient's goals for the shift include to have decreased pain level    The clinical goals for the shift include pt will rate pain at 3 or less this shift

## 2024-01-03 NOTE — PROGRESS NOTES
"Misael Hernandez is a 38 y.o. male on day 2 of admission presenting with Idiopathic acute pancreatitis without infection or necrosis.    Subjective     No overnight events reported.     Patient sitting up in a chair this morning, reports improvement in his abdominal pain. He has not had any nausea, vomiting, or diarrhea since admission. He has had poor PO intake on the full liquid diet, states that none of the food on the full liquid list is appealing to him. Discussed with surgery, will advance diet to soft and continue to monitor intake.        Objective     Physical Exam  Constitutional:       General: He is not in acute distress.     Appearance: He is not toxic-appearing.   HENT:      Head: Normocephalic and atraumatic.      Mouth/Throat:      Mouth: Mucous membranes are moist.   Eyes:      Conjunctiva/sclera: Conjunctivae normal.   Cardiovascular:      Rate and Rhythm: Normal rate and regular rhythm.   Pulmonary:      Effort: No respiratory distress.      Breath sounds: Normal breath sounds. No wheezing or rales.   Abdominal:      General: There is no distension.      Palpations: Abdomen is soft.      Tenderness: There is no abdominal tenderness.   Musculoskeletal:         General: No swelling.   Skin:     General: Skin is warm and dry.      Findings: No rash.   Neurological:      Mental Status: He is alert and oriented to person, place, and time.   Psychiatric:         Mood and Affect: Mood normal.         Behavior: Behavior normal.         Last Recorded Vitals  Blood pressure (!) 158/98, pulse 92, temperature 36.5 °C (97.7 °F), temperature source Temporal, resp. rate 20, height 1.753 m (5' 9\"), weight 85.9 kg (189 lb 6 oz), SpO2 95 %.  Intake/Output last 3 Shifts:  I/O last 3 completed shifts:  In: 5072 (59 mL/kg) [P.O.:800; I.V.:3872 (45.1 mL/kg); IV Piggyback:400]  Out: 2110 (24.6 mL/kg) [Urine:2110 (0.7 mL/kg/hr)]  Weight: 85.9 kg     Relevant Results        Scheduled medications  apixaban, 5 mg, oral, " BID  atorvastatin, 40 mg, oral, Nightly  ketorolac, 15 mg, intravenous, q6h JULIO  losartan, 50 mg, oral, Daily  meropenem, 1 g, intravenous, q8h  metoprolol succinate XL, 50 mg, oral, Daily  nicotine, 1 patch, transdermal, Daily  pantoprazole, 40 mg, oral, Daily before breakfast   Or  pantoprazole, 40 mg, intravenous, Daily before breakfast  potassium chloride CR, 40 mEq, oral, Daily      Continuous medications  sodium chloride 0.9%, 125 mL/hr, Last Rate: 125 mL/hr (01/02/24 5439)      PRN medications  PRN medications: doxepin, HYDROmorphone, ondansetron **OR** ondansetron, polyethylene glycol, traZODone    Results for orders placed or performed during the hospital encounter of 01/01/24 (from the past 24 hour(s))   CBC   Result Value Ref Range    WBC 9.0 4.4 - 11.3 x10*3/uL    nRBC 0.0 0.0 - 0.0 /100 WBCs    RBC 4.07 (L) 4.50 - 5.90 x10*6/uL    Hemoglobin 11.4 (L) 13.5 - 17.5 g/dL    Hematocrit 35.1 (L) 41.0 - 52.0 %    MCV 86 80 - 100 fL    MCH 28.0 26.0 - 34.0 pg    MCHC 32.5 32.0 - 36.0 g/dL    RDW 17.7 (H) 11.5 - 14.5 %    Platelets 411 150 - 450 x10*3/uL   Magnesium   Result Value Ref Range    Magnesium 1.65 1.60 - 2.40 mg/dL   Comprehensive Metabolic Panel   Result Value Ref Range    Glucose 142 (H) 74 - 99 mg/dL    Sodium 134 (L) 136 - 145 mmol/L    Potassium 4.0 3.5 - 5.3 mmol/L    Chloride 101 98 - 107 mmol/L    Bicarbonate 24 21 - 32 mmol/L    Anion Gap 13 10 - 20 mmol/L    Urea Nitrogen 4 (L) 6 - 23 mg/dL    Creatinine 0.35 (L) 0.50 - 1.30 mg/dL    eGFR >90 >60 mL/min/1.73m*2    Calcium 8.1 (L) 8.6 - 10.3 mg/dL    Albumin 2.6 (L) 3.4 - 5.0 g/dL    Alkaline Phosphatase 110 33 - 120 U/L    Total Protein 6.0 (L) 6.4 - 8.2 g/dL    AST 7 (L) 9 - 39 U/L    Bilirubin, Total 0.4 0.0 - 1.2 mg/dL    ALT 5 (L) 10 - 52 U/L     ECG 12 lead    Result Date: 1/2/2024  Sinus tachycardia Right superior axis deviation Inferior infarct (cited on or before 01-JAN-2024) Abnormal ECG When compared with ECG of 01-JAN-2024 07:53,  (unconfirmed) No significant change was found    ECG 12 lead    Result Date: 1/2/2024  Sinus tachycardia Right superior axis deviation Inferior infarct , age undetermined Abnormal ECG When compared with ECG of 20-DEC-2023 10:23, (unconfirmed) Vent. rate has increased BY  48 BPM Questionable change in QRS axis    CT angio chest for pulmonary embolism    Result Date: 1/1/2024  Interpreted By:  Shira Mendiola, STUDY: CT ANGIO CHEST FOR PULMONARY EMBOLISM;  1/1/2024 9:12 am   INDICATION: Signs/Symptoms:Chest pain history of PE on Eliquis.   COMPARISON: 12/20/2023   ACCESSION NUMBER(S): [id number]   ORDERING CLINICIAN: DIAMOND FOLEY   TECHNIQUE: CT of the chest was performed. Sagittal and coronal reconstructions were generated.  75 cc Omnipaque 350 intravenous contrast given for the examination.  Multiplanar reconstructions of the pulmonary vessels were created on an independent workstation and provided for review.   FINDINGS:     CHEST WALL AND LOWER NECK: Right anterior chest wall port remains in place with catheter extending to the distal SVC. Mild bilateral gynecomastia. No significant axillary lymphadenopathy.   MEDIASTINUM AND LAUREN:  Lymphadenopathy including dominant probable konstantin mass in the subcarinal space measuring 4.9 x 3.2 cm similar to the previous exam.   HEART AND VESSELS:  Slight decreased size of nonocclusive right upper lobe PE since the prior exam (image 114/305. No new pulmonary artery filling defect identified. The heart is normal in size. No significant pericardial effusion.   LUNGS, PLEURA, LARGE AIRWAYS: Multiple bilateral lobulated pulmonary nodules/masses, some with calcifications and or cavitation, similar to the previous exam. Subtle aerated debris layer in the dependent right main bronchus. Central airways are otherwise patent. No significant pleural effusion.   UPPER ABDOMEN:  Increased stranding along included portions of the pancreas and in the left upper quadrant including 1.7 cm  low-density nodular component image 224/305. Increased size and definition of 4.5 x 3.1 cm rounded hypodensity abutting the inferior aspect of the stomach. Partially calcified left adrenal mass and heterogenous right adrenal mass similar to the prior exam.   BONES:  Degenerative endplate spurring in the thoracic spine.       Slight decreased size of right upper lobe PE since 12/20/2023. No new pulmonary embolism identified.   Multiple bilateral pulmonary nodules/masses and lymphadenopathy without significant change from 12/20/2023.   Increased upper abdominal/peripancreatic fat stranding and 4.5 cm fluid collection indenting the stomach, most consistent with worsening acute pancreatitis with developing pseudocyst. Clinical correlation and further evaluation/follow-up recommended.   Additional findings as described above.   MACRO: None.   Signed by: Shira Mendiola 1/1/2024 9:39 AM Dictation workstation:   [id number]    ECG 12 Lead    Result Date: 12/20/2023  Normal sinus rhythm Rightward axis Borderline ECG When compared with ECG of 20-DEC-2023 10:23, (unconfirmed) Sinus rhythm has replaced Atrial flutter    CT angio chest for pulmonary embolism    Result Date: 12/20/2023  STUDY: CT Angiogram of the Chest, CT Abdomen and Pelvis with IV Contrast; 12/20/2023, 11:22AM. INDICATION: Chest and abdominal pain with persistent nausea and vomiting. COMPARISON: CT [facility] 8/25/2023. ACCESSION NUMBER(S): [id number], [id number] ORDERING CLINICIAN: BANDAR RUFF TECHNIQUE:  CTA of the chest was performed following rapid injection of intravenous contrast.  Images are reviewed and processed at a workstation according to the CT angiogram protocol with 3-D and/or MIP post processing imaging generated.  CT of the abdomen and pelvis was performed with intravenous contrast.  Omnipaque 350 100 mL was administered intravenously. Automated mA/kV exposure control was utilized and patient examination was performed in strict accordance  with principles of ALARA. FINDINGS: There is a port entering from the right with the tip at the junction of the superior vena cava with right atrium. CTA CHEST: Pulmonary arteries are adequately opacified.  There is a filling defect in the pulmonary artery anteriorly in the right upper lobe. This is best seen on series 401, slice 120 and is consistent with a pulmonary embolism. The heart is normal in size without pericardial effusion.  There is mediastinal adenopathy with a subcarinal lymph nodes measuring 2.8 cm.  There is bilateral hilar adenopathy. There is no pleural effusion, pleural thickening, or pneumothorax. The airways are patent. There are numerous bilateral pulmonary masses.  Compared to August 25, 2023, these are unchanged. ABDOMEN:  LIVER: No hepatomegaly.  Smooth surface contour.  Normal attenuation.  BILE DUCTS: No intrahepatic or extrahepatic biliary ductal dilatation.  GALLBLADDER: The gallbladder is absent.  STOMACH: No abnormalities identified.  PANCREAS: There is edema surrounding the tail the pancreas.  Correlation with the patient's pancreatic enzymes is recommended.  SPLEEN: No splenomegaly or focal splenic lesion.  ADRENAL GLANDS: There are bilateral adrenal masses.  There is calcification in the mass in the left adrenal.  These are seen in August 2023 and are nonsignificantly changed.  KIDNEYS AND URETERS: Kidneys are normal in size and location.  No renal or ureteral calculi.  PELVIS:  BLADDER: No abnormalities identified.  REPRODUCTIVE ORGANS: No abnormalities identified.  BOWEL: There is a duodenal diverticulum.  The appendix is identified and is normal.  VESSELS: No abnormalities identified.  Abdominal aorta is normal in caliber.  PERITONEUM/RETROPERITONEUM/LYMPH NODES: No free fluid.  No pneumoperitoneum.  There is thick rimmed fluid collection in the presacral space.  It measures 2.6 x 3.0 cm and is unchanged from the prior exam. No lymphadenopathy.  ABDOMINAL WALL: There is a ventral  hernia containing fat.  There is an umbilical hernia containing fat. SOFT TISSUES: No abnormalities identified.  BONES: No acute fracture or aggressive osseous lesion.    Pulmonary embolism in the right upper lobe.  This is a new finding since August 25, 2023.  There is no evidence of right heart strain. Bilateral pulmonary masses with mediastinal and hilar adenopathy not significantly changed from the prior exam. Edema surrounding the tail the pancreas.  Correlation with the patient's pancreatic enzymes is recommended. Bilateral adrenal masses not significantly changed from the prior exam. Presacral thick-walled fluid collection.  This may represent a centrally necrotic mass.  It was seen on the prior exam is unchanged. Critical results were discussed with Dr Adalid Nunez on December 20, 2023 at 12:20 PM Signed by Deandre Rodriguez MD    CT abdomen pelvis w IV contrast    Result Date: 12/20/2023  STUDY: CT Angiogram of the Chest, CT Abdomen and Pelvis with IV Contrast; 12/20/2023, 11:22AM. INDICATION: Chest and abdominal pain with persistent nausea and vomiting. COMPARISON: CT CAP 8/25/2023. ACCESSION NUMBER(S): [id number], [id number] ORDERING CLINICIAN: ADALID RUFF TECHNIQUE:  CTA of the chest was performed following rapid injection of intravenous contrast.  Images are reviewed and processed at a workstation according to the CT angiogram protocol with 3-D and/or MIP post processing imaging generated.  CT of the abdomen and pelvis was performed with intravenous contrast.  Omnipaque 350 100 mL was administered intravenously. Automated mA/kV exposure control was utilized and patient examination was performed in strict accordance with principles of ALARA. FINDINGS: There is a port entering from the right with the tip at the junction of the superior vena cava with right atrium. CTA CHEST: Pulmonary arteries are adequately opacified.  There is a filling defect in the pulmonary artery anteriorly in the right  upper lobe. This is best seen on series 401, slice 120 and is consistent with a pulmonary embolism. The heart is normal in size without pericardial effusion.  There is mediastinal adenopathy with a subcarinal lymph nodes measuring 2.8 cm.  There is bilateral hilar adenopathy. There is no pleural effusion, pleural thickening, or pneumothorax. The airways are patent. There are numerous bilateral pulmonary masses.  Compared to August 25, 2023, these are unchanged. ABDOMEN:  LIVER: No hepatomegaly.  Smooth surface contour.  Normal attenuation.  BILE DUCTS: No intrahepatic or extrahepatic biliary ductal dilatation.  GALLBLADDER: The gallbladder is absent.  STOMACH: No abnormalities identified.  PANCREAS: There is edema surrounding the tail the pancreas.  Correlation with the patient's pancreatic enzymes is recommended.  SPLEEN: No splenomegaly or focal splenic lesion.  ADRENAL GLANDS: There are bilateral adrenal masses.  There is calcification in the mass in the left adrenal.  These are seen in August 2023 and are nonsignificantly changed.  KIDNEYS AND URETERS: Kidneys are normal in size and location.  No renal or ureteral calculi.  PELVIS:  BLADDER: No abnormalities identified.  REPRODUCTIVE ORGANS: No abnormalities identified.  BOWEL: There is a duodenal diverticulum.  The appendix is identified and is normal.  VESSELS: No abnormalities identified.  Abdominal aorta is normal in caliber.  PERITONEUM/RETROPERITONEUM/LYMPH NODES: No free fluid.  No pneumoperitoneum.  There is thick rimmed fluid collection in the presacral space.  It measures 2.6 x 3.0 cm and is unchanged from the prior exam. No lymphadenopathy.  ABDOMINAL WALL: There is a ventral hernia containing fat.  There is an umbilical hernia containing fat. SOFT TISSUES: No abnormalities identified.  BONES: No acute fracture or aggressive osseous lesion.    Pulmonary embolism in the right upper lobe.  This is a new finding since August 25, 2023.  There is no  evidence of right heart strain. Bilateral pulmonary masses with mediastinal and hilar adenopathy not significantly changed from the prior exam. Edema surrounding the tail the pancreas.  Correlation with the patient's pancreatic enzymes is recommended. Bilateral adrenal masses not significantly changed from the prior exam. Presacral thick-walled fluid collection.  This may represent a centrally necrotic mass.  It was seen on the prior exam is unchanged. Critical results were discussed with Dr Adalid Nunez on December 20, 2023 at 12:20 PM Signed by Deandre Rodriguez MD              Assessment/Plan   Principal Problem:    Idiopathic acute pancreatitis without infection or necrosis  Active Problems:    Sinus tachycardia    Malignant neoplasm of colon (CMS/HCC)    Pseudocyst of pancreas due to acute pancreatitis    #Acute pancreatitis with pseudocyst (improving)  #HLD  - Patient reports that he has had abdominal pain for a few weeks now; no nausea, vomiting, or diarrhea  - WBC 14.4 on admission > 9.7 > 9.0   - Lipase 106 > 54  - LFTs: alk phos 165 > 110, ALT 5 >5, AST 8 >7, TSB 0.6 > 0.4   - Lipid panel: total cholesterol 232, , HDL 37.5, VLDL 22   - Trop 5 > 5  - Albumin 3.5 > 2.6   - CTA for PE: Increased upper abdominal/peripancreatic fat stranding and 4.5 cm fluid collection indenting the stomach, most consistent with worsening acute pancreatitis with developing pseudocyst. Clinical   correlation and further evaluation/follow-up recommended.   - Continue meropenem (day 3)  - Start atorvastatin 40 mg PO at bedtime   - Zofran PRN for nausea/vomiting  - Toradol 15 mg IVP q6h scheduled x 5 days  - Hydromorphone 1 mg IVP PRN for severe pain- frequency decreased to q4h   - 2g Na/soft diet, advance as tolerated  - Continue NS @ 125 ml/hr - rate reduced from 200 ml/hr   - Nutrition consult  - Surgery following, appreciate recs      #Hypokalemia (resolved)   #Hypomagnesemia (resolved)  - Mag 1.65, K 4.0 today   -  Encourage PO intake as tolerated  - Daily BMP and mag level      #Rectal cancer with metastases, on chemotherapy  - Patient follows with Dr. Vega, currently on PO and IV chemotherapy- Lonsurf PO   - CTA chest 1/1/24: Multiple bilateral pulmonary nodules/masses and lymphadenopathy without significant change from 12/20/2023.   - CA 19-9 332.18 (normal range < 35)  - Resume outpatient follow up with hem/onc on discharge      #Recent pulmonary embolism   - Patient diagnosed with PE on 12/20/23 in [place] [facility]  - CTA chest 1/1/24: Slight decreased size of right upper lobe PE since 12/20/2023. No new pulmonary embolism identified.   - Continue apixaban  - SCDs ordered  - Remains on room air with no respiratory distress     #HTN  #HFrEF, not in acute exacerbation  - Trop 5 > 5 on admission   - Continue losartan and metoprolol succinate   - Echo from 2019 reviewed, LVEF 30% with ICM, patient does not follow with cardiology  - Currently receiving NS @ 125 ml/hr for acute pancreatitis; monitor volume status closely  - Strict I&Os: LOS + 3587 ml   - Daily weights: up 2.3 kg since admission, receiving continuous IVF for acute pancreatitis   - 2g Na diet   - Monitor HR and BP   - On room air, appears euvolemic on exam     #Tobacco use   - Nicotine patch ordered  - Continue to encourage cessation on discharge      #Insomnia  - Continue doxepin at bedtime- home med      DVT PPx: Apixaban   GI PPx: Protonix   Diet: 2g Na, soft   Code Status: Full code      Disposition: Patient requires inpatient management at this time. Downgraded to med/surg level of care 1/2/24.      Total accumulated time spent face to face and not face to face preparing to see the patient, obtaining and reviewing separately obtained history; performing a medically appropriate examination and/or evaluation; counseling and educating the patient, family; ordering medications, tests, or procedures; referring and communicating with other health care professionals;  documenting clinical information in the patient's medical record; independently interpreting results and communicating the results to the patient, family; and care coordination was 45 minutes.             Radha Oleary PA-C

## 2024-01-03 NOTE — NURSING NOTE
Pt c/o abdominal pain & requesting pain medication approximately  every 2 hours. Pt. aware of decrease in frequency of pain medication ordered. Pt. Stated good understanding.

## 2024-01-03 NOTE — PROGRESS NOTES
"Misael Hernandez is a 38 y.o. male on day 2 of admission presenting with Idiopathic acute pancreatitis without infection or necrosis.    Subjective   Feels better , less pain        Objective     Physical Exam  Constitutional:       Appearance: Normal appearance.   Abdominal:      Palpations: Abdomen is soft. There is no mass.      Tenderness: There is no abdominal tenderness. There is no guarding.      Hernia: No hernia is present.         Last Recorded Vitals  Blood pressure (!) 161/91, pulse 87, temperature 36.5 °C (97.7 °F), temperature source Temporal, resp. rate 16, height 1.753 m (5' 9\"), weight 86.4 kg (190 lb 7.6 oz), SpO2 97 %.  Intake/Output last 3 Shifts:  I/O last 3 completed shifts:  In: 4100 (47.5 mL/kg) [P.O.:800; I.V.:3000 (34.7 mL/kg); IV Piggyback:300]  Out: 2135 (24.7 mL/kg) [Urine:2135 (0.7 mL/kg/hr)]  Weight: 86.4 kg     Relevant Results      Assessment/Plan   Principal Problem:    Idiopathic acute pancreatitis without infection or necrosis  Active Problems:    Sinus tachycardia    Malignant neoplasm of colon (CMS/HCC)    Pseudocyst of pancreas due to acute pancreatitis    Plan - continue diet. Can be followed conservatively       Cedric Fernando MD      "

## 2024-01-03 NOTE — PROGRESS NOTES
Per IDT rounds, patient may be ready for discharge Thursday.  IMM letter given and explained to patient. Consent/signature obtained and copy given to patient.

## 2024-01-03 NOTE — DISCHARGE INSTR - DIET
Eat 5 to 6 smaller meals and snacks per day on a regular schedule.    Choose foods low in unhealthy fats- avoid fried foods, fast food.    3.  Choose foods high in protein and eat a protein food at each meal- high protein foods include meat, fish, chicken, yogurt, cottage cheese, peanut butter.    4.  Eat a variety of fruits and vegetables    5.  Drink Ensure or Boost daily, as needed, if unable to eat more than 50% of a meal

## 2024-01-03 NOTE — CONSULTS
"Nutrition Assessment Note  Nutrition Assessment      Reason for Assessment  Reason for Assessment: Admission nursing screening (MST = 2 eating poorly, weight loss.)    Per H&P / Progress Notes:  Pt presented to ED from home due to abdominal pain, poor appetite, pt with history of rectal cancer with mets, on chemotherapy.  CTA chest showed \"a slight decrease in size of right upper lobe PE and upper abdominal/peripancreatic fat stranding with a fluid collection, concerning for acute pancreatitis with developing pseudocyst.\"  Na 133 (L); K+ 3.2 (L); Mg 1.58 (L).  He received IV fluids, Zofran, Clear liquid diet and he was admitted to [place] ICU on 1/1 for further treatment of acute pancreatitis.  Surgery consulted.  Diet advanced to full liquids on 1/2 and Easy to Chew on 1/3.  Statin started today for hyperlipidemia.  Current diet order is 2 to 3 grams sodium, Easy to Chew.  NS @ 125 mL/hour    Past Medical History:   Diagnosis Date    Other conditions influencing health status       Rectal Cancer      Multiple DVTs and PEs, most recent PE 12/20/2023, on apixaban   HFrEF with ischemic cardiomyopathy (LVEF 30% in 2019)  Day's esophagus  Opioid use disorder   Depression  Anxiety  HLD  Avascular necrosis of femoral heads  Iron deficiency anemia   Asthma  Essential tremor  Migraines   Tobacco use     Past Surgical History:   Procedure Laterality Date    COLON SURGERY   01/22/2014     Colon Surgery    CT GUIDED IMAGING FOR NEEDLE PLACEMENT   6/27/2018     CT GUIDED IMAGING FOR NEEDLE PLACEMENT [facility] INPATIENT LEGAC[facility]    CT HEAD ANGIO W AND WO IV CONTRAST   9/10/2014     CT HEAD ANGIO W AND WO IV CONTRAST 9/10/2014 [facility] [place]    ESOPHAGOGASTRODUODENOSCOPY   01/22/2014     Diagnostic Esophagogastroduodenoscopy    HAND SURGERY   01/22/2014     Hand Surgery                                                                                                                                                          MR HEAD " "ANGIO W IV CONTRAST   9/10/2014     MR HEAD ANGIO W IV CONTRAST 9/10/2014 [facility] [place]    OTHER SURGICAL HISTORY   07/07/2014     Laparoscopy Total Colectomy W/ Proctectomy, Ileostomy      Cardiac catheterization    Medications Reviewed.    Pertinent Labs:  1/3/23:  H/H 11.4/35.1 (L)  Glu 142 (H)  Na 134 (L)  BUN/Cr 4/0.35 (L)  Ca 8.1 (L)  Alb 2.6 (L)  ALT 5 (L)  AST 7 (L)    Vital Signs:  /68 (BP Location: Right arm, Patient Position: Sitting)   Pulse 98   Temp 36 °C (96.8 °F) (Temporal)   Resp (!) 33   Ht 1.753 m (5' 9.02\")   Wt 85.9 kg (189 lb 6 oz)   SpO2 96%   BMI 27.95 kg/m²        History:  Food and Nutrient History  Energy Intake: Poor < 50 %  Food and Nutrient History: Visited Misael in room, he is up ambulating in room.  Lunch tray noted- he ate 50% of a hamburger and drank 8 oz OJ.  He reports poor appetite prior to admission, says he doesn't eat that much due to chemotherapy.  Denies alcohol consumption.  He denies nausea, stomach pain, difficulty chewing / swallowing.  Reports normal BM 4 days ago.  He confirms 8 lbs unintentional weight loss in 2 weeks;  lbs.  Vitamin/Herbal Supplement Use: none per patient.         Food and Nutrient Administration History  Additional Food and Nutrient Administration History: PO Intake per flowsheet:  1/2- 25% B / 75% L / chocolate milk, OJ                   Anthropometrics:  Height: 175.3 cm (5' 9.02\")  Weight: 85.9 kg (189 lb 6 oz)  BMI (Calculated): 27.95    Weight Change: 0    Weight Change  Weight History / % Weight Change: Weight history per chart:  1/3/23- 85.9 kg; 1/2/23- 86.4 kg; 12/20/23- 88.6 kg (3% loss x 2 weeks); 12/6/23- 92.3 kg (6.9% loss x 1 month)- significant; 10/10/23- 93.7 kg (8.3% loss x 3 months)- significant; 3/7/23- 103 kg (16.6% loss x 10 months)  Significant Weight Loss: Yes  Interpretation of Weight Loss: >5% in 1 month         IBW/kg (Dietitian Calculated): 72.7 kg  Percent of IBW: 118 %       Wt Readings from " "Last 20 Encounters:   01/03/24 85.9 kg (189 lb 6 oz)   12/20/23 88.6 kg (195 lb 5.2 oz)   12/20/23 88.6 kg (195 lb 5.2 oz)   12/06/23 92.3 kg (203 lb 7.8 oz)   12/05/23 89.9 kg (198 lb 4.9 oz)   11/22/23 90.6 kg (199 lb 11.8 oz)   11/21/23 89 kg (196 lb 5.1 oz)   11/08/23 95.4 kg (210 lb 3.3 oz)   11/07/23 94.6 kg (208 lb 7.1 oz)   10/25/23 93 kg (205 lb 0.4 oz)   10/24/23 92.2 kg (203 lb 2.8 oz)   10/11/23 95 kg (209 lb 7 oz)   10/10/23 93.7 kg (206 lb 9.1 oz)   03/22/23 103 kg (227 lb 4.7 oz)   03/21/23 103 kg (226 lb 6.6 oz)   03/20/23 102 kg (225 lb 8.5 oz)   03/08/23 104 kg (228 lb 6.3 oz)   03/07/23 103 kg (227 lb 11.8 oz)   02/22/23 104 kg (228 lb 2.8 oz)   02/21/23 103 kg (227 lb 4.7 oz)                             Energy Needs:  Calculated Energy Needs Using Equations  Height: 175.3 cm (5' 9.02\")  Weight Used for Equation Calculations: 72.7 kg (160 lb 4.4 oz)  [place]- St. Jeor Equation (Overweight or Obese Patients): 1628  Temp: 36 °C (96.8 °F)    Estimated Energy Needs  Total Energy Estimated Needs (kCal): 2180 kCal  Total Estimated Energy Need per Day (kCal/kg): 30 kCal/kg  Method for Estimating Needs: 30 kcal/kg IBW    Estimated Protein Needs  Total Protein Estimated Needs (g): 109 g  Total Protein Estimated Needs (g/kg): 1.5 g/kg  Method for Estimating Needs: 87 to 109 g/day (1.2 to 1.5 g/kg IBW)    Estimated Fluid Needs  Total Fluid Estimated Needs (mL): 2545 mL  Total Fluid Estimated Needs (mL/kg): 35 mL/kg  Method for Estimating Needs: 35 mL/kg IBW or fluid per medical team.         Nutrition Focused Physical Findings:  Subcutaneous Fat Loss  Orbital Fat Pads: Mild-Moderate (slight dark circles and slight hollowing) (mild)  Buccal Fat Pads: Well nourished (full, rounded cheeks)  Triceps: Well nourished (ample fat tissue)  Ribs: Defer    Muscle Wasting  Temporalis: Well nourished (well-defined muscle)  Pectoralis (Clavicular Region): Mild-Moderate (some protrusion of clavicle) " (mild)  Deltoid/Trapezius: Well nourished (rounded appearance at arm, shoulder, neck)  Interosseous: Mild-Moderate (slightly depressed area between thumb and forefinger) (mild)  Trapezius/Infraspinatus/Supraspinatus (Scapular Region): Defer  Quadriceps: Defer  Gastrocnemius: Well nourished (well developed bulbous muscle)    Edema  Edema: none  Edema Location: per NFPE         Physical Findings (Nutrition Deficiency/Toxicity)  Hair: Negative  Eyes: Negative  Nails: Negative  Skin: Positive (lower extremities dark in color, appear dry.)       Nutrition Diagnosis   Malnutrition Diagnosis  Patient has Malnutrition Diagnosis: Yes  Diagnosis Status: New  Malnutrition Diagnosis: Moderate malnutrition related to chronic disease or condition  As Evidenced by: mild muscle loss (pectoralis major, deltoids, interosseous) and mild subcutaneous fat loss (orbital, perioral, below triceps, chest); significant unintentional weight loss of 6.9% x 1 month and 8.3% x 3 months; poor to fair nutritional intake meeting less than 75% of estimated needs for greater than or equal to one month.                                                             Nutrition Interventions/Recommendations   Nutrition Prescription  Individualized Nutrition Prescription Provided for : diet, fluids, oral supplements    Food and/or Nutrient Delivery Interventions  Interventions: Meals and snacks, Medical food supplement    Meals and Snacks: Energy-modified diet, Fat-modified diet, Modify schedule of foods/fluids, Protein-modified diet  Goal: Consider liberalizing diet to Regular due to poor appetite and intake; Encourage intake of protein foods at each meal; Encourage intake of foods low in fat; Recommend small and more frequent meals and snacks.                                Medical Food Supplement: Commercial beverage  Goal: Glucerna TID (660 kcal/30 g protein)                             Additional Interventions: Check weight 2 to 3 times per week.          Coordination of Nutrition Care by a Nutrition Professional  Collaboration and Referral of Nutrition Care: Collaboration by nutrition professional with other providers  Goal: Pt reviewed at IDT Rounds; JAKY Allred    Education Documentation  Nutrition Care Manual, taught by Rhonda Yeboah, RD, LD at 1/3/2024 12:20 PM.  Learner: Patient  Readiness: Acceptance  Method: Explanation, Handout  Response: Needs Reinforcement  Comment: Reviewed Pancreatitis Diet Education, low fat diet, high protein diet, small and more frequent meals and snacks.  Encouraged pt to consume protein foods.  Printed pancreatitis diet education from [facility].             Nutrition Monitoring and Evaluation   Food and Nutrient Related History  Energy Intake: Estimated energy intake  Criteria: Nutritional intake meeting > 75% of estimated needs.    Fluid Intake: Estimated fluid intake  Criteria: Fluid intake meeting estimated needs.    Amount of Food: Medical food intake  Criteria: Pt will consume Glucerna TID with good tolerance.                             Anthropometrics: Body Composition/Growth/Weight History  Weight: Measured weight  Criteria: Pt will maintain stable weight.                   Biochemical Data, Medical Tests and Procedures  Electrolyte and Renal Panel: Sodium, Calcium, serum  Criteria: Lytes wnl                        Nutrition Focused Physical Findings  Adipose: Loss of subcutaneous fat  Criteria: Gain / maintain adipose stores.         Digestive System: Decrease in appetite  Criteria: Improved appetite.    Muscles: Muscle atrophy  Criteria: Gain / maintain lean muscle mass.                   Follow Up  Follow up: Provided inpatient RDN contact information  Last Date of Nutrition Visit: 01/03/24  Nutrition Follow-Up Needed?: 3-5 days, Dietitian to reassess per policy  Follow up Comment: % meals; diet tolerance; ONS

## 2024-01-04 VITALS
DIASTOLIC BLOOD PRESSURE: 99 MMHG | TEMPERATURE: 96.3 F | BODY MASS INDEX: 28.05 KG/M2 | OXYGEN SATURATION: 98 % | HEART RATE: 85 BPM | HEIGHT: 69 IN | RESPIRATION RATE: 18 BRPM | SYSTOLIC BLOOD PRESSURE: 156 MMHG | WEIGHT: 189.38 LBS

## 2024-01-04 LAB
ALBUMIN SERPL BCP-MCNC: 2.7 G/DL (ref 3.4–5)
ALP SERPL-CCNC: 104 U/L (ref 33–120)
ALT SERPL W P-5'-P-CCNC: 6 U/L (ref 10–52)
ANION GAP SERPL CALC-SCNC: 12 MMOL/L (ref 10–20)
AST SERPL W P-5'-P-CCNC: 9 U/L (ref 9–39)
BILIRUB SERPL-MCNC: 0.4 MG/DL (ref 0–1.2)
BUN SERPL-MCNC: 4 MG/DL (ref 6–23)
CALCIUM SERPL-MCNC: 8.5 MG/DL (ref 8.6–10.3)
CHLORIDE SERPL-SCNC: 100 MMOL/L (ref 98–107)
CO2 SERPL-SCNC: 25 MMOL/L (ref 21–32)
CREAT SERPL-MCNC: 0.37 MG/DL (ref 0.5–1.3)
ERYTHROCYTE [DISTWIDTH] IN BLOOD BY AUTOMATED COUNT: 17.7 % (ref 11.5–14.5)
GFR SERPL CREATININE-BSD FRML MDRD: >90 ML/MIN/1.73M*2
GLUCOSE SERPL-MCNC: 171 MG/DL (ref 74–99)
HCT VFR BLD AUTO: 34.6 % (ref 41–52)
HGB BLD-MCNC: 11.3 G/DL (ref 13.5–17.5)
MAGNESIUM SERPL-MCNC: 1.62 MG/DL (ref 1.6–2.4)
MCH RBC QN AUTO: 28.3 PG (ref 26–34)
MCHC RBC AUTO-ENTMCNC: 32.7 G/DL (ref 32–36)
MCV RBC AUTO: 87 FL (ref 80–100)
NRBC BLD-RTO: 0 /100 WBCS (ref 0–0)
PLATELET # BLD AUTO: 375 X10*3/UL (ref 150–450)
POTASSIUM SERPL-SCNC: 4 MMOL/L (ref 3.5–5.3)
PROT SERPL-MCNC: 6 G/DL (ref 6.4–8.2)
RBC # BLD AUTO: 4 X10*6/UL (ref 4.5–5.9)
SODIUM SERPL-SCNC: 133 MMOL/L (ref 136–145)
WBC # BLD AUTO: 8.4 X10*3/UL (ref 4.4–11.3)

## 2024-01-04 PROCEDURE — 80053 COMPREHEN METABOLIC PANEL: CPT | Mod: IPSPLIT

## 2024-01-04 PROCEDURE — 99239 HOSP IP/OBS DSCHRG MGMT >30: CPT | Performed by: INTERNAL MEDICINE

## 2024-01-04 PROCEDURE — 85027 COMPLETE CBC AUTOMATED: CPT | Mod: IPSPLIT

## 2024-01-04 PROCEDURE — 2500000004 HC RX 250 GENERAL PHARMACY W/ HCPCS (ALT 636 FOR OP/ED): Mod: IPSPLIT

## 2024-01-04 PROCEDURE — G0378 HOSPITAL OBSERVATION PER HR: HCPCS

## 2024-01-04 PROCEDURE — 83735 ASSAY OF MAGNESIUM: CPT | Mod: IPSPLIT

## 2024-01-04 PROCEDURE — 2500000001 HC RX 250 WO HCPCS SELF ADMINISTERED DRUGS (ALT 637 FOR MEDICARE OP): Mod: IPSPLIT

## 2024-01-04 PROCEDURE — 99232 SBSQ HOSP IP/OBS MODERATE 35: CPT | Performed by: SURGERY

## 2024-01-04 RX ORDER — ATORVASTATIN CALCIUM 40 MG/1
40 TABLET, FILM COATED ORAL NIGHTLY
Qty: 30 TABLET | Refills: 2 | Status: SHIPPED | OUTPATIENT
Start: 2024-01-04 | End: 2024-06-26

## 2024-01-04 RX ADMIN — METOPROLOL SUCCINATE 50 MG: 25 TABLET, FILM COATED, EXTENDED RELEASE ORAL at 08:32

## 2024-01-04 RX ADMIN — HYDROMORPHONE HYDROCHLORIDE 1 MG: 1 INJECTION, SOLUTION INTRAMUSCULAR; INTRAVENOUS; SUBCUTANEOUS at 04:01

## 2024-01-04 RX ADMIN — PANTOPRAZOLE SODIUM 40 MG: 40 TABLET, DELAYED RELEASE ORAL at 06:30

## 2024-01-04 RX ADMIN — MEROPENEM 1 G: 1 INJECTION, POWDER, FOR SOLUTION INTRAVENOUS at 00:06

## 2024-01-04 RX ADMIN — SODIUM CHLORIDE 125 ML/HR: 9 INJECTION, SOLUTION INTRAVENOUS at 05:09

## 2024-01-04 RX ADMIN — HYDROMORPHONE HYDROCHLORIDE 1 MG: 1 INJECTION, SOLUTION INTRAMUSCULAR; INTRAVENOUS; SUBCUTANEOUS at 08:37

## 2024-01-04 RX ADMIN — LOSARTAN POTASSIUM 50 MG: 50 TABLET, FILM COATED ORAL at 08:32

## 2024-01-04 RX ADMIN — KETOROLAC TROMETHAMINE 15 MG: 15 INJECTION, SOLUTION INTRAMUSCULAR; INTRAVENOUS at 06:30

## 2024-01-04 RX ADMIN — MEROPENEM 1 G: 1 INJECTION, POWDER, FOR SOLUTION INTRAVENOUS at 06:30

## 2024-01-04 RX ADMIN — HYDROMORPHONE HYDROCHLORIDE 1 MG: 1 INJECTION, SOLUTION INTRAMUSCULAR; INTRAVENOUS; SUBCUTANEOUS at 00:01

## 2024-01-04 RX ADMIN — APIXABAN 5 MG: 5 TABLET, FILM COATED ORAL at 08:32

## 2024-01-04 RX ADMIN — KETOROLAC TROMETHAMINE 15 MG: 15 INJECTION, SOLUTION INTRAMUSCULAR; INTRAVENOUS at 01:05

## 2024-01-04 ASSESSMENT — COGNITIVE AND FUNCTIONAL STATUS - GENERAL
DAILY ACTIVITIY SCORE: 24
MOBILITY SCORE: 24

## 2024-01-04 ASSESSMENT — PAIN - FUNCTIONAL ASSESSMENT
PAIN_FUNCTIONAL_ASSESSMENT: 0-10

## 2024-01-04 ASSESSMENT — PAIN SCALES - GENERAL
PAINLEVEL_OUTOF10: 6
PAINLEVEL_OUTOF10: 7
PAINLEVEL_OUTOF10: 10 - WORST POSSIBLE PAIN
PAINLEVEL_OUTOF10: 10 - WORST POSSIBLE PAIN
PAINLEVEL_OUTOF10: 6

## 2024-01-04 ASSESSMENT — PAIN DESCRIPTION - LOCATION
LOCATION: ABDOMEN
LOCATION: ABDOMEN

## 2024-01-04 NOTE — NURSING NOTE
1/3/24 1936: Assumed care of pt. VSS. C/o pain in abdomen 10/10. Given PRN dilauded at this time. Call bell in reach    1/4/24 0001: pt c/o pain in abdomen, given PRN dilauded.    1/4/24 0401: pt continues to c/o pain in abdomen,  8/10. Given PRN dilaudid. Pt states pain medication alleviates pain quickly, and states Toradol helps when administered as well. Call bell in [facility].

## 2024-01-04 NOTE — CARE PLAN
The patient's goals for the shift include to have decreased pain level    The clinical goals for the shift include patient will tolerate IV antibiotics, and remain aferbile    Over the shift, the patient remained free from injury, and tolerated IV antibiotics, remained afebrile. C/o pain throughout the shift. Given PRN dilaudid Q4 hours. Tolerating IV fluids. No nausea/vomiting. Slept intermittently during the night. Ambulated to the bathroom independently.

## 2024-01-04 NOTE — DISCHARGE SUMMARY
Discharge Diagnosis  Idiopathic acute pancreatitis without infection or necrosis    Issues Requiring Follow-Up  Follow up with DR. Fernando in 6 weeks with CT Scan    Follow up with Oncology    Discharge Meds     Your medication list        START taking these medications        Instructions Last Dose Given Next Dose Due   atorvastatin 40 mg tablet  Commonly known as: Lipitor      Take 1 tablet (40 mg) by mouth once daily at bedtime.              CONTINUE taking these medications        Instructions Last Dose Given Next Dose Due   buprenorphine-naloxone 8-2 mg SL tablet  Commonly known as: Suboxone           doxepin 50 mg capsule  Commonly known as: SINEquan           Eliquis 5 mg tablet  Generic drug: apixaban           ergocalciferol 1.25 MG (64344 UT) capsule  Commonly known as: Vitamin D-2      Take 1 capsule (50,000 Units) by mouth 1 (one) time per week for 12 doses.       lidocaine-prilocaine 2.5-2.5 % cream  Commonly known as: Emla      apply topically to affected area if needed       Lonsurf 20-8.19 mg tablet  Generic drug: trifluridine-tipiraciL      TAKE THREE (3) TABLETS BY MOUTH 2 TIMES DAILY FOR 5 DAYS, EVERY 2 WEEKS       losartan 50 mg tablet  Commonly known as: Cozaar           metoprolol succinate XL 25 mg 24 hr tablet  Commonly known as: Toprol-XL           Ventolin HFA 90 mcg/actuation inhaler  Generic drug: albuterol                     Where to Get Your Medications        These medications were sent to [id number] - [place] [id number]      Phone: [phone]   atorvastatin 40 mg tablet         Test Results Pending At Discharge  Pending Labs       No current pending labs.            Hospital Course   #Acute pancreatitis with pseudocyst (improving)  #HLD  - Patient reports that he has had abdominal pain for a few weeks now; no nausea, vomiting, or diarrhea  - WBC 14.4 on admission > 9.7 > 9.0 > 8.4  - Lipase 106 > 54  - LFTs:  alk phos 110 > 104, ALT 5 > 6, AST 7 > 9, TSB 0.6 > 0.4   - Lipid panel: total cholesterol 232, , HDL 37.5, VLDL 22   - Trop 5 > 5  - Albumin 3.5 > 2.6 > 2.7  - CTA for PE: Increased upper abdominal/peripancreatic fat stranding and 4.5 cm fluid collection indenting the stomach, most consistent with worsening acute pancreatitis with developing pseudocyst. Clinical   correlation and further evaluation/follow-up recommended.   - Continue meropenem (day 3)  - Start atorvastatin 40 mg PO at bedtime   - Zofran PRN for nausea/vomiting  - Toradol 15 mg IVP q6h scheduled x 5 days  - Hydromorphone 1 mg IVP PRN for severe pain- frequency decreased to q4h   - 2g Na/soft diet, advance as tolerated  - Continue NS @ 125 ml/hr - rate reduced from 200 ml/hr   - Nutrition consult  - Surgery following, appreciate recs   - Ok to discharge without antibiotics per Dr. Fernando     #Hypokalemia (resolved)   #Hypomagnesemia (resolved)  - Mag 1.65 > 1.62, K 4 > 4  - Encourage PO intake as tolerated  - Daily BMP and mag level      #Rectal cancer with metastases, on chemotherapy  - Patient follows with Dr. Vega, currently on PO and IV chemotherapy- Lonsurf PO   - CTA chest 1/1/24: Multiple bilateral pulmonary nodules/masses and lymphadenopathy without significant change from 12/20/2023.   - CA 19-9 332.18 (normal range < 35)  - Resume outpatient follow up with hem/onc on discharge      #Recent pulmonary embolism   - Patient diagnosed with PE on 12/20/23 in [place] ED  - CTA chest 1/1/24: Slight decreased size of right upper lobe PE since 12/20/2023. No new pulmonary embolism identified.   - Continue apixaban  - SCDs ordered  - Remains on room air with no respiratory distress     #HTN  #HFrEF, not in acute exacerbation  - Trop 5 > 5 on admission   - Continue losartan and metoprolol succinate   - Echo from 2019 reviewed, LVEF 30% with ICM, patient does not follow with cardiology  - Currently receiving NS @ 125 ml/hr for acute pancreatitis;  monitor volume status closely  - Strict I&Os: LOS + 3587 ml   - Daily weights: up 2.3 kg since admission, receiving continuous IVF for acute pancreatitis   - 2g Na diet   - Monitor HR and BP   - On room air, appears euvolemic on exam     #Tobacco use   - Nicotine patch ordered  - Continue to encourage cessation on discharge      #Insomnia  - Continue doxepin at bedtime- home med      DVT PPx: Apixaban   GI PPx: Protonix   Diet: 2g Na, soft   Code Status: Full code     Pertinent Physical Exam At Time of Discharge  Physical Exam  Constitutional:       Appearance: Normal appearance. He is obese.   HENT:      Head: Normocephalic and atraumatic.      Nose: Nose normal.      Mouth/Throat:      Mouth: Mucous membranes are moist.   Eyes:      Extraocular Movements: Extraocular movements intact.   Cardiovascular:      Rate and Rhythm: Normal rate and regular rhythm.      Pulses: Normal pulses.      Heart sounds: Normal heart sounds.   Pulmonary:      Effort: Pulmonary effort is normal.      Breath sounds: Normal breath sounds.   Abdominal:      General: Bowel sounds are normal.      Palpations: Abdomen is soft.   Musculoskeletal:         General: Normal range of motion.      Cervical back: Normal range of motion.   Skin:     General: Skin is warm and dry.      Capillary Refill: Capillary refill takes less than 2 seconds.   Neurological:      Mental Status: He is alert. Mental status is at baseline.   Psychiatric:         Mood and Affect: Mood normal.         Behavior: Behavior normal.       Disposition: Patient was stable to be discharged to home. He will follow up with his PCP, Oncologist, and Dr. Fernando.     Total cumulative time spent in preparation of this discharge including documentation review, coordination of care with the medical team including PT/SW/care coordinators and treating consultants, discussion with patient and pertinent family members and finalization of prescriptions, follow-up appointments, and this  discharge summary was approximately 45 minutes.         Outpatient Follow-Up  Future Appointments   Date Time Provider Department Center   1/11/2024  2:00 PM Sandra Lui MD PhD [id number] [facility]   1/17/2024  9:00 AM INF 02 [facility]   1/31/2024  9:00 AM INF 02 [facility]   2/14/2024  9:00 AM INF 02 [facility]   2/14/2024  4:45 PM Cedric Fernando MD [id number] [facility]         Jennifer Gurrola, APRN-CNP

## 2024-01-04 NOTE — PROGRESS NOTES
Patient medically ready for discharge.  Patient follow up information on discharge instructions.  Patient will be returning home and denies any home care needs. Patient is in agreement with discharge plan.  Siena Courtney RN,TCC

## 2024-01-04 NOTE — PROGRESS NOTES
"Misael Hernandez is a 38 y.o. male on day 3 of admission presenting with Idiopathic acute pancreatitis without infection or necrosis.    Subjective   Had some pain overnight.  Tolerated his diet.       Objective     Physical Exam  Constitutional:       Appearance: Normal appearance.   Abdominal:      Palpations: Abdomen is soft. There is no mass.      Tenderness: There is abdominal tenderness in the epigastric area. There is no guarding.      Hernia: No hernia is present.      Comments: Mild epigastric tenderness         Last Recorded Vitals  Blood pressure (!) 144/93, pulse 96, temperature 36.8 °C (98.2 °F), temperature source Temporal, resp. rate 18, height 1.753 m (5' 9.02\"), weight 85.9 kg (189 lb 6 oz), SpO2 98 %.  Intake/Output last 3 Shifts:  I/O last 3 completed shifts:  In: 2789 (32.5 mL/kg) [P.O.:480; I.V.:1709 (19.9 mL/kg); IV Piggyback:600]  Out: - (0 mL/kg)   Weight: 85.9 kg     Relevant Results                  Malnutrition Diagnosis Status: New  Malnutrition Diagnosis: Moderate malnutrition related to chronic disease or condition  As Evidenced by: mild muscle loss (pectoralis major, deltoids, interosseous) and mild subcutaneous fat loss (orbital, perioral, below triceps, chest); significant unintentional weight loss of 6.9% x 1 month and 8.3% x 3 months; poor to fair nutritional intake meeting less than 75% of estimated needs for greater than or equal to one month.  I agree with the dietitian's malnutrition diagnosis.      Assessment/Plan   Principal Problem:    Idiopathic acute pancreatitis without infection or necrosis  Active Problems:    Sinus tachycardia    Malignant neoplasm of colon (CMS/HCC)    Pseudocyst of pancreas due to acute pancreatitis    Plan-continue nutritional supplementation.  Will follow-up in 6 weeks.  Repeat CT scan of the pancreas with contrast to ensure resolution of pancreatic lesion.             Cedric Fernando MD      "

## 2024-01-07 LAB
ATRIAL RATE: 114 BPM
ATRIAL RATE: 135 BPM
P AXIS: 60 DEGREES
P AXIS: 71 DEGREES
P OFFSET: 195 MS
P OFFSET: 197 MS
P ONSET: 142 MS
P ONSET: 148 MS
PR INTERVAL: 134 MS
PR INTERVAL: 144 MS
Q ONSET: 214 MS
Q ONSET: 215 MS
QRS COUNT: 19 BEATS
QRS COUNT: 23 BEATS
QRS DURATION: 82 MS
QRS DURATION: 86 MS
QT INTERVAL: 294 MS
QT INTERVAL: 336 MS
QTC CALCULATION(BAZETT): 441 MS
QTC CALCULATION(BAZETT): 463 MS
QTC FREDERICIA: 385 MS
QTC FREDERICIA: 416 MS
R AXIS: 242 DEGREES
R AXIS: 248 DEGREES
T AXIS: 70 DEGREES
T AXIS: 73 DEGREES
T OFFSET: 362 MS
T OFFSET: 382 MS
VENTRICULAR RATE: 114 BPM
VENTRICULAR RATE: 135 BPM

## 2024-01-08 ENCOUNTER — APPOINTMENT (OUTPATIENT)
Dept: HEMATOLOGY/ONCOLOGY | Facility: HOSPITAL | Age: 39
End: 2024-01-08
Payer: MEDICARE

## 2024-01-08 ENCOUNTER — HOSPITAL ENCOUNTER (EMERGENCY)
Facility: HOSPITAL | Age: 39
Discharge: ANOTHER HOSPITAL | End: 2024-01-09
Attending: EMERGENCY MEDICINE
Payer: MEDICARE

## 2024-01-08 ENCOUNTER — APPOINTMENT (OUTPATIENT)
Dept: RADIOLOGY | Facility: HOSPITAL | Age: 39
End: 2024-01-08
Payer: MEDICARE

## 2024-01-08 ENCOUNTER — APPOINTMENT (OUTPATIENT)
Dept: CARDIOLOGY | Facility: HOSPITAL | Age: 39
End: 2024-01-08
Payer: MEDICARE

## 2024-01-08 DIAGNOSIS — K65.1 INTRA-ABDOMINAL ABSCESS (MULTI): ICD-10-CM

## 2024-01-08 DIAGNOSIS — Z87.19 HISTORY OF PANCREATITIS: ICD-10-CM

## 2024-01-08 DIAGNOSIS — R07.9 CHEST PAIN, UNSPECIFIED TYPE: Primary | ICD-10-CM

## 2024-01-08 DIAGNOSIS — R10.12 LEFT UPPER QUADRANT ABDOMINAL PAIN: ICD-10-CM

## 2024-01-08 LAB
ALBUMIN SERPL BCP-MCNC: 3.2 G/DL (ref 3.4–5)
ALP SERPL-CCNC: 119 U/L (ref 33–120)
ALT SERPL W P-5'-P-CCNC: 10 U/L (ref 10–52)
AMPHETAMINES UR QL SCN: ABNORMAL
ANION GAP SERPL CALC-SCNC: 14 MMOL/L (ref 10–20)
AST SERPL W P-5'-P-CCNC: 11 U/L (ref 9–39)
BARBITURATES UR QL SCN: ABNORMAL
BASOPHILS # BLD AUTO: 0.13 X10*3/UL (ref 0–0.1)
BASOPHILS NFR BLD AUTO: 0.9 %
BENZODIAZ UR QL SCN: ABNORMAL
BILIRUB SERPL-MCNC: 0.3 MG/DL (ref 0–1.2)
BNP SERPL-MCNC: 17 PG/ML (ref 0–99)
BUN SERPL-MCNC: 6 MG/DL (ref 6–23)
BZE UR QL SCN: ABNORMAL
CALCIUM SERPL-MCNC: 8.9 MG/DL (ref 8.6–10.3)
CANNABINOIDS UR QL SCN: ABNORMAL
CARDIAC TROPONIN I PNL SERPL HS: 5 NG/L (ref 0–20)
CARDIAC TROPONIN I PNL SERPL HS: 6 NG/L (ref 0–20)
CHLORIDE SERPL-SCNC: 98 MMOL/L (ref 98–107)
CO2 SERPL-SCNC: 27 MMOL/L (ref 21–32)
CREAT SERPL-MCNC: 0.5 MG/DL (ref 0.5–1.3)
D DIMER PPP FEU-MCNC: 1910 NG/ML FEU
EGFRCR SERPLBLD CKD-EPI 2021: >90 ML/MIN/1.73M*2
EOSINOPHIL # BLD AUTO: 0.43 X10*3/UL (ref 0–0.7)
EOSINOPHIL NFR BLD AUTO: 2.9 %
ERYTHROCYTE [DISTWIDTH] IN BLOOD BY AUTOMATED COUNT: 17.5 % (ref 11.5–14.5)
FENTANYL+NORFENTANYL UR QL SCN: ABNORMAL
FLUAV RNA RESP QL NAA+PROBE: NOT DETECTED
FLUBV RNA RESP QL NAA+PROBE: NOT DETECTED
GLUCOSE SERPL-MCNC: 219 MG/DL (ref 74–99)
HCT VFR BLD AUTO: 37.9 % (ref 41–52)
HGB BLD-MCNC: 12.2 G/DL (ref 13.5–17.5)
IMM GRANULOCYTES # BLD AUTO: 0.15 X10*3/UL (ref 0–0.7)
IMM GRANULOCYTES NFR BLD AUTO: 1 % (ref 0–0.9)
INR PPP: 2.1 (ref 0.9–1.1)
LIPASE SERPL-CCNC: 104 U/L (ref 9–82)
LYMPHOCYTES # BLD AUTO: 2.27 X10*3/UL (ref 1.2–4.8)
LYMPHOCYTES NFR BLD AUTO: 15.5 %
MCH RBC QN AUTO: 27.7 PG (ref 26–34)
MCHC RBC AUTO-ENTMCNC: 32.2 G/DL (ref 32–36)
MCV RBC AUTO: 86 FL (ref 80–100)
MONOCYTES # BLD AUTO: 1.1 X10*3/UL (ref 0.1–1)
MONOCYTES NFR BLD AUTO: 7.5 %
NEUTROPHILS # BLD AUTO: 10.53 X10*3/UL (ref 1.2–7.7)
NEUTROPHILS NFR BLD AUTO: 72.2 %
NRBC BLD-RTO: 0 /100 WBCS (ref 0–0)
OPIATES UR QL SCN: ABNORMAL
OXYCODONE+OXYMORPHONE UR QL SCN: ABNORMAL
PCP UR QL SCN: ABNORMAL
PLATELET # BLD AUTO: 479 X10*3/UL (ref 150–450)
POTASSIUM SERPL-SCNC: 3.4 MMOL/L (ref 3.5–5.3)
PROT SERPL-MCNC: 7.4 G/DL (ref 6.4–8.2)
PROTHROMBIN TIME: 24 SECONDS (ref 9.8–12.8)
RBC # BLD AUTO: 4.41 X10*6/UL (ref 4.5–5.9)
SARS-COV-2 RNA RESP QL NAA+PROBE: NOT DETECTED
SODIUM SERPL-SCNC: 136 MMOL/L (ref 136–145)
WBC # BLD AUTO: 14.6 X10*3/UL (ref 4.4–11.3)

## 2024-01-08 PROCEDURE — 71045 X-RAY EXAM CHEST 1 VIEW: CPT

## 2024-01-08 PROCEDURE — 83880 ASSAY OF NATRIURETIC PEPTIDE: CPT | Performed by: EMERGENCY MEDICINE

## 2024-01-08 PROCEDURE — 96361 HYDRATE IV INFUSION ADD-ON: CPT

## 2024-01-08 PROCEDURE — 71275 CT ANGIOGRAPHY CHEST: CPT | Performed by: RADIOLOGY

## 2024-01-08 PROCEDURE — 2500000004 HC RX 250 GENERAL PHARMACY W/ HCPCS (ALT 636 FOR OP/ED): Mod: SE

## 2024-01-08 PROCEDURE — 2500000001 HC RX 250 WO HCPCS SELF ADMINISTERED DRUGS (ALT 637 FOR MEDICARE OP): Mod: SE | Performed by: EMERGENCY MEDICINE

## 2024-01-08 PROCEDURE — 2500000004 HC RX 250 GENERAL PHARMACY W/ HCPCS (ALT 636 FOR OP/ED): Mod: SE | Performed by: EMERGENCY MEDICINE

## 2024-01-08 PROCEDURE — 93005 ELECTROCARDIOGRAM TRACING: CPT

## 2024-01-08 PROCEDURE — 71275 CT ANGIOGRAPHY CHEST: CPT

## 2024-01-08 PROCEDURE — 83690 ASSAY OF LIPASE: CPT | Performed by: EMERGENCY MEDICINE

## 2024-01-08 PROCEDURE — 87636 SARSCOV2 & INF A&B AMP PRB: CPT | Performed by: EMERGENCY MEDICINE

## 2024-01-08 PROCEDURE — 74177 CT ABD & PELVIS W/CONTRAST: CPT | Performed by: RADIOLOGY

## 2024-01-08 PROCEDURE — 96374 THER/PROPH/DIAG INJ IV PUSH: CPT

## 2024-01-08 PROCEDURE — 85379 FIBRIN DEGRADATION QUANT: CPT | Performed by: EMERGENCY MEDICINE

## 2024-01-08 PROCEDURE — 84484 ASSAY OF TROPONIN QUANT: CPT | Performed by: EMERGENCY MEDICINE

## 2024-01-08 PROCEDURE — 96375 TX/PRO/DX INJ NEW DRUG ADDON: CPT | Mod: 59

## 2024-01-08 PROCEDURE — 74177 CT ABD & PELVIS W/CONTRAST: CPT

## 2024-01-08 PROCEDURE — 71045 X-RAY EXAM CHEST 1 VIEW: CPT | Performed by: RADIOLOGY

## 2024-01-08 PROCEDURE — 85610 PROTHROMBIN TIME: CPT | Performed by: EMERGENCY MEDICINE

## 2024-01-08 PROCEDURE — 84484 ASSAY OF TROPONIN QUANT: CPT | Mod: 91 | Performed by: EMERGENCY MEDICINE

## 2024-01-08 PROCEDURE — 80307 DRUG TEST PRSMV CHEM ANLYZR: CPT | Performed by: EMERGENCY MEDICINE

## 2024-01-08 PROCEDURE — 99285 EMERGENCY DEPT VISIT HI MDM: CPT | Mod: 25,27 | Performed by: EMERGENCY MEDICINE

## 2024-01-08 PROCEDURE — 85025 COMPLETE CBC W/AUTO DIFF WBC: CPT | Performed by: EMERGENCY MEDICINE

## 2024-01-08 PROCEDURE — 80053 COMPREHEN METABOLIC PANEL: CPT | Performed by: EMERGENCY MEDICINE

## 2024-01-08 PROCEDURE — 2550000001 HC RX 255 CONTRASTS: Mod: SE | Performed by: EMERGENCY MEDICINE

## 2024-01-08 RX ORDER — LOSARTAN POTASSIUM 50 MG/1
50 TABLET ORAL DAILY
Status: DISCONTINUED | OUTPATIENT
Start: 2024-01-08 | End: 2024-01-09 | Stop reason: HOSPADM

## 2024-01-08 RX ORDER — MEROPENEM 1 G/1
INJECTION, POWDER, FOR SOLUTION INTRAVENOUS
Status: COMPLETED
Start: 2024-01-08 | End: 2024-01-08

## 2024-01-08 RX ORDER — POTASSIUM CHLORIDE 14.9 MG/ML
20 INJECTION INTRAVENOUS ONCE
Status: COMPLETED | OUTPATIENT
Start: 2024-01-08 | End: 2024-01-08

## 2024-01-08 RX ORDER — MORPHINE SULFATE 4 MG/ML
INJECTION, SOLUTION INTRAMUSCULAR; INTRAVENOUS
Status: COMPLETED
Start: 2024-01-08 | End: 2024-01-08

## 2024-01-08 RX ORDER — MORPHINE SULFATE 4 MG/ML
4 INJECTION, SOLUTION INTRAMUSCULAR; INTRAVENOUS ONCE
Status: COMPLETED | OUTPATIENT
Start: 2024-01-08 | End: 2024-01-08

## 2024-01-08 RX ORDER — METOPROLOL SUCCINATE 25 MG/1
25 TABLET, EXTENDED RELEASE ORAL DAILY
Status: DISCONTINUED | OUTPATIENT
Start: 2024-01-08 | End: 2024-01-09 | Stop reason: HOSPADM

## 2024-01-08 RX ORDER — ONDANSETRON HYDROCHLORIDE 2 MG/ML
INJECTION, SOLUTION INTRAVENOUS
Status: COMPLETED
Start: 2024-01-08 | End: 2024-01-08

## 2024-01-08 RX ORDER — MORPHINE SULFATE 4 MG/ML
4 INJECTION, SOLUTION INTRAMUSCULAR; INTRAVENOUS EVERY 4 HOURS PRN
Status: DISCONTINUED | OUTPATIENT
Start: 2024-01-08 | End: 2024-01-09 | Stop reason: HOSPADM

## 2024-01-08 RX ORDER — SODIUM CHLORIDE 9 MG/ML
125 INJECTION, SOLUTION INTRAVENOUS CONTINUOUS
Status: DISCONTINUED | OUTPATIENT
Start: 2024-01-08 | End: 2024-01-09 | Stop reason: HOSPADM

## 2024-01-08 RX ORDER — ONDANSETRON HYDROCHLORIDE 2 MG/ML
4 INJECTION, SOLUTION INTRAVENOUS ONCE
Status: COMPLETED | OUTPATIENT
Start: 2024-01-08 | End: 2024-01-08

## 2024-01-08 RX ADMIN — LOSARTAN POTASSIUM 50 MG: 50 TABLET, FILM COATED ORAL at 23:18

## 2024-01-08 RX ADMIN — MORPHINE SULFATE 4 MG: 4 INJECTION, SOLUTION INTRAMUSCULAR; INTRAVENOUS at 15:40

## 2024-01-08 RX ADMIN — MEROPENEM 2 G: 1 INJECTION, POWDER, FOR SOLUTION INTRAVENOUS at 19:12

## 2024-01-08 RX ADMIN — ONDANSETRON 4 MG: 2 INJECTION, SOLUTION INTRAMUSCULAR; INTRAVENOUS at 15:40

## 2024-01-08 RX ADMIN — SODIUM CHLORIDE 1000 ML: 9 INJECTION, SOLUTION INTRAVENOUS at 15:40

## 2024-01-08 RX ADMIN — IOHEXOL 75 ML: 350 INJECTION, SOLUTION INTRAVENOUS at 16:58

## 2024-01-08 RX ADMIN — POTASSIUM CHLORIDE 20 MEQ: 14.9 INJECTION, SOLUTION INTRAVENOUS at 19:51

## 2024-01-08 RX ADMIN — MORPHINE SULFATE 4 MG: 4 INJECTION, SOLUTION INTRAMUSCULAR; INTRAVENOUS at 20:29

## 2024-01-08 RX ADMIN — APIXABAN 5 MG: 5 TABLET, FILM COATED ORAL at 23:18

## 2024-01-08 RX ADMIN — SODIUM CHLORIDE 125 ML/HR: 9 INJECTION, SOLUTION INTRAVENOUS at 15:40

## 2024-01-08 RX ADMIN — ONDANSETRON HYDROCHLORIDE 4 MG: 2 INJECTION, SOLUTION INTRAVENOUS at 15:40

## 2024-01-08 RX ADMIN — METOPROLOL SUCCINATE 25 MG: 25 TABLET, EXTENDED RELEASE ORAL at 23:20

## 2024-01-08 ASSESSMENT — PAIN DESCRIPTION - LOCATION: LOCATION: CHEST

## 2024-01-08 ASSESSMENT — PAIN DESCRIPTION - DESCRIPTORS: DESCRIPTORS: STABBING

## 2024-01-08 ASSESSMENT — PAIN DESCRIPTION - PAIN TYPE: TYPE: ACUTE PAIN

## 2024-01-08 ASSESSMENT — PAIN - FUNCTIONAL ASSESSMENT: PAIN_FUNCTIONAL_ASSESSMENT: 0-10

## 2024-01-08 ASSESSMENT — PAIN SCALES - GENERAL
PAINLEVEL_OUTOF10: 3
PAINLEVEL_OUTOF10: 10 - WORST POSSIBLE PAIN

## 2024-01-08 ASSESSMENT — PAIN DESCRIPTION - FREQUENCY: FREQUENCY: CONSTANT/CONTINUOUS

## 2024-01-08 NOTE — ED PROVIDER NOTES
Department of Emergency Medicine   ED  Provider Note  Admit Date/RoomTime: 1/8/2024  3:08 PM  ED Room: AC03/03                  History of Present Illness:   Misael Hernandez is a 38 y.o. male presenting to the ED for chest pain, beginning this past Thursday when he got released from the hospital.  The complaint has been persistent, moderate in severity, and worsened by  deep inspiration and cough and movement .  Patient has history of lung cancer.  Recent hospitalization for pancreatitis.  Patient was released on Thursday says has been having some left-sided chest pain left shoulder pain that has been progressively worse over the last couple of days.  He describes as a sharp stabbing pain it hurts to cough it hurts to take a deep inspiration, no fever or chills.  Has had cough but minimal phlegm.  He denies abdominal pain he says this pain is different than his pancreatitis pain last week.  He denies any leg pain swelling or calf tenderness.  He has extensive past medical history including lung cancer previous NSTEMI, pleurisy, pneumonia, hypertension, hyperlipidemia, previous DVT and PE.  He is currently on Eliquis.  He says that he has been compliant with his medications.      Review of Systems:   Pertinent positives and review of systems as noted above.  Remaining 10 review of systems is negative or noncontributory to today's episode of care.  Review of Systems   A complete review of systems is otherwise negative except as noted above    --------------------------------------------- PAST HISTORY ---------------------------------------------  Past Medical History:  has a past medical history of Other conditions influencing health status.    Past Surgical History:  has a past surgical history that includes Colon surgery (01/22/2014); Hand surgery (01/22/2014); Esophagogastroduodenoscopy (01/22/2014); Other surgical history (07/07/2014); CT angio head w and wo IV contrast (9/10/2014); MR head angio w IV contrast  (9/10/2014); and CT guided imaging for needle placement (6/27/2018).    Social History:  reports that he has been smoking cigarettes. He has been smoking an average of 1 pack per day. He has never used smokeless tobacco. He reports that he does not currently use alcohol. He reports current drug use. Drug: Marijuana.    Family History: family history includes ADENOCARCINOMA OF THE ESOPHAGUS in his father; Cancer in his maternal grandfather and another family member; Diabetes in his father's brother and maternal grandfather. Unless otherwise noted, family history is non contributory    Patient's Medications   New Prescriptions    No medications on file   Previous Medications    ALBUTEROL (VENTOLIN HFA) 90 MCG/ACTUATION INHALER    INHALE TWO PUFFS INTO THE LUNGS EVERY 6 HOURS AS NEEDED FOR SHORTNESS OF BREATH OR FOR WHEEZING FOR UP TO 30 DAYS    APIXABAN (ELIQUIS) 5 MG TABLET    Take 1 tablet (5 mg) by mouth 2 times a day.    ATORVASTATIN (LIPITOR) 40 MG TABLET    Take 1 tablet (40 mg) by mouth once daily at bedtime.    BUPRENORPHINE-NALOXONE (SUBOXONE) 8-2 MG SL TABLET    Place 1 tablet under the tongue 2 times a day.    DOXEPIN (SINEQUAN) 50 MG CAPSULE    Take 10 mg by mouth as needed at bedtime for sleep.    ERGOCALCIFEROL (VITAMIN D-2) 1.25 MG (91199 UT) CAPSULE    Take 1 capsule (50,000 Units) by mouth 1 (one) time per week for 12 doses.    LIDOCAINE-PRILOCAINE (EMLA) 2.5-2.5 % CREAM    apply topically to affected area if needed    LOSARTAN (COZAAR) 50 MG TABLET    Take 1 tablet (50 mg) by mouth once daily.    METOPROLOL SUCCINATE XL (TOPROL-XL) 25 MG 24 HR TABLET    Take 2 tablets (50 mg) by mouth once daily.    TRIFLURIDINE-TIPIRACIL (LONSURF) 20-8.19 MG TABLET    TAKE THREE (3) TABLETS BY MOUTH 2 TIMES DAILY FOR 5 DAYS, EVERY 2 WEEKS   Modified Medications    No medications on file   Discontinued Medications    No medications on file      The patient’s home medications have been reviewed.    Allergies:  Acetaminophen and Adhesive tape-silicones    -------------------------------------------------- RESULTS -------------------------------------------------  All laboratory and radiology results have been personally reviewed by myself   LABS:  Labs Reviewed   CBC WITH AUTO DIFFERENTIAL - Abnormal       Result Value    WBC 14.6 (*)     nRBC 0.0      RBC 4.41 (*)     Hemoglobin 12.2 (*)     Hematocrit 37.9 (*)     MCV 86      MCH 27.7      MCHC 32.2      RDW 17.5 (*)     Platelets 479 (*)     Neutrophils % 72.2      Immature Granulocytes %, Automated 1.0 (*)     Lymphocytes % 15.5      Monocytes % 7.5      Eosinophils % 2.9      Basophils % 0.9      Neutrophils Absolute 10.53 (*)     Immature Granulocytes Absolute, Automated 0.15      Lymphocytes Absolute 2.27      Monocytes Absolute 1.10 (*)     Eosinophils Absolute 0.43      Basophils Absolute 0.13 (*)    COMPREHENSIVE METABOLIC PANEL - Abnormal    Glucose 219 (*)     Sodium 136      Potassium 3.4 (*)     Chloride 98      Bicarbonate 27      Anion Gap 14      Urea Nitrogen 6      Creatinine 0.50      eGFR >90      Calcium 8.9      Albumin 3.2 (*)     Alkaline Phosphatase 119      Total Protein 7.4      AST 11      Bilirubin, Total 0.3      ALT 10     PROTIME-INR - Abnormal    Protime 24.0 (*)     INR 2.1 (*)    D-DIMER, NON VTE - Abnormal    D-Dimer Non VTE, Quant (ng/mL FEU) 1,910 (*)     Narrative:     The D-Dimer assay is reported in ng/mL Fibrinogen Equivalent Units (FEU). The results of this assay should NOT be used for the exclusion of Deep Vein Thrombosis and/or Pulmonary Embolism.   DRUG SCREEN,URINE - Abnormal    Amphetamine Screen, Urine Presumptive Negative      Barbiturate Screen, Urine Presumptive Negative      Benzodiazepines Screen, Urine Presumptive Negative      Cannabinoid Screen, Urine Presumptive Positive (*)     Cocaine Metabolite Screen, Urine Presumptive Negative      Fentanyl Screen, Urine Presumptive Positive (*)     Opiate Screen, Urine  Presumptive Positive (*)     Oxycodone Screen, Urine Presumptive Negative      PCP Screen, Urine Presumptive Negative      Narrative:     Drug screen results are presumptive and should not be used to assess   compliance with prescribed medication. Contact the performing [facility] laboratory   to add-on definitive confirmatory testing if clinically indicated.    Toxicology screening results are reported qualitatively. The concentration must   be greater than or equal to the cutoff to be reported as positive. The concentration   at which the screening test can detect an individual drug or metabolite varies.   The absence of expected drug(s) and/or drug metabolite(s) may indicate non-compliance,   inappropriate timing of specimen collection relative to drug administration, poor drug   absorption, diluted/adulterated urine, or limitations of testing. For medical purposes   only; not valid for forensic use.    Interpretive questions should be directed to the laboratory medical directors.   LIPASE - Abnormal    Lipase 104 (*)     Narrative:     Venipuncture immediately after or during the administration of Metamizole may lead to falsely low results. Testing should be performed immediately prior to Metamizole dosing.   TROPONIN I, HIGH SENSITIVITY - Normal    Troponin I, High Sensitivity 6      Narrative:     Less than 99th percentile of normal range cutoff-  Female and children under 18 years old <14 ng/L; Male <21 ng/L: Negative  Repeat testing should be performed if clinically indicated.     Female and children under 18 years old 14-50 ng/L; Male 21-50 ng/L:  Consistent with possible cardiac damage and possible increased clinical   risk. Serial measurements may help to assess extent of myocardial damage.     >50 ng/L: Consistent with cardiac damage, increased clinical risk and  myocardial infarction. Serial measurements may help assess extent of   myocardial damage.      NOTE: Children less than 1 year old may have higher  baseline troponin   levels and results should be interpreted in conjunction with the overall   clinical context.     NOTE: Troponin I testing is performed using a different   testing methodology at [facility] than at other   [facility]. Direct result comparisons should only   be made within the same method.   B-TYPE NATRIURETIC PEPTIDE - Normal    BNP 17      Narrative:        <100 pg/mL - Heart failure unlikely  100-299 pg/mL - Intermediate probability of acute heart                  failure exacerbation. Correlate with clinical                  context and patient history.    >=300 pg/mL - Heart Failure likely. Correlate with clinical                  context and patient history.    BNP testing is performed using different testing methodology at [facility] than at other [facility] hospitals. Direct result comparisons should only be made within the same method.      SERIAL TROPONIN-INITIAL - Normal    Troponin I, High Sensitivity 5      Narrative:     Less than 99th percentile of normal range cutoff-  Female and children under 18 years old <14 ng/L; Male <21 ng/L: Negative  Repeat testing should be performed if clinically indicated.     Female and children under 18 years old 14-50 ng/L; Male 21-50 ng/L:  Consistent with possible cardiac damage and possible increased clinical   risk. Serial measurements may help to assess extent of myocardial damage.     >50 ng/L: Consistent with cardiac damage, increased clinical risk and  myocardial infarction. Serial measurements may help assess extent of   myocardial damage.      NOTE: Children less than 1 year old may have higher baseline troponin   levels and results should be interpreted in conjunction with the overall   clinical context.     NOTE: Troponin I testing is performed using a different   testing methodology at [facility] than at other   [facility]. Direct result comparisons should only   be made within the same  method.   SARS-COV-2 AND INFLUENZA A/B PCR - Normal    Flu A Result Not Detected      Flu B Result Not Detected      Coronavirus 2019, PCR Not Detected      Narrative:     This assay has received FDA Emergency Use Authorization (EUA) and  is only authorized for the duration of time that circumstances exist to justify the authorization of the emergency use of in vitro diagnostic tests for the detection of SARS-CoV-2 virus and/or diagnosis of COVID-19 infection under section 564(b)(1) of the Act, 21 U.S.C. 360bbb-3(b)(1). Testing for SARS-CoV-2 is only recommended for patients who meet current clinical and/or epidemiological criteria as defined by federal, state, or local public health directives. This assay is an in vitro diagnostic nucleic acid amplification test for the qualitative detection of SARS-CoV-2, Influenza A, and Influenza B from nasopharyngeal specimens and has been validated for use at [facility]. Negative results do not preclude COVID-19 infections or Influenza A/B infections, and should not be used as the sole basis for diagnosis, treatment, or other management decisions. If Influenza A/B and RSV PCR results are negative, testing for Parainfluenza virus, Adenovirus and Metapneumovirus is routinely performed for [facility] [place] [facility]ology and intensive care inpatients, and is available on other patients by placing an add-on request.    TROPONIN SERIES- (INITIAL, 1 HR)    Narrative:     The following orders were created for panel order Troponin Series, (0, 1 HR).  Procedure                               Abnormality         Status                     ---------                               -----------         ------                     Troponin I, High Sensiti...[[id number]]  Normal              Final result               Troponin, High Sensitivi...[[id number]]                                                   Please view results for these tests on the individual orders.   SERIAL  TROPONIN, 1 HOUR         RADIOLOGY:  Interpreted by Radiologist.  CT angio chest for pulmonary embolism   Final Result   1. Stable pulmonary emboli in the segmental and subsegmental arteries   to the right upper lobe. No new pulmonary embolism.   2. No right heart strain.   3. Slight interval progression in pulmonary metastases and right   hilar adenopathy.   4. Stable mediastinal adenopathy.   5. Interval development of free fluid collections in the left   anterior abdomen and greater curvature of the stomach, likely   abscesses or infected pancreatic pseudocyst.   6. Questionable acute pancreatitis. Correlation with serum amylase   and lipase levels is recommended.   7. Stable bilateral adrenal masses.   8. A small fluid collection anterior to the distal sacrum, unchanged.   This is likely an abscess.        MACRO:   None        Signed by: Ovidio Garcia 1/8/2024 5:50 PM   Dictation workstation:   [id number]      CT abdomen pelvis w IV contrast   Final Result   1. Stable pulmonary emboli in the segmental and subsegmental arteries   to the right upper lobe. No new pulmonary embolism.   2. No right heart strain.   3. Slight interval progression in pulmonary metastases and right   hilar adenopathy.   4. Stable mediastinal adenopathy.   5. Interval development of free fluid collections in the left   anterior abdomen and greater curvature of the stomach, likely   abscesses or infected pancreatic pseudocyst.   6. Questionable acute pancreatitis. Correlation with serum amylase   and lipase levels is recommended.   7. Stable bilateral adrenal masses.   8. A small fluid collection anterior to the distal sacrum, unchanged.   This is likely an abscess.        MACRO:   None        Signed by: Ovidio Garcia 1/8/2024 5:50 PM   Dictation workstation:   [id number]      XR chest 1 view   Final Result   No focal infiltrate or pneumothorax is identified.        MACRO:   None.        Signed by: Pratik Dia 1/8/2024 3:34 PM  "  Dictation workstation:   [id number]          Encounter Date: 01/01/24   ECG 12 lead   Result Value    Ventricular Rate 114    Atrial Rate 114    CT Interval 144    QRS Duration 86    QT Interval 336    QTC Calculation(Bazett) 463    P Axis 60    R Axis 248    T Axis 70    QRS Count 19    Q Onset 214    P Onset 142    P Offset 195    T Offset 382    QTC Fredericia 416    Narrative    Sinus tachycardia  Right superior axis deviation  Inferior infarct (cited on or before 01-JAN-2024)  Abnormal ECG  When compared with ECG of 01-JAN-2024 07:53, (unconfirmed)  No significant change was found  See ED provider note for full interpretation and clinical correlation  Confirmed by Landon Parada ([id number]) on 1/7/2024 10:27:56 PM     ------------------------- NURSING NOTES AND VITALS REVIEWED ---------------------------   The nursing notes within the ED encounter and vital signs as below have been reviewed.   BP (!) 159/101   Pulse (!) 116   Temp 36.7 °C (98 °F) (Oral)   Resp 19   Ht 1.753 m (5' 9\")   Wt 86 kg (189 lb 9.5 oz)   SpO2 97%   BMI 28.00 kg/m²   Oxygen Saturation Interpretation: Normal      ---------------------------------------------------PHYSICAL EXAM--------------------------------------  Physical Exam  Vitals and nursing note reviewed.   Constitutional:       General: He is not in acute distress.     Appearance: He is well-developed. He is not ill-appearing or toxic-appearing.   HENT:      Head: Normocephalic and atraumatic.   Eyes:      Extraocular Movements: Extraocular movements intact.      Conjunctiva/sclera: Conjunctivae normal.      Pupils: Pupils are equal, round, and reactive to light.   Neck:      Thyroid: No thyromegaly.      Vascular: No hepatojugular reflux or JVD.   Cardiovascular:      Rate and Rhythm: Normal rate and regular rhythm.      Chest Wall: PMI is not displaced.      Pulses:           Carotid pulses are 2+ on the right side and 2+ on the left side.       Radial pulses are 2+ on " the right side and 2+ on the left side.        Dorsalis pedis pulses are 2+ on the right side and 2+ on the left side.        Posterior tibial pulses are 2+ on the right side and 2+ on the left side.      Heart sounds: Normal heart sounds. Heart sounds not distant. No murmur heard.     No systolic murmur is present.   Pulmonary:      Effort: Pulmonary effort is normal. No respiratory distress.      Breath sounds: Examination of the right-middle field reveals decreased breath sounds. Examination of the left-middle field reveals decreased breath sounds. Examination of the right-lower field reveals decreased breath sounds. Examination of the left-lower field reveals decreased breath sounds. Decreased breath sounds present. No wheezing, rhonchi or rales.   Chest:      Chest wall: Tenderness present. No mass, deformity, crepitus or edema.   Abdominal:      General: Bowel sounds are normal.      Palpations: Abdomen is soft.      Tenderness: There is no abdominal tenderness.   Musculoskeletal:         General: No swelling. Normal range of motion.      Cervical back: Normal range of motion and neck supple.      Right lower leg: No tenderness. No edema.      Left lower leg: No tenderness. No edema.   Skin:     General: Skin is warm and dry.      Capillary Refill: Capillary refill takes less than 2 seconds.      Coloration: Skin is not cyanotic.      Findings: No ecchymosis.      Nails: There is no clubbing.   Neurological:      Mental Status: He is alert and oriented to person, place, and time.   Psychiatric:         Mood and Affect: Mood normal.            Procedures  None  ------------------------------ ED COURSE/MEDICAL DECISION MAKING----------------------    Medical Decision Making:   Was seen and examined by me.  An IV is started.  Appropriate labs are ordered.  Today's EKG is similar to EKG on 1/1/2024.  He has borderline right axis deviation.  No acute ST-T change.  No STEMI.  Sinus tachycardia rate 117 bpm.    ED  Course as of 01/08/24 2310   Mon Jan 08, 2024   1526 An EKG at 1514 interpreted by me at 1520.  Sinus tachycardia rate 117 bpm.  Axis normal.   ms QRS 80 ms  ms.  Nonspecific ST-T change.  Left posterior fascicular block.  No acute ST segment elevation.  No STEMI. [EC]   1725 CBC with Differential(!)  White blood cell count 14.6, hemoglobin 12.2, hematocrit 37.9, platelet count 4 and 79,000, 10.5 neutrophils [EC]   1726 Comprehensive Metabolic Panel(!)  Glucose 219, sodium 136, potassium 3.4, chloride 98, bicarb 27, anion gap 14  BUN 6, creatinine 0.50, GFR greater than 90  AST, ALT, total bilirubin are normal. [EC]   1726 Sars-CoV-2 and Influenza A/B PCR  Influenza A/B is not detected/not detected,  Coronavirus is not detected [EC]   1726 Troponin I, High Sensitivity  First troponin is normal at 6, [EC]   1727 Protime-INR(!)  INR is 2.1 [EC]   1727 D-dimer, quantitative(!)  Quantitative D-dimer is elevated 1910  Patient has history of DVT/PE and has sharp pleuritic pain.  I have ordered a CTA of her chest to rule out PE [EC]   1727 Lipase(!)  Lipase 104 [EC]   1727 Chest x-ray shows no infiltrate effusion pneumothorax no acute cardiopulmonary process. [EC]   1851 CT chest abdomen pelvis  IMPRESSION:  1. Stable pulmonary emboli in the segmental and subsegmental arteries  to the right upper lobe. No new pulmonary embolism.  2. No right heart strain.  3. Slight interval progression in pulmonary metastases and right  hilar adenopathy.  4. Stable mediastinal adenopathy.  5. Interval development of free fluid collections in the left  anterior abdomen and greater curvature of the stomach, likely  abscesses or infected pancreatic pseudocyst.  6. Questionable acute pancreatitis. Correlation with serum amylase  and lipase levels is recommended.  7. Stable bilateral adrenal masses.  8. A small fluid collection anterior to the distal sacrum, unchanged.  This is likely an abscess.   [EC]   1853 Ct abdomen  continued:  Abdomen and pelvis:  Unremarkable liver, spleen, and bilateral kidneys. Bilateral adrenal  masses, unchanged. Gallbladder not seen. Stable subcentimeter  hypodensity in the pancreatic tail. Minimal fat stranding around the  pancreas with trace fluid in the left anterior pararenal space,  suspicious for acute pancreatitis. Fat containing ventral abdominal  wall hernias. Normal appendix. Bowel loops nonobstructed. No free air  or free fluid. Interval development of a fluid collection in the left  anterolateral abdomen measuring 3.7 x 3.0 cm. 2 fluid collections  associated with the greater curvature of the stomach measuring up to  4.7 and 2.3 cm. These are probably abscesses or infected pancreatic  pseudocysts. Vascular calcification. No abdominal aortic aneurysm.  Sections through the pelvis demonstrate an incompletely distended  urinary bladder. Prostate normal in size. Stable fluid collection  anterior to distal sacrum measuring 2.5 x 1.0 cm on image number 123.  No lytic or sclerotic lesion in the regional skeleton. Bilateral pars  defects of L5. No spondylolisthesis.   [EC]      ED Course User Index  [EC] John Roberts DO         Diagnoses as of 01/08/24 2310   Chest pain, unspecified type   Left upper quadrant abdominal pain   Intra-abdominal abscess (CMS/HCC)   History of pancreatitis      Patient does have leukocytosis.  Patient does have ongoing pulmonary embolism but there has been no change in this.  No recent new embolisms.  He is on Eliquis twice a day.  He denies fever or chills.  CT imaging of the abdomen does show a fluid collection in the left anterolateral abdomen measuring 3.7 x 3 cm.  There are also 2 fluid collections associated with the greater curvature of the stomach measuring up to 4.7 and 2.3 cm these are probably abscesses or infected pancreatic pseudocyst.  I did discuss the findings with our general surgeon Dr CAR Fernando:  he does know this patient.  Recommends transfer to   [facility] for possible interventional radiology drainage.  In the interim we will start him on IV meropenem 2 g every 8 hours.  I have reviewed the findings with the patient.  I do not think his current pain is from his heart.  I do not think he has new pulmonary embolisms.  I think the pain is coming from his intra-abdominal abscesses.    I did discuss the case with with Dr. Zac Houes at [facility] and patient was accepted in transfer.  Patient blood pressure is creeping up.  Patient is uncertain if he took his blood pressure meds today.  I did review his home-going medications from his discharge on January 4 and I put in for his Cozaar 50 mg once a day and his metoprolol succinate 25 mg once a day.  Patient will be signed out to the oncoming physician Dr. David Blanc on shift change.   Patient pending transfer to [facility]    Counseling:   The emergency provider has spoken with the patient and discussed today’s results, in addition to providing specific details for the plan of care and counseling regarding the diagnosis and prognosis.  Questions are answered at this time and they are agreeable with the plan.      --------------------------------- IMPRESSION AND DISPOSITION ---------------------------------        IMPRESSION  1. Chest pain, unspecified type    2. Left upper quadrant abdominal pain    3. Intra-abdominal abscess (CMS/HCC)    4. History of pancreatitis        DISPOSITION  Disposition: Transfer to [facility] to service of [facility]  Patient condition is stable      Billing Provider Critical Care Time: 0 minutes     John Roberts DO  01/08/24 3020

## 2024-01-09 ENCOUNTER — DOCUMENTATION (OUTPATIENT)
Dept: HEMATOLOGY/ONCOLOGY | Facility: HOSPITAL | Age: 39
End: 2024-01-09
Payer: MEDICARE

## 2024-01-09 ENCOUNTER — SPECIALTY PHARMACY (OUTPATIENT)
Dept: PHARMACY | Facility: CLINIC | Age: 39
End: 2024-01-09

## 2024-01-09 ENCOUNTER — HOSPITAL ENCOUNTER (INPATIENT)
Facility: HOSPITAL | Age: 39
LOS: 6 days | Discharge: HOME | DRG: 438 | End: 2024-01-15
Attending: STUDENT IN AN ORGANIZED HEALTH CARE EDUCATION/TRAINING PROGRAM | Admitting: STUDENT IN AN ORGANIZED HEALTH CARE EDUCATION/TRAINING PROGRAM
Payer: MEDICARE

## 2024-01-09 ENCOUNTER — APPOINTMENT (OUTPATIENT)
Dept: CARDIOLOGY | Facility: HOSPITAL | Age: 39
End: 2024-01-09
Payer: MEDICARE

## 2024-01-09 VITALS
TEMPERATURE: 98 F | BODY MASS INDEX: 28.08 KG/M2 | OXYGEN SATURATION: 93 % | RESPIRATION RATE: 19 BRPM | HEART RATE: 104 BPM | SYSTOLIC BLOOD PRESSURE: 162 MMHG | WEIGHT: 189.6 LBS | DIASTOLIC BLOOD PRESSURE: 103 MMHG | HEIGHT: 69 IN

## 2024-01-09 DIAGNOSIS — K85.90 PANCREATIC ABSCESS (HHS-HCC): Primary | ICD-10-CM

## 2024-01-09 DIAGNOSIS — I48.91 ATRIAL FIBRILLATION, UNSPECIFIED TYPE (MULTI): ICD-10-CM

## 2024-01-09 DIAGNOSIS — E11.9 TYPE 2 DIABETES MELLITUS WITHOUT COMPLICATION, WITHOUT LONG-TERM CURRENT USE OF INSULIN (MULTI): ICD-10-CM

## 2024-01-09 LAB
ALBUMIN SERPL BCP-MCNC: 3 G/DL (ref 3.4–5)
ALP SERPL-CCNC: 91 U/L (ref 33–120)
ALT SERPL W P-5'-P-CCNC: 8 U/L (ref 10–52)
AMYLASE SERPL-CCNC: 70 U/L (ref 29–103)
ANION GAP SERPL CALC-SCNC: 14 MMOL/L (ref 10–20)
APTT PPP: 39 SECONDS (ref 27–38)
AST SERPL W P-5'-P-CCNC: 10 U/L (ref 9–39)
BASOPHILS # BLD AUTO: 0.11 X10*3/UL (ref 0–0.1)
BASOPHILS NFR BLD AUTO: 0.8 %
BILIRUB SERPL-MCNC: 0.5 MG/DL (ref 0–1.2)
BUN SERPL-MCNC: 3 MG/DL (ref 6–23)
CALCIUM SERPL-MCNC: 9 MG/DL (ref 8.6–10.6)
CHLORIDE SERPL-SCNC: 99 MMOL/L (ref 98–107)
CO2 SERPL-SCNC: 25 MMOL/L (ref 21–32)
CREAT SERPL-MCNC: 0.33 MG/DL (ref 0.5–1.3)
EGFRCR SERPLBLD CKD-EPI 2021: >90 ML/MIN/1.73M*2
EOSINOPHIL # BLD AUTO: 0.3 X10*3/UL (ref 0–0.7)
EOSINOPHIL NFR BLD AUTO: 2.3 %
ERYTHROCYTE [DISTWIDTH] IN BLOOD BY AUTOMATED COUNT: 17.4 % (ref 11.5–14.5)
EST. AVERAGE GLUCOSE BLD GHB EST-MCNC: 206 MG/DL
GLUCOSE BLD MANUAL STRIP-MCNC: 212 MG/DL (ref 74–99)
GLUCOSE SERPL-MCNC: 166 MG/DL (ref 74–99)
HBA1C MFR BLD: 8.8 %
HCT VFR BLD AUTO: 33.7 % (ref 41–52)
HGB BLD-MCNC: 11.5 G/DL (ref 13.5–17.5)
IMM GRANULOCYTES # BLD AUTO: 0.12 X10*3/UL (ref 0–0.7)
IMM GRANULOCYTES NFR BLD AUTO: 0.9 % (ref 0–0.9)
INR PPP: 1.9 (ref 0.9–1.1)
LIPASE SERPL-CCNC: 68 U/L (ref 9–82)
LYMPHOCYTES # BLD AUTO: 1.71 X10*3/UL (ref 1.2–4.8)
LYMPHOCYTES NFR BLD AUTO: 12.9 %
MAGNESIUM SERPL-MCNC: 1.5 MG/DL (ref 1.6–2.4)
MCH RBC QN AUTO: 29.4 PG (ref 26–34)
MCHC RBC AUTO-ENTMCNC: 34.1 G/DL (ref 32–36)
MCV RBC AUTO: 86 FL (ref 80–100)
MONOCYTES # BLD AUTO: 1.07 X10*3/UL (ref 0.1–1)
MONOCYTES NFR BLD AUTO: 8.1 %
NEUTROPHILS # BLD AUTO: 9.91 X10*3/UL (ref 1.2–7.7)
NEUTROPHILS NFR BLD AUTO: 75 %
NRBC BLD-RTO: 0 /100 WBCS (ref 0–0)
PLATELET # BLD AUTO: 384 X10*3/UL (ref 150–450)
POTASSIUM SERPL-SCNC: 3.5 MMOL/L (ref 3.5–5.3)
PROT SERPL-MCNC: 7 G/DL (ref 6.4–8.2)
PROTHROMBIN TIME: 21.2 SECONDS (ref 9.8–12.8)
RBC # BLD AUTO: 3.91 X10*6/UL (ref 4.5–5.9)
SODIUM SERPL-SCNC: 134 MMOL/L (ref 136–145)
UFH PPP CHRO-ACNC: 0.2 IU/ML
UFH PPP CHRO-ACNC: 0.5 IU/ML
WBC # BLD AUTO: 13.2 X10*3/UL (ref 4.4–11.3)

## 2024-01-09 PROCEDURE — 85520 HEPARIN ASSAY: CPT

## 2024-01-09 PROCEDURE — 2500000004 HC RX 250 GENERAL PHARMACY W/ HCPCS (ALT 636 FOR OP/ED): Mod: SE

## 2024-01-09 PROCEDURE — 36415 COLL VENOUS BLD VENIPUNCTURE: CPT

## 2024-01-09 PROCEDURE — 82150 ASSAY OF AMYLASE: CPT

## 2024-01-09 PROCEDURE — 99222 1ST HOSP IP/OBS MODERATE 55: CPT | Performed by: NURSE PRACTITIONER

## 2024-01-09 PROCEDURE — 83036 HEMOGLOBIN GLYCOSYLATED A1C: CPT

## 2024-01-09 PROCEDURE — 82947 ASSAY GLUCOSE BLOOD QUANT: CPT

## 2024-01-09 PROCEDURE — 93010 ELECTROCARDIOGRAM REPORT: CPT | Performed by: INTERNAL MEDICINE

## 2024-01-09 PROCEDURE — 2500000004 HC RX 250 GENERAL PHARMACY W/ HCPCS (ALT 636 FOR OP/ED): Mod: SE | Performed by: EMERGENCY MEDICINE

## 2024-01-09 PROCEDURE — 96376 TX/PRO/DX INJ SAME DRUG ADON: CPT

## 2024-01-09 PROCEDURE — 2500000004 HC RX 250 GENERAL PHARMACY W/ HCPCS (ALT 636 FOR OP/ED)

## 2024-01-09 PROCEDURE — 87040 BLOOD CULTURE FOR BACTERIA: CPT

## 2024-01-09 PROCEDURE — 85730 THROMBOPLASTIN TIME PARTIAL: CPT

## 2024-01-09 PROCEDURE — 83690 ASSAY OF LIPASE: CPT

## 2024-01-09 PROCEDURE — 99222 1ST HOSP IP/OBS MODERATE 55: CPT

## 2024-01-09 PROCEDURE — 99223 1ST HOSP IP/OBS HIGH 75: CPT

## 2024-01-09 PROCEDURE — 85025 COMPLETE CBC W/AUTO DIFF WBC: CPT

## 2024-01-09 PROCEDURE — 93005 ELECTROCARDIOGRAM TRACING: CPT

## 2024-01-09 PROCEDURE — 1170000001 HC PRIVATE ONCOLOGY ROOM DAILY

## 2024-01-09 PROCEDURE — 83735 ASSAY OF MAGNESIUM: CPT

## 2024-01-09 PROCEDURE — 2500000001 HC RX 250 WO HCPCS SELF ADMINISTERED DRUGS (ALT 637 FOR MEDICARE OP)

## 2024-01-09 PROCEDURE — 2500000002 HC RX 250 W HCPCS SELF ADMINISTERED DRUGS (ALT 637 FOR MEDICARE OP, ALT 636 FOR OP/ED)

## 2024-01-09 PROCEDURE — 2500000001 HC RX 250 WO HCPCS SELF ADMINISTERED DRUGS (ALT 637 FOR MEDICARE OP): Mod: SE

## 2024-01-09 PROCEDURE — 80053 COMPREHEN METABOLIC PANEL: CPT

## 2024-01-09 PROCEDURE — S4991 NICOTINE PATCH NONLEGEND: HCPCS

## 2024-01-09 RX ORDER — METOPROLOL SUCCINATE 25 MG/1
25 TABLET, EXTENDED RELEASE ORAL DAILY
Status: DISCONTINUED | OUTPATIENT
Start: 2024-01-09 | End: 2024-01-15 | Stop reason: HOSPADM

## 2024-01-09 RX ORDER — POLYETHYLENE GLYCOL 3350 17 G/17G
17 POWDER, FOR SOLUTION ORAL DAILY
Status: DISCONTINUED | OUTPATIENT
Start: 2024-01-09 | End: 2024-01-15 | Stop reason: HOSPADM

## 2024-01-09 RX ORDER — BUPRENORPHINE AND NALOXONE 8; 2 MG/1; MG/1
1 FILM, SOLUBLE BUCCAL; SUBLINGUAL EVERY 24 HOURS
Status: DISCONTINUED | OUTPATIENT
Start: 2024-01-09 | End: 2024-01-10

## 2024-01-09 RX ORDER — BUPRENORPHINE HYDROCHLORIDE AND NALOXONE HYDROCHLORIDE DIHYDRATE 8; 2 MG/1; MG/1
1 TABLET SUBLINGUAL 2 TIMES DAILY
Status: DISCONTINUED | OUTPATIENT
Start: 2024-01-09 | End: 2024-01-10

## 2024-01-09 RX ORDER — HALOPERIDOL 2 MG/1
2 TABLET ORAL NIGHTLY
Status: DISCONTINUED | OUTPATIENT
Start: 2024-01-09 | End: 2024-01-15 | Stop reason: HOSPADM

## 2024-01-09 RX ORDER — MORPHINE SULFATE 4 MG/ML
4 INJECTION, SOLUTION INTRAMUSCULAR; INTRAVENOUS ONCE
Status: COMPLETED | OUTPATIENT
Start: 2024-01-09 | End: 2024-01-09

## 2024-01-09 RX ORDER — LIDOCAINE AND PRILOCAINE 25; 25 MG/G; MG/G
1 CREAM TOPICAL
Status: DISPENSED | OUTPATIENT
Start: 2024-01-09 | End: 2024-01-09

## 2024-01-09 RX ORDER — OMEPRAZOLE 40 MG/1
40 CAPSULE, DELAYED RELEASE ORAL
COMMUNITY

## 2024-01-09 RX ORDER — GUAIFENESIN/DEXTROMETHORPHAN 100-10MG/5
5 SYRUP ORAL EVERY 4 HOURS PRN
Status: DISCONTINUED | OUTPATIENT
Start: 2024-01-09 | End: 2024-01-15 | Stop reason: HOSPADM

## 2024-01-09 RX ORDER — BUPRENORPHINE HYDROCHLORIDE AND NALOXONE HYDROCHLORIDE DIHYDRATE 8; 2 MG/1; MG/1
1 TABLET SUBLINGUAL
Status: DISCONTINUED | OUTPATIENT
Start: 2024-01-09 | End: 2024-01-09

## 2024-01-09 RX ORDER — HEPARIN SODIUM 5000 [USP'U]/ML
2000-4000 INJECTION, SOLUTION INTRAVENOUS; SUBCUTANEOUS EVERY 4 HOURS PRN
Status: DISCONTINUED | OUTPATIENT
Start: 2024-01-09 | End: 2024-01-11

## 2024-01-09 RX ORDER — HEPARIN SODIUM 5000 [USP'U]/ML
4000 INJECTION, SOLUTION INTRAVENOUS; SUBCUTANEOUS ONCE
Status: COMPLETED | OUTPATIENT
Start: 2024-01-09 | End: 2024-01-09

## 2024-01-09 RX ORDER — DEXTROSE MONOHYDRATE 100 MG/ML
0.3 INJECTION, SOLUTION INTRAVENOUS ONCE AS NEEDED
Status: DISCONTINUED | OUTPATIENT
Start: 2024-01-09 | End: 2024-01-15 | Stop reason: HOSPADM

## 2024-01-09 RX ORDER — BUPRENORPHINE HYDROCHLORIDE AND NALOXONE HYDROCHLORIDE DIHYDRATE 8; 2 MG/1; MG/1
1 TABLET SUBLINGUAL 2 TIMES DAILY
Status: DISCONTINUED | OUTPATIENT
Start: 2024-01-09 | End: 2024-01-09

## 2024-01-09 RX ORDER — IBUPROFEN 200 MG
1 TABLET ORAL DAILY
Status: DISCONTINUED | OUTPATIENT
Start: 2024-01-09 | End: 2024-01-15 | Stop reason: HOSPADM

## 2024-01-09 RX ORDER — CEFTRIAXONE 2 G/50ML
2 INJECTION, SOLUTION INTRAVENOUS EVERY 24 HOURS
Status: DISCONTINUED | OUTPATIENT
Start: 2024-01-09 | End: 2024-01-14

## 2024-01-09 RX ORDER — OXYCODONE HYDROCHLORIDE 5 MG/1
5 TABLET ORAL
Status: DISCONTINUED | OUTPATIENT
Start: 2024-01-09 | End: 2024-01-10

## 2024-01-09 RX ORDER — MAGNESIUM SULFATE HEPTAHYDRATE 40 MG/ML
4 INJECTION, SOLUTION INTRAVENOUS ONCE
Status: COMPLETED | OUTPATIENT
Start: 2024-01-09 | End: 2024-01-09

## 2024-01-09 RX ORDER — OXYCODONE HYDROCHLORIDE 5 MG/1
10 TABLET ORAL EVERY 4 HOURS PRN
Status: DISCONTINUED | OUTPATIENT
Start: 2024-01-09 | End: 2024-01-09

## 2024-01-09 RX ORDER — ONDANSETRON HYDROCHLORIDE 2 MG/ML
4 INJECTION, SOLUTION INTRAVENOUS ONCE
Status: DISCONTINUED | OUTPATIENT
Start: 2024-01-09 | End: 2024-01-09 | Stop reason: HOSPADM

## 2024-01-09 RX ORDER — LACTULOSE 10 G/15ML
20 SOLUTION ORAL 3 TIMES DAILY
Status: DISCONTINUED | OUTPATIENT
Start: 2024-01-09 | End: 2024-01-11

## 2024-01-09 RX ORDER — SENNOSIDES 8.6 MG/1
2 TABLET ORAL 2 TIMES DAILY
Status: DISCONTINUED | OUTPATIENT
Start: 2024-01-09 | End: 2024-01-15 | Stop reason: HOSPADM

## 2024-01-09 RX ORDER — CLONIDINE HYDROCHLORIDE 0.1 MG/1
0.1 TABLET ORAL ONCE
Status: COMPLETED | OUTPATIENT
Start: 2024-01-09 | End: 2024-01-09

## 2024-01-09 RX ORDER — PANTOPRAZOLE SODIUM 40 MG/1
40 TABLET, DELAYED RELEASE ORAL
Status: DISCONTINUED | OUTPATIENT
Start: 2024-01-10 | End: 2024-01-15 | Stop reason: HOSPADM

## 2024-01-09 RX ORDER — METRONIDAZOLE 500 MG/1
500 TABLET ORAL EVERY 8 HOURS SCHEDULED
Status: DISCONTINUED | OUTPATIENT
Start: 2024-01-09 | End: 2024-01-14

## 2024-01-09 RX ORDER — MEROPENEM 1 G/1
1 INJECTION, POWDER, FOR SOLUTION INTRAVENOUS EVERY 8 HOURS
Status: DISCONTINUED | OUTPATIENT
Start: 2024-01-09 | End: 2024-01-09

## 2024-01-09 RX ORDER — MEROPENEM 1 G/1
INJECTION, POWDER, FOR SOLUTION INTRAVENOUS
Status: COMPLETED
Start: 2024-01-09 | End: 2024-01-09

## 2024-01-09 RX ORDER — ERGOCALCIFEROL 1.25 MG/1
50000 CAPSULE ORAL
Status: DISCONTINUED | OUTPATIENT
Start: 2024-01-09 | End: 2024-01-15 | Stop reason: HOSPADM

## 2024-01-09 RX ORDER — HYDROMORPHONE HYDROCHLORIDE 1 MG/ML
0.8 INJECTION, SOLUTION INTRAMUSCULAR; INTRAVENOUS; SUBCUTANEOUS ONCE
Status: COMPLETED | OUTPATIENT
Start: 2024-01-09 | End: 2024-01-09

## 2024-01-09 RX ORDER — HALOPERIDOL 2 MG/1
2 TABLET ORAL DAILY
COMMUNITY

## 2024-01-09 RX ORDER — HYDROMORPHONE HYDROCHLORIDE 1 MG/ML
0.5 INJECTION, SOLUTION INTRAMUSCULAR; INTRAVENOUS; SUBCUTANEOUS ONCE
Status: COMPLETED | OUTPATIENT
Start: 2024-01-09 | End: 2024-01-09

## 2024-01-09 RX ORDER — ALBUTEROL SULFATE 90 UG/1
1 AEROSOL, METERED RESPIRATORY (INHALATION) EVERY 4 HOURS PRN
Status: DISCONTINUED | OUTPATIENT
Start: 2024-01-09 | End: 2024-01-15 | Stop reason: HOSPADM

## 2024-01-09 RX ORDER — HEPARIN SODIUM 10000 [USP'U]/100ML
0-4000 INJECTION, SOLUTION INTRAVENOUS CONTINUOUS
Status: DISPENSED | OUTPATIENT
Start: 2024-01-09 | End: 2024-01-11

## 2024-01-09 RX ORDER — ATORVASTATIN CALCIUM 40 MG/1
40 TABLET, FILM COATED ORAL NIGHTLY
Status: DISCONTINUED | OUTPATIENT
Start: 2024-01-09 | End: 2024-01-15 | Stop reason: HOSPADM

## 2024-01-09 RX ORDER — INSULIN LISPRO 100 [IU]/ML
0-5 INJECTION, SOLUTION INTRAVENOUS; SUBCUTANEOUS
Status: DISCONTINUED | OUTPATIENT
Start: 2024-01-09 | End: 2024-01-15 | Stop reason: HOSPADM

## 2024-01-09 RX ORDER — CLONIDINE HYDROCHLORIDE 0.1 MG/1
TABLET ORAL
Status: COMPLETED
Start: 2024-01-09 | End: 2024-01-09

## 2024-01-09 RX ORDER — DOXEPIN HYDROCHLORIDE 10 MG/1
10 CAPSULE ORAL NIGHTLY PRN
Status: DISCONTINUED | OUTPATIENT
Start: 2024-01-09 | End: 2024-01-15 | Stop reason: HOSPADM

## 2024-01-09 RX ORDER — HALOPERIDOL 5 MG/ML
2 INJECTION INTRAMUSCULAR DAILY
Status: DISCONTINUED | OUTPATIENT
Start: 2024-01-09 | End: 2024-01-09

## 2024-01-09 RX ORDER — OXYCODONE HYDROCHLORIDE 5 MG/1
5 TABLET ORAL EVERY 4 HOURS PRN
Status: DISCONTINUED | OUTPATIENT
Start: 2024-01-09 | End: 2024-01-09

## 2024-01-09 RX ORDER — OXYCODONE HYDROCHLORIDE 5 MG/1
10 TABLET ORAL
Status: DISCONTINUED | OUTPATIENT
Start: 2024-01-09 | End: 2024-01-10

## 2024-01-09 RX ORDER — DEXTROSE 50 % IN WATER (D50W) INTRAVENOUS SYRINGE
25
Status: DISCONTINUED | OUTPATIENT
Start: 2024-01-09 | End: 2024-01-15 | Stop reason: HOSPADM

## 2024-01-09 RX ADMIN — GUAIFENESIN AND DEXTROMETHORPHAN 5 ML: 100; 10 SYRUP ORAL at 14:32

## 2024-01-09 RX ADMIN — DOXEPIN HYDROCHLORIDE 10 MG: 10 CAPSULE ORAL at 20:48

## 2024-01-09 RX ADMIN — HEPARIN SODIUM 4000 UNITS: 5000 INJECTION, SOLUTION INTRAVENOUS; SUBCUTANEOUS at 11:30

## 2024-01-09 RX ADMIN — HYDROMORPHONE HYDROCHLORIDE 0.5 MG: 1 INJECTION, SOLUTION INTRAMUSCULAR; INTRAVENOUS; SUBCUTANEOUS at 09:28

## 2024-01-09 RX ADMIN — HEPARIN SODIUM 1000 UNITS/HR: 10000 INJECTION, SOLUTION INTRAVENOUS at 12:21

## 2024-01-09 RX ADMIN — GUAIFENESIN AND DEXTROMETHORPHAN 5 ML: 100; 10 SYRUP ORAL at 20:59

## 2024-01-09 RX ADMIN — LACTULOSE 20 G: 20 SOLUTION ORAL at 14:32

## 2024-01-09 RX ADMIN — CLONIDINE HYDROCHLORIDE 0.1 MG: 0.1 TABLET ORAL at 03:21

## 2024-01-09 RX ADMIN — MORPHINE SULFATE 4 MG: 4 INJECTION, SOLUTION INTRAMUSCULAR; INTRAVENOUS at 00:51

## 2024-01-09 RX ADMIN — OXYCODONE HYDROCHLORIDE 10 MG: 5 TABLET ORAL at 16:06

## 2024-01-09 RX ADMIN — CEFTRIAXONE SODIUM 2 G: 2 INJECTION, SOLUTION INTRAVENOUS at 18:27

## 2024-01-09 RX ADMIN — MORPHINE SULFATE 4 MG: 4 INJECTION, SOLUTION INTRAMUSCULAR; INTRAVENOUS at 04:50

## 2024-01-09 RX ADMIN — Medication 1 PATCH: at 09:28

## 2024-01-09 RX ADMIN — OXYCODONE HYDROCHLORIDE 10 MG: 5 TABLET ORAL at 12:16

## 2024-01-09 RX ADMIN — MEROPENEM 1 G: 1 INJECTION, POWDER, FOR SOLUTION INTRAVENOUS at 10:54

## 2024-01-09 RX ADMIN — HYDROMORPHONE HYDROCHLORIDE 0.8 MG: 1 INJECTION, SOLUTION INTRAMUSCULAR; INTRAVENOUS; SUBCUTANEOUS at 10:06

## 2024-01-09 RX ADMIN — MAGNESIUM SULFATE 4 G: 4 INJECTION INTRAVENOUS at 14:32

## 2024-01-09 RX ADMIN — HEPARIN SODIUM 2000 UNITS: 5000 INJECTION, SOLUTION INTRAVENOUS; SUBCUTANEOUS at 19:00

## 2024-01-09 RX ADMIN — MEROPENEM 2 G: 1 INJECTION, POWDER, FOR SOLUTION INTRAVENOUS at 03:22

## 2024-01-09 RX ADMIN — METRONIDAZOLE 500 MG: 500 TABLET, FILM COATED ORAL at 18:27

## 2024-01-09 RX ADMIN — STANDARDIZED SENNA CONCENTRATE 17.2 MG: 8.6 TABLET ORAL at 21:00

## 2024-01-09 RX ADMIN — HALOPERIDOL 2 MG: 2 TABLET ORAL at 20:48

## 2024-01-09 RX ADMIN — OXYCODONE HYDROCHLORIDE 10 MG: 5 TABLET ORAL at 19:58

## 2024-01-09 RX ADMIN — METOPROLOL SUCCINATE 25 MG: 25 TABLET, EXTENDED RELEASE ORAL at 16:27

## 2024-01-09 RX ADMIN — ERGOCALCIFEROL 50000 UNITS: 1.25 CAPSULE ORAL at 10:54

## 2024-01-09 SDOH — SOCIAL STABILITY: SOCIAL INSECURITY: ARE THERE ANY APPARENT SIGNS OF INJURIES/BEHAVIORS THAT COULD BE RELATED TO ABUSE/NEGLECT?: NO

## 2024-01-09 SDOH — SOCIAL STABILITY: SOCIAL INSECURITY: ABUSE: ADULT

## 2024-01-09 SDOH — SOCIAL STABILITY: SOCIAL INSECURITY: DOES ANYONE TRY TO KEEP YOU FROM HAVING/CONTACTING OTHER FRIENDS OR DOING THINGS OUTSIDE YOUR HOME?: NO

## 2024-01-09 SDOH — SOCIAL STABILITY: SOCIAL INSECURITY: WERE YOU ABLE TO COMPLETE ALL THE BEHAVIORAL HEALTH SCREENINGS?: YES

## 2024-01-09 SDOH — SOCIAL STABILITY: SOCIAL INSECURITY: HAVE YOU HAD THOUGHTS OF HARMING ANYONE ELSE?: NO

## 2024-01-09 SDOH — SOCIAL STABILITY: SOCIAL INSECURITY: DO YOU FEEL UNSAFE GOING BACK TO THE PLACE WHERE YOU ARE LIVING?: NO

## 2024-01-09 SDOH — SOCIAL STABILITY: SOCIAL INSECURITY: DO YOU FEEL ANYONE HAS EXPLOITED OR TAKEN ADVANTAGE OF YOU FINANCIALLY OR OF YOUR PERSONAL PROPERTY?: NO

## 2024-01-09 SDOH — SOCIAL STABILITY: SOCIAL INSECURITY: ARE YOU OR HAVE YOU BEEN THREATENED OR ABUSED PHYSICALLY, EMOTIONALLY, OR SEXUALLY BY ANYONE?: NO

## 2024-01-09 SDOH — SOCIAL STABILITY: SOCIAL INSECURITY: HAS ANYONE EVER THREATENED TO HURT YOUR FAMILY OR YOUR PETS?: NO

## 2024-01-09 ASSESSMENT — PAIN - FUNCTIONAL ASSESSMENT
PAIN_FUNCTIONAL_ASSESSMENT: 0-10

## 2024-01-09 ASSESSMENT — COGNITIVE AND FUNCTIONAL STATUS - GENERAL
DAILY ACTIVITIY SCORE: 24
DAILY ACTIVITIY SCORE: 24
PATIENT BASELINE BEDBOUND: NO
MOBILITY SCORE: 24
MOBILITY SCORE: 24

## 2024-01-09 ASSESSMENT — ACTIVITIES OF DAILY LIVING (ADL)
JUDGMENT_ADEQUATE_SAFELY_COMPLETE_DAILY_ACTIVITIES: YES
GROOMING: INDEPENDENT
ADEQUATE_TO_COMPLETE_ADL: YES
DRESSING YOURSELF: INDEPENDENT
PATIENT'S MEMORY ADEQUATE TO SAFELY COMPLETE DAILY ACTIVITIES?: YES
DRESSING YOURSELF: INDEPENDENT
TOILETING: INDEPENDENT
HEARING - RIGHT EAR: FUNCTIONAL
FEEDING YOURSELF: INDEPENDENT
HEARING - LEFT EAR: FUNCTIONAL
BATHING: INDEPENDENT
WALKS IN HOME: INDEPENDENT
HEARING - LEFT EAR: FUNCTIONAL
GROOMING: INDEPENDENT
HEARING - RIGHT EAR: FUNCTIONAL
ADEQUATE_TO_COMPLETE_ADL: YES
BATHING: INDEPENDENT
TOILETING: INDEPENDENT
FEEDING YOURSELF: INDEPENDENT
JUDGMENT_ADEQUATE_SAFELY_COMPLETE_DAILY_ACTIVITIES: YES
LACK_OF_TRANSPORTATION: NO
WALKS IN HOME: INDEPENDENT
PATIENT'S MEMORY ADEQUATE TO SAFELY COMPLETE DAILY ACTIVITIES?: YES

## 2024-01-09 ASSESSMENT — COLUMBIA-SUICIDE SEVERITY RATING SCALE - C-SSRS
1. IN THE PAST MONTH, HAVE YOU WISHED YOU WERE DEAD OR WISHED YOU COULD GO TO SLEEP AND NOT WAKE UP?: NO
2. HAVE YOU ACTUALLY HAD ANY THOUGHTS OF KILLING YOURSELF?: NO
6. HAVE YOU EVER DONE ANYTHING, STARTED TO DO ANYTHING, OR PREPARED TO DO ANYTHING TO END YOUR LIFE?: NO

## 2024-01-09 ASSESSMENT — LIFESTYLE VARIABLES
HOW MANY STANDARD DRINKS CONTAINING ALCOHOL DO YOU HAVE ON A TYPICAL DAY: PATIENT DOES NOT DRINK
AUDIT-C TOTAL SCORE: 0
PRESCIPTION_ABUSE_PAST_12_MONTHS: NO
AUDIT-C TOTAL SCORE: 0
HOW OFTEN DO YOU HAVE 6 OR MORE DRINKS ON ONE OCCASION: NEVER
SUBSTANCE_ABUSE_PAST_12_MONTHS: YES
HOW OFTEN DO YOU HAVE A DRINK CONTAINING ALCOHOL: NEVER
SKIP TO QUESTIONS 9-10: 1

## 2024-01-09 ASSESSMENT — PAIN SCALES - GENERAL
PAINLEVEL_OUTOF10: 10 - WORST POSSIBLE PAIN
PAINLEVEL_OUTOF10: 10 - WORST POSSIBLE PAIN
PAINLEVEL_OUTOF10: 6
PAINLEVEL_OUTOF10: 10 - WORST POSSIBLE PAIN
PAINLEVEL_OUTOF10: 8
PAINLEVEL_OUTOF10: 8
PAINLEVEL_OUTOF10: 10 - WORST POSSIBLE PAIN

## 2024-01-09 ASSESSMENT — PAIN DESCRIPTION - ORIENTATION: ORIENTATION: LEFT

## 2024-01-09 ASSESSMENT — PAIN DESCRIPTION - LOCATION
LOCATION: ABDOMEN

## 2024-01-09 NOTE — ED NOTES
0500 pt given ordered pain meds. Pt sitting on side of bed and awaiting transfer     Rocío Azul RN  01/09/24 [id number]

## 2024-01-09 NOTE — PROGRESS NOTES
Pharmacy Medication History Review    Misael Hernandez is a 38 y.o. male admitted for Pancreatic abscess. Pharmacy reviewed the patient's glnuf-ck-gkduroljz medications and allergies for accuracy.    The list below reflects the updated PTA list. Comments regarding how patient may be taking medications differently can be found in the Admit Orders Activity  Prior to Admission Medications   Prescriptions Last Dose Informant   albuterol (Ventolin HFA) 90 mcg/actuation inhaler 1/8/2024 Self   Sig: INHALE TWO PUFFS INTO THE LUNGS EVERY 6 HOURS AS NEEDED FOR SHORTNESS OF BREATH OR FOR WHEEZING FOR UP TO 30 DAYS   atorvastatin (Lipitor) 40 mg tablet 1/8/2024 Self   Sig: Take 1 tablet (40 mg) by mouth once daily at bedtime.   buprenorphine-naloxone (Suboxone) 8-2 mg SL tablet 1/8/2024 Self   Sig: Place 1 tablet under the tongue 2 times a day. With additional 0.5 tab in the evening   doxepin (SINEquan) 50 mg capsule Past Week Self   Sig: Take 10 mg by mouth as needed at bedtime for sleep.   ergocalciferol (Vitamin D-2) 1.25 MG (63765 UT) capsule Not Taking Self   Sig: Take 1 capsule (50,000 Units) by mouth 1 (one) time per week for 12 doses.   Patient not taking: Reported on 1/9/2024   haloperidol (Haldol) 2 mg tablet Past Month Self   Sig: Take 1 tablet (2 mg) by mouth once daily.   lidocaine-prilocaine (Emla) 2.5-2.5 % cream  Self   Sig: apply topically to affected area if needed   losartan (Cozaar) 50 mg tablet 1/8/2024 Self   Sig: Take 1 tablet (50 mg) by mouth once daily.   metoprolol succinate XL (Toprol-XL) 25 mg 24 hr tablet 1/8/2024 Self   Sig: Take 1 tablet (25 mg) by mouth once daily.   omeprazole (PriLOSEC) 40 mg DR capsule Past Month Self   Sig: Take 1 capsule (40 mg) by mouth once daily in the morning. Take before meals. Do not crush or chew.   trifluridine-tipiraciL (Lonsurf) 20-8.19 mg tablet Not Taking Self   Sig: TAKE THREE (3) TABLETS BY MOUTH 2 TIMES DAILY FOR 5 DAYS, EVERY 2 WEEKS   Patient not taking:  "Reported on 1/9/2024      Facility-Administered Medications: None        The list below reflects the updated allergy list. Please review each documented allergy for additional clarification and justification.  Allergies  Reviewed by Magnolia Garcia PharmD on 1/9/2024        Severity Reactions Comments    Acetaminophen Medium Nausea And Vomiting     Adhesive Tape-silicones Not Specified Unknown             Patient declines M2B at discharge. [facility] has [facility] to [facility].    Sources used to complete the med history include: Patient interview - fair historian of medications/ Pharmacy - [facility], [facility]/ Chart review/ [facility] - suboxone 8-2mg tab #75/30 filled 12/14/23    Below are additional concerns with the patient's PTA list.  Omeprazole and haloperidol were discontinued as \"therapy complete\" on 1/1/24 at [facility], but patient reported taking at home. These are both long-term medications patient has been filling since at least January 2023.     Magnolia Garcia PharmD  Transitions of Care Pharmacist  [facility]  Please reach out via Secure Chat for questions, or if no response call [employer] or [facility]   "

## 2024-01-09 NOTE — Clinical Note
VSS throughout. 2mg versed, 100mcg fentanyl given ivp. L abd 10fr drain placed. Sutured, dressing in tact. 40cc brown fluid aspirated. Report given to rpcu rn.

## 2024-01-09 NOTE — CONSULTS
Inpatient consult to Infectious Diseases  Consult performed by: Festus Abraham MD  Consult ordered by: Zac House MD        Reason For Consult: Patient found to have free fluid collections in the abdomen concerning for abscesses or infected pancreatic pseudocysts in the setting of recent acute pancreatitis    History Of Present Illness  Mr. Hernandez is a 38 year old gentleman with a PMHx of rectal cancer on chemotherapy, multiple DVTs and PEs, HTN, HLD, HFrEF, who was recently admitted from 1/2-1/4 for new acute pancreatitis with pseudocyst, who was transferred to [facility] from the [facility] ED after presenting with abdominal pain on 1/8.  For his previous admission from 1/2/23 he was treated with IV fluids, meropenem, and pain control and discharged with plan for follow up with general surgery.  However the patient developed recurrent abdominal pain for which he presented to the ED on 1/8.  Workup in the ED was relevant for an elevated WBC of 14.6, elevated lipase of 104, and a CT Abd/Pelvis that demonstrated interval development of free fluid collections in the left anterior abdomen and greater curvature of the stomach likely abscesses or infected pancreatic pseudocyst.  CT imaging also demonstrated stable pulmonary emboli, slight interval progression in pulmonary metastases, questionable acute pancreatitis, and a small fluid collection anterior to the distal sacrum that is likely an abscess but unchanged from prior imaging.  He was started on IV meropenem 1g Q8 for suspected infected pancreatic pseudocyst/abscess and Infectious Disease was consulted for further antibiotic recommendations.    Mr. Hernandez was seen at the bedside this afternoon.  He states that for the last couple days he has been experiencing a sharp 10/10 abdominal pain that he localizes to his left side at the lower border of his ribcage.  The pain is worse with coughing, sneezing, and deep breaths and occasionally radiates up his side into his left  shoulder.  This pain is similar in characterization to when he was recently admitted for acute pancreatitis on 1/2 but that the location has shifted from the middle of his abdomen to his left side.  He has also had an associated cough with some mild wheezing and shortness of breath.  He denies any nausea, vomiting, chest pain, fevers, chills, sweats, or changes in his bowel movements.  He denies taking any antibiotics recently or recent infections.      Onc Hx: Rectal carcinoma initially diagnosed in 2013.  Treated initially with Xeloda and radiation not completed secondary to noncompliance.  This was followed by resection with adjuvant FOLFOXnot completed secondary to noncompliance.  Lung metastasis were found on lung biopsy 6/22/18 which was treated with FOLFOX followed be Xeloda/Avastin with progressive disease.  Then started on irinotecan/cetuximab 11/29/21 complicated by skin and GI toxicity requiring dose reductions.  Patient was on Cetuximab/irinotecan until 8/9/23 when it was discontinued due to continued disease progression in the lung/mediastinum and bilateral adrenal masses.  He was then started on Avastin and Lonsurf every two weeks started on 9/13/23.  Most recent dose of Avastin was on 12/20.      Past Medical History  He has a past medical history of Other conditions influencing health status.    Surgical History  He has a past surgical history that includes Colon surgery (01/22/2014); Hand surgery (01/22/2014); Esophagogastroduodenoscopy (01/22/2014); Other surgical history (07/07/2014); CT angio head w and wo IV contrast (9/10/2014); MR head angio w IV contrast (9/10/2014); and CT guided imaging for needle placement (6/27/2018).     Social History     Occupational History    Not on file   Tobacco Use    Smoking status: Every Day     Packs/day: 1     Types: Cigarettes    Smokeless tobacco: Never   Vaping Use    Vaping Use: Never used   Substance and Sexual Activity    Alcohol use: Not Currently     Drug use: Yes     Types: Marijuana    Sexual activity: Not on file     Travel History   Travel since 12/09/23    No documented travel since 12/09/23              Family History  Family History   Problem Relation Name Age of Onset    Other (ADENOCARCINOMA OF THE ESOPHAGUS) Father      Diabetes Father's Brother      Diabetes Maternal Grandfather      Cancer Maternal Grandfather      Cancer Other UNCLE      Allergies  Acetaminophen and Adhesive tape-silicones     Immunization History   Administered Date(s) Administered    Influenza, Unspecified 10/01/2020    Influenza, injectable, quadrivalent 10/28/2019    Influenza, seasonal, injectable 11/29/2010    Moderna SARS-CoV-2 Vaccination 05/20/2021, 06/22/2021     Medications  Home medications:  Medications Prior to Admission   Medication Sig Dispense Refill Last Dose    albuterol (Ventolin HFA) 90 mcg/actuation inhaler INHALE TWO PUFFS INTO THE LUNGS EVERY 6 HOURS AS NEEDED FOR SHORTNESS OF BREATH OR FOR WHEEZING FOR UP TO 30 DAYS   1/8/2024    atorvastatin (Lipitor) 40 mg tablet Take 1 tablet (40 mg) by mouth once daily at bedtime. 30 tablet 2 1/8/2024    buprenorphine-naloxone (Suboxone) 8-2 mg SL tablet Place 1 tablet under the tongue 2 times a day. With additional 0.5 tab in the evening   1/8/2024    doxepin (SINEquan) 50 mg capsule Take 10 mg by mouth as needed at bedtime for sleep.   Past Week    ergocalciferol (Vitamin D-2) 1.25 MG (29870 UT) capsule Take 1 capsule (50,000 Units) by mouth 1 (one) time per week for 12 doses. (Patient not taking: Reported on 1/9/2024) 12 capsule 0 Not Taking    haloperidol (Haldol) 2 mg tablet Take 1 tablet (2 mg) by mouth once daily.   Past Month    lidocaine-prilocaine (Emla) 2.5-2.5 % cream apply topically to affected area if needed 1 g 1     losartan (Cozaar) 50 mg tablet Take 1 tablet (50 mg) by mouth once daily.   1/8/2024    metoprolol succinate XL (Toprol-XL) 25 mg 24 hr tablet Take 1 tablet (25 mg) by mouth once daily.    "1/8/2024    omeprazole (PriLOSEC) 40 mg DR capsule Take 1 capsule (40 mg) by mouth once daily in the morning. Take before meals. Do not crush or chew.   Past Month    trifluridine-tipiraciL (Lonsurf) 20-8.19 mg tablet TAKE THREE (3) TABLETS BY MOUTH 2 TIMES DAILY FOR 5 DAYS, EVERY 2 WEEKS (Patient not taking: Reported on 1/9/2024) 30 tablet 11 Not Taking     Current medications:  Scheduled medications  atorvastatin, 40 mg, oral, Nightly  buprenorphine-naloxone, 1 tablet, sublingual, 3 times per day  cefTRIAXone, 2 g, intravenous, q24h  ergocalciferol, 50,000 Units, oral, Weekly  insulin lispro, 0-5 Units, subcutaneous, TID with meals  lactulose, 20 g, oral, TID  magnesium sulfate, 4 g, intravenous, Once  metoprolol succinate XL, 25 mg, oral, Daily  metroNIDAZOLE, 500 mg, oral, q8h JULIO  nicotine, 1 patch, transdermal, Daily  polyethylene glycol, 17 g, oral, Daily  sennosides, 2 tablet, oral, BID      Continuous medications  heparin, 0-4,000 Units/hr, Last Rate: 1,000 Units/hr (01/09/24 1221)      PRN medications  PRN medications: albuterol, dextromethorphan-guaifenesin, dextrose 10 % in water (D10W), dextrose, doxepin, glucagon, heparin, lidocaine-prilocaine, oxyCODONE, oxyCODONE    Review of Systems   All other systems reviewed and are negative.       Objective  Range of Vitals (last 24 hours)  Heart Rate:  [100-129]   Temp:  [36 °C (96.8 °F)-36.8 °C (98.2 °F)]   Resp:  [20-22]   BP: (143-183)/()   Height:  [175.3 cm (5' 9\")]   Weight:  [83.9 kg (184 lb 15.5 oz)-83.9 kg (185 lb)]   SpO2:  [93 %-100 %]   Daily Weight  01/09/24 : 83.9 kg (185 lb)    Body mass index is 27.32 kg/m².     Physical Exam  Constitutional:       General: He is not in acute distress.     Appearance: He is not ill-appearing.   Eyes:      Extraocular Movements: Extraocular movements intact.      Conjunctiva/sclera: Conjunctivae normal.   Cardiovascular:      Rate and Rhythm: Regular rhythm. Tachycardia present.      Heart sounds: No murmur " "heard.     No friction rub. No gallop.   Pulmonary:      Effort: No respiratory distress.      Comments: Bilateral inspiratory and expiratory wheezes  Abdominal:      General: Abdomen is flat. Bowel sounds are normal.      Palpations: Abdomen is soft.      Comments: Moderate pain to palpation, worst in the lateral aspect of the left upper quadrant.  No guarding.   Skin:     General: Skin is warm and dry.      Comments: Dusky appearance of the skin of the bilateral lower extremities consistent with chronic venous stasis.   Neurological:      General: No focal deficit present.      Mental Status: He is alert and oriented to person, place, and time. Mental status is at baseline.   Psychiatric:         Mood and Affect: Mood normal.         Behavior: Behavior normal.         Thought Content: Thought content normal.          Relevant Results  Labs  Results from last 72 hours   Lab Units 01/09/24  0831 01/08/24  1523   WBC AUTO x10*3/uL 13.2* 14.6*   HEMOGLOBIN g/dL 11.5* 12.2*   HEMATOCRIT % 33.7* 37.9*   PLATELETS AUTO x10*3/uL 384 479*   NEUTROS PCT AUTO % 75.0 72.2   LYMPHS PCT AUTO % 12.9 15.5   MONOS PCT AUTO % 8.1 7.5   EOS PCT AUTO % 2.3 2.9     Results from last 72 hours   Lab Units 01/09/24  0831 01/08/24  1523   SODIUM mmol/L 134* 136   POTASSIUM mmol/L 3.5 3.4*   CHLORIDE mmol/L 99 98   CO2 mmol/L 25 27   BUN mg/dL 3* 6   CREATININE mg/dL 0.33* 0.50   GLUCOSE mg/dL 166* 219*   CALCIUM mg/dL 9.0 8.9   ANION GAP mmol/L 14 14   EGFR mL/min/1.73m*2 >90 >90     Results from last 72 hours   Lab Units 01/09/24  0831 01/08/24  1523   ALK PHOS U/L 91 119   BILIRUBIN TOTAL mg/dL 0.5 0.3   PROTEIN TOTAL g/dL 7.0 7.4   ALT U/L 8* 10   AST U/L 10 11   ALBUMIN g/dL 3.0* 3.2*     Estimated Creatinine Clearance: 125 mL/min (A) (by C-G formula based on SCr of 0.33 mg/dL (L)).  No results found for: \"CRP\", \"SEDRATE\"  No results found for: \"HIV1X2\", \"HIVCONF\", \"YZQIXK8EP\"  Hepatitis C Ab   Date Value Ref Range Status   02/21/2023 " NONREACTIVE NONREACTIVE Final     Comment:      Results from patients taking biotin supplements or receiving   high-dose biotin therapy should be interpreted with caution   due to possible interference with this test. Providers may    contact their local laboratory for further information.       Microbiology  Blood cultures 1/9: Pending    Imaging    CT chest angio & abdomen pelvis w IV contrast    Result Date: 1/8/2024    1. Stable pulmonary emboli in the segmental and subsegmental arteries to the right upper lobe. No new pulmonary embolism. 2. No right heart strain. 3. Slight interval progression in pulmonary metastases and right hilar adenopathy. 4. Stable mediastinal adenopathy. 5. Interval development of free fluid collections in the left anterior abdomen and greater curvature of the stomach, likely abscesses or infected pancreatic pseudocyst. 6. Questionable acute pancreatitis. Correlation with serum amylase and lipase levels is recommended. 7. Stable bilateral adrenal masses. 8. A small fluid collection anterior to the distal sacrum, unchanged. This is likely an abscess.       XR chest 1 view    Result Date: 1/8/2024    FINDINGS: Multiple cardiac monitoring leads are seen over the chest.  A right internal jugular implanted port catheter is unchanged in position. Masses are seen over the left mid lung and right lung base, similar to the prior study. No focal infiltrate, pleural effusion or pneumothorax is identified. The cardiac silhouette is within normal limits for size.       No focal infiltrate or pneumothorax is identified.            Assessment/Plan   Mr. Hernandez is a 38 year old gentleman with a PMHx of rectal cancer on chemotherapy, multiple DVTs and PEs, HTN, HLD, HFrEF, who was recently admitted from 1/2-1/4 for new acute pancreatitis with pseudocyst, who was transferred to [facility] from the [facility] ED after presenting with chest pain on 1/8.  Patient found to have findings concerning for an infected  pancreatic pseudocyst/abscess on CT imaging, so he was started on Meropenem and ID was consulted for further antibiotic recommendations.    #Pancreatic Fluid Collection concerning for infected pancreatic psuedocyst/abscess  #Acute Pancreatitis  Patient recently admitted and treated for acute pancreatitis from 1/2 -1/4 with meropenem, fluids, and pain control at which time he was found to have a fluid collection indenting the stomach consistent with development of a pseudocyst.  Patient experienced recurrent abdominal pain now localizing to lateral left upper abdomen, with imaging demonstrating development of free fluid collections in the anterior abdomen and greater curvature of the stomach likely abscesses or infected pancreatic pseudocyst.  Patient started on IV Meropenem 1g Q8 and ID consulted for antibiotic recommendations.  Plan for IR drainage of fluid collections on 1/11 as patient has been on Eliquis for DVTs/PEs.       Recommendations:  1.) Recommend switching antibiotics from IV Meropenem to IV Ceftriaxone 2g Q24 and PO Metronidazole 500mg Q8.  Pseudomonal coverage not required at this time as patient does not have risk factors.  2.) Agree with plan for IR drainage of abdominal fluid collections, and will follow up on the aspirate cytology and cultures for further antibiotic recommendations.  3.) Will follow up on blood culture results.    Patient discussed with Dr. Lindquist.    Festus Abraham MD  Transitional Year Resident  PGY-1

## 2024-01-09 NOTE — ED NOTES
Pt given ordered pain meds. Pt sts pain is 10 on scale 0-10. Pt to be transferred. Pt resting and call light in reach     Rocío Azul RN  01/09/24 005

## 2024-01-09 NOTE — H&P
History Of Present Illness  Misael Hernandez is a 38 y.o. male presenting with a PMHx of metastatic rectal carcinoma, Day's esophagus, HFrEF, cholecystits (2019), HLD, HTN, recurrent DVT/PE, and tobacco use disorder who presents as transfer from [place] with new leukocytosis and abdominal fluid collections. Concern for possible abscess vs infected pancreatic pseudocyst in the setting of recent episode of acute pancreatitis.     Patient most recently presented to [place] ED on 1/2/23 with abdominal pain and decreased appetite, found to have evidence of pancreatic inflammation on imaging withj new 4.5 cm fluid collection abutting the stomach, thought to be c/w pancreatic pseudocyst. White count normal at that time. Evaluated by general surgery (Dr. Fernando) during admission (no surgical intervention) and treated with IV meropenem, fluids, and pain control. Discharged on 1/4/23 with plan for repeat CT in 6 months and outpatient follow-up with Dr. Fernando. Patient then represented to the ED on 1/4/23 with persistent abdominal pain and found to have new leukocytosis, with repeat CT abdomen now showing multiple fluid collections-- one in left anterolateral abdomen measuring 3.7 x 3 cm, and two more along the grater curvature of the stomach, measuring up to 4.7 and 2.3 cm. Transferred to [facility] for IR consult/drainage and further management.     Of note, patient was hospitalized in December of 2023 for acute PE of right upper lobe, after patient reportedly stopped taking home apixiban (has previously history of recurrent LE DVT). Noted to have stable clot burden on repeat CTA chest during ED visit on 1/4/23.    Regarding patient's cancer diagnosis, he was originally diagnosed as stage III disease in 2013, s/p neoadjuvant Xeloda and resection but did not complete adjuvant chemo or neoadjuvant radiation therapy due to noncompliance. Noted to have disease recurrence with metastasis to lungs in 2018 and again in 2021 while on  "Xeloda/Avastin. Transitioned to Lonsurf and Avastin [id number] after further disease progression (lung, adrenal glands) noted on imaging in August of 2023. Most recent Avastin infusion on 12/20/23. Patient was most recently being followed by Dr. Vega, with plan to transition providers with Dr. Lui as new primary. Originally scheduled for initial visit with Dr. Lui on 1/11/23.     On evaluation, Mr. Hernandez endorses 10/10 left-sided abdominal pain. Denies any recent fever/chills, N/V, dysuria, diarrhea, chest pain, SOB, or LE swelling. Last dose of apixiban evening of 1/8/23. Reports that he was previously prescribed suboxone but that he stopped taking it a week ago because \"it was no longer helpful.\"     Past Medical History  Past Medical History:   Diagnosis Date    Other conditions influencing health status     Rectal Cancer       Surgical History  Past Surgical History:   Procedure Laterality Date    COLON SURGERY  01/22/2014    Colon Surgery    CT GUIDED IMAGING FOR NEEDLE PLACEMENT  6/27/2018    CT GUIDED IMAGING FOR NEEDLE PLACEMENT [facility] INPATIENT LEGACY    CT HEAD ANGIO W AND WO IV CONTRAST  9/10/2014    CT HEAD ANGIO W AND WO IV CONTRAST 9/10/2014 [facility] [place] INPATIENT LEGACY    ESOPHAGOGASTRODUODENOSCOPY  01/22/2014    Diagnostic Esophagogastroduodenoscopy    HAND SURGERY  01/22/2014    Hand Surgery                                                                                                                                                          MR HEAD ANGIO W IV CONTRAST  9/10/2014    MR HEAD ANGIO W IV CONTRAST 9/10/2014 [facility] [place] INPATIENT LEGACY    OTHER SURGICAL HISTORY  07/07/2014    Laparoscopy Total Colectomy W/ Proctectomy, Ileostomy        Social History  He reports that he has been smoking cigarettes. He has been smoking an average of 1 pack per day. He has never used smokeless tobacco. He reports that he does not currently use alcohol. He reports current drug use. Drug: Marijuana.    Family " "History  Family History   Problem Relation Name Age of Onset    Other (ADENOCARCINOMA OF THE ESOPHAGUS) Father      Diabetes Father's Brother      Diabetes Maternal Grandfather      Cancer Maternal Grandfather      Cancer Other UNCLE         Allergies  Acetaminophen and Adhesive tape-silicones    Review of Systems  A 14 point ROS was conducted and negative except as otherwise noted in HPI.     Physical Exam  Constitutional:       Appearance: Normal appearance.      Comments: In moderate discomfort   HENT:      Head: Normocephalic and atraumatic.      Nose: Nose normal.      Mouth/Throat:      Mouth: Mucous membranes are moist.      Pharynx: Oropharynx is clear. No posterior oropharyngeal erythema.   Eyes:      General: No scleral icterus.     Extraocular Movements: Extraocular movements intact.      Conjunctiva/sclera: Conjunctivae normal.      Pupils: Pupils are equal, round, and reactive to light.   Cardiovascular:      Rate and Rhythm: Normal rate and regular rhythm.      Pulses: Normal pulses.      Heart sounds: Normal heart sounds.   Pulmonary:      Effort: Pulmonary effort is normal. No respiratory distress.      Breath sounds: Normal breath sounds.   Abdominal:      General: Abdomen is flat. There is no distension.      Palpations: Abdomen is soft.      Comments: Tenderness to palpation over LUQ and LLQ   Musculoskeletal:         General: Normal range of motion.      Cervical back: Normal range of motion and neck supple.      Right lower leg: No edema.      Left lower leg: No edema.   Skin:     General: Skin is warm and dry.   Neurological:      General: No focal deficit present.      Mental Status: He is alert and oriented to person, place, and time.   Psychiatric:         Mood and Affect: Mood is anxious.         Behavior: Behavior normal.      Last Recorded Vitals  Blood pressure (!) 157/92, pulse 105, temperature 36.8 °C (98.2 °F), temperature source Temporal, resp. rate 22, height 1.753 m (5' 9\"), weight " 83.9 kg (185 lb), SpO2 94 %.    Relevant Results  Results for orders placed or performed during the hospital encounter of 01/09/24 (from the past 24 hour(s))   CBC and Auto Differential   Result Value Ref Range    WBC 13.2 (H) 4.4 - 11.3 x10*3/uL    nRBC 0.0 0.0 - 0.0 /100 WBCs    RBC 3.91 (L) 4.50 - 5.90 x10*6/uL    Hemoglobin 11.5 (L) 13.5 - 17.5 g/dL    Hematocrit 33.7 (L) 41.0 - 52.0 %    MCV 86 80 - 100 fL    MCH 29.4 26.0 - 34.0 pg    MCHC 34.1 32.0 - 36.0 g/dL    RDW 17.4 (H) 11.5 - 14.5 %    Platelets 384 150 - 450 x10*3/uL    Neutrophils % 75.0 40.0 - 80.0 %    Immature Granulocytes %, Automated 0.9 0.0 - 0.9 %    Lymphocytes % 12.9 13.0 - 44.0 %    Monocytes % 8.1 2.0 - 10.0 %    Eosinophils % 2.3 0.0 - 6.0 %    Basophils % 0.8 0.0 - 2.0 %    Neutrophils Absolute 9.91 (H) 1.20 - 7.70 x10*3/uL    Immature Granulocytes Absolute, Automated 0.12 0.00 - 0.70 x10*3/uL    Lymphocytes Absolute 1.71 1.20 - 4.80 x10*3/uL    Monocytes Absolute 1.07 (H) 0.10 - 1.00 x10*3/uL    Eosinophils Absolute 0.30 0.00 - 0.70 x10*3/uL    Basophils Absolute 0.11 (H) 0.00 - 0.10 x10*3/uL   Comprehensive metabolic panel   Result Value Ref Range    Glucose 166 (H) 74 - 99 mg/dL    Sodium 134 (L) 136 - 145 mmol/L    Potassium 3.5 3.5 - 5.3 mmol/L    Chloride 99 98 - 107 mmol/L    Bicarbonate 25 21 - 32 mmol/L    Anion Gap 14 10 - 20 mmol/L    Urea Nitrogen 3 (L) 6 - 23 mg/dL    Creatinine 0.33 (L) 0.50 - 1.30 mg/dL    eGFR >90 >60 mL/min/1.73m*2    Calcium 9.0 8.6 - 10.6 mg/dL    Albumin 3.0 (L) 3.4 - 5.0 g/dL    Alkaline Phosphatase 91 33 - 120 U/L    Total Protein 7.0 6.4 - 8.2 g/dL    AST 10 9 - 39 U/L    Bilirubin, Total 0.5 0.0 - 1.2 mg/dL    ALT 8 (L) 10 - 52 U/L   Magnesium   Result Value Ref Range    Magnesium 1.50 (L) 1.60 - 2.40 mg/dL   Coagulation Screen   Result Value Ref Range    Protime 21.2 (H) 9.8 - 12.8 seconds    INR 1.9 (H) 0.9 - 1.1    aPTT 39 (H) 27 - 38 seconds   Lipase   Result Value Ref Range    Lipase 68 9 -  82 U/L   Amylase   Result Value Ref Range    Amylase 70 29 - 103 U/L   Hemoglobin A1c   Result Value Ref Range    Hemoglobin A1C 8.8 (H) see below %    Estimated Average Glucose 206 Not Established mg/dL   Blood Culture    Specimen: Peripheral Venipuncture; Blood culture   Result Value Ref Range    Blood Culture Loaded on Instrument - Culture in progress    Blood Culture    Specimen: Peripheral Venipuncture; Blood culture   Result Value Ref Range    Blood Culture Loaded on Instrument - Culture in progress    Heparin Assay, UFH   Result Value Ref Range    Heparin Unfractionated 0.5 See Comment Below for Therapeutic Ranges IU/mL     Imaging:  CT ANGIO CHEST FOR PULMONARY EMBOLISM; CT ABDOMEN PELVIS W IV  CONTRAST;  1/8/2024 4:57 pm  IMPRESSION:  1. Stable pulmonary emboli in the segmental and subsegmental arteries  to the right upper lobe. No new pulmonary embolism.  2. No right heart strain.  3. Slight interval progression in pulmonary metastases and right  hilar adenopathy.  4. Stable mediastinal adenopathy.  5. Interval development of free fluid collections in the left  anterior abdomen and greater curvature of the stomach, likely  abscesses or infected pancreatic pseudocyst.  6. Questionable acute pancreatitis. Correlation with serum amylase  and lipase levels is recommended.  7. Stable bilateral adrenal masses.  8. A small fluid collection anterior to the distal sacrum, unchanged.  This is likely an abscess.       Assessment/Plan   Principal Problem:    Pancreatic abscess    Misael Hernandez is a 38 y.o. male presenting with a PMHx of metastatic rectal carcinoma (currently on Lonsurf and Avastin [id number], transitioning care from Dr. Vega to Dr. Lui), Day's esophagus, HFrEF, cholecystits (2019), HLD, HTN, recurrent DVT/PE, and tobacco use disorder who presents as transfer from [place] with new leukocytosis and abdominal fluid collections. Concern for possible abscess vs infected pancreatic pseudocyst in  the setting of recent episode of acute pancreatitis.     Repeat CBC, CMP,Mg, coags, lipase, amylase, and blood cultures collected upon arrival. Patient continued on IV meropenem (started in [place] ED on 1/4). Holding home Lonsurf.     IR consulted, planning for fluid drainage on 1/11/23. Will hold home apixiban and transition to heparin gtt for time being.     ID consulted for further antibiotic duration recs. Supportive onc consulted for suboxone management and additional pain recs. Will start oxycodone 5-10 mg PO q3h PRN in meantime.     Additionally noted to have blood glucose elevated over 200 at time of admission. Per review of Dr. Vega's notes, appears patient had been started on dapagliflozin but patient doesn't appear to have been compliant with medication. Will start low-dose SSI while inpatient.    Nury Vega and Sania informed of patient's hospitalization. Will need to reschedule appointment with Dr. Lui at time of discharge.     Plan:  -Starting IV meropenem, consulting ID for additional antibiotic recs  -IR consulted, planning for drainage on 1/11 (discussing with ID which fluid pocket to target)  -Oxycodone 5-10 mg q3h PRN for pain, consulting supportive onc for further recs  -Will transition from home apixiban to heparin gtt while awaiting IR procedure  -Continue home metoprolol for HTN/tachycardia (though suspect pain likely contributing). Will hold home losartan for time being, consider restarting if pressures remain elevated  -A1C, low-dose SSI for hyperglycemia     #Abdominal fluid collections  #Acute leukocytosis   #Suspected pancreatic pseudocyst  #Acute abdominal pain  -Overall concern for possible abscesses or new, infected pancreatic pseudocysts in setting of patient's recent bout of pancreatitis  -Currently tolerating PO well with decent fluid intake, ok for regular diet. Fluid bolus PRN    #Metastatic rectal cancer  -Previously following with Dr. Vega, about to transition  care to Dr. Lui as primary oncologist  -Currently receiving Lonsurf and avastin [id number], last infusion 12/20  -Holding home Lonsurf in setting of suspected infection    #Hx of recurrent DVT  #Subacute right PE  -Hospitalized 12/2023 with new right PE after stopping home apixiban  -Repeat CTA 1/4/2023 showing stable clot burden compared to last admission  -Starting heparin gtt ahead of IR procedure    #HTN  #HLD  -Continue home metoprolol XR 25 mg daily   -Holding home losartan for time being, will consider restarting if BP remains elevated  -Continue home atorvastatin    #Hyperglycemia  -Last A1C 8.5 (2018)  -Apparently initated on dapagloflozin by primary care doctor in May 2023 but does not appear to be taking  -Repeat A1C  -Low dose SSI with meals    F: PRN  E: PRN  N: regular diet  GI: home omeprazole  DVT ppx: heparin gtt    NOK: brother Aryan Hernandez- [phone]  Code status: full (confirmed on admission)    Carmen Mays MD

## 2024-01-09 NOTE — CONSULTS
SUPPORTIVE AND PALLIATIVE ONCOLOGY CONSULT    Inpatient consult to [facility] Adult Supportive Oncology  Consult performed by: Siena Betancur, APRN-CNP  Consult ordered by: Zac House MD        SERVICE DATE: 1/9/2024      PALLIATIVE MEDICINE OUTPATIENT PROVIDER:  None  CURRENT ATTENDING PROVIDER: Zac House MD     Medical Oncologist: Willa Vega MD   Radiation Oncologist: No care team member to display  Primary Physician: Jesse Dominguez  [phone]    REASON FOR CONSULT/CHIEF CONSULT COMPLAINT: pain management    Subjective   HISTORY OF PRESENT ILLNESS: Misael Hernandez is a 38 y.o. male diagnosed with metastatic rectal CA dx 2013. PMH significant for Vit D deficiency, non-compliance, opioid abuse on suboxone, cholecystitis, Day's esophagus, tobacco abuse, depression/ anxiety, HTN, dyslipidemia, Avascular necrosis, COPD, DMII, Migraines, Subarachnoid hemorrhage. Admitted 1/9/2024 for further evaluation and management of severe abdominal pain r/t fluid collections in abdomen. Supportive and Palliative Oncology is consulted for pain management.     Spoke with pt- laying in bed.  Reports pain started last Thursday and has been persistent though feels quality has not changed.  His suboxone was not helping control this acute pain and pt reports he stopped taking it last Thursday.  He was given 2 doses of IV dilaudid this morning which he does feel were effective.  He understands plan for drainage of his fluid collections on Thursday and pain control until then.     Pain is in LUQ & LLQ- sometimes radiating to L shoulder.  Pain is worse with deep breaths and coughing.  Has not had BM x 1 week- reports this is typical for him.      Per primary team- we are waiting until Thursday bc pt has been on Eliquis.    Pain Assessment:  Onset: 5 days   Location: Left abdomen  Duration: Constant  Characteristics:   Rating: Severe   Descriptors: stretching fullness, sharp, and radiating to R shoulder   Aggravating:  movement, coughing, and deep breaths    Relieving: Analgesics opioids and Modifying activity   Treatment/Home Regimen:  pt is on suboxone BID and 1/2 tab in evening for opioid abuse   Intolerances:Misael Hernandez is allergic to acetaminophen and adhesive tape-silicones.   Personal Pain Goal: 3    Interference with Function: Very Much   Coping Strategies: Relaxation and Distraction   Emotional Response: Depression   Barriers to Pain Management: Fear of addiction    Opioid Use  Past 24 h prn opioid use:  1.3mg IV dilaudid = 20.8 OME      OARRS/PDMP reviewed no aberrant behavior noted.    Symptom Assessment:  Pain:very much   Headache: none  Dizziness:none  Lack of energy: somewhat  Difficulty sleeping: somewhat  Worrying: somewhat  Anxiety: a little  Depression: somewhat  Pain in mouth/swallowing: none  Dry mouth: none  Taste changes: none  Shortness of breath: somewhat  Lack of appetite: a little   Nausea: none  Vomiting: none  Constipation: very much last BM 1 week ago  Diarrhea: none  Sore muscles: none  Numbness or tingling in hands/feet/other: somewhat  Weight loss: none    Information obtained from: chart review, interview of patient, discussion with RN, and discussion with primary team  ______________________________________________________________________     Oncology History   Rectal cancer (CMS/HCC)   8/31/2023 Initial Diagnosis    Rectal cancer (CMS/HCC)     9/13/2023 -  Chemotherapy    Bevacizumab + Trifluridine and tipiracil, 28 Day Cycles         Past Medical History:   Diagnosis Date    Other conditions influencing health status     Rectal Cancer     Past Surgical History:   Procedure Laterality Date    COLON SURGERY  01/22/2014    Colon Surgery    CT GUIDED IMAGING FOR NEEDLE PLACEMENT  6/27/2018    CT GUIDED IMAGING FOR NEEDLE PLACEMENT [facility] INPATIENT LEGACY    CT HEAD ANGIO W AND WO IV CONTRAST  9/10/2014    CT HEAD ANGIO W AND WO IV CONTRAST 9/10/2014 [facility] INPATIENT LEGACY    ESOPHAGOGASTRODUODENOSCOPY   01/22/2014    Diagnostic Esophagogastroduodenoscopy    HAND SURGERY  01/22/2014    Hand Surgery                                                                                                                                                          MR HEAD ANGIO W IV CONTRAST  9/10/2014    MR HEAD ANGIO W IV CONTRAST 9/10/2014 [facility] INPATIENT LEGACY    OTHER SURGICAL HISTORY  07/07/2014    Laparoscopy Total Colectomy W/ Proctectomy, Ileostomy     Family History   Problem Relation Name Age of Onset    Other (ADENOCARCINOMA OF THE ESOPHAGUS) Father      Diabetes Father's Brother      Diabetes Maternal Grandfather      Cancer Maternal Grandfather      Cancer Other UNCLE         SOCIAL HISTORY:  Marital Status single and Children 4.  Lives with his mother.   Social History:  reports that he has been smoking cigarettes. He has been smoking an average of 1 pack per day. He has never used smokeless tobacco. He reports that he does not currently use alcohol. He reports current drug use. Drug: Marijuana.      REVIEW OF SYSTEMS:  Review of systems negative unless noted in HPI.       Objective       Lab Results   Component Value Date    WBC 13.2 (H) 01/09/2024    HGB 11.5 (L) 01/09/2024    HCT 33.7 (L) 01/09/2024    MCV 86 01/09/2024     01/09/2024      Lab Results   Component Value Date    GLUCOSE 166 (H) 01/09/2024    CALCIUM 9.0 01/09/2024     (L) 01/09/2024    K 3.5 01/09/2024    CO2 25 01/09/2024    CL 99 01/09/2024    BUN 3 (L) 01/09/2024    CREATININE 0.33 (L) 01/09/2024     Lab Results   Component Value Date    ALT 8 (L) 01/09/2024    AST 10 01/09/2024    GGT 59 05/30/2023    ALKPHOS 91 01/09/2024    BILITOT 0.5 01/09/2024     Estimated Creatinine Clearance: 125 mL/min (A) (by C-G formula based on SCr of 0.33 mg/dL (L)).     Current Outpatient Medications   Medication Instructions    albuterol (Ventolin HFA) 90 mcg/actuation inhaler INHALE TWO PUFFS INTO THE LUNGS EVERY 6 HOURS AS NEEDED FOR SHORTNESS OF  BREATH OR FOR WHEEZING FOR UP TO 30 DAYS    atorvastatin (LIPITOR) 40 mg, oral, Nightly    buprenorphine-naloxone (Suboxone) 8-2 mg SL tablet 1 tablet, sublingual, 2 times daily    doxepin (SINEQUAN) 10 mg, oral, Nightly PRN    ergocalciferol (VITAMIN D-2) 50,000 Units, oral, Weekly    lidocaine-prilocaine (Emla) 2.5-2.5 % cream apply topically to affected area if needed    losartan (COZAAR) 50 mg, oral, Daily    metoprolol succinate XL (TOPROL-XL) 50 mg, oral, Daily    trifluridine-tipiraciL (Lonsurf) 20-8.19 mg tablet TAKE THREE (3) TABLETS BY MOUTH 2 TIMES DAILY FOR 5 DAYS, EVERY 2 WEEKS     Scheduled medications   atorvastatin, 40 mg, oral, Nightly  ergocalciferol, 50,000 Units, oral, Weekly  heparin, 4,000 Units, intravenous, Once  meropenem, 1 g, intravenous, q8h  nicotine, 1 patch, transdermal, Daily  polyethylene glycol, 17 g, oral, Daily  sennosides, 2 tablet, oral, BID      Continuous medications  heparin, 0-4,000 Units/hr      PRN medications  PRN medications: albuterol, doxepin, heparin, lidocaine-prilocaine, oxyCODONE, oxyCODONE     Allergies:   Allergies   Allergen Reactions    Acetaminophen Nausea And Vomiting    Adhesive Tape-Silicones Unknown                PHYSICAL EXAMINATION:  Vital Signs:   Vital signs reviewed  Vitals:    01/09/24 0808   BP: 143/88   Pulse: (!) 111   Resp: 20   Temp: 36 °C (96.8 °F)   SpO2: 97%     Pain Score: 10 - Worst possible pain     Physical Exam  Well-developed  M Laying in bed, NAD but appears uncomfortable  A&O x 3, pleasant and cooperative with interview & exam  Breathing comfortably on RA  + cough  Abd soft, ND, ++ttp in LUQ and LLQ, BS hypo  No edema in ext      ASSESSMENT/PLAN:  39yo M diagnosed with metastatic rectal CA dx 2013. PMH significant for Vit D deficiency, non-compliance, opioid abuse on suboxone, cholecystitis, Day's esophagus, tobacco abuse, depression/ anxiety, HTN, dyslipidemia, Avascular necrosis, COPD, DMII, Migraines, Subarachnoid  hemorrhage. Admitted 1/9/2024 for further evaluation and management of severe abdominal pain r/t fluid collections in abdomen. Supportive and Palliative Oncology is consulted for pain management.     Pain:  L sided abdominal  pain related to fluid collections/ abscess vs psuedocyst and infection  Pain is:  acute  Type: visceral  Pain control: sub-optimally controlled  Home regimen:  pt is on suboxone BID and 1/2 tab in evening for opioid abuse  Personalized pain goal: 3  Total OME usage for the past 24 hours:  20.8  START 8mg-2mg suboxone BID and 1/2 tab at 6PM  Continue 5-10mg oxycodone q4h PRN for BT pain    Constipation  At risk for constipation related to opioids, currently constipated  Usual bowel pattern:  1-2x week  Home regimen: none  LBM 1 week ago  START 2 senna-s BID  START miralax daily  START 30ml lactulose q4h until pt has BM- then stop   Pt declines suppository      Medical Decision Making/Goals of Care/Advance Care Planning:  Patient's current clinical condition, including diagnosis, prognosis, and management plan, and goals of care were discussed.   Life limiting disease: metastatic malignancy  Family: Supportive   Performance status: Moderate limitations due to pain  Joys/meaning/strength: Family  Understanding of health: Demonstrates good prognostic understanding of disease process, understands plan for draining of fluid collection on Thursday.  Information:Wants full disclosure  Goals: symptom control  Worries and fears now and future: ongoing symptoms     Advance Directives  Existence of Advance Directives:Unknown  Decision maker: Surrogate decision maker is mother Carine  Code Status: Full code    Introduction to Supportive and Palliative Oncology:  Spoke with pt at bedside  Introduced the role and philosophy of Supportive and Palliative oncology in the evaluation and management of symptoms during cancer treatment  Palliative care was introduced as a service for patients with serious illness to  help with symptoms, assist with goals of care conversations, navigate complex decision making, improve quality of life for patients, and provide support both patients and families.  Patient seemed to appreciate the extra layer of support.     Supportive Interventions: Interventions: Music Therapy: referral placed, SPO Spiritual Care: offered    Disposition:  Please  start the process of having prior authorization with meds to beds deliver medications to patient prior to discharge via [facility] pharmacy. Prescriptions will need to be sent 48-72 hours prior to discharge so that a prior authorization can be completed.     Discharge date: unknown pending acute issues  Patient does not qualify for an appointment with Outpatient Supportive Oncology       Signature and billing:  Thank you for allowing us to participate in the care of this patient. Recommendations will be communicated back to the consulting service by way of shared electronic medical record or face-to-face.    Medical complexity was high level due to due to complexity of problems, extensive data review, and high risk of management/treatment.    I spent 60 minutes in the care of this patient which included chart review, interviewing patient/family, discussion with primary team, coordination of care, and documentation.      DATA   Diagnostic tests and information reviewed for today's visit:  Conversation with primary team, Most recent labs and imaging results, Medications       Some elements copied from ER note on 1/8/24, the elements have been updated and all reflect current decision making from today, 1/9/2024.    Plan of Care discussed with: Provider and Patient    Thank you for asking Supportive and Palliative Oncology to assist with care of this patient.  We will continue to follow.  Please contact us for additional questions or concerns.      SIGNATURE: RON Kaufman  PAGER/CONTACT:  Contact information:  Supportive and Palliative  Oncology  Monday-Friday 8 AM-5 PM  Epic Secure chat or pager [phone].  After hours and weekends:  pager [phone]

## 2024-01-09 NOTE — CARE PLAN
The patient's goals for the shift include      The clinical goals for the shift include Patient will report pain level <5 throughout shift.      Problem: Pain - Adult  Goal: Verbalizes/displays adequate comfort level or baseline comfort level  Outcome: Progressing     Problem: Safety - Adult  Goal: Free from fall injury  Outcome: Progressing     Problem: Discharge Planning  Goal: Discharge to home or other facility with appropriate resources  Outcome: Progressing     Problem: Chronic Conditions and Co-morbidities  Goal: Patient's chronic conditions and co-morbidity symptoms are monitored and maintained or improved  Outcome: Progressing     Problem: Pain  Goal: Takes deep breaths with improved pain control throughout the shift  Outcome: Progressing  Goal: Turns in bed with improved pain control throughout the shift  Outcome: Progressing  Goal: Walks with improved pain control throughout the shift  Outcome: Progressing  Goal: Performs ADL's with improved pain control throughout shift  Outcome: Progressing  Goal: Participates in PT with improved pain control throughout the shift  Outcome: Progressing  Goal: Free from opioid side effects throughout the shift  Outcome: Progressing  Goal: Free from acute confusion related to pain meds throughout the shift  Outcome: Progressing

## 2024-01-10 ENCOUNTER — PHARMACY VISIT (OUTPATIENT)
Dept: PHARMACY | Facility: CLINIC | Age: 39
End: 2024-01-10
Payer: MEDICARE

## 2024-01-10 LAB
ALBUMIN SERPL BCP-MCNC: 2.7 G/DL (ref 3.4–5)
ANION GAP SERPL CALC-SCNC: 16 MMOL/L (ref 10–20)
ATRIAL RATE: 117 BPM
ATRIAL RATE: 87 BPM
BASOPHILS # BLD AUTO: 0.12 X10*3/UL (ref 0–0.1)
BASOPHILS NFR BLD AUTO: 1 %
BUN SERPL-MCNC: 5 MG/DL (ref 6–23)
CALCIUM SERPL-MCNC: 8.2 MG/DL (ref 8.6–10.6)
CHLORIDE SERPL-SCNC: 97 MMOL/L (ref 98–107)
CO2 SERPL-SCNC: 24 MMOL/L (ref 21–32)
CREAT SERPL-MCNC: 0.29 MG/DL (ref 0.5–1.3)
EGFRCR SERPLBLD CKD-EPI 2021: >90 ML/MIN/1.73M*2
EOSINOPHIL # BLD AUTO: 0.23 X10*3/UL (ref 0–0.7)
EOSINOPHIL NFR BLD AUTO: 2 %
ERYTHROCYTE [DISTWIDTH] IN BLOOD BY AUTOMATED COUNT: 17.1 % (ref 11.5–14.5)
GLUCOSE BLD MANUAL STRIP-MCNC: 189 MG/DL (ref 74–99)
GLUCOSE BLD MANUAL STRIP-MCNC: 195 MG/DL (ref 74–99)
GLUCOSE SERPL-MCNC: 171 MG/DL (ref 74–99)
HCT VFR BLD AUTO: 33.7 % (ref 41–52)
HGB BLD-MCNC: 11.2 G/DL (ref 13.5–17.5)
IMM GRANULOCYTES # BLD AUTO: 0.11 X10*3/UL (ref 0–0.7)
IMM GRANULOCYTES NFR BLD AUTO: 1 % (ref 0–0.9)
LYMPHOCYTES # BLD AUTO: 1.68 X10*3/UL (ref 1.2–4.8)
LYMPHOCYTES NFR BLD AUTO: 14.5 %
MAGNESIUM SERPL-MCNC: 1.97 MG/DL (ref 1.6–2.4)
MCH RBC QN AUTO: 28.2 PG (ref 26–34)
MCHC RBC AUTO-ENTMCNC: 33.2 G/DL (ref 32–36)
MCV RBC AUTO: 85 FL (ref 80–100)
MONOCYTES # BLD AUTO: 0.99 X10*3/UL (ref 0.1–1)
MONOCYTES NFR BLD AUTO: 8.6 %
NEUTROPHILS # BLD AUTO: 8.42 X10*3/UL (ref 1.2–7.7)
NEUTROPHILS NFR BLD AUTO: 72.9 %
NRBC BLD-RTO: 0 /100 WBCS (ref 0–0)
P AXIS: 51 DEGREES
P AXIS: 51 DEGREES
P OFFSET: 183 MS
P OFFSET: 188 MS
P ONSET: 135 MS
P ONSET: 140 MS
PHOSPHATE SERPL-MCNC: 3.6 MG/DL (ref 2.5–4.9)
PLATELET # BLD AUTO: 284 X10*3/UL (ref 150–450)
POTASSIUM SERPL-SCNC: 3.9 MMOL/L (ref 3.5–5.3)
PR INTERVAL: 148 MS
PR INTERVAL: 156 MS
Q ONSET: 213 MS
Q ONSET: 214 MS
QRS COUNT: 14 BEATS
QRS COUNT: 20 BEATS
QRS DURATION: 80 MS
QRS DURATION: 88 MS
QT INTERVAL: 344 MS
QT INTERVAL: 358 MS
QTC CALCULATION(BAZETT): 430 MS
QTC CALCULATION(BAZETT): 479 MS
QTC FREDERICIA: 405 MS
QTC FREDERICIA: 430 MS
R AXIS: 164 DEGREES
R AXIS: 98 DEGREES
RBC # BLD AUTO: 3.97 X10*6/UL (ref 4.5–5.9)
SODIUM SERPL-SCNC: 133 MMOL/L (ref 136–145)
T AXIS: 64 DEGREES
T AXIS: 65 DEGREES
T OFFSET: 386 MS
T OFFSET: 392 MS
UFH PPP CHRO-ACNC: 0.3 IU/ML
UFH PPP CHRO-ACNC: 0.5 IU/ML
VENTRICULAR RATE: 117 BPM
VENTRICULAR RATE: 87 BPM
WBC # BLD AUTO: 11.6 X10*3/UL (ref 4.4–11.3)

## 2024-01-10 PROCEDURE — 2500000001 HC RX 250 WO HCPCS SELF ADMINISTERED DRUGS (ALT 637 FOR MEDICARE OP)

## 2024-01-10 PROCEDURE — 2500000004 HC RX 250 GENERAL PHARMACY W/ HCPCS (ALT 636 FOR OP/ED)

## 2024-01-10 PROCEDURE — 2500000002 HC RX 250 W HCPCS SELF ADMINISTERED DRUGS (ALT 637 FOR MEDICARE OP, ALT 636 FOR OP/ED)

## 2024-01-10 PROCEDURE — 82947 ASSAY GLUCOSE BLOOD QUANT: CPT

## 2024-01-10 PROCEDURE — 80069 RENAL FUNCTION PANEL: CPT

## 2024-01-10 PROCEDURE — S4991 NICOTINE PATCH NONLEGEND: HCPCS

## 2024-01-10 PROCEDURE — 99233 SBSQ HOSP IP/OBS HIGH 50: CPT

## 2024-01-10 PROCEDURE — 83735 ASSAY OF MAGNESIUM: CPT

## 2024-01-10 PROCEDURE — 85025 COMPLETE CBC W/AUTO DIFF WBC: CPT

## 2024-01-10 PROCEDURE — 1170000001 HC PRIVATE ONCOLOGY ROOM DAILY

## 2024-01-10 PROCEDURE — 85520 HEPARIN ASSAY: CPT

## 2024-01-10 RX ORDER — BUPRENORPHINE AND NALOXONE 4; 1 MG/1; MG/1
1 FILM, SOLUBLE BUCCAL; SUBLINGUAL ONCE
Status: COMPLETED | OUTPATIENT
Start: 2024-01-10 | End: 2024-01-10

## 2024-01-10 RX ORDER — KETOROLAC TROMETHAMINE 15 MG/ML
15 INJECTION, SOLUTION INTRAMUSCULAR; INTRAVENOUS EVERY 6 HOURS PRN
Status: DISCONTINUED | OUTPATIENT
Start: 2024-01-10 | End: 2024-01-10

## 2024-01-10 RX ORDER — BUPRENORPHINE HYDROCHLORIDE AND NALOXONE HYDROCHLORIDE DIHYDRATE 8; 2 MG/1; MG/1
1 TABLET SUBLINGUAL 2 TIMES DAILY
Status: DISCONTINUED | OUTPATIENT
Start: 2024-01-10 | End: 2024-01-10

## 2024-01-10 RX ORDER — BUPRENORPHINE AND NALOXONE 2; .5 MG/1; MG/1
1 FILM, SOLUBLE BUCCAL; SUBLINGUAL ONCE
Status: COMPLETED | OUTPATIENT
Start: 2024-01-10 | End: 2024-01-10

## 2024-01-10 RX ORDER — ALUMINUM HYDROXIDE, MAGNESIUM HYDROXIDE, AND SIMETHICONE 1200; 120; 1200 MG/30ML; MG/30ML; MG/30ML
10 SUSPENSION ORAL 4 TIMES DAILY PRN
Status: DISCONTINUED | OUTPATIENT
Start: 2024-01-10 | End: 2024-01-15 | Stop reason: HOSPADM

## 2024-01-10 RX ORDER — BUPRENORPHINE HYDROCHLORIDE AND NALOXONE HYDROCHLORIDE DIHYDRATE 8; 2 MG/1; MG/1
1 TABLET SUBLINGUAL 2 TIMES DAILY
Status: DISCONTINUED | OUTPATIENT
Start: 2024-01-10 | End: 2024-01-15 | Stop reason: HOSPADM

## 2024-01-10 RX ORDER — DEXAMETHASONE SODIUM PHOSPHATE 4 MG/ML
4 INJECTION, SOLUTION INTRA-ARTICULAR; INTRALESIONAL; INTRAMUSCULAR; INTRAVENOUS; SOFT TISSUE ONCE
Status: COMPLETED | OUTPATIENT
Start: 2024-01-10 | End: 2024-01-10

## 2024-01-10 RX ORDER — KETOROLAC TROMETHAMINE 15 MG/ML
15 INJECTION, SOLUTION INTRAMUSCULAR; INTRAVENOUS EVERY 6 HOURS SCHEDULED
Status: DISCONTINUED | OUTPATIENT
Start: 2024-01-10 | End: 2024-01-15 | Stop reason: HOSPADM

## 2024-01-10 RX ORDER — HYDROMORPHONE HYDROCHLORIDE 2 MG/1
2 TABLET ORAL
Status: DISCONTINUED | OUTPATIENT
Start: 2024-01-10 | End: 2024-01-11

## 2024-01-10 RX ORDER — BUPRENORPHINE AND NALOXONE 4; 1 MG/1; MG/1
1 FILM, SOLUBLE BUCCAL; SUBLINGUAL DAILY
Status: DISCONTINUED | OUTPATIENT
Start: 2024-01-10 | End: 2024-01-14

## 2024-01-10 RX ADMIN — DEXAMETHASONE SODIUM PHOSPHATE 4 MG: 4 INJECTION INTRA-ARTICULAR; INTRALESIONAL; INTRAMUSCULAR; INTRAVENOUS; SOFT TISSUE at 15:02

## 2024-01-10 RX ADMIN — GUAIFENESIN AND DEXTROMETHORPHAN 5 ML: 100; 10 SYRUP ORAL at 01:04

## 2024-01-10 RX ADMIN — BUPRENORPHINE AND NALOXONE 1 FILM: 4; 1 FILM BUCCAL; SUBLINGUAL at 17:58

## 2024-01-10 RX ADMIN — PANTOPRAZOLE SODIUM 40 MG: 40 TABLET, DELAYED RELEASE ORAL at 07:50

## 2024-01-10 RX ADMIN — GUAIFENESIN AND DEXTROMETHORPHAN 5 ML: 100; 10 SYRUP ORAL at 05:06

## 2024-01-10 RX ADMIN — HEPARIN SODIUM 1200 UNITS/HR: 10000 INJECTION, SOLUTION INTRAVENOUS at 04:18

## 2024-01-10 RX ADMIN — GUAIFENESIN AND DEXTROMETHORPHAN 5 ML: 100; 10 SYRUP ORAL at 11:35

## 2024-01-10 RX ADMIN — HALOPERIDOL 2 MG: 2 TABLET ORAL at 20:50

## 2024-01-10 RX ADMIN — GUAIFENESIN AND DEXTROMETHORPHAN 5 ML: 100; 10 SYRUP ORAL at 22:08

## 2024-01-10 RX ADMIN — STANDARDIZED SENNA CONCENTRATE 17.2 MG: 8.6 TABLET ORAL at 08:25

## 2024-01-10 RX ADMIN — OXYCODONE HYDROCHLORIDE 10 MG: 5 TABLET ORAL at 07:52

## 2024-01-10 RX ADMIN — OXYCODONE HYDROCHLORIDE 10 MG: 5 TABLET ORAL at 00:06

## 2024-01-10 RX ADMIN — HYDROMORPHONE HYDROCHLORIDE 2 MG: 2 TABLET ORAL at 22:05

## 2024-01-10 RX ADMIN — CEFTRIAXONE SODIUM 2 G: 2 INJECTION, SOLUTION INTRAVENOUS at 17:58

## 2024-01-10 RX ADMIN — KETOROLAC TROMETHAMINE 15 MG: 15 INJECTION INTRAMUSCULAR; INTRAVENOUS at 13:40

## 2024-01-10 RX ADMIN — KETOROLAC TROMETHAMINE 15 MG: 15 INJECTION, SOLUTION INTRAMUSCULAR; INTRAVENOUS at 20:50

## 2024-01-10 RX ADMIN — LACTULOSE 20 G: 20 SOLUTION ORAL at 08:25

## 2024-01-10 RX ADMIN — BUPRENORPHINE AND NALOXONE 1 TABLET: 8; 2 TABLET SUBLINGUAL at 20:50

## 2024-01-10 RX ADMIN — BUPRENORPHINE AND NALOXONE 1 FILM: 2; .5 FILM BUCCAL; SUBLINGUAL at 11:59

## 2024-01-10 RX ADMIN — METOPROLOL SUCCINATE 25 MG: 25 TABLET, EXTENDED RELEASE ORAL at 08:25

## 2024-01-10 RX ADMIN — METRONIDAZOLE 500 MG: 500 TABLET, FILM COATED ORAL at 13:40

## 2024-01-10 RX ADMIN — METRONIDAZOLE 500 MG: 500 TABLET, FILM COATED ORAL at 05:06

## 2024-01-10 RX ADMIN — DOXEPIN HYDROCHLORIDE 10 MG: 10 CAPSULE ORAL at 20:50

## 2024-01-10 RX ADMIN — Medication 1 PATCH: at 08:26

## 2024-01-10 RX ADMIN — OXYCODONE HYDROCHLORIDE 10 MG: 5 TABLET ORAL at 04:18

## 2024-01-10 RX ADMIN — BUPRENORPHINE AND NALOXONE 1 FILM: 4; 1 FILM BUCCAL; SUBLINGUAL at 14:38

## 2024-01-10 RX ADMIN — METRONIDAZOLE 500 MG: 500 TABLET, FILM COATED ORAL at 22:00

## 2024-01-10 RX ADMIN — ALUMINUM HYDROXIDE, MAGNESIUM HYDROXIDE, AND SIMETHICONE 10 ML: 200; 200; 20 SUSPENSION ORAL at 22:08

## 2024-01-10 RX ADMIN — ALUMINUM HYDROXIDE, MAGNESIUM HYDROXIDE, AND SIMETHICONE 10 ML: 200; 200; 20 SUSPENSION ORAL at 11:43

## 2024-01-10 ASSESSMENT — COGNITIVE AND FUNCTIONAL STATUS - GENERAL
DAILY ACTIVITIY SCORE: 24
MOBILITY SCORE: 24
DAILY ACTIVITIY SCORE: 24
MOBILITY SCORE: 24

## 2024-01-10 ASSESSMENT — PAIN SCALES - GENERAL
PAINLEVEL_OUTOF10: 10 - WORST POSSIBLE PAIN
PAINLEVEL_OUTOF10: 8
PAINLEVEL_OUTOF10: 9
PAINLEVEL_OUTOF10: 10 - WORST POSSIBLE PAIN
PAINLEVEL_OUTOF10: 9
PAINLEVEL_OUTOF10: 10 - WORST POSSIBLE PAIN
PAINLEVEL_OUTOF10: 10 - WORST POSSIBLE PAIN

## 2024-01-10 ASSESSMENT — PAIN - FUNCTIONAL ASSESSMENT
PAIN_FUNCTIONAL_ASSESSMENT: 0-10

## 2024-01-10 ASSESSMENT — LIFESTYLE VARIABLES
TOTAL_SCORE: 3
TOTAL_SCORE: 3
TOTAL_SCORE: 5
TOTAL_SCORE: 3

## 2024-01-10 NOTE — CARE PLAN
Problem: Pain - Adult  Goal: Verbalizes/displays adequate comfort level or baseline comfort level  Outcome: Progressing     Problem: Safety - Adult  Goal: Free from fall injury  Outcome: Progressing     Problem: Discharge Planning  Goal: Discharge to home or other facility with appropriate resources  Outcome: Progressing     Problem: Chronic Conditions and Co-morbidities  Goal: Patient's chronic conditions and co-morbidity symptoms are monitored and maintained or improved  Outcome: Progressing     Problem: Pain  Goal: Takes deep breaths with improved pain control throughout the shift  Outcome: Progressing  Goal: Turns in bed with improved pain control throughout the shift  Outcome: Progressing  Goal: Walks with improved pain control throughout the shift  Outcome: Progressing  Goal: Performs ADL's with improved pain control throughout shift  Outcome: Progressing  Goal: Participates in PT with improved pain control throughout the shift  Outcome: Progressing  Goal: Free from opioid side effects throughout the shift  Outcome: Progressing  Goal: Free from acute confusion related to pain meds throughout the shift  Outcome: Progressing       The clinical goals for the shift include Pt will be safe and free from injury throughout shift radha 1/10/24 @1900

## 2024-01-10 NOTE — PROGRESS NOTES
"Misael Hernandez is a 38 y.o. male on day 1 of admission presenting with Pancreatic abscess.    Subjective   No acute events overnight. Mr. Hernandez reports ongoing 8/10 right-sided abdominal pain this morning. Has been refusing home suboxone out of concern for withdrawal with PRN oxycodone. Otherwise denies any current fever/chills, N/V, chest pain, or SOB. Reports having had a few small bowel movements this morning.        Objective     Physical Exam  Constitutional:       Appearance: Normal appearance.      Comments: Sitting up at bedside   HENT:      Head: Normocephalic and atraumatic.      Mouth/Throat:      Mouth: Mucous membranes are moist.      Pharynx: Oropharynx is clear. No posterior oropharyngeal erythema.   Eyes:      General: No scleral icterus.     Extraocular Movements: Extraocular movements intact.      Conjunctiva/sclera: Conjunctivae normal.      Pupils: Pupils are equal, round, and reactive to light.   Cardiovascular:      Rate and Rhythm: Normal rate and regular rhythm.      Pulses: Normal pulses.      Heart sounds: Normal heart sounds.   Pulmonary:      Effort: Pulmonary effort is normal.      Breath sounds: Normal breath sounds.   Abdominal:      General: Abdomen is flat.      Palpations: Abdomen is soft.      Comments: Tenderness to palpation over right side of abdomen   Musculoskeletal:         General: Normal range of motion.      Cervical back: Normal range of motion and neck supple.      Right lower leg: No edema.      Left lower leg: No edema.   Skin:     General: Skin is warm and dry.   Neurological:      General: No focal deficit present.      Mental Status: He is alert and oriented to person, place, and time.   Psychiatric:         Mood and Affect: Mood normal.         Behavior: Behavior normal.       Last Recorded Vitals  Blood pressure 124/86, pulse 99, temperature 35.9 °C (96.6 °F), temperature source Temporal, resp. rate 18, height 1.753 m (5' 9\"), weight 83.9 kg (185 lb), SpO2 99 " %.  Intake/Output last 3 Shifts:  I/O last 3 completed shifts:  In: 348.5 (4.2 mL/kg) [I.V.:198.5 (2.4 mL/kg); IV Piggyback:150]  Out: - (0 mL/kg)   Weight: 83.9 kg     Relevant Results  Results for orders placed or performed during the hospital encounter of 01/09/24 (from the past 24 hour(s))   Heparin Assay, UFH   Result Value Ref Range    Heparin Unfractionated 0.5 See Comment Below for Therapeutic Ranges IU/mL   Heparin Assay, UFH   Result Value Ref Range    Heparin Unfractionated 0.2 See Comment Below for Therapeutic Ranges IU/mL   POCT GLUCOSE   Result Value Ref Range    POCT Glucose 212 (H) 74 - 99 mg/dL   Heparin Assay, UFH   Result Value Ref Range    Heparin Unfractionated 0.5 See Comment Below for Therapeutic Ranges IU/mL   CBC and Auto Differential   Result Value Ref Range    WBC 11.6 (H) 4.4 - 11.3 x10*3/uL    nRBC 0.0 0.0 - 0.0 /100 WBCs    RBC 3.97 (L) 4.50 - 5.90 x10*6/uL    Hemoglobin 11.2 (L) 13.5 - 17.5 g/dL    Hematocrit 33.7 (L) 41.0 - 52.0 %    MCV 85 80 - 100 fL    MCH 28.2 26.0 - 34.0 pg    MCHC 33.2 32.0 - 36.0 g/dL    RDW 17.1 (H) 11.5 - 14.5 %    Platelets 284 150 - 450 x10*3/uL    Neutrophils % 72.9 40.0 - 80.0 %    Immature Granulocytes %, Automated 1.0 (H) 0.0 - 0.9 %    Lymphocytes % 14.5 13.0 - 44.0 %    Monocytes % 8.6 2.0 - 10.0 %    Eosinophils % 2.0 0.0 - 6.0 %    Basophils % 1.0 0.0 - 2.0 %    Neutrophils Absolute 8.42 (H) 1.20 - 7.70 x10*3/uL    Immature Granulocytes Absolute, Automated 0.11 0.00 - 0.70 x10*3/uL    Lymphocytes Absolute 1.68 1.20 - 4.80 x10*3/uL    Monocytes Absolute 0.99 0.10 - 1.00 x10*3/uL    Eosinophils Absolute 0.23 0.00 - 0.70 x10*3/uL    Basophils Absolute 0.12 (H) 0.00 - 0.10 x10*3/uL   Renal function panel   Result Value Ref Range    Glucose 171 (H) 74 - 99 mg/dL    Sodium 133 (L) 136 - 145 mmol/L    Potassium 3.9 3.5 - 5.3 mmol/L    Chloride 97 (L) 98 - 107 mmol/L    Bicarbonate 24 21 - 32 mmol/L    Anion Gap 16 10 - 20 mmol/L    Urea Nitrogen 5 (L) 6 -  23 mg/dL    Creatinine 0.29 (L) 0.50 - 1.30 mg/dL    eGFR >90 >60 mL/min/1.73m*2    Calcium 8.2 (L) 8.6 - 10.6 mg/dL    Phosphorus 3.6 2.5 - 4.9 mg/dL    Albumin 2.7 (L) 3.4 - 5.0 g/dL   Magnesium   Result Value Ref Range    Magnesium 1.97 1.60 - 2.40 mg/dL   Heparin Assay, UFH   Result Value Ref Range    Heparin Unfractionated 0.3 See Comment Below for Therapeutic Ranges IU/mL   POCT GLUCOSE   Result Value Ref Range    POCT Glucose 195 (H) 74 - 99 mg/dL          Malnutrition          I agree with the dietitian's malnutrition diagnosis.      Assessment/Plan   Principal Problem:    Pancreatic abscess    Misael Hernandez is a 38 y.o. male presenting with a PMHx of metastatic rectal carcinoma (currently on Lonsurf and Avastin [id number], transitioning care from Dr. Vega to Dr. Lui), Day's esophagus, HFrEF, cholecystits (2019), HLD, HTN, recurrent DVT/PE, and tobacco use disorder who presents as transfer from [place] with new leukocytosis and abdominal fluid collections. Concern for possible abscess vs infected pancreatic pseudocyst in the setting of recent episode of acute pancreatitis.     IR consulted, planning for fluid drainage on 1/11/23.     ID consulted, patient switched to ceftriaxone and metronidazole per their recs. Final recs regarding duration pending fluid aspirate cytology and cultures.     Patient initially refusing suboxone overnight. Discussion held with supportive oncology, patient agreeable to resume suboxone. Will hold any additional full opioids until patient has been back on home suboxone dose for 24 hours, to decrease the risk of withdrawal. In meantime, will start Toradol q6h and give one time dose dexamethasone 4 mg for acute pain control.    Updates 1/10:  -Continue ceftriaxone and metronidazole, final ID recs pending fluid drainage  -Plan for fluid collection drainage tomorrow morning with IR (NPO at midnight, will hold heparin gtt at 4 AM)  -Resuming home suboxone per supportive onc  recommendations, holding full opioid agonists until back on home suboxone dosing to prevent withdrawal  -Toradol 15 mg q6h, IV dexamethasone 4 mg now for acute pain    #Abdominal fluid collections  #Acute leukocytosis   #Suspected pancreatic pseudocyst  #Acute abdominal pain  -Overall concern for possible abscesses or new, infected pancreatic pseudocysts in setting of patient's recent bout of pancreatitis  -Amylase/lipase WNL on admission  -Currently tolerating PO well with decent fluid intake, ok for regular diet. Fluid bolus PRN  -Supportive onc following for pain management, appreciate recs  -Resuming home suboxone, ok to start oral hydromorphone 2 mg q3h prn mod pain and 4 mg q3h prn severe pain after 9 PM suboxone dose   -Toradol 15 mg q6h, 4 mg IV dexamethasone now for acute pain     #Metastatic rectal cancer  -Previously following with Dr. Vega, about to transition care to Dr. Lui as primary oncologist  -Currently receiving Lonsurf and avastin a9vrard, last infusion 12/20  -Holding home Lonsurf in setting of suspected infection     #Hx of recurrent DVT  #Subacute right PE  -Hospitalized 12/2023 with new right PE after stopping home apixiban  -Repeat CTA 1/4/2023 showing stable clot burden compared to last admission  -Currently on heparin gtt ahead of IR procedure     #HTN  #HLD  -Continue home metoprolol XR 25 mg daily   -Holding home losartan for time being, will consider restarting if BP remains elevated  -Continue home atorvastatin     #Hyperglycemia  -Last A1C 8.5 (2018)  -Apparently initated on dapagloflozin by primary care doctor in May 2023 but does not appear to be taking  -Repeat A1C  -Low dose SSI with meals     F: PRN  E: PRN  N: regular diet  GI: home omeprazole  DVT ppx: heparin gtt     NOK: brother Aryan Hernandez- [phone]  Code status: full (confirmed on admission)             Carmen Mays MD

## 2024-01-10 NOTE — CARE PLAN
The patient's goals for the shift include      The clinical goals for the shift include pt will be safe and free from injury through shift      Problem: Pain - Adult  Goal: Verbalizes/displays adequate comfort level or baseline comfort level  Outcome: Progressing     Problem: Safety - Adult  Goal: Free from fall injury  Outcome: Progressing     Problem: Chronic Conditions and Co-morbidities  Goal: Patient's chronic conditions and co-morbidity symptoms are monitored and maintained or improved  Outcome: Progressing

## 2024-01-10 NOTE — PROGRESS NOTES
SUPPORTIVE AND PALLIATIVE ONCOLOGY INPATIENT FOLLOW-UP      SERVICE DATE: 01/10/24     SUBJECTIVE:  Interval Events:  Patient seen at bedside this morning. Rates his pain /10. States oxycodone is somewhat effective at reducing his pain. Denies symptoms of opioid withdrawal at this time. States he is afraid of restarting buprenorphine due to risk of withdrawal. Extensively discussed risks associated with even short-term holding of medications for opioid use disorder including risk of withdrawal, relapse, mortality. Discussed option for giving a lower dose of Suboxone and assess for withdrawal prior to restarting full dose. Patient agreeable. Patient understands he will not be discharged with short-acting opioids.     Last BM was this morning. Described as small and hard. Patient understands importance of continuing bowel regimen.     Pain Assessment:  Location: Left abdomen  Duration: Constant  Characteristics:   Ratin   Descriptors: stretching fullness, sharp, and radiating R shoulder   Aggravating: movement, coughing, and deep breaths    Relieving: Analgesics opioids, modifying ctivity   Interference with Function: Very Much    Opioid Use  Past 24 h prn opioid use (7am-7am):   Oxycodone IR 10 mg   x 4 doses = 40 mg = 50 OME  Total 24h OME use:  50    Symptom Assessment:  Nausea none  Vomiting none  Constipation very much   Shortness of breath none  Other: Cough somewhat     Information obtained from: chart review, interview of patient, and discussion with primary team  ______________________________________________________________________        OBJECTIVE:    Lab Results   Component Value Date    WBC 11.6 (H) 01/10/2024    HGB 11.2 (L) 01/10/2024    HCT 33.7 (L) 01/10/2024    MCV 85 01/10/2024     01/10/2024      Lab Results   Component Value Date    GLUCOSE 171 (H) 01/10/2024    CALCIUM 8.2 (L) 01/10/2024     (L) 01/10/2024    K 3.9 01/10/2024    CO2 24 01/10/2024    CL 97 (L) 01/10/2024    BUN 5  (L) 01/10/2024    CREATININE 0.29 (L) 01/10/2024     Lab Results   Component Value Date    ALT 8 (L) 01/09/2024    AST 10 01/09/2024    GGT 59 05/30/2023    ALKPHOS 91 01/09/2024    BILITOT 0.5 01/09/2024     Estimated Creatinine Clearance: 125 mL/min (A) (by C-G formula based on SCr of 0.29 mg/dL (L)).     Scheduled medications  atorvastatin, 40 mg, oral, Nightly  buprenorphine-naloxone, 1 Film, sublingual, q24h  buprenorphine-naloxone, 1 tablet, sublingual, BID  cefTRIAXone, 2 g, intravenous, q24h  ergocalciferol, 50,000 Units, oral, Weekly  haloperidol, 2 mg, oral, Nightly  insulin lispro, 0-5 Units, subcutaneous, TID with meals  lactulose, 20 g, oral, TID  metoprolol succinate XL, 25 mg, oral, Daily  metroNIDAZOLE, 500 mg, oral, q8h JULIO  nicotine, 1 patch, transdermal, Daily  pantoprazole, 40 mg, oral, Daily before breakfast  polyethylene glycol, 17 g, oral, Daily  sennosides, 2 tablet, oral, BID      Continuous medications  heparin, 0-4,000 Units/hr, Last Rate: 1,200 Units/hr (01/10/24 0418)      PRN medications  PRN medications: albuterol, dextromethorphan-guaifenesin, dextrose 10 % in water (D10W), dextrose, doxepin, glucagon, heparin, oxyCODONE, oxyCODONE   }     PHYSICAL EXAMINATION:    Vital Signs:   Vital signs reviewed  Visit Vitals  /78 (BP Location: Right arm, Patient Position: Sitting)   Pulse 109   Temp 35.9 °C (96.6 °F) (Temporal)   Resp 22        Pain Score: 10 - Worst possible pain       Physical Exam see NP attestation    ASSESSMENT/PLAN:  39yo M diagnosed with metastatic rectal CA dx 2013. PMH significant for Vit D deficiency, non-compliance, opioid abuse on suboxone, cholecystitis, Day's esophagus, tobacco abuse, depression/ anxiety, HTN, dyslipidemia, Avascular necrosis, COPD, DMII, Migraines, Subarachnoid hemorrhage. Admitted 1/9/2024 for further evaluation and management of severe abdominal pain r/t fluid collections in abdomen. Supportive and Palliative Oncology is consulted for  pain management.      Pain:  L sided abdominal pain related to fluid collections/ abscess vs psuedocyst and infection  Pain is:  acute  Type: visceral  Pain control: sub-optimally controlled  Home regimen:  pt is on suboxone 8-2 mg BID and 1/2 tab in evening for opioid abuse  Personalized pain goal: 3  Total OME usage for the past 24 hours:  20.8  Given history of opioid use disorder, continuation of medication for opioid use disorder (buprenorphine) is necessary in order to avoid cravings and withdrawal, decrease morbidity related to opioid use, and decrease mortality (both all cause and opioid related death). Given active exposure to full agonist opioids this admission and timing since last buprenorphine dose prior to admission (>48 hrs), best to re-introduce buprenorphine at a lower dose to avoid precipitating severe opioid withdrawal. Short acting opioids can be utilized in patients maintained on buprenorphine for acute pain. Hydromorphone is the preferred short acting opioids for patients on buprenorphine as buprenorphine has very high affinity for mu-opioid receptor. Hydromorphone has higher mu opioid receptor affinity compared with other short acting opioids such as morphine and oxycodone.  Treatment with nonopioid multimodal pain management as guided by patient specific factors  is also appropriate in order to optimize pain management. Addition of NSAID expected to be helpful given underlying inflammatory etiology related to infection. Recommend:  Discontinue current Suboxone orders  Discontinue oxycodone IR PRN orders for now   Order COWS assessment  Four hours after last dose of oxycodone, (12pm), give buprenorphine-naloxone (Suboxone) 2-0.5 mg SL film x 1 dose   Assess COWS Q1H x 2 hours after giving Suboxone  If COWS <5 within 2 hours, give Suboxone 4-2 mg film x 1 dose, then restart home regimen  Home regimen is Suboxone 8-2 mg BID and Suboxone 4-2 mg at 6PM  After patient receives 24 hours of suboxone  therapy:   Start hydromorphone 2 mg PO Q3H PRN moderate pain   Start hydromorphone 4 mg PO Q3H PRN severe pain  Start ketorolac 15 mg IV Q6H, scheduled x 5 days  Discussed with patient will not discharge with short-acting opioids; patient expressed understanding  Continue to monitor pain scores and administer PRN medications as appropriate  Continue/initiate nonpharmacologic pain management strategies including ice/heat therapy, distraction techniques, deep breathing/relaxation techniques, calming music, and repositioning  Continue to monitor for signs of opioid efficacy (pain scores, improved functionality) and toxicity (pinpoint pupils, excess sedation/drowsiness/confusion, respiratory depression, etc.)      Constipation  At risk for constipation related to opioids, currently constipated  Usual bowel pattern:  1-2x week  Home regimen: none  LBM 1/10/24. Given bowel regimen started yesterday, would continue current management:  Continue doc-senna PO BID  Continue MiraLAX 17g PO daily  Continue lactulose 20 g PO 3x/day until patient has BM then discontinue    Pt declines suppository        Medical Decision Making/Goals of Care/Advance Care Planning:  Patient's current clinical condition, including diagnosis, prognosis, and management plan, and goals of care were discussed.   Life limiting disease: metastatic malignancy  Family: Supportive   Performance status: Moderate limitations due to pain  Joys/meaning/strength: Family  Understanding of health: Demonstrates good prognostic understanding of disease process, understands plan for draining of fluid collection on Thursday.  Information:Wants full disclosure  Goals: symptom control  Worries and fears now and future: ongoing symptoms      Advance Directives  Existence of Advance Directives:Unknown  Decision maker: Surrogate decision maker is mother Carine  Code Status: Full code    Data:   Diagnostic tests and information reviewed for today's visit:  Conversation with  primary team, Most recent labs and imaging results, Medications       Some elements copied from DARWIN Kaufman note on 1/9/24, the elements have been updated and all reflect current decision making from today, 01/10/24       Plan of Care discussed with: Provider and Patient    Thank you for asking Supportive and Palliative Oncology to assist with care of this patient.  We will continue to follow  Please contact us for additional questions or concerns.      SIGNATURE:   Milad YarbroughD  Palliative Medicine Clinical Pharmacy Specialist   [facility]    PAGER/CONTACT:  Contact information:  [facility]ive [facility] Palliative [facility]cology  Monday-Friday 8 AM-5 PM  Epic Secure chat or pager [phone].  After hours and weekends:  pager [phone]

## 2024-01-11 ENCOUNTER — APPOINTMENT (OUTPATIENT)
Dept: HEMATOLOGY/ONCOLOGY | Facility: CLINIC | Age: 39
End: 2024-01-11
Payer: MEDICARE

## 2024-01-11 ENCOUNTER — APPOINTMENT (OUTPATIENT)
Dept: RADIOLOGY | Facility: HOSPITAL | Age: 39
DRG: 438 | End: 2024-01-11
Payer: MEDICARE

## 2024-01-11 LAB
ALBUMIN SERPL BCP-MCNC: 2.8 G/DL (ref 3.4–5)
AMYLASE FLD-CCNC: >6000 U/L
ANION GAP SERPL CALC-SCNC: 14 MMOL/L (ref 10–20)
ATRIAL RATE: 101 BPM
BASOPHILS # BLD AUTO: 0.06 X10*3/UL (ref 0–0.1)
BASOPHILS NFR BLD AUTO: 0.4 %
BASOPHILS NFR FLD MANUAL: 0 %
BLASTS NFR FLD MANUAL: 0 %
BUN SERPL-MCNC: 9 MG/DL (ref 6–23)
CALCIUM SERPL-MCNC: 9.4 MG/DL (ref 8.6–10.6)
CHLORIDE SERPL-SCNC: 95 MMOL/L (ref 98–107)
CLARITY FLD: ABNORMAL
CO2 SERPL-SCNC: 28 MMOL/L (ref 21–32)
COLOR FLD: ABNORMAL
CREAT SERPL-MCNC: 0.42 MG/DL (ref 0.5–1.3)
EGFRCR SERPLBLD CKD-EPI 2021: >90 ML/MIN/1.73M*2
EOSINOPHIL # BLD AUTO: 0 X10*3/UL (ref 0–0.7)
EOSINOPHIL NFR BLD AUTO: 0 %
EOSINOPHIL NFR FLD MANUAL: 8 %
ERYTHROCYTE [DISTWIDTH] IN BLOOD BY AUTOMATED COUNT: 17.1 % (ref 11.5–14.5)
GLUCOSE SERPL-MCNC: 235 MG/DL (ref 74–99)
HCT VFR BLD AUTO: 33 % (ref 41–52)
HGB BLD-MCNC: 11.1 G/DL (ref 13.5–17.5)
IMM GRANULOCYTES # BLD AUTO: 0.1 X10*3/UL (ref 0–0.7)
IMM GRANULOCYTES NFR BLD AUTO: 0.7 % (ref 0–0.9)
IMMATURE GRANULOCYTES IN FLUID: 0 %
LIPASE FLD-CCNC: >6000 U/L
LYMPHOCYTES # BLD AUTO: 1.34 X10*3/UL (ref 1.2–4.8)
LYMPHOCYTES NFR BLD AUTO: 9.4 %
LYMPHOCYTES NFR FLD MANUAL: 0 %
MAGNESIUM SERPL-MCNC: 1.79 MG/DL (ref 1.6–2.4)
MCH RBC QN AUTO: 28.8 PG (ref 26–34)
MCHC RBC AUTO-ENTMCNC: 33.6 G/DL (ref 32–36)
MCV RBC AUTO: 86 FL (ref 80–100)
MONOCYTES # BLD AUTO: 0.98 X10*3/UL (ref 0.1–1)
MONOCYTES NFR BLD AUTO: 6.9 %
MONOS+MACROS NFR FLD MANUAL: 21 %
NEUTROPHILS # BLD AUTO: 11.76 X10*3/UL (ref 1.2–7.7)
NEUTROPHILS NFR BLD AUTO: 82.6 %
NEUTROPHILS NFR FLD MANUAL: 71 %
NRBC BLD-RTO: 0 /100 WBCS (ref 0–0)
OTHER CELLS NFR FLD MANUAL: 0 %
P AXIS: 58 DEGREES
P OFFSET: 186 MS
P ONSET: 131 MS
PHOSPHATE SERPL-MCNC: 3.5 MG/DL (ref 2.5–4.9)
PLASMA CELLS NFR FLD MANUAL: 0 %
PLATELET # BLD AUTO: 406 X10*3/UL (ref 150–450)
POTASSIUM SERPL-SCNC: 4 MMOL/L (ref 3.5–5.3)
PR INTERVAL: 162 MS
Q ONSET: 212 MS
QRS COUNT: 16 BEATS
QRS DURATION: 88 MS
QT INTERVAL: 320 MS
QTC CALCULATION(BAZETT): 414 MS
QTC FREDERICIA: 380 MS
R AXIS: 105 DEGREES
RBC # BLD AUTO: 3.86 X10*6/UL (ref 4.5–5.9)
RBC # FLD AUTO: ABNORMAL /UL
SODIUM SERPL-SCNC: 133 MMOL/L (ref 136–145)
T AXIS: 47 DEGREES
T OFFSET: 372 MS
TOTAL CELLS COUNTED FLD: 100
VENTRICULAR RATE: 101 BPM
WBC # BLD AUTO: 14.2 X10*3/UL (ref 4.4–11.3)
WBC # FLD AUTO: 940 /UL

## 2024-01-11 PROCEDURE — 2720000007 HC OR 272 NO HCPCS

## 2024-01-11 PROCEDURE — 80069 RENAL FUNCTION PANEL: CPT

## 2024-01-11 PROCEDURE — C1729 CATH, DRAINAGE: HCPCS

## 2024-01-11 PROCEDURE — 83735 ASSAY OF MAGNESIUM: CPT

## 2024-01-11 PROCEDURE — 2500000004 HC RX 250 GENERAL PHARMACY W/ HCPCS (ALT 636 FOR OP/ED): Performed by: RADIOLOGY

## 2024-01-11 PROCEDURE — 0W9G3ZX DRAINAGE OF PERITONEAL CAVITY, PERCUTANEOUS APPROACH, DIAGNOSTIC: ICD-10-PCS | Performed by: RADIOLOGY

## 2024-01-11 PROCEDURE — 99152 MOD SED SAME PHYS/QHP 5/>YRS: CPT

## 2024-01-11 PROCEDURE — 2500000001 HC RX 250 WO HCPCS SELF ADMINISTERED DRUGS (ALT 637 FOR MEDICARE OP)

## 2024-01-11 PROCEDURE — 99152 MOD SED SAME PHYS/QHP 5/>YRS: CPT | Performed by: RADIOLOGY

## 2024-01-11 PROCEDURE — 89051 BODY FLUID CELL COUNT: CPT

## 2024-01-11 PROCEDURE — 49406 IMAGE CATH FLUID PERI/RETRO: CPT

## 2024-01-11 PROCEDURE — 82150 ASSAY OF AMYLASE: CPT | Performed by: STUDENT IN AN ORGANIZED HEALTH CARE EDUCATION/TRAINING PROGRAM

## 2024-01-11 PROCEDURE — C1769 GUIDE WIRE: HCPCS

## 2024-01-11 PROCEDURE — 87070 CULTURE OTHR SPECIMN AEROBIC: CPT

## 2024-01-11 PROCEDURE — 82947 ASSAY GLUCOSE BLOOD QUANT: CPT

## 2024-01-11 PROCEDURE — 88104 CYTOPATH FL NONGYN SMEARS: CPT

## 2024-01-11 PROCEDURE — 85025 COMPLETE CBC W/AUTO DIFF WBC: CPT

## 2024-01-11 PROCEDURE — 83690 ASSAY OF LIPASE: CPT | Performed by: STUDENT IN AN ORGANIZED HEALTH CARE EDUCATION/TRAINING PROGRAM

## 2024-01-11 PROCEDURE — S4991 NICOTINE PATCH NONLEGEND: HCPCS

## 2024-01-11 PROCEDURE — 1170000001 HC PRIVATE ONCOLOGY ROOM DAILY

## 2024-01-11 PROCEDURE — 2500000004 HC RX 250 GENERAL PHARMACY W/ HCPCS (ALT 636 FOR OP/ED)

## 2024-01-11 PROCEDURE — 49406 IMAGE CATH FLUID PERI/RETRO: CPT | Performed by: RADIOLOGY

## 2024-01-11 PROCEDURE — 99232 SBSQ HOSP IP/OBS MODERATE 35: CPT

## 2024-01-11 PROCEDURE — 2500000002 HC RX 250 W HCPCS SELF ADMINISTERED DRUGS (ALT 637 FOR MEDICARE OP, ALT 636 FOR OP/ED)

## 2024-01-11 RX ORDER — FENTANYL CITRATE 50 UG/ML
INJECTION, SOLUTION INTRAMUSCULAR; INTRAVENOUS
Status: COMPLETED | OUTPATIENT
Start: 2024-01-11 | End: 2024-01-11

## 2024-01-11 RX ORDER — HYDROMORPHONE HYDROCHLORIDE 2 MG/1
2 TABLET ORAL
Status: DISCONTINUED | OUTPATIENT
Start: 2024-01-11 | End: 2024-01-15 | Stop reason: HOSPADM

## 2024-01-11 RX ORDER — MIDAZOLAM HYDROCHLORIDE 1 MG/ML
INJECTION INTRAMUSCULAR; INTRAVENOUS
Status: COMPLETED | OUTPATIENT
Start: 2024-01-11 | End: 2024-01-11

## 2024-01-11 RX ORDER — HEPARIN SODIUM 5000 [USP'U]/ML
2000-4000 INJECTION, SOLUTION INTRAVENOUS; SUBCUTANEOUS EVERY 4 HOURS PRN
Status: DISCONTINUED | OUTPATIENT
Start: 2024-01-11 | End: 2024-01-12

## 2024-01-11 RX ORDER — HEPARIN SODIUM 10000 [USP'U]/100ML
0-4000 INJECTION, SOLUTION INTRAVENOUS CONTINUOUS
Status: DISCONTINUED | OUTPATIENT
Start: 2024-01-11 | End: 2024-01-12

## 2024-01-11 RX ORDER — HEPARIN SODIUM 5000 [USP'U]/ML
4000 INJECTION, SOLUTION INTRAVENOUS; SUBCUTANEOUS ONCE
Status: COMPLETED | OUTPATIENT
Start: 2024-01-11 | End: 2024-01-11

## 2024-01-11 RX ADMIN — KETOROLAC TROMETHAMINE 15 MG: 15 INJECTION, SOLUTION INTRAMUSCULAR; INTRAVENOUS at 20:31

## 2024-01-11 RX ADMIN — HYDROMORPHONE HYDROCHLORIDE 2 MG: 2 TABLET ORAL at 04:14

## 2024-01-11 RX ADMIN — CEFTRIAXONE SODIUM 2 G: 2 INJECTION, SOLUTION INTRAVENOUS at 16:44

## 2024-01-11 RX ADMIN — HYDROMORPHONE HYDROCHLORIDE 2 MG: 2 TABLET ORAL at 20:22

## 2024-01-11 RX ADMIN — MIDAZOLAM HYDROCHLORIDE 1 MG: 1 INJECTION, SOLUTION INTRAMUSCULAR; INTRAVENOUS at 08:52

## 2024-01-11 RX ADMIN — METOPROLOL SUCCINATE 25 MG: 25 TABLET, EXTENDED RELEASE ORAL at 09:43

## 2024-01-11 RX ADMIN — MIDAZOLAM HYDROCHLORIDE 1 MG: 1 INJECTION, SOLUTION INTRAMUSCULAR; INTRAVENOUS at 08:59

## 2024-01-11 RX ADMIN — KETOROLAC TROMETHAMINE 15 MG: 15 INJECTION, SOLUTION INTRAMUSCULAR; INTRAVENOUS at 01:14

## 2024-01-11 RX ADMIN — Medication 1 PATCH: at 09:00

## 2024-01-11 RX ADMIN — ATORVASTATIN CALCIUM 40 MG: 40 TABLET, FILM COATED ORAL at 21:57

## 2024-01-11 RX ADMIN — HEPARIN SODIUM 1200 UNITS/HR: 10000 INJECTION, SOLUTION INTRAVENOUS at 01:13

## 2024-01-11 RX ADMIN — DOXEPIN HYDROCHLORIDE 10 MG: 10 CAPSULE ORAL at 21:56

## 2024-01-11 RX ADMIN — PANTOPRAZOLE SODIUM 40 MG: 40 TABLET, DELAYED RELEASE ORAL at 06:22

## 2024-01-11 RX ADMIN — KETOROLAC TROMETHAMINE 15 MG: 15 INJECTION, SOLUTION INTRAMUSCULAR; INTRAVENOUS at 13:26

## 2024-01-11 RX ADMIN — METRONIDAZOLE 500 MG: 500 TABLET, FILM COATED ORAL at 22:02

## 2024-01-11 RX ADMIN — FENTANYL CITRATE 50 MCG: 50 INJECTION, SOLUTION INTRAMUSCULAR; INTRAVENOUS at 08:59

## 2024-01-11 RX ADMIN — GUAIFENESIN AND DEXTROMETHORPHAN 5 ML: 100; 10 SYRUP ORAL at 04:17

## 2024-01-11 RX ADMIN — BUPRENORPHINE AND NALOXONE 1 FILM: 4; 1 FILM BUCCAL; SUBLINGUAL at 09:43

## 2024-01-11 RX ADMIN — BUPRENORPHINE AND NALOXONE 1 TABLET: 8; 2 TABLET SUBLINGUAL at 22:06

## 2024-01-11 RX ADMIN — HYDROMORPHONE HYDROCHLORIDE 2 MG: 2 TABLET ORAL at 23:41

## 2024-01-11 RX ADMIN — METRONIDAZOLE 500 MG: 500 TABLET, FILM COATED ORAL at 05:04

## 2024-01-11 RX ADMIN — GUAIFENESIN AND DEXTROMETHORPHAN 5 ML: 100; 10 SYRUP ORAL at 20:31

## 2024-01-11 RX ADMIN — HYDROMORPHONE HYDROCHLORIDE 2 MG: 2 TABLET ORAL at 01:14

## 2024-01-11 RX ADMIN — HYDROMORPHONE HYDROCHLORIDE 2 MG: 2 TABLET ORAL at 15:16

## 2024-01-11 RX ADMIN — METRONIDAZOLE 500 MG: 500 TABLET, FILM COATED ORAL at 13:26

## 2024-01-11 RX ADMIN — HEPARIN SODIUM 4000 UNITS: 5000 INJECTION, SOLUTION INTRAVENOUS; SUBCUTANEOUS at 17:00

## 2024-01-11 RX ADMIN — HEPARIN SODIUM 1200 UNITS/HR: 10000 INJECTION, SOLUTION INTRAVENOUS at 17:26

## 2024-01-11 RX ADMIN — HALOPERIDOL 2 MG: 2 TABLET ORAL at 21:56

## 2024-01-11 RX ADMIN — FENTANYL CITRATE 50 MCG: 50 INJECTION, SOLUTION INTRAMUSCULAR; INTRAVENOUS at 08:52

## 2024-01-11 RX ADMIN — HYDROMORPHONE HYDROCHLORIDE 2 MG: 2 TABLET ORAL at 09:42

## 2024-01-11 RX ADMIN — BUPRENORPHINE AND NALOXONE 1 TABLET: 8; 2 TABLET SUBLINGUAL at 09:42

## 2024-01-11 ASSESSMENT — PAIN SCALES - GENERAL
PAINLEVEL_OUTOF10: 8
PAINLEVEL_OUTOF10: 0 - NO PAIN
PAINLEVEL_OUTOF10: 10 - WORST POSSIBLE PAIN
PAINLEVEL_OUTOF10: 10 - WORST POSSIBLE PAIN
PAINLEVEL_OUTOF10: 0 - NO PAIN
PAINLEVEL_OUTOF10: 10 - WORST POSSIBLE PAIN
PAINLEVEL_OUTOF10: 0 - NO PAIN
PAINLEVEL_OUTOF10: 9
PAINLEVEL_OUTOF10: 0 - NO PAIN

## 2024-01-11 ASSESSMENT — PAIN - FUNCTIONAL ASSESSMENT
PAIN_FUNCTIONAL_ASSESSMENT: 0-10

## 2024-01-11 ASSESSMENT — PAIN DESCRIPTION - DESCRIPTORS
DESCRIPTORS: STABBING
DESCRIPTORS: STABBING

## 2024-01-11 ASSESSMENT — COGNITIVE AND FUNCTIONAL STATUS - GENERAL
MOBILITY SCORE: 24
DAILY ACTIVITIY SCORE: 24

## 2024-01-11 NOTE — PROGRESS NOTES
SUPPORTIVE AND PALLIATIVE ONCOLOGY INPATIENT FOLLOW-UP      SERVICE DATE: 24     SUBJECTIVE:  Interval Events:  Patient seen at bedside this morning. Mom, niece, and nephew present at bedside.     Patient rates his pain 8/10 and reports hydromorphone decreases pain to 6/10. Feels current regimen is adequate for controlling his pain. Understands he should expect his pain to continue to improve as the infection resolves. No signs of precipitated opioid withdrawal at this time.     Patient had multiple loose bowel movements yesterday after taking lactulose.     Pain Assessment:  Location: Left abdomen  Duration: Constant  Characteristics:   Ratin   Descriptors: stretching fullness, sharp, and radiating R shoulder   Aggravating: movement, coughing, and deep breaths    Relieving: Analgesics opioids, modifying ctivity   Interference with Function: Very Much    Opioid Use  Past 24 h prn opioid use (7am-7am):   Oxycodone IR 10 mg   x 1 doses = 10 mg = 12.5 OME  Hydromorphone 2 mg PO x 3 doses = 6 mg = 30 OME  Buprenorphine 4 mg x 1 dose  Buprenorphine 2 mg x 1 dose   Buprenorphine 8 mg x 1 dose   Total 24h OME use:  ~43 OME + 14 mg buprenorphine     Symptom Assessment:  Nausea none  Vomiting none  Constipation very much   Shortness of breath none  Other: Cough somewhat     Information obtained from: chart review, interview of patient, and discussion with primary team  ______________________________________________________________________        OBJECTIVE:    Lab Results   Component Value Date    WBC 14.2 (H) 2024    HGB 11.1 (L) 2024    HCT 33.0 (L) 2024    MCV 86 2024     2024      Lab Results   Component Value Date    GLUCOSE 235 (H) 2024    CALCIUM 9.4 2024     (L) 2024    K 4.0 2024    CO2 28 2024    CL 95 (L) 2024    BUN 9 2024    CREATININE 0.42 (L) 2024     Lab Results   Component Value Date    ALT 8 (L) 2024     AST 10 01/09/2024    GGT 59 05/30/2023    ALKPHOS 91 01/09/2024    BILITOT 0.5 01/09/2024     Estimated Creatinine Clearance: 125 mL/min (A) (by C-G formula based on SCr of 0.42 mg/dL (L)).     Scheduled medications  atorvastatin, 40 mg, oral, Nightly  buprenorphine-naloxone, 1 Film, sublingual, Daily  buprenorphine-naloxone, 1 tablet, sublingual, BID  cefTRIAXone, 2 g, intravenous, q24h  ergocalciferol, 50,000 Units, oral, Weekly  haloperidol, 2 mg, oral, Nightly  insulin lispro, 0-5 Units, subcutaneous, TID with meals  ketorolac, 15 mg, intravenous, q6h JULIO  lactulose, 20 g, oral, TID  metoprolol succinate XL, 25 mg, oral, Daily  metroNIDAZOLE, 500 mg, oral, q8h JULIO  nicotine, 1 patch, transdermal, Daily  pantoprazole, 40 mg, oral, Daily before breakfast  polyethylene glycol, 17 g, oral, Daily  sennosides, 2 tablet, oral, BID      Continuous medications       PRN medications  PRN medications: albuterol, alum-mag hydroxide-simeth, dextromethorphan-guaifenesin, dextrose 10 % in water (D10W), dextrose, doxepin, glucagon, heparin, HYDROmorphone   }     PHYSICAL EXAMINATION:    Vital Signs:   Vital signs reviewed  Visit Vitals  /87   Pulse 90   Temp 36.1 °C (97 °F)   Resp 20        Pain Score: 8       ASSESSMENT/PLAN:  39yo M diagnosed with metastatic rectal CA dx 2013. PMH significant for Vit D deficiency, non-compliance, opioid abuse on suboxone, cholecystitis, Day's esophagus, tobacco abuse, depression/ anxiety, HTN, dyslipidemia, Avascular necrosis, COPD, DMII, Migraines, Subarachnoid hemorrhage. Admitted 1/9/2024 for further evaluation and management of severe abdominal pain r/t fluid collections in abdomen. Supportive and Palliative Oncology is consulted for pain management.      Pain:  L sided abdominal pain related to fluid collections/ abscess vs psuedocyst and infection  Pain is:  acute  Type: visceral, somatic   Pain control: improving  Home regimen:  pt is on suboxone 8-2 mg BID and 1/2 tab in  evening for opioid abuse  Personalized pain goal: 3  Total OME usage for the past 24 hours:   ~43 OME + 14 mg buprenorphine   Given history of opioid use disorder, continuation of medication for opioid use disorder (buprenorphine) is necessary in order to avoid cravings and withdrawal, decrease morbidity related to opioid use, and decrease mortality (both all cause and opioid related death). Short acting opioids can be utilized in patients maintained on buprenorphine for acute pain. Hydromorphone is the preferred short acting opioids for patients on buprenorphine as buprenorphine has very high affinity for mu-opioid receptor. Hydromorphone has higher mu opioid receptor affinity compared with other short acting opioids such as morphine and oxycodone.  Treatment with nonopioid multimodal pain management as guided by patient specific factors  is also appropriate in order to optimize pain management. Continuation of NSAID expected to be helpful given underlying inflammatory etiology related to infection. Recommend:  Change PRN indication to hydromorphone 2 mg PO Q3H PRN moderate or severe pain   Continue Suboxone 8-2 mg BID and Suboxone 4-1 mg at 6pm  Continue ketorolac 15 mg IV Q6H, scheduled x 5 days  Discussed with patient will not discharge with short-acting opioids; patient expressed understanding  Continue to monitor pain scores and administer PRN medications as appropriate  Continue/initiate nonpharmacologic pain management strategies including ice/heat therapy, distraction techniques, deep breathing/relaxation techniques, calming music, and repositioning  Continue to monitor for signs of opioid efficacy (pain scores, improved functionality) and toxicity (pinpoint pupils, excess sedation/drowsiness/confusion, respiratory depression, etc.)      Constipation  At risk for constipation related to opioids, currently not constipated  Usual bowel pattern:  1-2x week  Home regimen: none  LBM 1/10/24. Recommend:  Continue  doc-senna PO BID   May discontinue MiraLAX 17g PO daily given patient declining  Discontinue lactulose 20 g PO 3x/day        Medical Decision Making/Goals of Care/Advance Care Planning:  Patient's current clinical condition, including diagnosis, prognosis, and management plan, and goals of care were discussed.   Life limiting disease: metastatic malignancy  Family: Supportive   Performance status: Moderate limitations due to pain  Joys/meaning/strength: Family  Understanding of health: Demonstrates good prognostic understanding of disease process, understands plan for draining of fluid collection on Thursday.  Information:Wants full disclosure  Goals: symptom control  Worries and fears now and future: ongoing symptoms      Advance Directives  Existence of Advance Directives:Unknown  Decision maker: Surrogate decision maker is mother Carine  Code Status: Full code    Data:   Diagnostic tests and information reviewed for today's visit:  Conversation with primary team, Most recent labs and imaging results, Medications       Some elements copied from DARWIN Kaufman note on 1/9/24, the elements have been updated and all reflect current decision making from today, 01/11/24       Plan of Care discussed with: Provider and Patient    Thank you for asking Supportive and Palliative Oncology to assist with care of this patient.  We will continue to follow  Please contact us for additional questions or concerns.      SIGNATURE:   Milad YarbroughD  Palliative Medicine Clinical Pharmacy Specialist   [facility]    PAGER/CONTACT:  Contact information:  [facility]  Monday-Friday 8 AM-5 PM  Epic Secure chat or pager [phone].  After hours and weekends:  pager [phone]

## 2024-01-11 NOTE — PROGRESS NOTES
Misael Hernandez is a 38 y.o. male on day 2 of admission presenting with Pancreatic abscess.    Subjective   No acute events overnight.  S/p IR-guided drain placement this morning with placement of pigtail catheter. Tolerated well. Patient otherwise reports significant improvement in pain control. Noted a few loose stools yesterday during the day but feels these have slowed down and become more formed. Denies any current fever/chills, N/V, chest pain, SOB, or dysuria.        Objective     Physical Exam  Constitutional:       General: He is not in acute distress.     Appearance: Normal appearance.   HENT:      Head: Normocephalic and atraumatic.      Nose: Nose normal.      Mouth/Throat:      Mouth: Mucous membranes are moist.      Pharynx: Oropharynx is clear. No posterior oropharyngeal erythema.   Eyes:      General: No scleral icterus.     Extraocular Movements: Extraocular movements intact.      Conjunctiva/sclera: Conjunctivae normal.      Pupils: Pupils are equal, round, and reactive to light.   Cardiovascular:      Rate and Rhythm: Normal rate and regular rhythm.      Pulses: Normal pulses.      Heart sounds: Normal heart sounds.   Pulmonary:      Effort: Pulmonary effort is normal. No respiratory distress.      Breath sounds: Normal breath sounds.   Abdominal:      General: Abdomen is flat. There is no distension.      Palpations: Abdomen is soft.      Comments: Closed suction drain in place over LUQ, draining serosanginous fluid. Mild tenderness to palpation around drain site. Interval improvement in generalized LUQ pain on palpation   Musculoskeletal:         General: Normal range of motion.      Cervical back: Normal range of motion and neck supple.      Right lower leg: No edema.      Left lower leg: No edema.   Skin:     General: Skin is warm and dry.   Neurological:      General: No focal deficit present.      Mental Status: He is alert and oriented to person, place, and time.   Psychiatric:         Mood  "and Affect: Mood normal.         Behavior: Behavior normal.     Last Recorded Vitals  Blood pressure 127/87, pulse 90, temperature 36.1 °C (97 °F), resp. rate 20, height 1.753 m (5' 9\"), weight 83.9 kg (185 lb), SpO2 95 %.  Intake/Output last 3 Shifts:  I/O last 3 completed shifts:  In: 560.1 (6.7 mL/kg) [I.V.:560.1 (6.7 mL/kg)]  Out: - (0 mL/kg)   Weight: 83.9 kg     Relevant Results  Results for orders placed or performed during the hospital encounter of 01/09/24 (from the past 24 hour(s))   POCT GLUCOSE   Result Value Ref Range    POCT Glucose 189 (H) 74 - 99 mg/dL   Magnesium   Result Value Ref Range    Magnesium 1.79 1.60 - 2.40 mg/dL   Renal Function Panel   Result Value Ref Range    Glucose 235 (H) 74 - 99 mg/dL    Sodium 133 (L) 136 - 145 mmol/L    Potassium 4.0 3.5 - 5.3 mmol/L    Chloride 95 (L) 98 - 107 mmol/L    Bicarbonate 28 21 - 32 mmol/L    Anion Gap 14 10 - 20 mmol/L    Urea Nitrogen 9 6 - 23 mg/dL    Creatinine 0.42 (L) 0.50 - 1.30 mg/dL    eGFR >90 >60 mL/min/1.73m*2    Calcium 9.4 8.6 - 10.6 mg/dL    Phosphorus 3.5 2.5 - 4.9 mg/dL    Albumin 2.8 (L) 3.4 - 5.0 g/dL   CBC and Auto Differential   Result Value Ref Range    WBC 14.2 (H) 4.4 - 11.3 x10*3/uL    nRBC 0.0 0.0 - 0.0 /100 WBCs    RBC 3.86 (L) 4.50 - 5.90 x10*6/uL    Hemoglobin 11.1 (L) 13.5 - 17.5 g/dL    Hematocrit 33.0 (L) 41.0 - 52.0 %    MCV 86 80 - 100 fL    MCH 28.8 26.0 - 34.0 pg    MCHC 33.6 32.0 - 36.0 g/dL    RDW 17.1 (H) 11.5 - 14.5 %    Platelets 406 150 - 450 x10*3/uL    Neutrophils % 82.6 40.0 - 80.0 %    Immature Granulocytes %, Automated 0.7 0.0 - 0.9 %    Lymphocytes % 9.4 13.0 - 44.0 %    Monocytes % 6.9 2.0 - 10.0 %    Eosinophils % 0.0 0.0 - 6.0 %    Basophils % 0.4 0.0 - 2.0 %    Neutrophils Absolute 11.76 (H) 1.20 - 7.70 x10*3/uL    Immature Granulocytes Absolute, Automated 0.10 0.00 - 0.70 x10*3/uL    Lymphocytes Absolute 1.34 1.20 - 4.80 x10*3/uL    Monocytes Absolute 0.98 0.10 - 1.00 x10*3/uL    Eosinophils Absolute " 0.00 0.00 - 0.70 x10*3/uL    Basophils Absolute 0.06 0.00 - 0.10 x10*3/uL   Body Fluid Cell Count   Result Value Ref Range    Color, Fluid Red-brown (A) Colorless, Straw, Yellow    Clarity, Fluid Turbid (A) Clear    WBC, Fluid 940 See Comment /uL    RBC, Fluid 29,000 0  /uL /uL   Body Fluid Differential   Result Value Ref Range    Neutrophils %, Manual, Fluid 71 <25 % %    Lymphocytes %, Manual, Fluid 0 <75 % %    Mono/Macrophages %, Manual, Fluid 21 <70 % %    Eosinophils %, Manual, Fluid 8 0 % %    Basophils %, Manual, Fluid 0 0 % %    Immature Granulocytes %, Manual, Fluid 0 0 % %    Blasts %, Manual, Fluid 0 0 % %    Unclassified Cells %, Manual, Fluid 0 0 % %    Plasma Cells %, Manual, Fluid 0 0 % %    Total Cells Counted, Fluid 100    Amylase, Fluid   Result Value Ref Range    Amylase, Fluid >6,000 Not established. U/L   Lipase, Fluid   Result Value Ref Range    Lipase, Fluid >6,000 Not established U/L             Malnutrition          I agree with the dietitian's malnutrition diagnosis.      Assessment/Plan   Principal Problem:    Pancreatic abscess    Misael Hernandez is a 38 y.o. male presenting with a PMHx of metastatic rectal carcinoma (currently on Lonsurf and Avastin [id number], transitioning care from Dr. Vega to Dr. Lui), Day's esophagus, HFrEF, cholecystits (2019), HLD, HTN, recurrent DVT/PE, and tobacco use disorder who presents as transfer from [place] with new leukocytosis and abdominal fluid collections. Concern for possible abscess vs infected pancreatic pseudocyst in the setting of recent episode of acute pancreatitis. Currently treating with ceftriaxone and metronidazole.     S/p IR-guided drain placement this AM, fluid cultures and cell count sent. Patient tolerated well, reports good pain management.     Will continue IV ceftriaxone and metronidazole, final ID recs pending fluid study results.     Updates 1/11:  -Continue ceftriaxone and metronidazole, final ID recs pending fluid  studies  -S/p IR-guided fluid drainage this morning, tolerated well  -Restart heparin drip 8 hours post-procedure, plan to transition to apixiban  -Continue home suboxone, toradol 15 mg q6h, PO Dilaudid 2 mg q3h PRN for pain management     #Abdominal fluid collections  #Acute leukocytosis   #Suspected pancreatic pseudocyst  #Acute abdominal pain  -Overall concern for possible abscesses or new, infected pancreatic pseudocysts in setting of patient's recent bout of pancreatitis  -Amylase/lipase WNL on admission  -Currently tolerating PO well with decent fluid intake, ok for regular diet. Fluid bolus PRN  -Supportive onc following for pain management, appreciate recs  -Continue home suboxone, toradol 15 mg q6h, PO Dilaudid 2 mg q3h PRN for pain management     #Metastatic rectal cancer  -Previously following with Dr. Vega, about to transition care to Dr. Lui as primary oncologist  -Currently receiving Lonsurf and avastin [id number], last infusion 12/20  -Holding home Lonsurf in setting of suspected infection     #Hx of recurrent DVT  #Subacute right PE  -Hospitalized 12/2023 with new right PE after stopping home apixiban  -Repeat CTA 1/4/2023 showing stable clot burden compared to last admission  -Currently on heparin gtt, will plan for transition back to home Eliquis      #HTN  #HLD  -Continue home metoprolol XR 25 mg daily   -Holding home losartan for time being, will consider restarting if BP remains elevated  -Continue home atorvastatin     #Hyperglycemia  -Last A1C 8.5 (2018)  -Apparently initated on dapagloflozin by primary care doctor in May 2023 but does not appear to be taking  -Repeat A1C  -Low dose SSI with meals     F: PRN  E: PRN  N: regular diet  GI: home omeprazole  DVT ppx: heparin gtt     NOK: brother Aryan Hernandez- [phone]  Code status: full (confirmed on admission)                Carmen Mays MD

## 2024-01-11 NOTE — POST-PROCEDURE NOTE
Interventional Radiology Brief Postprocedure Note    Attending: Dr. Flood    Assistant: Dr. Pappas    Diagnosis: acute pancreatitis with intra-abdominal abscesses    Description of procedure:     Patient was brought to the procedure area.  Limited diagnostic scanning of the abdomen was performed, which demonstrated multiloculated fluid collection in the LUQ.  Subsequently the patient was prepped and draped in the usual sterile manner.  Lidocaine was administered for local analgesia.  Subsequently with a 5F Yueh, the fluid collection was accessed under ultrasound guidance.  The inner stylet was removed and a Collazo wire was advanced into the fluid collection.  A pigtail skater drain was subsequently placed.  Fluid was aspirated for analysis.  Patient tolerated the procedure. See PACS for full details.      If abscess drain is not draining correctly or there are questions regarding care and management of this drain while patient is inpatient status, please call [phone] for IR nursing to triage.        Anesthesia:  Local, moderate    Complications: None    Estimated Blood Loss: minimal    Medications  As of 01/11/24 0915      sennosides (Senokot) tablet 17.2 mg (mg) Total dose:  4 tablet* Dosing weight:  83.9   *Administration not included in total     Date/Time Rate/Dose/Volume Action       01/09/24  0943 *17.2 mg Missed      2100 17.2 mg Given     01/10/24  0825 17.2 mg Given      2100 *17.2 mg Missed               polyethylene glycol (Glycolax, Miralax) packet 17 g (g) Total dose:  0 g* Dosing weight:  83.9   *Administration not included in total     Date/Time Rate/Dose/Volume Action       01/09/24  0943 *17 g Missed     01/10/24  0900 *17 g Missed               HYDROmorphone (Dilaudid) injection 0.5 mg (mg) Total dose:  0.5 mg Dosing weight:  83.9      Date/Time Rate/Dose/Volume Action       01/09/24  0928 0.5 mg Given               HYDROmorphone (Dilaudid) injection 0.8 mg (mg) Total dose:  0.8 mg Dosing  weight:  83.9      Date/Time Rate/Dose/Volume Action       01/09/24  1006 0.8 mg Given               atorvastatin (Lipitor) tablet 40 mg (mg) Total dose:  0 mg* Dosing weight:  83.9   *Administration not included in total     Date/Time Rate/Dose/Volume Action       01/09/24  2100 *40 mg Missed     01/10/24  2100 *40 mg Missed               doxepin (SINEquan) capsule 10 mg (mg) Total dose:  20 mg Dosing weight:  83.9      Date/Time Rate/Dose/Volume Action       01/09/24  2048 10 mg Given     01/10/24  2050 10 mg Given               ergocalciferol (Vitamin D-2) capsule 50,000 Units (Units) Total dose:  50,000 Units Dosing weight:  83.9      Date/Time Rate/Dose/Volume Action       01/09/24  1054 50,000 Units Given               nicotine (Nicoderm CQ) 14 mg/24 hr patch 1 patch (patch) Total dose:  2 patch Dosing weight:  83.9      Date/Time Rate/Dose/Volume Action       01/09/24  0928 1 patch (over 1440 min) Medication Applied     01/10/24  0825  (over 1440 min) Medication Removed      0826 1 patch (over 1440 min) Medication Applied               meropenem (Merrem) in sodium chloride 0.9 % 100 mL IV 1 g (mL/hr) Total dose:  1 g* Dosing weight:  83.9   *From user-documented volume     Date/Time Rate/Dose/Volume Action       01/09/24  1054 1 g - 200 mL/hr (over 30 min) New Bag      1124 100 mL Stopped               oxyCODONE (Roxicodone) immediate release tablet 10 mg (mg) Total dose:  60 mg Dosing weight:  83.9      Date/Time Rate/Dose/Volume Action       01/09/24  1216 10 mg Given      1606 10 mg Given      1958 10 mg Given     01/10/24  0006 10 mg Given      0418 10 mg Given      0752 10 mg Given               heparin (porcine) injection 4,000 Units (Units) Total dose:  4,000 Units Dosing weight:  83.9      Date/Time Rate/Dose/Volume Action       01/09/24  1130 4,000 Units Given               heparin (porcine) injection 2,000-4,000 Units (Units) Total dose:  2,000 Units Dosing weight:  83.9      Date/Time  Rate/Dose/Volume Action       01/09/24  1900 2,000 Units Given               heparin 25,000 Units in dextrose 5% 250 mL (100 Units/mL) infusion (premix) (Units/hr) Total dose:  46,010 Units* Dosing weight:  83.9   *From user-documented volume     Date/Time Rate/Dose/Volume Action       01/09/24  Canceled Entry      1221 1,000 Units/hr - 10 mL/hr New Bag      Canceled Entry      1743 1,000 Units/hr - 10 mL/hr Rate Verify      2048 1,200 Units/hr - 12 mL/hr Rate Change      2158 1,200 Units/hr - 12 mL/hr Rate Verify     01/10/24  0418 1,200 Units/hr - 12 mL/hr New Bag     01/11/24  0113 1,200 Units/hr - 12 mL/hr New Bag      0406  Stopped               dextromethorphan-guaifenesin (Robitussin DM)  mg/5 mL oral liquid 5 mL (mL) Total volume:  35 mL Dosing weight:  83.9      Date/Time Rate/Dose/Volume Action       01/09/24  1432 5 mL Given      2059 5 mL Given     01/10/24  0104 5 mL Given      0506 5 mL Given      1135 5 mL Given      2208 5 mL Given     01/11/24  0417 5 mL Given               lactulose 20 gram/30 mL oral solution 20 g (g) Total dose:  40 g* Dosing weight:  83.9   *Administration not included in total     Date/Time Rate/Dose/Volume Action       01/09/24  1432 20 g Given      2100 *20 g Missed     01/10/24  0825 20 g Given      1500 *20 g Missed      2100 *20 g Missed               magnesium sulfate IV 4 g (mL/hr) Total dose:  4 g* Dosing weight:  83.9   *From user-documented volume     Date/Time Rate/Dose/Volume Action       01/09/24  1432 4 g - 25 mL/hr (over 240 min) New Bag      1832  (over 240 min) Stopped               insulin lispro (HumaLOG) injection 0-5 Units (Units) Total dose:  Cannot be calculated* Dosing weight:  83.9   *Administration dose not documented     Date/Time Rate/Dose/Volume Action       01/09/24  1330 *Not included in total Missed      1700 *Not included in total Missed     01/10/24  0800 *Not included in total Missed      1200 *Not included in total Missed      1700 *Not  included in total Missed               metoprolol succinate XL (Toprol-XL) 24 hr tablet 25 mg (mg) Total dose:  50 mg Dosing weight:  83.9      Date/Time Rate/Dose/Volume Action       01/09/24  1627 25 mg Given     01/10/24  0825 25 mg Given               cefTRIAXone (Rocephin) 2 g IV in dextrose 5% 50 mL (mL/hr) Total dose:  2 g* Dosing weight:  83.9   *From user-documented volume     Date/Time Rate/Dose/Volume Action       01/09/24  1827 2 g - 100 mL/hr (over 30 min) - 50 mL New Bag      1857  (over 30 min) Stopped     01/10/24  1758 2 g - 100 mL/hr (over 30 min) New Bag      1828  (over 30 min) Stopped               metroNIDAZOLE (Flagyl) tablet 500 mg (mg) Total dose:  2,500 mg Dosing weight:  83.9      Date/Time Rate/Dose/Volume Action       01/09/24  1827 500 mg Given     01/10/24  0506 500 mg Given      1340 500 mg Given      2200 500 mg Given     01/11/24  0504 500 mg Given               buprenorphine-naloxone (Suboxone) 8-2 mg per SL tablet 1 tablet (tablet) Total dose:  1 tablet* Dosing weight:  83.9   *Administration not included in total     Date/Time Rate/Dose/Volume Action       01/09/24  2100 *1 tablet Missed     01/10/24  0825 *1 tablet Missed      2050 1 tablet Given               buprenorphine-naloxone (Suboxone) 8-2 mg per SL film 1 Film (Film) Total dose:  0 Film* Dosing weight:  83.9   *Administration not included in total     Date/Time Rate/Dose/Volume Action       01/09/24  1800 *1 Film Missed               pantoprazole (ProtoNix) EC tablet 40 mg (mg) Total dose:  80 mg Dosing weight:  83.9      Date/Time Rate/Dose/Volume Action       01/10/24  0750 40 mg Given     01/11/24  0622 40 mg Given               haloperidol lactate (Haldol) injection 2 mg (mg) Total dose:  0 mg* Dosing weight:  83.9   *Administration not included in total     Date/Time Rate/Dose/Volume Action       01/09/24  1845 *2 mg Missed               haloperidol (Haldol) tablet 2 mg (mg) Total dose:  4 mg Dosing weight:  83.9       Date/Time Rate/Dose/Volume Action       01/09/24  2048 2 mg Given     01/10/24  2050 2 mg Given               buprenorphine-naloxone (Suboxone) 2-0.5 mg per sublingual film 1 Film (Film) Total dose:  1 Film Dosing weight:  83.9      Date/Time Rate/Dose/Volume Action       01/10/24  1159 1 Film Given               alum-mag hydroxide-simeth (Mylanta) 200-200-20 mg/5 mL oral suspension 10 mL (mL) Total volume:  20 mL Dosing weight:  83.9      Date/Time Rate/Dose/Volume Action       01/10/24  1143 10 mL Given      2208 10 mL Given               ketorolac (Toradol) injection 15 mg (mg) Total dose:  45 mg Dosing weight:  83.9      Date/Time Rate/Dose/Volume Action       01/10/24  1340 15 mg Given      2050 15 mg Given     01/11/24  0114 15 mg Given               buprenorphine-naloxone (Suboxone) 4-1 mg per sublingual film 1 Film (Film) Total dose:  2 Film Dosing weight:  83.9      Date/Time Rate/Dose/Volume Action       01/10/24  1438 1 Film Given      1758 1 Film Given               dexAMETHasone (Decadron) injection 4 mg (mg) Total dose:  4 mg Dosing weight:  83.9      Date/Time Rate/Dose/Volume Action       01/10/24  1502 4 mg Given               HYDROmorphone (Dilaudid) tablet 2 mg (mg) Total dose:  6 mg Dosing weight:  83.9      Date/Time Rate/Dose/Volume Action       01/10/24  2205 2 mg Given     01/11/24  0114 2 mg Given      0414 2 mg Given               fentaNYL PF (Sublimaze) injection (mcg) Total dose:  100 mcg      Date/Time Rate/Dose/Volume Action       01/11/24  0852 50 mcg Given      0859 50 mcg Given               midazolam (Versed) injection (mg) Total dose:  2 mg      Date/Time Rate/Dose/Volume Action       01/11/24  0852 1 mg Given      0859 1 mg Given                     See detailed result report with images in PACS.    The patient tolerated the procedure well without incident or complication and is in stable condition.

## 2024-01-11 NOTE — CONSULTS
"Nutrition Initial Assessment:   Nutrition Assessment    The patient is a 38 y.o. male who is hospital day #2.  Pt is a transfer from [place] for leukocytosis and abdominal fluid collection management/work up. Started on abx. Went with IR today to get fluid drained. Also working on pain management with supportive onc.     PMHx: metastatic rectal carcinoma (currently on Lonsurf and Avastin [id number], transitioning care from Dr. Vega to Dr. Lui), Day's esophagus, HFrEF, cholecystits (2019), HLD, HTN, recurrent DVT/PE     Reason for Assessment: Provider consult order      Nutrition History:  Energy Intake: Good > 75 %  Food and Nutrient History: Pt reports he is doing well in terms of nutrition. Have been eating 2-3 meals a day at [facility] which is his baseline. Had just finished eating a meal consisting of chicken sandwich, tater tots, chocolate milk, and OJ (~750 kcal and 40g protein). Family reports pt ate all of his meal. Pt also reports he has been consistently eating everything that he orders. Pt has also been snacking. Reports he lost weight in the past 6 weeks due to his medical issues (recent pancreatitis) where he couldnt keep anything down. However, intake is back to baseline and he no longer has N/V. Getting medication for bowel movements at the moment. Pt reports he cant eat nuts due to his dentition.  Vitamin/Herbal Supplement Use: Per pt, none.  Food Allergies/Intolerances:  None  GI Symptoms: None  Oral Problems: Chewing difficulty d/t dentition          Anthropometrics:  Start of admission anthropometrics:  Height: 175.3 cm (5' 9\")  Weight: 83.9 kg (184 lb 15.5 oz)  BMI (Calculated): 27.3    IBW/kg (Dietitian Calculated): 72.7 kg  Percent of IBW: 115 %       Wt Hx prior to admission.    01/09/24 83.9 kg (185 lb) - stated    01/08/24 86 kg (189 lb 9.5 oz) - stated   01/03/24 85.9 kg (189 lb 6 oz)   12/20/23 88.6 kg (195 lb 5.2 oz)   12/05/23 89.9 kg (198 lb 4.9 oz)   11/22/23 90.6 kg (199 lb 11.8 " oz)   11/21/23 89 kg (196 lb 5.1 oz)   11/08/23 95.4 kg (210 lb 3.3 oz)   11/07/23 94.6 kg (208 lb 7.1 oz)   10/10/23 93.7 kg (206 lb 9.1 oz)   03/08/23 104 kg (228 lb 6.3 oz)   02/07/23 103 kg (227 lb 15.3 oz)   01/25/23 103 kg (227 lb 11.8 oz)     Weight Change %:  Weight History / % Weight Change: Pt reports a UBW of around 200# and believes his wt is down to 190#. Reports wt loss has occured in the past 6 weeks due to medical issues where he couldnt keep food down. Wt hx suggests a 12% wt loss in the past 2 months. Important to note admission wt is stated.  Significant Weight Loss: Yes    Nutrition Focused Physical Exam Findings:    Edema:  Edema: none  Physical Findings:  Skin: Negative    Objective Data:    Last BM Date: 01/10/24    Nutrition Significant Labs:    Results from last 7 days   Lab Units 01/11/24  0714 01/10/24  0512 01/09/24  0831 01/09/24  0831   HEMOGLOBIN g/dL 11.1* 11.2*  --  11.5*   MCV fL 86 85  --  86   GLUCOSE mg/dL 235* 171*  --  166*   POTASSIUM mmol/L 4.0 3.9  --  3.5   SODIUM mmol/L 133* 133*  --  134*   PHOSPHORUS mg/dL 3.5 3.6   < >  --    MAGNESIUM mg/dL 1.79 1.97  --  1.50*   CREATININE mg/dL 0.42* 0.29*  --  0.33*   BUN mg/dL 9 5*  --  3*   ALT U/L  --   --   --  8*   AST U/L  --   --   --  10   ALK PHOS U/L  --   --   --  91    < > = values in this interval not displayed.       Nutrition Specific Medications:  atorvastatin, 40 mg, oral, Nightly  cefTRIAXone, 2 g, intravenous, q24h  ergocalciferol, 50,000 Units, oral, Weekly  insulin lispro, 0-5 Units, subcutaneous, TID with meals  pantoprazole, 40 mg, oral, Daily before breakfast  polyethylene glycol, 17 g, oral, Daily  sennosides, 2 tablet, oral, BID    I/O:   I/O last 2 completed shifts:  In: 361.6 (4.3 mL/kg) [I.V.:361.6 (4.3 mL/kg)]  Out: - (0 mL/kg)   Weight: 83.9 kg       Estimated Needs:   Total Energy Estimated Needs (kCal): 2250 kCal  Method for Estimating Needs: 27 kcal/kg * 84 kg    Total Protein Estimated Needs (g):  110 g  Method for Estimating Needs: 1.3 g/kg * 84 kg    Total Fluid Estimated Needs (mL): 2250 mL  Method for Estimating Needs: 1 ml/kcal or per team          Nutrition Diagnosis   Malnutrition Diagnosis  Patient has Malnutrition Diagnosis: Yes  Diagnosis Status: New  Malnutrition Diagnosis: Severe malnutrition related to acute disease or injury  As Evidenced by: 12% wt loss in 2 months; on average PO intake in the past month likely meeting <75% of nutr needs  Additional Assessment Information: Though pt does meet malnutrition criteria it appears his nutritional status may be improving given reports of PO intake back to baseline and no longer N/V. Will continue to monitor wt and PO intake while inpatient.            Nutrition Interventions/Recommendations     When able resume regular diet   Please obtain updated weight         Nutrition Prescription:  Individualized Nutrition Prescription Provided for : 2250 kcal and 110g protein via oral diet    Nutrition Education:   Reviewed protein sources with patient and encourage consumption of protein with every meal and snack. Family with questions about ONS consumption however, patient denied wanting to try any at the moment.     Nutrition Monitoring and Evaluation   Food/Nutrient Related History Monitoring  Monitoring and Evaluation Plan: Energy intake  Energy Intake: Estimated energy intake  Criteria: >75% of nutr needs    Body Composition/Growth/Weight History  Monitoring and Evaluation Plan: Weight  Weight: Weight change  Criteria: no wt change                 Time Spent/Follow-up Reminder:   Time Spent (min): 60 minutes  Last Date of Nutrition Visit: 01/11/24  Nutrition Follow-Up Needed?: Dietitian to reassess per policy  Follow up Comment: PO diet; denied ONS

## 2024-01-11 NOTE — CARE PLAN
The patient's goals for the shift include      The clinical goals for the shift include patient will remain safe and free from injury this shift 1/11/24 1900      Problem: Safety - Adult  Goal: Free from fall injury  Outcome: Progressing     Problem: Discharge Planning  Goal: Discharge to home or other facility with appropriate resources  Outcome: Progressing

## 2024-01-11 NOTE — PROGRESS NOTES
Misael Hernandez is a 38 y.o. male on day 2 of admission presenting with Pancreatic abscess.    Subjective   Interval History:   Mr. Hernandez went for drainage of his intraabdominal fluid collections with IR this morning.  The procedure was successful, and he has a pigtail skater drain in place collecting dark red/brown fluid.      On examination at the bedside Mr. Hernandez was resting comfortably in bed with his family in the room.  He states that since the procedure his abdominal pain has improved, but is still mildly present.  He continues to have mild wheezing and shortness of breath, but is able to take deeper breaths now with the improved abdominal pain.  He continues to have a good appetite, and was able to eat lunch without any nausea, or worsening stomach pain this afternoon.    Review of Systems   All other systems reviewed and are negative.      Objective   Range of Vitals (last 24 hours)  Heart Rate:  []   Temp:  [35.9 °C (96.6 °F)-36.3 °C (97.3 °F)]   Resp:  [16-28]   BP: (113-144)/(78-95)   SpO2:  [93 %-98 %]   Daily Weight  01/09/24 : 83.9 kg (185 lb)    Body mass index is 27.32 kg/m².    Physical Exam  Constitutional:       General: He is not in acute distress.     Appearance: He is not ill-appearing.   Eyes:      Extraocular Movements: Extraocular movements intact.      Conjunctiva/sclera: Conjunctivae normal.   Cardiovascular:      Rate and Rhythm: Regular rhythm. Tachycardia present.      Heart sounds: No murmur heard.     No friction rub. No gallop.   Pulmonary:      Effort: No respiratory distress.      Comments: Bilateral inspiratory and expiratory wheezes, rhonchorous breath sounds  Abdominal:      General: Abdomen is flat. Bowel sounds are normal.      Palpations: Abdomen is soft.      Comments: No significant pain to palpation of the abdomen.  No guarding. Pigtail skater drain in place in the lateral aspect of his left upper abdomen.  Drain bulb containing moderate amount of dark red/brown  fluid.  Skin:     General: Skin is warm and dry.      Comments: Dusky appearance of the skin of the bilateral lower extremities consistent with chronic venous stasis.   Neurological:      General: No focal deficit present.      Mental Status: He is alert and oriented to person, place, and time. Mental status is at baseline.   Psychiatric:         Mood and Affect: Mood normal.         Behavior: Behavior normal.         Thought Content: Thought content normal.     Scheduled medications  atorvastatin, 40 mg, oral, Nightly  buprenorphine-naloxone, 1 Film, sublingual, Daily  buprenorphine-naloxone, 1 tablet, sublingual, BID  cefTRIAXone, 2 g, intravenous, q24h  ergocalciferol, 50,000 Units, oral, Weekly  haloperidol, 2 mg, oral, Nightly  heparin, 4,000 Units, intravenous, Once  insulin lispro, 0-5 Units, subcutaneous, TID with meals  ketorolac, 15 mg, intravenous, q6h JULIO  metoprolol succinate XL, 25 mg, oral, Daily  metroNIDAZOLE, 500 mg, oral, q8h JULIO  nicotine, 1 patch, transdermal, Daily  pantoprazole, 40 mg, oral, Daily before breakfast  polyethylene glycol, 17 g, oral, Daily  sennosides, 2 tablet, oral, BID      Continuous medications  heparin, 0-4,000 Units/hr      PRN medications  PRN medications: albuterol, alum-mag hydroxide-simeth, dextromethorphan-guaifenesin, dextrose 10 % in water (D10W), dextrose, doxepin, glucagon, heparin, HYDROmorphone        Relevant Results  Labs  Results from last 72 hours   Lab Units 01/11/24  0714 01/10/24  0512 01/09/24  0831   WBC AUTO x10*3/uL 14.2* 11.6* 13.2*   HEMOGLOBIN g/dL 11.1* 11.2* 11.5*   HEMATOCRIT % 33.0* 33.7* 33.7*   PLATELETS AUTO x10*3/uL 406 284 384   NEUTROS PCT AUTO % 82.6 72.9 75.0   LYMPHS PCT AUTO % 9.4 14.5 12.9   MONOS PCT AUTO % 6.9 8.6 8.1   EOS PCT AUTO % 0.0 2.0 2.3     Results from last 72 hours   Lab Units 01/11/24  0714 01/10/24  0512 01/09/24  0831   SODIUM mmol/L 133* 133* 134*   POTASSIUM mmol/L 4.0 3.9 3.5   CHLORIDE mmol/L 95* 97* 99   CO2  "mmol/L 28 24 25   BUN mg/dL 9 5* 3*   CREATININE mg/dL 0.42* 0.29* 0.33*   GLUCOSE mg/dL 235* 171* 166*   CALCIUM mg/dL 9.4 8.2* 9.0   ANION GAP mmol/L 14 16 14   EGFR mL/min/1.73m*2 >90 >90 >90   PHOSPHORUS mg/dL 3.5 3.6  --      Results from last 72 hours   Lab Units 01/11/24  0714 01/10/24  0512 01/09/24  0831 01/08/24  1523   ALK PHOS U/L  --   --  91 119   BILIRUBIN TOTAL mg/dL  --   --  0.5 0.3   PROTEIN TOTAL g/dL  --   --  7.0 7.4   ALT U/L  --   --  8* 10   AST U/L  --   --  10 11   ALBUMIN g/dL 2.8* 2.7* 3.0* 3.2*     Estimated Creatinine Clearance: 125 mL/min (A) (by C-G formula based on SCr of 0.42 mg/dL (L)).  No results found for: \"CRP\"  Microbiology  Blood cultures 1/9: No growth x2 days  Abdominal fluid collection culture: In process    Imaging  No new recent imaging.       Assessment/Plan   Mr. Hernandez is a 38 year old gentleman with a PMHx of rectal cancer on chemotherapy, multiple DVTs and PEs, HTN, HLD, HFrEF, who was recently admitted from 1/2-1/4 for new acute pancreatitis with pseudocyst, who was transferred to [facility] from the [facility] ED after presenting with chest pain on 1/8.  Patient found to have findings concerning for an infected pancreatic pseudocyst/abscess on CT imaging, so he was started on Meropenem and ID was consulted for further antibiotic recommendations.     #Pancreatic Fluid Collection concerning for infected pancreatic psuedocyst/abscess  #Acute Pancreatitis  Patient recently admitted and treated for acute pancreatitis from 1/2 -1/4 with meropenem, fluids, and pain control at which time he was found to have a fluid collection indenting the stomach consistent with development of a pseudocyst.  Patient experienced recurrent abdominal pain now localizing to lateral left upper abdomen, with imaging demonstrating development of free fluid collections in the anterior abdomen and greater curvature of the stomach likely abscesses or infected pancreatic pseudocyst.  Patient started on " IV Meropenem 1g Q8 and ID consulted for antibiotic recommendations.  Antibiotics were transitioned from IV Meropenem to Ceftriaxone 2g Q24 and PO Metronidazole 500mg Q8 on 1/9.  Patient underwent successful drainage of fluid collections with IR on 1/11.  Fluid cultures are pending.  Blood cultures no growth at 2 days.     Recommendations:  1.) Continue with antibiotic treatment with Ceftriaxone and Metronidazole.  Will update antibiotic recommendations pending cultures from intraabdominal fluid collections.  2.) Would consider getting a GI consult to investigate etiology of his pancreatitis, as current etiology is unclear and he could be prone to recurrence.     Patient discussed with Dr. Lindquist.     Festus Abraham MD  Transitional Year Resident  PGY-1       Festus Abraham MD

## 2024-01-11 NOTE — CARE PLAN
The patient's goals for the shift include      The clinical goals for the shift include pt will remain safe and free from injury through shift      Problem: Pain - Adult  Goal: Verbalizes/displays adequate comfort level or baseline comfort level  Outcome: Progressing     Problem: Safety - Adult  Goal: Free from fall injury  Outcome: Progressing     Problem: Chronic Conditions and Co-morbidities  Goal: Patient's chronic conditions and co-morbidity symptoms are monitored and maintained or improved  Outcome: Progressing

## 2024-01-11 NOTE — PRE-PROCEDURE NOTE
Interventional Radiology Preprocedure Note    Indication for procedure: The encounter diagnosis was Pancreatic abscess.    Relevant review of systems: NA    Relevant Labs:   Lab Results   Component Value Date    CREATININE 0.29 (L) 01/10/2024    EGFR >90 01/10/2024    INR 1.9 (H) 01/09/2024    PROTIME 21.2 (H) 01/09/2024       Planned Sedation/Anesthesia: Moderate    Airway assessment: normal    Directed physical examination:    Alert & oriented, calm     Mallampati: II (hard and soft palate, upper portion of tonsils anduvula visible)    ASA Score: ASA 3 - Patient with moderate systemic disease with functional limitations    Benefits, risks and alternatives of procedure and planned sedation have been discussed with the patient and/or their representative. All questions answered and they agree to proceed.

## 2024-01-12 LAB
ALBUMIN SERPL BCP-MCNC: 2.8 G/DL (ref 3.4–5)
ANION GAP SERPL CALC-SCNC: 14 MMOL/L (ref 10–20)
BASOPHILS # BLD AUTO: 0.14 X10*3/UL (ref 0–0.1)
BASOPHILS NFR BLD AUTO: 1 %
BUN SERPL-MCNC: 12 MG/DL (ref 6–23)
CALCIUM SERPL-MCNC: 8.9 MG/DL (ref 8.6–10.6)
CHLORIDE SERPL-SCNC: 94 MMOL/L (ref 98–107)
CO2 SERPL-SCNC: 28 MMOL/L (ref 21–32)
CREAT SERPL-MCNC: 0.47 MG/DL (ref 0.5–1.3)
EGFRCR SERPLBLD CKD-EPI 2021: >90 ML/MIN/1.73M*2
EOSINOPHIL # BLD AUTO: 0.16 X10*3/UL (ref 0–0.7)
EOSINOPHIL NFR BLD AUTO: 1.1 %
ERYTHROCYTE [DISTWIDTH] IN BLOOD BY AUTOMATED COUNT: 17.3 % (ref 11.5–14.5)
GLUCOSE BLD MANUAL STRIP-MCNC: 330 MG/DL (ref 74–99)
GLUCOSE SERPL-MCNC: 390 MG/DL (ref 74–99)
HCT VFR BLD AUTO: 31.2 % (ref 41–52)
HGB BLD-MCNC: 10.4 G/DL (ref 13.5–17.5)
IMM GRANULOCYTES # BLD AUTO: 0.12 X10*3/UL (ref 0–0.7)
IMM GRANULOCYTES NFR BLD AUTO: 0.9 % (ref 0–0.9)
LYMPHOCYTES # BLD AUTO: 3.02 X10*3/UL (ref 1.2–4.8)
LYMPHOCYTES NFR BLD AUTO: 21.6 %
MAGNESIUM SERPL-MCNC: 1.52 MG/DL (ref 1.6–2.4)
MCH RBC QN AUTO: 28.9 PG (ref 26–34)
MCHC RBC AUTO-ENTMCNC: 33.3 G/DL (ref 32–36)
MCV RBC AUTO: 87 FL (ref 80–100)
MONOCYTES # BLD AUTO: 1.06 X10*3/UL (ref 0.1–1)
MONOCYTES NFR BLD AUTO: 7.6 %
NEUTROPHILS # BLD AUTO: 9.48 X10*3/UL (ref 1.2–7.7)
NEUTROPHILS NFR BLD AUTO: 67.8 %
NRBC BLD-RTO: 0 /100 WBCS (ref 0–0)
PATH REVIEW-CELL CT,FLUID: NORMAL
PHOSPHATE SERPL-MCNC: 4.3 MG/DL (ref 2.5–4.9)
PLATELET # BLD AUTO: 410 X10*3/UL (ref 150–450)
POTASSIUM SERPL-SCNC: 3.9 MMOL/L (ref 3.5–5.3)
RBC # BLD AUTO: 3.6 X10*6/UL (ref 4.5–5.9)
SODIUM SERPL-SCNC: 132 MMOL/L (ref 136–145)
UFH PPP CHRO-ACNC: 0.1 IU/ML
UFH PPP CHRO-ACNC: 0.3 IU/ML
WBC # BLD AUTO: 14 X10*3/UL (ref 4.4–11.3)

## 2024-01-12 PROCEDURE — 99232 SBSQ HOSP IP/OBS MODERATE 35: CPT | Performed by: INTERNAL MEDICINE

## 2024-01-12 PROCEDURE — 99233 SBSQ HOSP IP/OBS HIGH 50: CPT

## 2024-01-12 PROCEDURE — 2500000001 HC RX 250 WO HCPCS SELF ADMINISTERED DRUGS (ALT 637 FOR MEDICARE OP)

## 2024-01-12 PROCEDURE — 84100 ASSAY OF PHOSPHORUS: CPT

## 2024-01-12 PROCEDURE — 82947 ASSAY GLUCOSE BLOOD QUANT: CPT

## 2024-01-12 PROCEDURE — S4991 NICOTINE PATCH NONLEGEND: HCPCS

## 2024-01-12 PROCEDURE — 2500000004 HC RX 250 GENERAL PHARMACY W/ HCPCS (ALT 636 FOR OP/ED)

## 2024-01-12 PROCEDURE — 83735 ASSAY OF MAGNESIUM: CPT

## 2024-01-12 PROCEDURE — 85520 HEPARIN ASSAY: CPT

## 2024-01-12 PROCEDURE — 2500000002 HC RX 250 W HCPCS SELF ADMINISTERED DRUGS (ALT 637 FOR MEDICARE OP, ALT 636 FOR OP/ED)

## 2024-01-12 PROCEDURE — 85025 COMPLETE CBC W/AUTO DIFF WBC: CPT

## 2024-01-12 PROCEDURE — 1170000001 HC PRIVATE ONCOLOGY ROOM DAILY

## 2024-01-12 RX ORDER — DAPAGLIFLOZIN 5 MG/1
5 TABLET, FILM COATED ORAL DAILY
Status: DISCONTINUED | OUTPATIENT
Start: 2024-01-12 | End: 2024-01-15 | Stop reason: HOSPADM

## 2024-01-12 RX ORDER — MAGNESIUM SULFATE HEPTAHYDRATE 40 MG/ML
4 INJECTION, SOLUTION INTRAVENOUS ONCE
Status: COMPLETED | OUTPATIENT
Start: 2024-01-12 | End: 2024-01-12

## 2024-01-12 RX ADMIN — KETOROLAC TROMETHAMINE 15 MG: 15 INJECTION, SOLUTION INTRAMUSCULAR; INTRAVENOUS at 13:05

## 2024-01-12 RX ADMIN — PANTOPRAZOLE SODIUM 40 MG: 40 TABLET, DELAYED RELEASE ORAL at 08:00

## 2024-01-12 RX ADMIN — METRONIDAZOLE 500 MG: 500 TABLET, FILM COATED ORAL at 22:28

## 2024-01-12 RX ADMIN — GUAIFENESIN AND DEXTROMETHORPHAN 5 ML: 100; 10 SYRUP ORAL at 22:26

## 2024-01-12 RX ADMIN — CEFTRIAXONE SODIUM 2 G: 2 INJECTION, SOLUTION INTRAVENOUS at 16:11

## 2024-01-12 RX ADMIN — BUPRENORPHINE AND NALOXONE 1 TABLET: 8; 2 TABLET SUBLINGUAL at 22:27

## 2024-01-12 RX ADMIN — BUPRENORPHINE AND NALOXONE 1 TABLET: 8; 2 TABLET SUBLINGUAL at 09:06

## 2024-01-12 RX ADMIN — METRONIDAZOLE 500 MG: 500 TABLET, FILM COATED ORAL at 13:05

## 2024-01-12 RX ADMIN — HYDROMORPHONE HYDROCHLORIDE 2 MG: 2 TABLET ORAL at 20:53

## 2024-01-12 RX ADMIN — METRONIDAZOLE 500 MG: 500 TABLET, FILM COATED ORAL at 05:48

## 2024-01-12 RX ADMIN — BUPRENORPHINE AND NALOXONE 1 FILM: 4; 1 FILM BUCCAL; SUBLINGUAL at 09:06

## 2024-01-12 RX ADMIN — HYDROMORPHONE HYDROCHLORIDE 2 MG: 2 TABLET ORAL at 13:05

## 2024-01-12 RX ADMIN — Medication 1 PATCH: at 09:06

## 2024-01-12 RX ADMIN — ATORVASTATIN CALCIUM 40 MG: 40 TABLET, FILM COATED ORAL at 22:39

## 2024-01-12 RX ADMIN — KETOROLAC TROMETHAMINE 15 MG: 15 INJECTION, SOLUTION INTRAMUSCULAR; INTRAVENOUS at 02:29

## 2024-01-12 RX ADMIN — DAPAGLIFLOZIN 5 MG: 5 TABLET, FILM COATED ORAL at 16:11

## 2024-01-12 RX ADMIN — KETOROLAC TROMETHAMINE 15 MG: 15 INJECTION, SOLUTION INTRAMUSCULAR; INTRAVENOUS at 09:06

## 2024-01-12 RX ADMIN — HEPARIN SODIUM 1400 UNITS/HR: 10000 INJECTION, SOLUTION INTRAVENOUS at 05:48

## 2024-01-12 RX ADMIN — APIXABAN 5 MG: 5 TABLET, FILM COATED ORAL at 22:26

## 2024-01-12 RX ADMIN — MAGNESIUM SULFATE 4 G: 4 INJECTION INTRAVENOUS at 09:06

## 2024-01-12 RX ADMIN — HYDROMORPHONE HYDROCHLORIDE 2 MG: 2 TABLET ORAL at 09:06

## 2024-01-12 RX ADMIN — HALOPERIDOL 2 MG: 2 TABLET ORAL at 22:28

## 2024-01-12 RX ADMIN — HYDROMORPHONE HYDROCHLORIDE 2 MG: 2 TABLET ORAL at 16:12

## 2024-01-12 RX ADMIN — DOXEPIN HYDROCHLORIDE 10 MG: 10 CAPSULE ORAL at 22:27

## 2024-01-12 RX ADMIN — HEPARIN SODIUM 2000 UNITS: 5000 INJECTION, SOLUTION INTRAVENOUS; SUBCUTANEOUS at 01:32

## 2024-01-12 RX ADMIN — HYDROMORPHONE HYDROCHLORIDE 2 MG: 2 TABLET ORAL at 05:48

## 2024-01-12 RX ADMIN — METOPROLOL SUCCINATE 25 MG: 25 TABLET, EXTENDED RELEASE ORAL at 09:06

## 2024-01-12 RX ADMIN — HEPARIN SODIUM 1400 UNITS/HR: 10000 INJECTION, SOLUTION INTRAVENOUS at 01:29

## 2024-01-12 RX ADMIN — APIXABAN 5 MG: 5 TABLET, FILM COATED ORAL at 11:18

## 2024-01-12 RX ADMIN — KETOROLAC TROMETHAMINE 15 MG: 15 INJECTION, SOLUTION INTRAMUSCULAR; INTRAVENOUS at 20:53

## 2024-01-12 RX ADMIN — GUAIFENESIN AND DEXTROMETHORPHAN 5 ML: 100; 10 SYRUP ORAL at 00:42

## 2024-01-12 ASSESSMENT — COGNITIVE AND FUNCTIONAL STATUS - GENERAL
DAILY ACTIVITIY SCORE: 24
MOBILITY SCORE: 24

## 2024-01-12 ASSESSMENT — PAIN - FUNCTIONAL ASSESSMENT
PAIN_FUNCTIONAL_ASSESSMENT: 0-10
PAIN_FUNCTIONAL_ASSESSMENT: 0-10

## 2024-01-12 ASSESSMENT — PAIN SCALES - GENERAL
PAINLEVEL_OUTOF10: 0 - NO PAIN
PAINLEVEL_OUTOF10: 0 - NO PAIN
PAINLEVEL_OUTOF10: 7
PAINLEVEL_OUTOF10: 10 - WORST POSSIBLE PAIN
PAINLEVEL_OUTOF10: 0 - NO PAIN
PAINLEVEL_OUTOF10: 9
PAINLEVEL_OUTOF10: 8
PAINLEVEL_OUTOF10: 0 - NO PAIN

## 2024-01-12 ASSESSMENT — PAIN DESCRIPTION - DESCRIPTORS
DESCRIPTORS: STABBING
DESCRIPTORS: STABBING

## 2024-01-12 NOTE — CARE PLAN
The patient's goals for the shift include      The clinical goals for the shift include pataient will remain compliant with meds this shift 1/12/24 9919      Problem: Safety - Adult  Goal: Free from fall injury  Outcome: Progressing     Problem: Discharge Planning  Goal: Discharge to home or other facility with appropriate resources  Outcome: Progressing

## 2024-01-12 NOTE — PROGRESS NOTES
Misael Hernandez is a 38 y.o. male on day 3 of admission presenting with Pancreatic abscess.    Subjective   Patient refusing glucose checks and insulin overnight. Glucose elevated to 390 on RFP this AM.     Mr. Hernandez reports feeling ok this morning, has some discomfort around the drain incision site but otherwise feels his pain is well-controlled. Denies any fever/chills, N/V, chest pain, SOB, dysuria, or diarrhea. Endorses good appetite. Expressing frustration with having to remain in the hospital.       Objective     Physical Exam  Constitutional:       General: He is not in acute distress.     Appearance: Normal appearance.   HENT:      Head: Normocephalic and atraumatic.      Nose: Nose normal. No congestion.      Mouth/Throat:      Mouth: Mucous membranes are moist.      Pharynx: Oropharynx is clear. No posterior oropharyngeal erythema.   Eyes:      General: No scleral icterus.     Extraocular Movements: Extraocular movements intact.      Conjunctiva/sclera: Conjunctivae normal.      Pupils: Pupils are equal, round, and reactive to light.   Cardiovascular:      Rate and Rhythm: Normal rate and regular rhythm.      Pulses: Normal pulses.      Heart sounds: Normal heart sounds.   Pulmonary:      Effort: Pulmonary effort is normal. No respiratory distress.      Breath sounds: Normal breath sounds.   Abdominal:      General: Abdomen is flat. There is no distension.      Palpations: Abdomen is soft.      Comments: Suction drain in place over LUQ, draining small amount serosanginous fluid. Dressing in place, surrounding skin clean, without erythema or drainage.   Musculoskeletal:         General: Normal range of motion.      Cervical back: Normal range of motion and neck supple.      Right lower leg: No edema.      Left lower leg: No edema.   Skin:     General: Skin is warm and dry.   Neurological:      General: No focal deficit present.      Mental Status: He is alert and oriented to person, place, and time.  "  Psychiatric:         Mood and Affect: Mood normal.         Behavior: Behavior normal.         Last Recorded Vitals  Blood pressure 123/82, pulse 100, temperature 36.4 °C (97.6 °F), temperature source Temporal, resp. rate 16, height 1.753 m (5' 9\"), weight 83.9 kg (185 lb), SpO2 97 %.  Intake/Output last 3 Shifts:  I/O last 3 completed shifts:  In: 518.6 (6.2 mL/kg) [I.V.:518.6 (6.2 mL/kg)]  Out: 60 (0.7 mL/kg) [Drains:60]  Weight: 83.9 kg     Relevant Results  Results for orders placed or performed during the hospital encounter of 01/09/24 (from the past 24 hour(s))   CBC and Auto Differential   Result Value Ref Range    WBC 14.0 (H) 4.4 - 11.3 x10*3/uL    nRBC 0.0 0.0 - 0.0 /100 WBCs    RBC 3.60 (L) 4.50 - 5.90 x10*6/uL    Hemoglobin 10.4 (L) 13.5 - 17.5 g/dL    Hematocrit 31.2 (L) 41.0 - 52.0 %    MCV 87 80 - 100 fL    MCH 28.9 26.0 - 34.0 pg    MCHC 33.3 32.0 - 36.0 g/dL    RDW 17.3 (H) 11.5 - 14.5 %    Platelets 410 150 - 450 x10*3/uL    Neutrophils % 67.8 40.0 - 80.0 %    Immature Granulocytes %, Automated 0.9 0.0 - 0.9 %    Lymphocytes % 21.6 13.0 - 44.0 %    Monocytes % 7.6 2.0 - 10.0 %    Eosinophils % 1.1 0.0 - 6.0 %    Basophils % 1.0 0.0 - 2.0 %    Neutrophils Absolute 9.48 (H) 1.20 - 7.70 x10*3/uL    Immature Granulocytes Absolute, Automated 0.12 0.00 - 0.70 x10*3/uL    Lymphocytes Absolute 3.02 1.20 - 4.80 x10*3/uL    Monocytes Absolute 1.06 (H) 0.10 - 1.00 x10*3/uL    Eosinophils Absolute 0.16 0.00 - 0.70 x10*3/uL    Basophils Absolute 0.14 (H) 0.00 - 0.10 x10*3/uL   Renal function panel   Result Value Ref Range    Glucose 390 (H) 74 - 99 mg/dL    Sodium 132 (L) 136 - 145 mmol/L    Potassium 3.9 3.5 - 5.3 mmol/L    Chloride 94 (L) 98 - 107 mmol/L    Bicarbonate 28 21 - 32 mmol/L    Anion Gap 14 10 - 20 mmol/L    Urea Nitrogen 12 6 - 23 mg/dL    Creatinine 0.47 (L) 0.50 - 1.30 mg/dL    eGFR >90 >60 mL/min/1.73m*2    Calcium 8.9 8.6 - 10.6 mg/dL    Phosphorus 4.3 2.5 - 4.9 mg/dL    Albumin 2.8 (L) 3.4 " - 5.0 g/dL   Magnesium   Result Value Ref Range    Magnesium 1.52 (L) 1.60 - 2.40 mg/dL   Heparin Assay, UFH   Result Value Ref Range    Heparin Unfractionated 0.1 See Comment Below for Therapeutic Ranges IU/mL   Heparin Assay, UFH   Result Value Ref Range    Heparin Unfractionated 0.3 See Comment Below for Therapeutic Ranges IU/mL     No new imaging to review          Malnutrition Diagnosis Status: New  Malnutrition Diagnosis: Severe malnutrition related to acute disease or injury  As Evidenced by: 12% wt loss in 2 months; on average PO intake in the past month likely meeting <75% of nutr needs  I agree with the dietitian's malnutrition diagnosis.      Assessment/Plan   Principal Problem:    Pancreatic abscess    Misael Hernandez is a 38 y.o. male presenting with a PMHx of metastatic rectal carcinoma (currently on Lonsurf and Avastin [id number], transitioning care from Dr. Vega to Dr. Lui), Day's esophagus, HFrEF, cholecystits (2019), HLD, HTN, recurrent DVT/PE, and tobacco use disorder who presents as transfer from [place] with new leukocytosis and abdominal fluid collections. Concern for possible abscess vs infected pancreatic pseudocyst in the setting of recent episode of acute pancreatitis. Currently treating with ceftriaxone and metronidazole. S/p IR-guided LUQ drain placement on 1/11.     Fluid cultures pending, gram stain showing rare PMNs, no organisms. Drain output appears to be slowing.    Patient refusing glucose checks and insulin overnight. Noted to have an A1C of 8.8 on admission; had been prescribed dapagliflozin by primary care provider but does not seem to have been taking it. Discussed with patient importance of blood sugar management in the treatment of infection and wound healing. Patient was understanding and stated he would be willing to try an oral hyperglycemic agent with insulin injections if absolutely necessary. Will resume dapagliflozin 5 mg daily with SSI.      Updates  1/12:  -Continue ceftriaxone and metronidazole, ID recs pending fluid culture finalization  -Heparin drip stopped, patient re-started on apixiban  -Starting dapagliflozin per discussion with patient for uncontrolled hyperglycemia,   -Continue home suboxone, toradol 15 mg q6h, PO Dilaudid 2 mg q3h PRN for pain management     #Abdominal fluid collections  #Acute leukocytosis   #Suspected pancreatic pseudocyst  #Acute abdominal pain  -Overall concern for possible abscesses or new, infected pancreatic pseudocysts in setting of patient's recent bout of pancreatitis  -Amylase/lipase WNL on admission  -Currently tolerating PO well with decent fluid intake, ok for regular diet. Fluid bolus PRN  -Supportive onc following for pain management, appreciate recs  -Continue home suboxone, toradol 15 mg q6h, PO Dilaudid 2 mg q3h PRN for pain management     #Metastatic rectal cancer  -Previously following with Dr. Vega, about to transition care to Dr. Lui as primary oncologist  -Currently receiving Lonsurf and avastin [id number], last infusion 12/20  -Holding home Lonsurf in setting of suspected infection     #Hx of recurrent DVT  #Subacute right PE  -Hospitalized 12/2023 with new right PE after stopping home apixiban  -Repeat CTA 1/4/2023 showing stable clot burden compared to last admission  -Temporarily on heparin drip for IR procedure, re-started home apixiban today (1/12)     #HTN  #HLD  -Continue home metoprolol XR 25 mg daily   -Holding home losartan for time being, will consider restarting if BP remains elevated  -Continue home atorvastatin     #T2DM  #Uncontrolled hyperglycemia  -Last A1C 8.5 (2018), 8.8 on admission  -Apparently initated on dapagloflozin by primary care doctor in May 2023 but does not appear to be taking  -Will start dapagliflozin 5mg daily given uncontrolled blood glucose in setting of refused insulin injections  -Discussed with patient, agreeable to taking insulin. Will continue SSI     F: PRN  E:  PRN  N: regular diet  GI: home omeprazole  DVT ppx: apixibian     NOK: brother Aryan Hernandez- [phone]  Code status: full (confirmed on admission)             Carmen Mays MD

## 2024-01-13 LAB
ALBUMIN SERPL BCP-MCNC: 2.8 G/DL (ref 3.4–5)
ANION GAP SERPL CALC-SCNC: 14 MMOL/L (ref 10–20)
BACTERIA BLD CULT: NORMAL
BACTERIA BLD CULT: NORMAL
BASOPHILS # BLD AUTO: 0.13 X10*3/UL (ref 0–0.1)
BASOPHILS NFR BLD AUTO: 1.2 %
BUN SERPL-MCNC: 8 MG/DL (ref 6–23)
CALCIUM SERPL-MCNC: 9 MG/DL (ref 8.6–10.6)
CHLORIDE SERPL-SCNC: 96 MMOL/L (ref 98–107)
CO2 SERPL-SCNC: 27 MMOL/L (ref 21–32)
CREAT SERPL-MCNC: 0.48 MG/DL (ref 0.5–1.3)
EGFRCR SERPLBLD CKD-EPI 2021: >90 ML/MIN/1.73M*2
EOSINOPHIL # BLD AUTO: 0.47 X10*3/UL (ref 0–0.7)
EOSINOPHIL NFR BLD AUTO: 4.2 %
ERYTHROCYTE [DISTWIDTH] IN BLOOD BY AUTOMATED COUNT: 17.3 % (ref 11.5–14.5)
GLUCOSE SERPL-MCNC: 311 MG/DL (ref 74–99)
HCT VFR BLD AUTO: 32.6 % (ref 41–52)
HGB BLD-MCNC: 10.6 G/DL (ref 13.5–17.5)
IMM GRANULOCYTES # BLD AUTO: 0.11 X10*3/UL (ref 0–0.7)
IMM GRANULOCYTES NFR BLD AUTO: 1 % (ref 0–0.9)
LYMPHOCYTES # BLD AUTO: 1.73 X10*3/UL (ref 1.2–4.8)
LYMPHOCYTES NFR BLD AUTO: 15.4 %
MAGNESIUM SERPL-MCNC: 1.72 MG/DL (ref 1.6–2.4)
MCH RBC QN AUTO: 28.2 PG (ref 26–34)
MCHC RBC AUTO-ENTMCNC: 32.5 G/DL (ref 32–36)
MCV RBC AUTO: 87 FL (ref 80–100)
MONOCYTES # BLD AUTO: 1.03 X10*3/UL (ref 0.1–1)
MONOCYTES NFR BLD AUTO: 9.1 %
NEUTROPHILS # BLD AUTO: 7.8 X10*3/UL (ref 1.2–7.7)
NEUTROPHILS NFR BLD AUTO: 69.1 %
NRBC BLD-RTO: 0 /100 WBCS (ref 0–0)
PHOSPHATE SERPL-MCNC: 4.2 MG/DL (ref 2.5–4.9)
PLATELET # BLD AUTO: 384 X10*3/UL (ref 150–450)
POTASSIUM SERPL-SCNC: 4.2 MMOL/L (ref 3.5–5.3)
RBC # BLD AUTO: 3.76 X10*6/UL (ref 4.5–5.9)
SODIUM SERPL-SCNC: 133 MMOL/L (ref 136–145)
WBC # BLD AUTO: 11.3 X10*3/UL (ref 4.4–11.3)

## 2024-01-13 PROCEDURE — 85025 COMPLETE CBC W/AUTO DIFF WBC: CPT

## 2024-01-13 PROCEDURE — 80069 RENAL FUNCTION PANEL: CPT

## 2024-01-13 PROCEDURE — 99232 SBSQ HOSP IP/OBS MODERATE 35: CPT | Performed by: INTERNAL MEDICINE

## 2024-01-13 PROCEDURE — 2500000002 HC RX 250 W HCPCS SELF ADMINISTERED DRUGS (ALT 637 FOR MEDICARE OP, ALT 636 FOR OP/ED)

## 2024-01-13 PROCEDURE — 2500000001 HC RX 250 WO HCPCS SELF ADMINISTERED DRUGS (ALT 637 FOR MEDICARE OP)

## 2024-01-13 PROCEDURE — 82947 ASSAY GLUCOSE BLOOD QUANT: CPT

## 2024-01-13 PROCEDURE — 1170000001 HC PRIVATE ONCOLOGY ROOM DAILY

## 2024-01-13 PROCEDURE — S4991 NICOTINE PATCH NONLEGEND: HCPCS

## 2024-01-13 PROCEDURE — 99233 SBSQ HOSP IP/OBS HIGH 50: CPT

## 2024-01-13 PROCEDURE — 2500000004 HC RX 250 GENERAL PHARMACY W/ HCPCS (ALT 636 FOR OP/ED)

## 2024-01-13 PROCEDURE — 83735 ASSAY OF MAGNESIUM: CPT

## 2024-01-13 RX ORDER — MAGNESIUM SULFATE HEPTAHYDRATE 40 MG/ML
2 INJECTION, SOLUTION INTRAVENOUS ONCE
Status: COMPLETED | OUTPATIENT
Start: 2024-01-13 | End: 2024-01-13

## 2024-01-13 RX ADMIN — HYDROMORPHONE HYDROCHLORIDE 2 MG: 2 TABLET ORAL at 05:58

## 2024-01-13 RX ADMIN — HALOPERIDOL 2 MG: 2 TABLET ORAL at 21:56

## 2024-01-13 RX ADMIN — KETOROLAC TROMETHAMINE 15 MG: 15 INJECTION, SOLUTION INTRAMUSCULAR; INTRAVENOUS at 08:32

## 2024-01-13 RX ADMIN — PANTOPRAZOLE SODIUM 40 MG: 40 TABLET, DELAYED RELEASE ORAL at 06:00

## 2024-01-13 RX ADMIN — HYDROMORPHONE HYDROCHLORIDE 2 MG: 2 TABLET ORAL at 12:48

## 2024-01-13 RX ADMIN — DOXEPIN HYDROCHLORIDE 10 MG: 10 CAPSULE ORAL at 21:56

## 2024-01-13 RX ADMIN — HYDROMORPHONE HYDROCHLORIDE 2 MG: 2 TABLET ORAL at 16:16

## 2024-01-13 RX ADMIN — BUPRENORPHINE AND NALOXONE 1 TABLET: 8; 2 TABLET SUBLINGUAL at 08:32

## 2024-01-13 RX ADMIN — KETOROLAC TROMETHAMINE 15 MG: 15 INJECTION, SOLUTION INTRAMUSCULAR; INTRAVENOUS at 19:51

## 2024-01-13 RX ADMIN — HYDROMORPHONE HYDROCHLORIDE 2 MG: 2 TABLET ORAL at 23:04

## 2024-01-13 RX ADMIN — BUPRENORPHINE AND NALOXONE 1 TABLET: 8; 2 TABLET SUBLINGUAL at 21:56

## 2024-01-13 RX ADMIN — APIXABAN 5 MG: 5 TABLET, FILM COATED ORAL at 08:32

## 2024-01-13 RX ADMIN — ATORVASTATIN CALCIUM 40 MG: 40 TABLET, FILM COATED ORAL at 21:57

## 2024-01-13 RX ADMIN — DAPAGLIFLOZIN 5 MG: 5 TABLET, FILM COATED ORAL at 08:34

## 2024-01-13 RX ADMIN — METOPROLOL SUCCINATE 25 MG: 25 TABLET, EXTENDED RELEASE ORAL at 08:32

## 2024-01-13 RX ADMIN — HYDROMORPHONE HYDROCHLORIDE 2 MG: 2 TABLET ORAL at 19:51

## 2024-01-13 RX ADMIN — KETOROLAC TROMETHAMINE 15 MG: 15 INJECTION, SOLUTION INTRAMUSCULAR; INTRAVENOUS at 14:20

## 2024-01-13 RX ADMIN — Medication 1 PATCH: at 08:32

## 2024-01-13 RX ADMIN — APIXABAN 5 MG: 5 TABLET, FILM COATED ORAL at 21:57

## 2024-01-13 RX ADMIN — METRONIDAZOLE 500 MG: 500 TABLET, FILM COATED ORAL at 05:59

## 2024-01-13 RX ADMIN — HYDROMORPHONE HYDROCHLORIDE 2 MG: 2 TABLET ORAL at 10:39

## 2024-01-13 RX ADMIN — KETOROLAC TROMETHAMINE 15 MG: 15 INJECTION, SOLUTION INTRAMUSCULAR; INTRAVENOUS at 02:36

## 2024-01-13 RX ADMIN — METRONIDAZOLE 500 MG: 500 TABLET, FILM COATED ORAL at 21:56

## 2024-01-13 RX ADMIN — HYDROMORPHONE HYDROCHLORIDE 2 MG: 2 TABLET ORAL at 02:37

## 2024-01-13 RX ADMIN — METRONIDAZOLE 500 MG: 500 TABLET, FILM COATED ORAL at 14:20

## 2024-01-13 RX ADMIN — BUPRENORPHINE AND NALOXONE 1 FILM: 4; 1 FILM BUCCAL; SUBLINGUAL at 08:32

## 2024-01-13 RX ADMIN — CEFTRIAXONE SODIUM 2 G: 2 INJECTION, SOLUTION INTRAVENOUS at 18:21

## 2024-01-13 RX ADMIN — MAGNESIUM SULFATE HEPTAHYDRATE 2 G: 40 INJECTION, SOLUTION INTRAVENOUS at 12:23

## 2024-01-13 ASSESSMENT — COGNITIVE AND FUNCTIONAL STATUS - GENERAL
DAILY ACTIVITIY SCORE: 24
MOBILITY SCORE: 24

## 2024-01-13 ASSESSMENT — PAIN DESCRIPTION - DESCRIPTORS
DESCRIPTORS: STABBING

## 2024-01-13 ASSESSMENT — PAIN SCALES - GENERAL
PAINLEVEL_OUTOF10: 8
PAINLEVEL_OUTOF10: 10 - WORST POSSIBLE PAIN
PAINLEVEL_OUTOF10: 0 - NO PAIN
PAINLEVEL_OUTOF10: 0 - NO PAIN
PAINLEVEL_OUTOF10: 9
PAINLEVEL_OUTOF10: 0 - NO PAIN
PAINLEVEL_OUTOF10: 0 - NO PAIN
PAINLEVEL_OUTOF10: 8
PAINLEVEL_OUTOF10: 7
PAINLEVEL_OUTOF10: 9
PAINLEVEL_OUTOF10: 6

## 2024-01-13 ASSESSMENT — PAIN - FUNCTIONAL ASSESSMENT
PAIN_FUNCTIONAL_ASSESSMENT: 0-10

## 2024-01-13 NOTE — CARE PLAN
The patient's goals for the shift include      The clinical goals for the shift include patient will report a decrease in pain this shift 1/13/24 1900      Problem: Safety - Adult  Goal: Free from fall injury  Outcome: Progressing     Problem: Pain  Goal: Free from opioid side effects throughout the shift  Outcome: Progressing

## 2024-01-13 NOTE — PROGRESS NOTES
Misael Hernandez is a 38 y.o. male on day 3 of admission presenting with pancreatic fluid collections    Subjective   Interval History: IR drain placed yesterday, dark brown clear fluid. Has less pain in LLQ since drain        Review of Systems    Objective   Range of Vitals (last 24 hours)  Heart Rate:  []   Temp:  [35.8 °C (96.4 °F)-36.9 °C (98.4 °F)]   Resp:  [16]   BP: (120-132)/(81-89)   SpO2:  [95 %-97 %]   Daily Weight  01/09/24 : 83.9 kg (185 lb)    Body mass index is 27.32 kg/m².    Physical Exam  Lying in bed in NAD  Abd soft, minimal left sided tenderness , drain on left with brown fluid          Relevant Results  Labs  Results from last 72 hours   Lab Units 01/12/24  0050 01/11/24  0714 01/10/24  0512   WBC AUTO x10*3/uL 14.0* 14.2* 11.6*   HEMOGLOBIN g/dL 10.4* 11.1* 11.2*   HEMATOCRIT % 31.2* 33.0* 33.7*   PLATELETS AUTO x10*3/uL 410 406 284   NEUTROS PCT AUTO % 67.8 82.6 72.9   LYMPHS PCT AUTO % 21.6 9.4 14.5   MONOS PCT AUTO % 7.6 6.9 8.6   EOS PCT AUTO % 1.1 0.0 2.0     Results from last 72 hours   Lab Units 01/12/24  0050 01/11/24  0714 01/10/24  0512   SODIUM mmol/L 132* 133* 133*   POTASSIUM mmol/L 3.9 4.0 3.9   CHLORIDE mmol/L 94* 95* 97*   CO2 mmol/L 28 28 24   BUN mg/dL 12 9 5*   CREATININE mg/dL 0.47* 0.42* 0.29*   GLUCOSE mg/dL 390* 235* 171*   CALCIUM mg/dL 8.9 9.4 8.2*   ANION GAP mmol/L 14 14 16   EGFR mL/min/1.73m*2 >90 >90 >90   PHOSPHORUS mg/dL 4.3 3.5 3.6     Results from last 72 hours   Lab Units 01/12/24  0050 01/11/24  0714 01/10/24  0512   ALBUMIN g/dL 2.8* 2.8* 2.7*     Estimated Creatinine Clearance: 125 mL/min (A) (by C-G formula based on SCr of 0.47 mg/dL (L)).  Microbiology  1/9 blood cx x 2 NGTD  1/11 peripancreatic fluid gm st rare Wbc, no organisms. NGTD  Imaging  CT abd/pelvis 1/8/23  IMPRESSION:  1. Stable pulmonary emboli in the segmental and subsegmental arteries  to the right upper lobe. No new pulmonary embolism.  2. No right heart strain.  3. Slight interval  progression in pulmonary metastases and right  hilar adenopathy.  4. Stable mediastinal adenopathy.  5. Interval development of free fluid collections in the left  anterior abdomen and greater curvature of the stomach, likely  abscesses or infected pancreatic pseudocyst.  6. Questionable acute pancreatitis. Correlation with serum amylase  and lipase levels is recommended.  7. Stable bilateral adrenal masses.  8. A small fluid collection anterior to the distal sacrum, unchanged.        Assessment/Plan   Mr. Hernandez is a 38 year old gentleman with a PMHx of rectal cancer on chemotherapy, multiple DVTs and PEs, HTN, HLD, HFrEF, who was recently admitted from 1/2-1/4 for new acute pancreatitis with pseudocyst, who was transferred to [facility] from the [facility] ED after presenting with chest pain on 1/8.  Patient found to have findings concerning for an infected pancreatic pseudocyst/abscess on CT imaging, so he was started on Meropenem and ID was consulted for further antibiotic recommendations.  However the fluid does not appear infected so far-not purulent, gram stain unremarkable and culture NGTD     #Pancreatic Fluid Collection concerning for infected pancreatic psuedocyst/abscess  #Acute Pancreatitis    Micro:  1/9/24 blodd cx x 2 NGTD  1/11/24 left peripancreatic fluid: NGTD and gm st negative    Recommendations:  Continue ceftriaxone/flagyl pending cultures  Follow up fluid culture results  Consider GI input on further pseudocyst management-only one has been drained by VLAD.       Rosa Maria Stevenson MD  ID team A attending

## 2024-01-13 NOTE — PROGRESS NOTES
Misael Hernandez is a 38 y.o. male on day 4 of admission presenting with Pancreatic abscess.    Subjective   No acute events overnight. Total 95 mL recorded from LUQ drain over past 24 hours.     Subjectively, patient reports feeling well. Notes some mild tenderness at site of drain incision but otherwise feels pain is well-controlled. Denies any fever/chills, N/V, chest pain, SOB, dysuria, or diarrhea.        Objective     Physical Exam  Constitutional:       General: He is not in acute distress.     Appearance: Normal appearance.   HENT:      Head: Normocephalic and atraumatic.      Nose: Nose normal.      Mouth/Throat:      Mouth: Mucous membranes are moist.      Pharynx: Oropharynx is clear. No posterior oropharyngeal erythema.   Eyes:      General: No scleral icterus.     Extraocular Movements: Extraocular movements intact.      Conjunctiva/sclera: Conjunctivae normal.      Pupils: Pupils are equal, round, and reactive to light.   Cardiovascular:      Rate and Rhythm: Normal rate and regular rhythm.      Pulses: Normal pulses.      Heart sounds: Normal heart sounds.   Pulmonary:      Effort: Pulmonary effort is normal. No respiratory distress.      Breath sounds: Normal breath sounds.   Abdominal:      General: Abdomen is flat. There is no distension.      Palpations: Abdomen is soft.      Tenderness: There is no abdominal tenderness.      Comments: Suction drain in place over LUQ, draining small amount of dark brown fluid   Musculoskeletal:         General: Normal range of motion.      Cervical back: Normal range of motion and neck supple.      Right lower leg: No edema.      Left lower leg: No edema.   Skin:     General: Skin is warm and dry.   Neurological:      General: No focal deficit present.      Mental Status: He is alert and oriented to person, place, and time.   Psychiatric:         Mood and Affect: Mood normal.         Behavior: Behavior normal.         Last Recorded Vitals  Blood pressure 134/84, pulse  "96, temperature 36.6 °C (97.9 °F), temperature source Temporal, resp. rate 14, height 1.753 m (5' 9\"), weight 83.9 kg (185 lb), SpO2 97 %.  Intake/Output last 3 Shifts:  I/O last 3 completed shifts:  In: 219.3 (2.6 mL/kg) [I.V.:219.3 (2.6 mL/kg)]  Out: 155 (1.8 mL/kg) [Drains:155]  Weight: 83.9 kg     Relevant Results  Results for orders placed or performed during the hospital encounter of 01/09/24 (from the past 24 hour(s))   POCT GLUCOSE   Result Value Ref Range    POCT Glucose 330 (H) 74 - 99 mg/dL   Renal function panel   Result Value Ref Range    Glucose 311 (H) 74 - 99 mg/dL    Sodium 133 (L) 136 - 145 mmol/L    Potassium 4.2 3.5 - 5.3 mmol/L    Chloride 96 (L) 98 - 107 mmol/L    Bicarbonate 27 21 - 32 mmol/L    Anion Gap 14 10 - 20 mmol/L    Urea Nitrogen 8 6 - 23 mg/dL    Creatinine 0.48 (L) 0.50 - 1.30 mg/dL    eGFR >90 >60 mL/min/1.73m*2    Calcium 9.0 8.6 - 10.6 mg/dL    Phosphorus 4.2 2.5 - 4.9 mg/dL    Albumin 2.8 (L) 3.4 - 5.0 g/dL   Magnesium   Result Value Ref Range    Magnesium 1.72 1.60 - 2.40 mg/dL   CBC and Auto Differential   Result Value Ref Range    WBC 11.3 4.4 - 11.3 x10*3/uL    nRBC 0.0 0.0 - 0.0 /100 WBCs    RBC 3.76 (L) 4.50 - 5.90 x10*6/uL    Hemoglobin 10.6 (L) 13.5 - 17.5 g/dL    Hematocrit 32.6 (L) 41.0 - 52.0 %    MCV 87 80 - 100 fL    MCH 28.2 26.0 - 34.0 pg    MCHC 32.5 32.0 - 36.0 g/dL    RDW 17.3 (H) 11.5 - 14.5 %    Platelets 384 150 - 450 x10*3/uL    Neutrophils % 69.1 40.0 - 80.0 %    Immature Granulocytes %, Automated 1.0 (H) 0.0 - 0.9 %    Lymphocytes % 15.4 13.0 - 44.0 %    Monocytes % 9.1 2.0 - 10.0 %    Eosinophils % 4.2 0.0 - 6.0 %    Basophils % 1.2 0.0 - 2.0 %    Neutrophils Absolute 7.80 (H) 1.20 - 7.70 x10*3/uL    Immature Granulocytes Absolute, Automated 0.11 0.00 - 0.70 x10*3/uL    Lymphocytes Absolute 1.73 1.20 - 4.80 x10*3/uL    Monocytes Absolute 1.03 (H) 0.10 - 1.00 x10*3/uL    Eosinophils Absolute 0.47 0.00 - 0.70 x10*3/uL    Basophils Absolute 0.13 (H) 0.00 - " 0.10 x10*3/uL     No new imaging to review            Malnutrition Diagnosis Status: New  Malnutrition Diagnosis: Severe malnutrition related to acute disease or injury  As Evidenced by: 12% wt loss in 2 months; on average PO intake in the past month likely meeting <75% of nutr needs  I agree with the dietitian's malnutrition diagnosis.      Assessment/Plan   Principal Problem:    Pancreatic abscess    Misael Hernandez is a 38 y.o. male presenting with a PMHx of metastatic rectal carcinoma (currently on Lonsurf and Avastin [id number], transitioning care from Dr. Vega to Dr. Lui), Day's esophagus, HFrEF, cholecystits (2019), HLD, HTN, recurrent DVT/PE, and tobacco use disorder who presents as transfer from [place] with new leukocytosis and abdominal fluid collections. Concern for possible abscess vs infected pancreatic pseudocyst in the setting of recent episode of acute pancreatitis and new leukocytosis. Currently treating with ceftriaxone and metronidazole. S/p IR-guided LUQ drain placement on 1/11.     Fluid cultures NGTD, white count down-trending. ID recommending continued IV ceftriaxone/metronidazole pending culture finalization, though overall low suspicion for infection at this point. Will reach out to GI regarding further management recs (ie recommendation for additional drain placement vs outpatient monitoring).     Patient taking dapagliflozin but still refusing SSI. Glucose remains elevated at 311 on RFP this AM. Will continue to monitor, consider up-titration of dapagliflozin if blood sugar remains elevated over next day (ideally would treat with basal/bolus insulin inpatient but will focus on titrating oral anti-hyperglycemics since patient will allow PO med but not insulin injections).     Updates 1/13:  -Overall lower suspicion for pseudocyst infection given fluid cultures NGTD and no purulent fluid drainage  -Blood cultures 1/9 NGD4  -Will continue IV ceftriaxone/metronidazole for time being,  pending final ID recs/finalization of fluid cultures  -Will consult GI for additional management recommendations/guidance on follow-up imaging     #Abdominal fluid collections, suspected pancreatic pseudocyst   #Acute leukocytosis   #Acute abdominal pain- improved  -Initially concerned for possible abscesses or infected pancreatic pseudocysts in setting of recent bout of pancreatitis, new fluid collection on imaging, uncontrolled abdominal pain, and acute leukocytosis  -Continue home suboxone, toradol 15 mg q6h, PO Dilaudid 2 mg q3h PRN for pain management per supportive onc recs  -S/p IR-guided drain placement on 1/11, fluid cultures NGTD  -ID following for antibiotic management, appreciate recs. Overall lower suspicion for fluid collection infection at this time given non-purulent fluid drainage and negative cultures  -Will consult GI for further management recommendations/follow-up recs for imaging/monitoring     #Metastatic rectal cancer  -Previously following with Dr. Vega, about to transition care to Dr. Lui as primary oncologist  -Currently receiving Lonsurf and avastin [id number], last infusion 12/20  -Holding home Lonsurf in setting of suspected infection     #Hx of recurrent DVT  #Subacute right PE  -Hospitalized 12/2023 with new right PE after stopping home apixiban  -Repeat CTA 1/4/2023 showing stable clot burden compared to last admission  -Temporarily on heparin drip for IR procedure, re-started home apixiban 1/12     #HTN  #HLD  -Continue home metoprolol XR 25 mg daily   -Holding home losartan for time being, will consider restarting if BP remains elevated  -Continue home atorvastatin     #T2DM  #Uncontrolled hyperglycemia  -Last A1C 8.5 (2018), 8.8 on admission  -Apparently initated on dapagloflozin by primary care doctor in May 2023 but does not appear to be taking  -Patient remains resistant to taking SSI, threatening to leave AMA otherwise  -Continue dapagliflozin 5mg daily given  uncontrolled blood glucose in setting of refused insulin injections, consider up-titration if glucose continuing to rise     F: PRN  E: PRN  N: regular diet  GI: home omeprazole  DVT ppx: apixibian     NOK: brother Aryan Hernandez- [phone]  Code status: full (confirmed on admission)       Carmen Mays MD

## 2024-01-13 NOTE — HOSPITAL COURSE
Misael Hernandez is a 38 y.o. male presenting with a PMHx of metastatic rectal carcinoma (currently on Lonsurf and Avastin [id number], transitioning care from Dr. Vega to Dr. Lui), Day's esophagus, HFrEF, cholecystits (2019), HLD, HTN, recurrent DVT/PE, and tobacco use disorder who presents as transfer from [place] with new leukocytosis and abdominal fluid collections. Concern for possible abscess vs infected pancreatic pseudocyst in the setting of recent episode of acute pancreatitis and new leukocytosis.     Evaluated by infectious disease, started on ceftriaxone and metronidazole empirically. S/p IR-guided LUQ drain placement on 1/11. Fluid cultures showing no growth. Overall ID had lower suspicion for infection (more likely simple pseudocyst), and antibiotics were discontinued on 1/14. Drain removed by IR team on 1/15.    Additionally noted to have hyperglycemia into the 300s with A1C of 8.8. Appears to have been placed on dapagliflozin by PCP in past but does not appear to have been compliant with it. Patient refused insulin while inpatient but was agreeable to starting dapagliflozin and metformin. Patient discharged on these meds and outpatient referral to endocrinology placed.    Patient to be discharged home today in stable condition, with follow-up with Dr. Lui on 1/25.

## 2024-01-13 NOTE — CONSULTS
[facility]   [facility]  INITIAL CONSULT NOTE       Reason For Consult  Assistance w/ peripancreatic fluid collections management    SUBJECTIVE     History Of Present Illness  Misael Hernandez is a 38 y.o. male with a past medical history of metastatic rectal cancer s/p neoadjuvant chemotherapy and radiation therapy (incomplete 2/2 non-compliance), resection, adjuvant FOLFOX (incomplete 2/2 non-compliance) c/b lung/mediastinum and adrenal metastasis s/p additional chemotherapy, treatment complicated by neoplasm related pain, acneiform rash, diarrhea, hypokalemia, and hypomagnesemia, GERD c/b Day's esophagus, cholecystitis s/p cholecystectomy, HFrEF, DVT/PE, HTN, DLD, depression, and anxiety, admitted on 1/9/2024 with CT findings concerning for infected pancreatic pseudocyst/abscess.   At this time he underwent fluid collection aspiration w/ IR. Fluid was not purulent, Gram stain unremarkable, and culture NGTD. Additionally blood Cx from 1/9/24 NGTD. ID following and treating w/ CTX/Flagyl.    GI is consulted for assistance w/ peripancreatic fluid collections management.    Patient has no complains. He states that he thinks the drain output has been decreasing. He is able to tolerate a solid diet. He denied fever, chills, n/v, abdominal pain, diarrhea, and constipation.     Review of Systems  12-point ROS has been reviewed and is negative, except if mentioned otherwise above.    Past Medical History:    Past Medical History:   Diagnosis Date    Other conditions influencing health status     Rectal Cancer       Home Medications  Medications Prior to Admission   Medication Sig Dispense Refill Last Dose    albuterol (Ventolin HFA) 90 mcg/actuation inhaler INHALE TWO PUFFS INTO THE LUNGS EVERY 6 HOURS AS NEEDED FOR SHORTNESS OF BREATH OR FOR WHEEZING FOR UP TO 30 DAYS   1/8/2024    atorvastatin (Lipitor) 40 mg tablet Take 1 tablet (40 mg) by mouth once daily at  bedtime. 30 tablet 2 1/8/2024    buprenorphine-naloxone (Suboxone) 8-2 mg SL tablet Place 1 tablet under the tongue 2 times a day. With additional 0.5 tab in the evening   1/8/2024    doxepin (SINEquan) 50 mg capsule Take 10 mg by mouth as needed at bedtime for sleep.   Past Week    ergocalciferol (Vitamin D-2) 1.25 MG (34985 UT) capsule Take 1 capsule (50,000 Units) by mouth 1 (one) time per week for 12 doses. (Patient not taking: Reported on 1/9/2024) 12 capsule 0 Not Taking    haloperidol (Haldol) 2 mg tablet Take 1 tablet (2 mg) by mouth once daily.   Past Month    lidocaine-prilocaine (Emla) 2.5-2.5 % cream apply topically to affected area if needed 1 g 1     losartan (Cozaar) 50 mg tablet Take 1 tablet (50 mg) by mouth once daily.   1/8/2024    metoprolol succinate XL (Toprol-XL) 25 mg 24 hr tablet Take 1 tablet (25 mg) by mouth once daily.   1/8/2024    omeprazole (PriLOSEC) 40 mg DR capsule Take 1 capsule (40 mg) by mouth once daily in the morning. Take before meals. Do not crush or chew.   Past Month    trifluridine-tipiraciL (Lonsurf) 20-8.19 mg tablet TAKE THREE (3) TABLETS BY MOUTH 2 TIMES DAILY FOR 5 DAYS, EVERY 2 WEEKS (Patient not taking: Reported on 1/9/2024) 30 tablet 11 Not Taking       Surgical History:    Past Surgical History:   Procedure Laterality Date    COLON SURGERY  01/22/2014    Colon Surgery    CT GUIDED IMAGING FOR NEEDLE PLACEMENT  6/27/2018    CT GUIDED IMAGING FOR NEEDLE PLACEMENT [facility] INPATIENT LEGACY    CT HEAD ANGIO W AND WO IV CONTRAST  9/10/2014    CT HEAD ANGIO W AND WO IV CONTRAST 9/10/2014 [facility] [place]IENT [facility]GAC[facility]    ESOPHAGOGASTRODUODENOSCOPY  01/22/2014    Diagnostic Esophagogastroduodenoscopy    HAND SURGERY  01/22/2014    Hand Surgery                                                                                                                                                          MR HEAD ANGIO W IV CONTRAST  9/10/2014    MR HEAD ANGIO W IV CONTRAST 9/10/2014 [facility] INPATIENT  LEGACY    OTHER SURGICAL HISTORY  07/07/2014    Laparoscopy Total Colectomy W/ Proctectomy, Ileostomy       Allergies:    Allergies   Allergen Reactions    Acetaminophen Nausea And Vomiting    Adhesive Tape-Silicones Unknown       Social History:    Social History     Socioeconomic History    Marital status:      Spouse name: Not on file    Number of children: Not on file    Years of education: Not on file    Highest education level: Not on file   Occupational History    Not on file   Tobacco Use    Smoking status: Every Day     Packs/day: 1     Types: Cigarettes    Smokeless tobacco: Never   Vaping Use    Vaping Use: Never used   Substance and Sexual Activity    Alcohol use: Not Currently    Drug use: Yes     Types: Marijuana    Sexual activity: Not on file   Other Topics Concern    Not on file   Social History Narrative    Not on file     Social Determinants of Health     Financial Resource Strain: Low Risk  (1/9/2024)    Overall Financial Resource Strain (CARDIA)     Difficulty of Paying Living Expenses: Not very hard   Food Insecurity: No Food Insecurity (1/2/2024)    Hunger Vital Sign     Worried About Running Out of Food in the Last Year: Never true     Ran Out of Food in the Last Year: Never true   Transportation Needs: No Transportation Needs (1/9/2024)    PRAPARE - Transportation     Lack of Transportation (Medical): No     Lack of Transportation (Non-Medical): No   Physical Activity: Not on file   Stress: No Stress Concern Present (12/6/2023)    [employer] [place] [employer] - Occupational Stress Questionnaire     Feeling of Stress : Not at all   Social Connections: Not on file   Intimate Partner Violence: Not At Risk (10/25/2023)    Humiliation, Afraid, Rape, and Kick questionnaire     Fear of Current or Ex-Partner: No     Emotionally Abused: No     Physically Abused: No     Sexually Abused: No   Housing Stability: Low Risk  (1/9/2024)    Housing Stability Vital Sign     Unable to Pay  for Housing in the Last Year: No     Number of Places Lived in the Last Year: 1     Unstable Housing in the Last Year: No       Family History:    Family History   Problem Relation Name Age of Onset    Other (ADENOCARCINOMA OF THE ESOPHAGUS) Father      Diabetes Father's Brother      Diabetes Maternal Grandfather      Cancer Maternal Grandfather      Cancer Other UNCLE        EXAM     Vitals:    Vitals:    01/12/24 1802 01/12/24 2225 01/13/24 0240 01/13/24 0556   BP: 132/88 128/79 (!) 142/94 134/84   BP Location: Right arm Right arm Right arm Right arm   Patient Position: Lying Lying Lying Lying   Pulse: 94 93 89 96   Resp: 16 16 16 14   Temp: 36.9 °C (98.4 °F) 36 °C (96.8 °F) 35.9 °C (96.6 °F) 36.6 °C (97.9 °F)   TempSrc: Temporal Temporal Temporal Temporal   SpO2: 95% 97% 93% 97%   Weight:       Height:             Intake/Output Summary (Last 24 hours) at 1/13/2024 1201  Last data filed at 1/13/2024 0240  Gross per 24 hour   Intake --   Output 90 ml   Net -90 ml       Physical Exam   GENERAL: In no acute distress.  NEUROLOGIC: A and O x 3. Cranial nerves 2 through 12 grossly intact. Intact motor and sensory systems.  HEENT: Pupils are equal, round, and reactive to light and accommodation. Extraocular motion intact.    CARDIAC: S1 + S2, RRR, no M/R/G.    LUNGS: CTA BL. No wheezes or coarse breath sounds. Symmetric respirations. No increased work of breathing.   ABDOMEN: Soft, non-tender to palpation. No rebound or guarding. L sided percutaneous drain in place w/ dark green fluid.  EXTREMITIES: Moving x4 equally and spontaneously.    SKIN: Clean, warm, dry, and intact with normal skin turgor.  PSYCH: Appropriate mood and behavior.      OBJECTIVE                                                                              Medications       Current Facility-Administered Medications:     albuterol 90 mcg/actuation inhaler 1 puff, 1 puff, inhalation, q4h PRN, Carmen Mays MD    alum-mag hydroxide-simeth (Mylanta)  200-200-20 mg/5 mL oral suspension 10 mL, 10 mL, oral, 4x daily PRN, Carmen Mays MD, 10 mL at 01/10/24 2208    apixaban (Eliquis) tablet 5 mg, 5 mg, oral, BID, Carmen Mays MD, 5 mg at 01/13/24 0832    atorvastatin (Lipitor) tablet 40 mg, 40 mg, oral, Nightly, aCrmen Mays MD, 40 mg at 01/12/24 2239    buprenorphine-naloxone (Suboxone) 4-1 mg per sublingual film 1 Film, 1 Film, sublingual, Daily, Carmen Mays MD, 1 Film at 01/13/24 0832    buprenorphine-naloxone (Suboxone) 8-2 mg per SL tablet 1 tablet, 1 tablet, sublingual, BID, Carmen Mays MD, 1 tablet at 01/13/24 0832    cefTRIAXone (Rocephin) 2 g IV in dextrose 5% 50 mL, 2 g, intravenous, q24h, Wilfrid Chapin MD, Stopped at 01/12/24 1641    dapagliflozin propanediol (Farxiga) tablet 5 mg, 5 mg, oral, Daily, Oswald Hernandez MD, 5 mg at 01/13/24 0834    dextromethorphan-guaifenesin (Robitussin DM)  mg/5 mL oral liquid 5 mL, 5 mL, oral, q4h PRN, Carmen Mays MD, 5 mL at 01/12/24 2226    dextrose 10 % in water (D10W) infusion, 0.3 g/kg/hr, intravenous, Once PRN, Wilfrid Chapin MD    dextrose 50 % injection 25 g, 25 g, intravenous, q15 min PRN, Wilfrid Chapin MD    doxepin (SINEquan) capsule 10 mg, 10 mg, oral, Nightly PRN, Carmen Mays MD, 10 mg at 01/12/24 2227    ergocalciferol (Vitamin D-2) capsule 50,000 Units, 50,000 Units, oral, Weekly, Carmen Mays MD, 50,000 Units at 01/09/24 1054    glucagon (Glucagen) injection 1 mg, 1 mg, intramuscular, q15 min PRN, Wilfrid Chapin MD    haloperidol (Haldol) tablet 2 mg, 2 mg, oral, Nightly, Carmen Mays MD, 2 mg at 01/12/24 2228    HYDROmorphone (Dilaudid) tablet 2 mg, 2 mg, oral, q3h PRN, Carmen Mays MD, 2 mg at 01/13/24 1039    insulin lispro (HumaLOG) injection 0-5 Units, 0-5 Units, subcutaneous, TID with meals, Wilfrid Chapin MD    ketorolac (Toradol) injection 15 mg, 15 mg, intravenous, q6h JULIO, Carmen Mays MD, 15 mg at 01/13/24 0832     magnesium sulfate IV 2 g, 2 g, intravenous, Once, Carmen Mays MD    metoprolol succinate XL (Toprol-XL) 24 hr tablet 25 mg, 25 mg, oral, Daily, Carmen Mays MD, 25 mg at 01/13/24 0832    metroNIDAZOLE (Flagyl) tablet 500 mg, 500 mg, oral, q8h JULIO, Wilfrid Chapin MD, 500 mg at 01/13/24 0559    nicotine (Nicoderm CQ) 14 mg/24 hr patch 1 patch, 1 patch, transdermal, Daily, Carmen Mays MD, 1 patch at 01/13/24 0832    pantoprazole (ProtoNix) EC tablet 40 mg, 40 mg, oral, Daily before breakfast, Carmen Mays MD, 40 mg at 01/13/24 0600    polyethylene glycol (Glycolax, Miralax) packet 17 g, 17 g, oral, Daily, Carmen Mays MD    sennosides (Senokot) tablet 17.2 mg, 2 tablet, oral, BID, Carmen Mays MD, 17.2 mg at 01/10/24 0825                                                                            Labs     Results for orders placed or performed during the hospital encounter of 01/09/24 (from the past 24 hour(s))   POCT GLUCOSE   Result Value Ref Range    POCT Glucose 330 (H) 74 - 99 mg/dL   Renal function panel   Result Value Ref Range    Glucose 311 (H) 74 - 99 mg/dL    Sodium 133 (L) 136 - 145 mmol/L    Potassium 4.2 3.5 - 5.3 mmol/L    Chloride 96 (L) 98 - 107 mmol/L    Bicarbonate 27 21 - 32 mmol/L    Anion Gap 14 10 - 20 mmol/L    Urea Nitrogen 8 6 - 23 mg/dL    Creatinine 0.48 (L) 0.50 - 1.30 mg/dL    eGFR >90 >60 mL/min/1.73m*2    Calcium 9.0 8.6 - 10.6 mg/dL    Phosphorus 4.2 2.5 - 4.9 mg/dL    Albumin 2.8 (L) 3.4 - 5.0 g/dL   Magnesium   Result Value Ref Range    Magnesium 1.72 1.60 - 2.40 mg/dL   CBC and Auto Differential   Result Value Ref Range    WBC 11.3 4.4 - 11.3 x10*3/uL    nRBC 0.0 0.0 - 0.0 /100 WBCs    RBC 3.76 (L) 4.50 - 5.90 x10*6/uL    Hemoglobin 10.6 (L) 13.5 - 17.5 g/dL    Hematocrit 32.6 (L) 41.0 - 52.0 %    MCV 87 80 - 100 fL    MCH 28.2 26.0 - 34.0 pg    MCHC 32.5 32.0 - 36.0 g/dL    RDW 17.3 (H) 11.5 - 14.5 %    Platelets 384 150 - 450 x10*3/uL     Neutrophils % 69.1 40.0 - 80.0 %    Immature Granulocytes %, Automated 1.0 (H) 0.0 - 0.9 %    Lymphocytes % 15.4 13.0 - 44.0 %    Monocytes % 9.1 2.0 - 10.0 %    Eosinophils % 4.2 0.0 - 6.0 %    Basophils % 1.2 0.0 - 2.0 %    Neutrophils Absolute 7.80 (H) 1.20 - 7.70 x10*3/uL    Immature Granulocytes Absolute, Automated 0.11 0.00 - 0.70 x10*3/uL    Lymphocytes Absolute 1.73 1.20 - 4.80 x10*3/uL    Monocytes Absolute 1.03 (H) 0.10 - 1.00 x10*3/uL    Eosinophils Absolute 0.47 0.00 - 0.70 x10*3/uL    Basophils Absolute 0.13 (H) 0.00 - 0.10 x10*3/uL                                                                              Imaging           1/8/2024 CT A/P w/ IV contrast:  IMPRESSION:  1. Stable pulmonary emboli in the segmental and subsegmental arteries  to the right upper lobe. No new pulmonary embolism.  2. No right heart strain.  3. Slight interval progression in pulmonary metastases and right  hilar adenopathy.  4. Stable mediastinal adenopathy.  5. Interval development of free fluid collections in the left  anterior abdomen and greater curvature of the stomach, likely  abscesses or infected pancreatic pseudocyst.  6. Questionable acute pancreatitis. Correlation with serum amylase  and lipase levels is recommended.  7. Stable bilateral adrenal masses.  8. A small fluid collection anterior to the distal sacrum, unchanged.  This is likely an abscess.    12/20/2023 CT A/P w/ IV contrast:  IMPRESSION:  Pulmonary embolism in the right upper lobe.  This is a new finding  since August 25, 2023.  There is no evidence of right heart strain.  Bilateral pulmonary masses with mediastinal and hilar adenopathy not  significantly changed from the prior exam.  Edema surrounding the tail the pancreas.  Correlation with the  patient's pancreatic enzymes is recommended.  Bilateral adrenal masses not significantly changed from the prior  exam.  Presacral thick-walled fluid collection.  This may represent a  centrally necrotic mass.   It was seen on the prior exam is unchanged.                                                                         GI Procedures     08/2013 colonoscopy:  Impression:     - Likely malignant partially obstructing tumor in the                          rectum. Biopsied.                         - The examination was otherwise normal.    08/2013 EGD:  Impression:     - Esophageal mucosal changes suggestive of                          short-segment Day's esophagus and classified as                          Day's stage C1-M1 per [place] criteria.                         - Biopsies obtained for research.    ASSESSMENT / PLAN                  ASSESSMENT/PLAN:    Misael Hernandez is a 38 y.o. male with a past medical history of metastatic rectal cancer s/p neoadjuvant chemotherapy and radiation therapy (incomplete 2/2 non-compliance), resection, adjuvant FOLFOX (incomplete 2/2 non-compliance) c/b lung/mediastinum and adrenal metastasis s/p additional chemotherapy, treatment complicated by neoplasm related pain, acneiform rash, diarrhea, hypokalemia, and hypomagnesemia, GERD c/b Day's esophagus, cholecystitis s/p cholecystectomy, HFrEF, DVT/PE, HTN, DLD, depression, and anxiety, admitted on 1/9/2024 with CT findings concerning for infected pancreatic pseudocyst/abscess.   At this time he underwent fluid collection aspiration w/ IR. Fluid was not purulent, Gram stain unremarkable, and culture NGTD. Additionally blood Cx from 1/9/24 NGTD. ID following and treating w/ CTX/Flagyl.    GI is consulted for assistance w/ peripancreatic fluid collections management. No indication for assessment of endoscopic peripancreatic fluid collections for drainage, as the patient is doing clinically better.    Antibiotics per ID recommendations.  Drain management per IR.    The above recommendations were communicated to the [facility] team.    Patient was discussed with Dr. Kinney.    Gastroenterology will continue to follow.  During weekday  hours of 7am-5pm please do not hesitate to contact me on [employer] Chat or page [phone], if there are any further questions between the weekday hours of 7am-5pm.   After hours, on weekends, and on holidays, please [place] [phone]. Thank you.    Dana Angeles MD  PGY-4 Gastroenterology and Hepatology Fellow  [facility]

## 2024-01-14 LAB
ALBUMIN SERPL BCP-MCNC: 3 G/DL (ref 3.4–5)
ANION GAP SERPL CALC-SCNC: 13 MMOL/L (ref 10–20)
BACTERIA FLD CULT: NORMAL
BASOPHILS # BLD AUTO: 0.14 X10*3/UL (ref 0–0.1)
BASOPHILS NFR BLD AUTO: 1.1 %
BUN SERPL-MCNC: 9 MG/DL (ref 6–23)
CALCIUM SERPL-MCNC: 9.2 MG/DL (ref 8.6–10.6)
CHLORIDE SERPL-SCNC: 96 MMOL/L (ref 98–107)
CO2 SERPL-SCNC: 28 MMOL/L (ref 21–32)
CREAT SERPL-MCNC: 0.43 MG/DL (ref 0.5–1.3)
EGFRCR SERPLBLD CKD-EPI 2021: >90 ML/MIN/1.73M*2
EOSINOPHIL # BLD AUTO: 0.57 X10*3/UL (ref 0–0.7)
EOSINOPHIL NFR BLD AUTO: 4.4 %
ERYTHROCYTE [DISTWIDTH] IN BLOOD BY AUTOMATED COUNT: 17.4 % (ref 11.5–14.5)
GLUCOSE BLD MANUAL STRIP-MCNC: 275 MG/DL (ref 74–99)
GLUCOSE SERPL-MCNC: 314 MG/DL (ref 74–99)
GRAM STN SPEC: NORMAL
GRAM STN SPEC: NORMAL
HCT VFR BLD AUTO: 34.2 % (ref 41–52)
HGB BLD-MCNC: 11.3 G/DL (ref 13.5–17.5)
IMM GRANULOCYTES # BLD AUTO: 0.13 X10*3/UL (ref 0–0.7)
IMM GRANULOCYTES NFR BLD AUTO: 1 % (ref 0–0.9)
LYMPHOCYTES # BLD AUTO: 1.6 X10*3/UL (ref 1.2–4.8)
LYMPHOCYTES NFR BLD AUTO: 12.4 %
MAGNESIUM SERPL-MCNC: 1.86 MG/DL (ref 1.6–2.4)
MCH RBC QN AUTO: 28.6 PG (ref 26–34)
MCHC RBC AUTO-ENTMCNC: 33 G/DL (ref 32–36)
MCV RBC AUTO: 87 FL (ref 80–100)
MONOCYTES # BLD AUTO: 0.93 X10*3/UL (ref 0.1–1)
MONOCYTES NFR BLD AUTO: 7.2 %
NEUTROPHILS # BLD AUTO: 9.54 X10*3/UL (ref 1.2–7.7)
NEUTROPHILS NFR BLD AUTO: 73.9 %
NRBC BLD-RTO: 0 /100 WBCS (ref 0–0)
PHOSPHATE SERPL-MCNC: 3.7 MG/DL (ref 2.5–4.9)
PLATELET # BLD AUTO: 412 X10*3/UL (ref 150–450)
POTASSIUM SERPL-SCNC: 4.5 MMOL/L (ref 3.5–5.3)
RBC # BLD AUTO: 3.95 X10*6/UL (ref 4.5–5.9)
SODIUM SERPL-SCNC: 132 MMOL/L (ref 136–145)
WBC # BLD AUTO: 12.9 X10*3/UL (ref 4.4–11.3)

## 2024-01-14 PROCEDURE — 2500000001 HC RX 250 WO HCPCS SELF ADMINISTERED DRUGS (ALT 637 FOR MEDICARE OP)

## 2024-01-14 PROCEDURE — 85025 COMPLETE CBC W/AUTO DIFF WBC: CPT

## 2024-01-14 PROCEDURE — 82947 ASSAY GLUCOSE BLOOD QUANT: CPT

## 2024-01-14 PROCEDURE — 83735 ASSAY OF MAGNESIUM: CPT

## 2024-01-14 PROCEDURE — 99232 SBSQ HOSP IP/OBS MODERATE 35: CPT | Performed by: INTERNAL MEDICINE

## 2024-01-14 PROCEDURE — 80069 RENAL FUNCTION PANEL: CPT

## 2024-01-14 PROCEDURE — 2500000002 HC RX 250 W HCPCS SELF ADMINISTERED DRUGS (ALT 637 FOR MEDICARE OP, ALT 636 FOR OP/ED)

## 2024-01-14 PROCEDURE — 2500000004 HC RX 250 GENERAL PHARMACY W/ HCPCS (ALT 636 FOR OP/ED)

## 2024-01-14 PROCEDURE — S4991 NICOTINE PATCH NONLEGEND: HCPCS

## 2024-01-14 PROCEDURE — 1170000001 HC PRIVATE ONCOLOGY ROOM DAILY

## 2024-01-14 PROCEDURE — 99233 SBSQ HOSP IP/OBS HIGH 50: CPT

## 2024-01-14 RX ORDER — BUPRENORPHINE AND NALOXONE 4; 1 MG/1; MG/1
1 FILM, SOLUBLE BUCCAL; SUBLINGUAL EVERY 24 HOURS
Status: DISCONTINUED | OUTPATIENT
Start: 2024-01-14 | End: 2024-01-15 | Stop reason: HOSPADM

## 2024-01-14 RX ORDER — METFORMIN HYDROCHLORIDE 500 MG/1
500 TABLET ORAL
Status: DISCONTINUED | OUTPATIENT
Start: 2024-01-15 | End: 2024-01-15 | Stop reason: HOSPADM

## 2024-01-14 RX ADMIN — METOPROLOL SUCCINATE 25 MG: 25 TABLET, EXTENDED RELEASE ORAL at 08:44

## 2024-01-14 RX ADMIN — BUPRENORPHINE AND NALOXONE 1 TABLET: 8; 2 TABLET SUBLINGUAL at 08:44

## 2024-01-14 RX ADMIN — KETOROLAC TROMETHAMINE 15 MG: 15 INJECTION, SOLUTION INTRAMUSCULAR; INTRAVENOUS at 13:57

## 2024-01-14 RX ADMIN — KETOROLAC TROMETHAMINE 15 MG: 15 INJECTION, SOLUTION INTRAMUSCULAR; INTRAVENOUS at 20:26

## 2024-01-14 RX ADMIN — ATORVASTATIN CALCIUM 40 MG: 40 TABLET, FILM COATED ORAL at 20:26

## 2024-01-14 RX ADMIN — HALOPERIDOL 2 MG: 2 TABLET ORAL at 22:47

## 2024-01-14 RX ADMIN — APIXABAN 5 MG: 5 TABLET, FILM COATED ORAL at 20:26

## 2024-01-14 RX ADMIN — BUPRENORPHINE AND NALOXONE 1 TABLET: 8; 2 TABLET SUBLINGUAL at 20:26

## 2024-01-14 RX ADMIN — HYDROMORPHONE HYDROCHLORIDE 2 MG: 2 TABLET ORAL at 20:26

## 2024-01-14 RX ADMIN — HYDROMORPHONE HYDROCHLORIDE 2 MG: 2 TABLET ORAL at 11:50

## 2024-01-14 RX ADMIN — HYDROMORPHONE HYDROCHLORIDE 2 MG: 2 TABLET ORAL at 17:02

## 2024-01-14 RX ADMIN — PANTOPRAZOLE SODIUM 40 MG: 40 TABLET, DELAYED RELEASE ORAL at 06:02

## 2024-01-14 RX ADMIN — Medication 1 PATCH: at 08:43

## 2024-01-14 RX ADMIN — APIXABAN 5 MG: 5 TABLET, FILM COATED ORAL at 08:44

## 2024-01-14 RX ADMIN — BUPRENORPHINE AND NALOXONE 1 FILM: 4; 1 FILM BUCCAL; SUBLINGUAL at 17:02

## 2024-01-14 RX ADMIN — METRONIDAZOLE 500 MG: 500 TABLET, FILM COATED ORAL at 06:02

## 2024-01-14 RX ADMIN — HYDROMORPHONE HYDROCHLORIDE 2 MG: 2 TABLET ORAL at 06:02

## 2024-01-14 RX ADMIN — KETOROLAC TROMETHAMINE 15 MG: 15 INJECTION, SOLUTION INTRAMUSCULAR; INTRAVENOUS at 08:44

## 2024-01-14 RX ADMIN — KETOROLAC TROMETHAMINE 15 MG: 15 INJECTION, SOLUTION INTRAMUSCULAR; INTRAVENOUS at 01:05

## 2024-01-14 RX ADMIN — DAPAGLIFLOZIN 5 MG: 5 TABLET, FILM COATED ORAL at 08:44

## 2024-01-14 RX ADMIN — DOXEPIN HYDROCHLORIDE 10 MG: 10 CAPSULE ORAL at 22:47

## 2024-01-14 ASSESSMENT — COGNITIVE AND FUNCTIONAL STATUS - GENERAL
MOBILITY SCORE: 24
DAILY ACTIVITIY SCORE: 24
MOBILITY SCORE: 24
DAILY ACTIVITIY SCORE: 24

## 2024-01-14 ASSESSMENT — PAIN - FUNCTIONAL ASSESSMENT
PAIN_FUNCTIONAL_ASSESSMENT: 0-10

## 2024-01-14 ASSESSMENT — PAIN SCALES - GENERAL
PAINLEVEL_OUTOF10: 8
PAINLEVEL_OUTOF10: 9
PAINLEVEL_OUTOF10: 9
PAINLEVEL_OUTOF10: 0 - NO PAIN
PAINLEVEL_OUTOF10: 9
PAINLEVEL_OUTOF10: 7
PAINLEVEL_OUTOF10: 6
PAINLEVEL_OUTOF10: 10 - WORST POSSIBLE PAIN

## 2024-01-14 ASSESSMENT — PAIN DESCRIPTION - DESCRIPTORS: DESCRIPTORS: STABBING

## 2024-01-14 NOTE — PROGRESS NOTES
"Misael Hernandez is a 38 y.o. male on day 5 of admission presenting with Pancreatic abscess.    Subjective   Patient with no new complaints.       Objective     Physical Exam  GEN:  A&Ox3, no acute distress, appears comfortable.  Conversational and appropriate.  No confusion or gross mental status changes.  CARDIO: Normal rate and regular rhythm. No murmurs, rubs, or gallops.    PULM: LCTAB  NEURO: Cranial nerves II-XII grossly intact.   PSYCH: Appropriate mood and behavior, converses and responds appropriately during exam    Last Recorded Vitals  Blood pressure 111/78, pulse 101, temperature 36 °C (96.8 °F), resp. rate 18, height 1.753 m (5' 9\"), weight 83.9 kg (185 lb), SpO2 98 %.  Intake/Output last 3 Shifts:  I/O last 3 completed shifts:  In: 50 (0.6 mL/kg) [IV Piggyback:50]  Out: 60 (0.7 mL/kg) [Drains:60]  Weight: 83.9 kg     Assessment/Plan   Principal Problem:    Pancreatic abscess  Misael Hernandez is a 38 y.o. male with a PMHx of metastatic rectal carcinoma (currently on Lonsurf and Avastin [id number], transitioning care from Dr. Vega to Dr. Lui), Day's esophagus, HFrEF, cholecystits (2019), HLD, HTN, recurrent DVT/PE, and tobacco use disorder who presented as transfer from [place] for abdominal fluid collections. Concern for possible abscess vs infected pancreatic pseudocyst in the setting of recent episode of acute pancreatitis and new leukocytosis. Treated with ceftriaxone and metronidazole and now S/p IR-guided LUQ drain placement on 1/11. Clinically improved; now pending drain removal.        Updates 1/14:  -Discontinued abx per ID   -ID and GI signed off  -still refusing insulin; added Metformin 500 daily; uptitrate as oupatient     #Abdominal fluid collections, suspected pancreatic pseudocyst   #Acute leukocytosis   #Acute abdominal pain- improved  -Initially concerned for possible abscesses or infected pancreatic pseudocysts in setting of recent bout of pancreatitis, new fluid collection on " imaging, uncontrolled abdominal pain, and acute leukocytosis  -Continue home suboxone, toradol 15 mg q6h, PO Dilaudid 2 mg q3h PRN for pain management per supportive onc recs  -S/p IR-guided drain placement on 1/11, fluid cultures NGTD  -ID signed off as fluid has not shown evidence of infection and patient is improving.  -GI recs - management per IR; signed off     #Metastatic rectal cancer  -Previously following with Dr. Vega, about to transition care to Dr. Lui as primary oncologist  -Currently receiving Lonsurf and avastin [id number], last infusion 12/20  -Holding home Lonsurf in setting of suspected infection     #Hx of recurrent DVT  #Subacute right PE  -Hospitalized 12/2023 with new right PE after stopping home apixiban  -Repeat CTA 1/4/2023 showing stable clot burden compared to last admission  -Temporarily on heparin drip for IR procedure, re-started home apixiban 1/12     #HTN  #HLD  -Continue home metoprolol XR 25 mg daily   -Holding home losartan for time being, will consider restarting if BP remains elevated  -Continue home atorvastatin     #T2DM  #Uncontrolled hyperglycemia  -Last A1C 8.5 (2018), 8.8 on admission  -Apparently initated on dapagloflozin by primary care doctor in May 2023 but does not appear to be taking  -Patient remains resistant to taking SSI, threatening to leave AMA otherwise  -Continue dapagliflozin 5mg daily given uncontrolled blood glucose in setting of refused insulin injections, can increase dose after 4 weeks  - started Metformin 500mg daily; titrate up as outpatient     F: PRN  E: PRN  N: regular diet  GI: home omeprazole  DVT ppx: apixibian     NOK: brother Aryan Resk- [phone]  Code status: full (confirmed on admission)      Oswald Hernandez MD

## 2024-01-14 NOTE — PROGRESS NOTES
ID/Gi sign off; drain removal pending. Anticipate home self care at discharge once drain removed.

## 2024-01-14 NOTE — PROGRESS NOTES
"[facility]  [facility] [place]  CONSULT FOLLOW-UP     Reason For Consult  Assistance w/ peripancreatic fluid collections management     SUBJECTIVE     No events over night. He has no complains this morning. Reports wanting to go home.    EXAM     Last Recorded Vitals  Blood pressure 133/88, pulse 108, temperature 36 °C (96.8 °F), temperature source Temporal, resp. rate 18, height 1.753 m (5' 9\"), weight 83.9 kg (185 lb), SpO2 96 %.      Intake/Output Summary (Last 24 hours) at 1/14/2024 1248  Last data filed at 1/13/2024 2124  Gross per 24 hour   Intake 50 ml   Output 20 ml   Net 30 ml       Physical Exam   GENERAL: In no acute distress.  NEUROLOGIC: A and O x 3. Cranial nerves 2 through 12 grossly intact. Intact motor and sensory systems.  HEENT: Pupils are equal, round, and reactive to light and accommodation. Extraocular motion intact.    CARDIAC: S1 + S2, RRR, no M/R/G.    LUNGS: CTA BL. No wheezes or coarse breath sounds. Symmetric respirations. No increased work of breathing.   ABDOMEN: Soft, non-tender to palpation. No rebound or guarding. L sided percutaneous drain in place w/ dark green fluid.  EXTREMITIES: Moving x4 equally and spontaneously.    SKIN: Clean, warm, dry, and intact with normal skin turgor.  PSYCH: Appropriate mood and behavior.      OBJECTIVE                                                                              Medications             Current Facility-Administered Medications:     albuterol 90 mcg/actuation inhaler 1 puff, 1 puff, inhalation, q4h PRN, Carmen Mays MD    alum-mag hydroxide-simeth (Mylanta) 200-200-20 mg/5 mL oral suspension 10 mL, 10 mL, oral, 4x daily PRN, Carmen Mays MD, 10 mL at 01/10/24 2208    apixaban (Eliquis) tablet 5 mg, 5 mg, oral, BID, Carmen Mays MD, 5 mg at 01/14/24 0844    atorvastatin (Lipitor) tablet 40 mg, 40 mg, oral, Nightly, Carmen Mays MD, 40 mg at 01/13/24 2157    " buprenorphine-naloxone (Suboxone) 4-1 mg per sublingual film 1 Film, 1 Film, sublingual, q24h, Johnie Yeager MD    buprenorphine-naloxone (Suboxone) 8-2 mg per SL tablet 1 tablet, 1 tablet, sublingual, BID, Carmen Mays MD, 1 tablet at 01/14/24 0844    dapagliflozin propanediol (Farxiga) tablet 5 mg, 5 mg, oral, Daily, Oswald Hernandez MD, 5 mg at 01/14/24 0844    dextromethorphan-guaifenesin (Robitussin DM)  mg/5 mL oral liquid 5 mL, 5 mL, oral, q4h PRN, Carmen Mays MD, 5 mL at 01/12/24 2226    dextrose 10 % in water (D10W) infusion, 0.3 g/kg/hr, intravenous, Once PRN, Wilfrid Chapin MD    dextrose 50 % injection 25 g, 25 g, intravenous, q15 min PRN, Wilfrid Chapin MD    doxepin (SINEquan) capsule 10 mg, 10 mg, oral, Nightly PRN, Carmen Mays MD, 10 mg at 01/13/24 2156    ergocalciferol (Vitamin D-2) capsule 50,000 Units, 50,000 Units, oral, Weekly, Carmen Mays MD, 50,000 Units at 01/09/24 1054    glucagon (Glucagen) injection 1 mg, 1 mg, intramuscular, q15 min PRN, Wilfrid Chapin MD    haloperidol (Haldol) tablet 2 mg, 2 mg, oral, Nightly, Carmen Mays MD, 2 mg at 01/13/24 2156    HYDROmorphone (Dilaudid) tablet 2 mg, 2 mg, oral, q3h PRN, Carmen Mays MD, 2 mg at 01/14/24 1150    insulin lispro (HumaLOG) injection 0-5 Units, 0-5 Units, subcutaneous, TID with meals, Wilfrid Chapin MD    ketorolac (Toradol) injection 15 mg, 15 mg, intravenous, q6h JULIO, Carmen Mays MD, 15 mg at 01/14/24 0844    metoprolol succinate XL (Toprol-XL) 24 hr tablet 25 mg, 25 mg, oral, Daily, Carmen Mays MD, 25 mg at 01/14/24 0844    nicotine (Nicoderm CQ) 14 mg/24 hr patch 1 patch, 1 patch, transdermal, Daily, Carmen Mays MD, 1 patch at 01/14/24 0843    pantoprazole (ProtoNix) EC tablet 40 mg, 40 mg, oral, Daily before breakfast, Carmen Mays MD, 40 mg at 01/14/24 0602    polyethylene glycol (Glycolax, Miralax) packet 17 g, 17 g, oral, Daily, Carmen Mays,  MD    sennosides (Senokot) tablet 17.2 mg, 2 tablet, oral, BID, Carmen Mays MD, 17.2 mg at 01/10/24 0825                                                                            Labs     Results for orders placed or performed during the hospital encounter of 01/09/24 (from the past 24 hour(s))   POCT GLUCOSE   Result Value Ref Range    POCT Glucose 275 (H) 74 - 99 mg/dL   CBC and Auto Differential   Result Value Ref Range    WBC 12.9 (H) 4.4 - 11.3 x10*3/uL    nRBC 0.0 0.0 - 0.0 /100 WBCs    RBC 3.95 (L) 4.50 - 5.90 x10*6/uL    Hemoglobin 11.3 (L) 13.5 - 17.5 g/dL    Hematocrit 34.2 (L) 41.0 - 52.0 %    MCV 87 80 - 100 fL    MCH 28.6 26.0 - 34.0 pg    MCHC 33.0 32.0 - 36.0 g/dL    RDW 17.4 (H) 11.5 - 14.5 %    Platelets 412 150 - 450 x10*3/uL    Neutrophils % 73.9 40.0 - 80.0 %    Immature Granulocytes %, Automated 1.0 (H) 0.0 - 0.9 %    Lymphocytes % 12.4 13.0 - 44.0 %    Monocytes % 7.2 2.0 - 10.0 %    Eosinophils % 4.4 0.0 - 6.0 %    Basophils % 1.1 0.0 - 2.0 %    Neutrophils Absolute 9.54 (H) 1.20 - 7.70 x10*3/uL    Immature Granulocytes Absolute, Automated 0.13 0.00 - 0.70 x10*3/uL    Lymphocytes Absolute 1.60 1.20 - 4.80 x10*3/uL    Monocytes Absolute 0.93 0.10 - 1.00 x10*3/uL    Eosinophils Absolute 0.57 0.00 - 0.70 x10*3/uL    Basophils Absolute 0.14 (H) 0.00 - 0.10 x10*3/uL   Renal function panel   Result Value Ref Range    Glucose 314 (H) 74 - 99 mg/dL    Sodium 132 (L) 136 - 145 mmol/L    Potassium 4.5 3.5 - 5.3 mmol/L    Chloride 96 (L) 98 - 107 mmol/L    Bicarbonate 28 21 - 32 mmol/L    Anion Gap 13 10 - 20 mmol/L    Urea Nitrogen 9 6 - 23 mg/dL    Creatinine 0.43 (L) 0.50 - 1.30 mg/dL    eGFR >90 >60 mL/min/1.73m*2    Calcium 9.2 8.6 - 10.6 mg/dL    Phosphorus 3.7 2.5 - 4.9 mg/dL    Albumin 3.0 (L) 3.4 - 5.0 g/dL   Magnesium   Result Value Ref Range    Magnesium 1.86 1.60 - 2.40 mg/dL                                                                              Imaging           1/8/2024 CT A/P  w/ IV contrast:  IMPRESSION:  1. Stable pulmonary emboli in the segmental and subsegmental arteries  to the right upper lobe. No new pulmonary embolism.  2. No right heart strain.  3. Slight interval progression in pulmonary metastases and right  hilar adenopathy.  4. Stable mediastinal adenopathy.  5. Interval development of free fluid collections in the left  anterior abdomen and greater curvature of the stomach, likely  abscesses or infected pancreatic pseudocyst.  6. Questionable acute pancreatitis. Correlation with serum amylase  and lipase levels is recommended.  7. Stable bilateral adrenal masses.  8. A small fluid collection anterior to the distal sacrum, unchanged.  This is likely an abscess.     12/20/2023 CT A/P w/ IV contrast:  IMPRESSION:  Pulmonary embolism in the right upper lobe.  This is a new finding  since August 25, 2023.  There is no evidence of right heart strain.  Bilateral pulmonary masses with mediastinal and hilar adenopathy not  significantly changed from the prior exam.  Edema surrounding the tail the pancreas.  Correlation with the  patient's pancreatic enzymes is recommended.  Bilateral adrenal masses not significantly changed from the prior  exam.  Presacral thick-walled fluid collection.  This may represent a  centrally necrotic mass.  It was seen on the prior exam is unchanged.                                                                          GI Procedures      08/2013 colonoscopy:  Impression:     - Likely malignant partially obstructing tumor in the                          rectum. Biopsied.                         - The examination was otherwise normal.     08/2013 EGD:  Impression:     - Esophageal mucosal changes suggestive of                          short-segment Day's esophagus and classified as                          Day's stage C1-M1 per [place] criteria.                         - Biopsies obtained for research.     ASSESSMENT / PLAN                   ASSESSMENT/PLAN:     Misael Hernandez is a 38 y.o. male with a past medical history of metastatic rectal cancer s/p neoadjuvant chemotherapy and radiation therapy (incomplete 2/2 non-compliance), resection, adjuvant FOLFOX (incomplete 2/2 non-compliance) c/b lung/mediastinum and adrenal metastasis s/p additional chemotherapy, treatment complicated by neoplasm related pain, acneiform rash, diarrhea, hypokalemia, and hypomagnesemia, GERD c/b Day's esophagus, cholecystitis s/p cholecystectomy, HFrEF, DVT/PE, HTN, DLD, depression, and anxiety, admitted on 1/9/2024 with CT findings concerning for infected pancreatic pseudocyst/abscess.   At this time he underwent fluid collection aspiration w/ IR. Fluid was not purulent, Gram stain unremarkable, and culture NGTD. Additionally blood Cx from 1/9/24 NGTD. ID following and treating w/ CTX/Flagyl.     GI is consulted for assistance w/ peripancreatic fluid collections management. No indication for assessment of endoscopic peripancreatic fluid collections for drainage, as the patient is doing clinically better.     Antibiotics per ID recommendations.  Drain management per IR.     Patient was seen and discussed with Dr. Pettit.     Gastroenterology will sign off.     Dana Angeles MD  PGY-4 Gastroenterology and Hepatology Fellow  [facility] [place]

## 2024-01-14 NOTE — CARE PLAN
Problem: Pain - Adult  Goal: Verbalizes/displays adequate comfort level or baseline comfort level  Outcome: Progressing     Problem: Safety - Adult  Goal: Free from fall injury  Outcome: Progressing     Problem: Discharge Planning  Goal: Discharge to home or other facility with appropriate resources  Outcome: Progressing     Problem: Chronic Conditions and Co-morbidities  Goal: Patient's chronic conditions and co-morbidity symptoms are monitored and maintained or improved  Outcome: Progressing     Problem: Pain  Goal: Takes deep breaths with improved pain control throughout the shift  Outcome: Progressing  Goal: Turns in bed with improved pain control throughout the shift  Outcome: Progressing  Goal: Walks with improved pain control throughout the shift  Outcome: Progressing  Goal: Performs ADL's with improved pain control throughout shift  Outcome: Progressing  Goal: Participates in PT with improved pain control throughout the shift  Outcome: Progressing  Goal: Free from opioid side effects throughout the shift  Outcome: Progressing  Goal: Free from acute confusion related to pain meds throughout the shift  Outcome: Progressing       The clinical goals for the shift include pt will remain HDS and VSS throughout end of shift on 1/14/24 @1900

## 2024-01-14 NOTE — PROGRESS NOTES
"Misael Hernandez is a 38 y.o. male on day 4 of admission presenting with peripancreatic fluid collections    Subjective   Interval History: some pain at drain site, otherwise well. Has never had fever  even prior to admission       Objective   Range of Vitals (last 24 hours)  Heart Rate:  []   Temp:  [35.9 °C (96.6 °F)-36.9 °C (98.4 °F)]   Resp:  [14-18]   BP: (123-142)/(61-94)   SpO2:  [93 %-97 %]   Daily Weight  01/09/24 : 83.9 kg (185 lb)    Body mass index is 27.32 kg/m².    Physical Exam  Looks well in bed  Abd soft, drain with clear brown fluid    Relevant Results  Labs  Results from last 72 hours   Lab Units 01/13/24  0853 01/12/24 0050 01/11/24  0714   WBC AUTO x10*3/uL 11.3 14.0* 14.2*   HEMOGLOBIN g/dL 10.6* 10.4* 11.1*   HEMATOCRIT % 32.6* 31.2* 33.0*   PLATELETS AUTO x10*3/uL 384 410 406   NEUTROS PCT AUTO % 69.1 67.8 82.6   LYMPHS PCT AUTO % 15.4 21.6 9.4   MONOS PCT AUTO % 9.1 7.6 6.9   EOS PCT AUTO % 4.2 1.1 0.0     Results from last 72 hours   Lab Units 01/13/24  0852 01/12/24 0050 01/11/24  0714   SODIUM mmol/L 133* 132* 133*   POTASSIUM mmol/L 4.2 3.9 4.0   CHLORIDE mmol/L 96* 94* 95*   CO2 mmol/L 27 28 28   BUN mg/dL 8 12 9   CREATININE mg/dL 0.48* 0.47* 0.42*   GLUCOSE mg/dL 311* 390* 235*   CALCIUM mg/dL 9.0 8.9 9.4   ANION GAP mmol/L 14 14 14   EGFR mL/min/1.73m*2 >90 >90 >90   PHOSPHORUS mg/dL 4.2 4.3 3.5     Results from last 72 hours   Lab Units 01/13/24  0852 01/12/24 0050 01/11/24  0714   ALBUMIN g/dL 2.8* 2.8* 2.8*     Estimated Creatinine Clearance: 125 mL/min (A) (by C-G formula based on SCr of 0.48 mg/dL (L)).  No results found for: \"CRP\"    Pancreatic fluid 1/11 : lipase >6000, amylase >6000  , 71% PMN RBC 29,000  Cx: NGTD     Assessment/Plan   Mr. Hernandez is a 38 year old gentleman with a PMHx of rectal cancer on chemotherapy, multiple DVTs and PEs, HTN, HLD, HFrEF, who was recently admitted from 1/2-1/4 for new acute pancreatitis with pseudocyst, who was transferred to  " [facility] from the [facility] ED after presenting with chest pain on 1/8.  Patient found to have findings concerning for an infected pancreatic pseudocyst/abscess on CT imaging, so he was started on Meropenem and ID was consulted for further antibiotic recommendations.  However the fluid does not appear infected so far-not purulent, gram stain unremarkable and culture NGTD.     #Pancreatic Fluid Collection -uninfected pseudocyst  #Acute Pancreatitis     Micro:  1/9/24 blodd cx x 2 NGTD  1/11/24 left peripancreatic fluid: NGTD and gm st negative     Recommendations:  Would discontinue ceftriaxone/flagyl as no evidence of infection in the pseudocyst.  Drain management per IR.    ID will sign off, please call if further questions.    Rosa Maria Stevenson MD  ID team A

## 2024-01-15 VITALS
SYSTOLIC BLOOD PRESSURE: 125 MMHG | BODY MASS INDEX: 27.4 KG/M2 | DIASTOLIC BLOOD PRESSURE: 82 MMHG | RESPIRATION RATE: 16 BRPM | TEMPERATURE: 96.8 F | WEIGHT: 185 LBS | OXYGEN SATURATION: 98 % | HEIGHT: 69 IN | HEART RATE: 99 BPM

## 2024-01-15 LAB
ALBUMIN SERPL BCP-MCNC: 3.1 G/DL (ref 3.4–5)
ALP SERPL-CCNC: 78 U/L (ref 33–120)
ALT SERPL W P-5'-P-CCNC: 5 U/L (ref 10–52)
ANION GAP SERPL CALC-SCNC: 13 MMOL/L (ref 10–20)
AST SERPL W P-5'-P-CCNC: 8 U/L (ref 9–39)
BASOPHILS # BLD AUTO: 0.13 X10*3/UL (ref 0–0.1)
BASOPHILS NFR BLD AUTO: 1 %
BILIRUB SERPL-MCNC: 0.3 MG/DL (ref 0–1.2)
BUN SERPL-MCNC: 8 MG/DL (ref 6–23)
CALCIUM SERPL-MCNC: 9.4 MG/DL (ref 8.6–10.6)
CHLORIDE SERPL-SCNC: 98 MMOL/L (ref 98–107)
CO2 SERPL-SCNC: 28 MMOL/L (ref 21–32)
CREAT SERPL-MCNC: 0.41 MG/DL (ref 0.5–1.3)
EGFRCR SERPLBLD CKD-EPI 2021: >90 ML/MIN/1.73M*2
EOSINOPHIL # BLD AUTO: 0.86 X10*3/UL (ref 0–0.7)
EOSINOPHIL NFR BLD AUTO: 6.7 %
ERYTHROCYTE [DISTWIDTH] IN BLOOD BY AUTOMATED COUNT: 17.4 % (ref 11.5–14.5)
GLUCOSE BLD MANUAL STRIP-MCNC: 298 MG/DL (ref 74–99)
GLUCOSE SERPL-MCNC: 204 MG/DL (ref 74–99)
HCT VFR BLD AUTO: 33.7 % (ref 41–52)
HGB BLD-MCNC: 11.2 G/DL (ref 13.5–17.5)
IMM GRANULOCYTES # BLD AUTO: 0.16 X10*3/UL (ref 0–0.7)
IMM GRANULOCYTES NFR BLD AUTO: 1.2 % (ref 0–0.9)
LYMPHOCYTES # BLD AUTO: 2.13 X10*3/UL (ref 1.2–4.8)
LYMPHOCYTES NFR BLD AUTO: 16.6 %
MAGNESIUM SERPL-MCNC: 1.71 MG/DL (ref 1.6–2.4)
MCH RBC QN AUTO: 28.8 PG (ref 26–34)
MCHC RBC AUTO-ENTMCNC: 33.2 G/DL (ref 32–36)
MCV RBC AUTO: 87 FL (ref 80–100)
MONOCYTES # BLD AUTO: 1.04 X10*3/UL (ref 0.1–1)
MONOCYTES NFR BLD AUTO: 8.1 %
NEUTROPHILS # BLD AUTO: 8.5 X10*3/UL (ref 1.2–7.7)
NEUTROPHILS NFR BLD AUTO: 66.4 %
NRBC BLD-RTO: 0 /100 WBCS (ref 0–0)
PLATELET # BLD AUTO: 398 X10*3/UL (ref 150–450)
POTASSIUM SERPL-SCNC: 4.3 MMOL/L (ref 3.5–5.3)
PROT SERPL-MCNC: 6.5 G/DL (ref 6.4–8.2)
RBC # BLD AUTO: 3.89 X10*6/UL (ref 4.5–5.9)
SODIUM SERPL-SCNC: 135 MMOL/L (ref 136–145)
WBC # BLD AUTO: 12.8 X10*3/UL (ref 4.4–11.3)

## 2024-01-15 PROCEDURE — 99238 HOSP IP/OBS DSCHRG MGMT 30/<: CPT

## 2024-01-15 PROCEDURE — 83735 ASSAY OF MAGNESIUM: CPT

## 2024-01-15 PROCEDURE — 80053 COMPREHEN METABOLIC PANEL: CPT

## 2024-01-15 PROCEDURE — 2500000001 HC RX 250 WO HCPCS SELF ADMINISTERED DRUGS (ALT 637 FOR MEDICARE OP)

## 2024-01-15 PROCEDURE — S4991 NICOTINE PATCH NONLEGEND: HCPCS

## 2024-01-15 PROCEDURE — 99232 SBSQ HOSP IP/OBS MODERATE 35: CPT | Performed by: NURSE PRACTITIONER

## 2024-01-15 PROCEDURE — 36415 COLL VENOUS BLD VENIPUNCTURE: CPT

## 2024-01-15 PROCEDURE — 2500000002 HC RX 250 W HCPCS SELF ADMINISTERED DRUGS (ALT 637 FOR MEDICARE OP, ALT 636 FOR OP/ED)

## 2024-01-15 PROCEDURE — 82947 ASSAY GLUCOSE BLOOD QUANT: CPT

## 2024-01-15 PROCEDURE — 85025 COMPLETE CBC W/AUTO DIFF WBC: CPT

## 2024-01-15 PROCEDURE — 2500000004 HC RX 250 GENERAL PHARMACY W/ HCPCS (ALT 636 FOR OP/ED)

## 2024-01-15 RX ORDER — LOSARTAN POTASSIUM 50 MG/1
50 TABLET ORAL DAILY
Status: DISCONTINUED | OUTPATIENT
Start: 2024-01-15 | End: 2024-01-15 | Stop reason: HOSPADM

## 2024-01-15 RX ORDER — DAPAGLIFLOZIN 5 MG/1
5 TABLET, FILM COATED ORAL DAILY
Qty: 30 TABLET | Refills: 1 | Status: SHIPPED | OUTPATIENT
Start: 2024-01-15 | End: 2024-05-14

## 2024-01-15 RX ORDER — METFORMIN HYDROCHLORIDE 500 MG/1
500 TABLET ORAL
Qty: 30 TABLET | Refills: 0 | Status: SHIPPED | OUTPATIENT
Start: 2024-01-15 | End: 2024-04-03

## 2024-01-15 RX ADMIN — HYDROMORPHONE HYDROCHLORIDE 2 MG: 2 TABLET ORAL at 05:02

## 2024-01-15 RX ADMIN — METFORMIN HYDROCHLORIDE 500 MG: 500 TABLET ORAL at 09:10

## 2024-01-15 RX ADMIN — DAPAGLIFLOZIN 5 MG: 5 TABLET, FILM COATED ORAL at 09:10

## 2024-01-15 RX ADMIN — HYDROMORPHONE HYDROCHLORIDE 2 MG: 2 TABLET ORAL at 00:48

## 2024-01-15 RX ADMIN — BUPRENORPHINE AND NALOXONE 1 TABLET: 8; 2 TABLET SUBLINGUAL at 09:05

## 2024-01-15 RX ADMIN — APIXABAN 5 MG: 5 TABLET, FILM COATED ORAL at 09:05

## 2024-01-15 RX ADMIN — KETOROLAC TROMETHAMINE 15 MG: 15 INJECTION, SOLUTION INTRAMUSCULAR; INTRAVENOUS at 09:09

## 2024-01-15 RX ADMIN — KETOROLAC TROMETHAMINE 15 MG: 15 INJECTION, SOLUTION INTRAMUSCULAR; INTRAVENOUS at 02:00

## 2024-01-15 RX ADMIN — HYDROMORPHONE HYDROCHLORIDE 2 MG: 2 TABLET ORAL at 09:05

## 2024-01-15 RX ADMIN — LOSARTAN POTASSIUM 50 MG: 50 TABLET, FILM COATED ORAL at 09:06

## 2024-01-15 RX ADMIN — METOPROLOL SUCCINATE 25 MG: 25 TABLET, EXTENDED RELEASE ORAL at 09:06

## 2024-01-15 RX ADMIN — Medication 1 PATCH: at 09:06

## 2024-01-15 RX ADMIN — PANTOPRAZOLE SODIUM 40 MG: 40 TABLET, DELAYED RELEASE ORAL at 07:23

## 2024-01-15 RX ADMIN — GUAIFENESIN AND DEXTROMETHORPHAN 5 ML: 100; 10 SYRUP ORAL at 05:02

## 2024-01-15 RX ADMIN — HYDROMORPHONE HYDROCHLORIDE 2 MG: 2 TABLET ORAL at 12:11

## 2024-01-15 ASSESSMENT — COGNITIVE AND FUNCTIONAL STATUS - GENERAL
DAILY ACTIVITIY SCORE: 24
MOBILITY SCORE: 24

## 2024-01-15 ASSESSMENT — PAIN SCALES - GENERAL
PAINLEVEL_OUTOF10: 9
PAINLEVEL_OUTOF10: 9
PAINLEVEL_OUTOF10: 6
PAINLEVEL_OUTOF10: 10 - WORST POSSIBLE PAIN
PAINLEVEL_OUTOF10: 9

## 2024-01-15 ASSESSMENT — PAIN - FUNCTIONAL ASSESSMENT
PAIN_FUNCTIONAL_ASSESSMENT: 0-10

## 2024-01-15 NOTE — PROGRESS NOTES
"Misael Hernandez is a 38 y.o. male on day 6 of admission presenting with Pancreatic abscess.    Subjective   S/p abscess drain placement 1/11/24 in IR suite. Minimal output ID has signed off on case. Patient denies any issues with drain.       Objective     Physical Exam  Constitutional:       Appearance: Normal appearance.   Cardiovascular:      Pulses: Normal pulses.      Heart sounds: Normal heart sounds.   Pulmonary:      Effort: Pulmonary effort is normal.      Breath sounds: Normal breath sounds.   Abdominal:      General: Bowel sounds are normal.      Palpations: Abdomen is soft.      Comments: NITO bulb with minimal tan, purulent fluid   Skin:     General: Skin is warm and dry.      Comments: Abscess drain site c/d/i   Neurological:      Mental Status: He is alert.   Psychiatric:         Mood and Affect: Mood normal.         Behavior: Behavior normal.         Thought Content: Thought content normal.         Judgment: Judgment normal.         Last Recorded Vitals  Blood pressure 125/82, pulse 99, temperature 36 °C (96.8 °F), temperature source Temporal, resp. rate 16, height 1.753 m (5' 9\"), weight 83.9 kg (185 lb), SpO2 98 %.  Intake/Output last 3 Shifts:  I/O last 3 completed shifts:  In: 50 (0.6 mL/kg) [IV Piggyback:50]  Out: 60 (0.7 mL/kg) [Drains:60]  Weight: 83.9 kg     Relevant Results                              Assessment/Plan   Principal Problem:    Pancreatic abscess    IR consulted for drain removal. Review of recent I+O demonstrates adequate downtrend of output from NITO bulb. At bedside unable to aspirate any fluid from drain to NITO bulb. Using sterile technique, the drain was removed and sterile gauze and tegaderm placed over site. Dressing can be removed in 2 days, I discussed keeping the site dry with patient.       I spent 30 minutes in the professional and overall care of this patient.      Eugene Garcia, APRN-CNP      "

## 2024-01-15 NOTE — CARE PLAN
The patient's goals for the shift include      The clinical goals for the shift include pt will report decrease in pain 1 hour after medication administration 1/15/24 @ 1900      Problem: Pain - Adult  Goal: Verbalizes/displays adequate comfort level or baseline comfort level  1/15/2024 1403 by Marla Urban RN  Outcome: Adequate for Discharge  1/15/2024 1403 by Marla Urban RN  Outcome: Progressing     Problem: Safety - Adult  Goal: Free from fall injury  Outcome: Met     Problem: Discharge Planning  Goal: Discharge to home or other facility with appropriate resources  Outcome: Adequate for Discharge     Problem: Pain  Goal: Takes deep breaths with improved pain control throughout the shift  Outcome: Met  Goal: Turns in bed with improved pain control throughout the shift  Outcome: Met  Goal: Walks with improved pain control throughout the shift  Outcome: Met  Goal: Performs ADL's with improved pain control throughout shift  Outcome: Met  Goal: Participates in PT with improved pain control throughout the shift  Outcome: Adequate for Discharge  Goal: Free from opioid side effects throughout the shift  Outcome: Adequate for Discharge  Goal: Free from acute confusion related to pain meds throughout the shift  Outcome: Adequate for Discharge    Pt remained safe and free from injury throughout shift. VSS and HDS. NITO drain removed by IR. IV removed intact prior to discharge. Discharge instructions, medications, prescriptions, and follow-up appointments reviewed with pt. Pt verbalizes understanding and denies questions at this time. All belongings packed and sent with patient. Discharge complete.

## 2024-01-15 NOTE — DISCHARGE SUMMARY
Discharge Diagnosis  Pancreatic abscess    Issues Requiring Follow-Up  -Follow-up with endocrinology for diabetes management, started on dapagloflozin and metformin while in hospital  -Follow-up with Dr. Lui regarding resumption of oral chemotherapy and q2week Avastin infusions  -Follow-up with Dr. Fernando (general surgery) to monitor resolution of pancreatic fluid collections    Test Results Pending At Discharge  Pending Labs       No current pending labs.            Hospital Course  Misael Hernandez is a 38 y.o. male presenting with a PMHx of metastatic rectal carcinoma (currently on Lonsurf and Avastin [id number], transitioning care from Dr. Vega to Dr. Lui), Day's esophagus, HFrEF, cholecystits (2019), HLD, HTN, recurrent DVT/PE, and tobacco use disorder who presents as transfer from [place] with new leukocytosis and abdominal fluid collections. Concern for possible abscess vs infected pancreatic pseudocyst in the setting of recent episode of acute pancreatitis and new leukocytosis.     Evaluated by infectious disease, started on ceftriaxone and metronidazole empirically. S/p IR-guided LUQ drain placement on 1/11. Fluid cultures showing no growth. Overall ID had lower suspicion for infection (more likely simple pseudocyst), and antibiotics were discontinued on 1/14. Drain removed by IR team on 1/15.    Additionally noted to have hyperglycemia into the 300s with A1C of 8.8. Appears to have been placed on dapagliflozin by PCP in past but does not appear to have been compliant with it. Patient refused insulin while inpatient but was agreeable to starting dapagliflozin and metformin. Patient discharged on these meds and outpatient referral to endocrinology placed.    Patient to be discharged home today in stable condition, with follow-up with Dr. Lui on 1/25.      Pertinent Physical Exam At Time of Discharge  Physical Exam  Constitutional:       General: He is not in acute distress.     Appearance:  Normal appearance.   HENT:      Head: Normocephalic and atraumatic.      Nose: Nose normal.      Mouth/Throat:      Mouth: Mucous membranes are moist.      Pharynx: Oropharynx is clear. No posterior oropharyngeal erythema.   Eyes:      General: No scleral icterus.     Extraocular Movements: Extraocular movements intact.      Conjunctiva/sclera: Conjunctivae normal.      Pupils: Pupils are equal, round, and reactive to light.   Cardiovascular:      Rate and Rhythm: Normal rate and regular rhythm.      Pulses: Normal pulses.   Pulmonary:      Effort: Pulmonary effort is normal. No respiratory distress.      Breath sounds: Normal breath sounds.   Abdominal:      General: Abdomen is flat. There is no distension.      Palpations: Abdomen is soft.      Tenderness: There is no abdominal tenderness.   Musculoskeletal:         General: Normal range of motion.      Cervical back: Normal range of motion and neck supple.      Right lower leg: No edema.      Left lower leg: No edema.   Skin:     General: Skin is warm and dry.   Neurological:      General: No focal deficit present.      Mental Status: He is alert and oriented to person, place, and time.   Psychiatric:         Mood and Affect: Mood normal.         Behavior: Behavior normal.         Home Medications     Medication List      START taking these medications     apixaban 5 mg tablet; Commonly known as: Eliquis; Take 1 tablet (5 mg)   by mouth 2 times a day.   dapagliflozin propanediol 5 mg; Commonly known as: Farxiga; Take 1   tablet (5 mg) by mouth once daily.   metFORMIN 500 mg tablet; Commonly known as: Glucophage; Take 1 tablet   (500 mg) by mouth once daily with breakfast.     CONTINUE taking these medications     atorvastatin 40 mg tablet; Commonly known as: Lipitor; Take 1 tablet (40   mg) by mouth once daily at bedtime.   buprenorphine-naloxone 8-2 mg SL tablet; Commonly known as: Suboxone   doxepin 50 mg capsule; Commonly known as: SINEquan   haloperidol 2 mg  tablet; Commonly known as: Haldol   lidocaine-prilocaine 2.5-2.5 % cream; Commonly known as: Emla; apply   topically to affected area if needed   losartan 50 mg tablet; Commonly known as: Cozaar   metoprolol succinate XL 25 mg 24 hr tablet; Commonly known as: Toprol-XL   omeprazole 40 mg DR capsule; Commonly known as: PriLOSEC   Ventolin HFA 90 mcg/actuation inhaler; Generic drug: albuterol     STOP taking these medications     ergocalciferol 1.25 MG (52601 UT) capsule; Commonly known as: Vitamin   D-2   Lonsurf 20-8.19 mg tablet; Generic drug: trifluridine-tipiraciL       Outpatient Follow-Up  Future Appointments   Date Time Provider Department [place]   1/25/2024  2:00 PM Sandra Lui MD PhD [id number] [facility]   1/31/2024  9:00 AM INF 02 [facility]   2/14/2024  9:00 AM INF 02 [facility]   2/14/2024  4:45 PM Cedric Fernando MD [id number] [facility]       Carmen Mays MD

## 2024-01-15 NOTE — DISCHARGE INSTRUCTIONS
Dear Mr. Hernandez,     You were hospitalized on 1/9/24 at [facility] with concern for possible infected fluid collection in your abdomen. You were initially treated with IV antibiotics, and our interventional radiologists placed a drain to remove the fluid. Ultimately, that fluid did not appear to be infected. Our ID specialists decided to discontinue antibiotics as a result, and the drain was removed. You have a follow-up appointment by phone with Dr. Fernando on 2/14/24. He will likely plan to repeat imaging in a fews months to make sure the fluid has resolved.     In addition, you were found to have elevated blood sugars while in the hospital. Your Hgb A1C was 8.8, which is consistent with a diagnosis of diabetes. We started you on two oral medications to help control your sugars (dapagliflozin aka Farxiga, and metformin). Please continue to take these medications as instructed on the bottle. We have requested a follow-up appointment with our diabetes specialists in the outpatient setting, so that they can continue to monitor you on these new medications.     Dr. Lui's office has scheduled follow-up with you for 1/25/24.     You were additionally restarted on suboxone by our supportive oncology pain specialists in the hospital; please continue taking this as prescribed. We have requested a follow-up appointment with Dr. Dominguez, which is scheduled for 1/23/24.    Please do not hesitate to reach out with any further questions or concerns. It was a pleasure taking care of you.    Sincerely,   Your  Team

## 2024-01-15 NOTE — CARE PLAN
The patient's goals for the shift include      The clinical goals for the shift include pt remain safe and free from injury through shift    Problem: Pain - Adult  Goal: Verbalizes/displays adequate comfort level or baseline comfort level  Outcome: Progressing     Problem: Safety - Adult  Goal: Free from fall injury  Outcome: Progressing     Problem: Chronic Conditions and Co-morbidities  Goal: Patient's chronic conditions and co-morbidity symptoms are monitored and maintained or improved  Outcome: Progressing

## 2024-01-16 ENCOUNTER — APPOINTMENT (OUTPATIENT)
Dept: HEMATOLOGY/ONCOLOGY | Facility: HOSPITAL | Age: 39
End: 2024-01-16
Payer: MEDICARE

## 2024-01-16 LAB
GLUCOSE BLD MANUAL STRIP-MCNC: 197 MG/DL (ref 74–99)
GLUCOSE BLD MANUAL STRIP-MCNC: 229 MG/DL (ref 74–99)
GLUCOSE BLD MANUAL STRIP-MCNC: 262 MG/DL (ref 74–99)

## 2024-01-17 ENCOUNTER — APPOINTMENT (OUTPATIENT)
Dept: HEMATOLOGY/ONCOLOGY | Facility: HOSPITAL | Age: 39
End: 2024-01-17
Payer: MEDICARE

## 2024-01-25 ENCOUNTER — OFFICE VISIT (OUTPATIENT)
Dept: HEMATOLOGY/ONCOLOGY | Facility: CLINIC | Age: 39
End: 2024-01-25
Payer: MEDICARE

## 2024-01-25 VITALS
WEIGHT: 181.44 LBS | SYSTOLIC BLOOD PRESSURE: 105 MMHG | BODY MASS INDEX: 29.16 KG/M2 | RESPIRATION RATE: 18 BRPM | DIASTOLIC BLOOD PRESSURE: 70 MMHG | HEART RATE: 110 BPM | HEIGHT: 66 IN | OXYGEN SATURATION: 99 % | TEMPERATURE: 95.5 F

## 2024-01-25 DIAGNOSIS — C79.9 METASTASIS FROM RECTAL CANCER (MULTI): ICD-10-CM

## 2024-01-25 DIAGNOSIS — C20 METASTASIS FROM RECTAL CANCER (MULTI): ICD-10-CM

## 2024-01-25 DIAGNOSIS — C20 RECTAL CANCER (MULTI): Primary | ICD-10-CM

## 2024-01-25 PROCEDURE — 99215 OFFICE O/P EST HI 40 MIN: CPT | Performed by: STUDENT IN AN ORGANIZED HEALTH CARE EDUCATION/TRAINING PROGRAM

## 2024-01-25 PROCEDURE — 3078F DIAST BP <80 MM HG: CPT | Performed by: STUDENT IN AN ORGANIZED HEALTH CARE EDUCATION/TRAINING PROGRAM

## 2024-01-25 PROCEDURE — 3074F SYST BP LT 130 MM HG: CPT | Performed by: STUDENT IN AN ORGANIZED HEALTH CARE EDUCATION/TRAINING PROGRAM

## 2024-01-25 RX ORDER — ONDANSETRON 4 MG/1
4 TABLET, FILM COATED ORAL EVERY 8 HOURS PRN
COMMUNITY

## 2024-01-25 RX ORDER — EPINEPHRINE 0.3 MG/.3ML
0.3 INJECTION SUBCUTANEOUS EVERY 5 MIN PRN
Status: CANCELLED | OUTPATIENT
Start: 2024-01-31

## 2024-01-25 RX ORDER — PALONOSETRON 0.05 MG/ML
250 INJECTION, SOLUTION INTRAVENOUS ONCE
Status: CANCELLED
Start: 2024-01-31

## 2024-01-25 RX ORDER — HEPARIN SODIUM,PORCINE/PF 10 UNIT/ML
50 SYRINGE (ML) INTRAVENOUS AS NEEDED
Status: CANCELLED | OUTPATIENT
Start: 2024-01-25

## 2024-01-25 RX ORDER — FAMOTIDINE 10 MG/ML
20 INJECTION INTRAVENOUS ONCE AS NEEDED
Status: CANCELLED | OUTPATIENT
Start: 2024-02-14

## 2024-01-25 RX ORDER — DEXAMETHASONE SODIUM PHOSPHATE 4 MG/ML
10 INJECTION, SOLUTION INTRA-ARTICULAR; INTRALESIONAL; INTRAMUSCULAR; INTRAVENOUS; SOFT TISSUE ONCE
Status: CANCELLED
Start: 2024-01-31 | End: 2024-01-31

## 2024-01-25 RX ORDER — PROCHLORPERAZINE MALEATE 5 MG
5 TABLET ORAL EVERY 6 HOURS PRN
COMMUNITY

## 2024-01-25 RX ORDER — PROCHLORPERAZINE MALEATE 10 MG
10 TABLET ORAL EVERY 6 HOURS PRN
Status: CANCELLED | OUTPATIENT
Start: 2024-01-31

## 2024-01-25 RX ORDER — PROCHLORPERAZINE EDISYLATE 5 MG/ML
10 INJECTION INTRAMUSCULAR; INTRAVENOUS EVERY 6 HOURS PRN
Status: CANCELLED | OUTPATIENT
Start: 2024-01-31

## 2024-01-25 RX ORDER — FAMOTIDINE 10 MG/ML
20 INJECTION INTRAVENOUS ONCE AS NEEDED
Status: CANCELLED | OUTPATIENT
Start: 2024-01-31

## 2024-01-25 RX ORDER — DIPHENHYDRAMINE HYDROCHLORIDE 50 MG/ML
50 INJECTION INTRAMUSCULAR; INTRAVENOUS AS NEEDED
Status: CANCELLED | OUTPATIENT
Start: 2024-02-14

## 2024-01-25 RX ORDER — DIPHENHYDRAMINE HYDROCHLORIDE 50 MG/ML
50 INJECTION INTRAMUSCULAR; INTRAVENOUS AS NEEDED
Status: CANCELLED | OUTPATIENT
Start: 2024-01-31

## 2024-01-25 RX ORDER — ALBUTEROL SULFATE 0.83 MG/ML
3 SOLUTION RESPIRATORY (INHALATION) AS NEEDED
Status: CANCELLED | OUTPATIENT
Start: 2024-02-14

## 2024-01-25 RX ORDER — PROCHLORPERAZINE EDISYLATE 5 MG/ML
10 INJECTION INTRAMUSCULAR; INTRAVENOUS EVERY 6 HOURS PRN
Status: CANCELLED | OUTPATIENT
Start: 2024-02-14

## 2024-01-25 RX ORDER — EPINEPHRINE 0.3 MG/.3ML
0.3 INJECTION SUBCUTANEOUS EVERY 5 MIN PRN
Status: CANCELLED | OUTPATIENT
Start: 2024-02-14

## 2024-01-25 RX ORDER — ALBUTEROL SULFATE 0.83 MG/ML
3 SOLUTION RESPIRATORY (INHALATION) AS NEEDED
Status: CANCELLED | OUTPATIENT
Start: 2024-01-31

## 2024-01-25 RX ORDER — HEPARIN 100 UNIT/ML
500 SYRINGE INTRAVENOUS AS NEEDED
Status: CANCELLED | OUTPATIENT
Start: 2024-01-25

## 2024-01-25 RX ORDER — PROCHLORPERAZINE MALEATE 10 MG
10 TABLET ORAL EVERY 6 HOURS PRN
Status: CANCELLED | OUTPATIENT
Start: 2024-02-14

## 2024-01-25 ASSESSMENT — COLUMBIA-SUICIDE SEVERITY RATING SCALE - C-SSRS
2. HAVE YOU ACTUALLY HAD ANY THOUGHTS OF KILLING YOURSELF?: NO
1. IN THE PAST MONTH, HAVE YOU WISHED YOU WERE DEAD OR WISHED YOU COULD GO TO SLEEP AND NOT WAKE UP?: NO
6. HAVE YOU EVER DONE ANYTHING, STARTED TO DO ANYTHING, OR PREPARED TO DO ANYTHING TO END YOUR LIFE?: NO

## 2024-01-25 ASSESSMENT — PATIENT HEALTH QUESTIONNAIRE - PHQ9
2. FEELING DOWN, DEPRESSED OR HOPELESS: NOT AT ALL
SUM OF ALL RESPONSES TO PHQ9 QUESTIONS 1 AND 2: 0
1. LITTLE INTEREST OR PLEASURE IN DOING THINGS: NOT AT ALL

## 2024-01-25 ASSESSMENT — ENCOUNTER SYMPTOMS
DEPRESSION: 0
OCCASIONAL FEELINGS OF UNSTEADINESS: 0
LOSS OF SENSATION IN FEET: 0

## 2024-01-25 ASSESSMENT — PAIN SCALES - GENERAL: PAINLEVEL: 7

## 2024-01-25 NOTE — PROGRESS NOTES
Cancer History:  DIAGNOSIS  Rectal cancer    STAGING  Stage IV, M1b   Stage III, ypT3 ypN1b at diagnosis    CURRENT SITES OF DISEASE  Lungs, mediastinal and hilar lymph nodes, b/l adrenal glands  Presacral soft tissue swelling thought to be post-operative vs. residual tumor    MOLECULAR GENOMICS  MMR intact, BRAF and NRAS/KRAS wild-type    TUMOR MARKER  CEA 5.4 at diagnosis   CEA 79 on 2/7/23    PRIOR THERAPY  Neoadjuvant capecitabine and radiation  Adjuvant FOLFOX  Capecitabine, bevacizumab     CURRENT THERAPY  Irinotecan, cetuximab     CURRENT ONCOLOGICAL PROBLEMS  PE with recent recurrence 12/2023 d/t missed Eliquis from n/v  Pancreatitis 1/2024    HISTORY OF PRESENT ILLNESS  8/2013 - abdominal pain, rectal bleeding and weight loss leading to colonoscopy showing circumferential fungating partially obstructive mass in the rectum with biopsy showing invasive moderately differentiated adenocarcinoma.  Pathology showed invasive moderately differentiated adenocarcinoma, normal mismatch repair protein expression. Staged III = T3 N2 M0 rectal adenocarcinoma.  Initial CEA was 5.4.  8/2013 - noeadjuvant capecitabine and RT   1/2014 - open proctosigmoidectomy with coloanal anastomosis and diverting ileostomy.  Pathology showed invasive moderately differentiated adenocarcinoma of the rectum, 2 of 31 lymph nodes positive, therapy effect, low-grade, tumor 3.5 x 3 x 0.5 cm, negative margins, lymphovascular invasion and perineural invasion present, ypT3 ypN1b  11/2014 - ileostomy takedown with resection of small bowel by Dr. Willa Khan  11/2014 - given adjuvant FOLFOX (reportedly not completed due to patient non-compliance)  6/2018- biopsy proven RLL mets, MMR normal, NRAS and BRAF wild-type, moderately differentiated adenocarcinoma   6/2021 - disease progression seen at metsatatic sites, patient started on FOLFOX with intolerance to oxaliplatin leading to switch to capecitabine/bevacizumab  11/2021 - disease progression and  "patient switched to irinotecan / cetuximab with subsequent dose reductions due to skin toxicity and diarrhea  2/20/23 - CT torso showing overall disease progression  PAST MEDICAL HISTORY  Rectal cancer  Recurrent b/l DVT  History of cardiomyopathy (ischemic vs. stress?)  Cholecystitis   SAH (9/2014)  Day's esophagus by EGD 8/2013, GERD  Tobacco abuse  Depression/anxiety  Dyslipidemia  Hypertension  Avascular necrosis femoral heads noted on imaging 2021  Asthma/COPD  Migraines  Hepatic steatosis noted on imaging 2020  Essential tremor    SOCIAL HISTORY  Unemployed. Lives with mom and brother. Has four children ages 10-17. Smokes 1ppd. No alcohol use. Uses [employer].     CURRENT MEDS  see below    FAMILY HISTORY  Father - esophageal cancer, paternal GF - esophageal cancer    Subjective:  Since starting Lonsurf, was having intractable nausea and vomiting. He would take Zofran & Compazine, but states that they were not helpful for his nausea. He was taking the Lonsurf BID Mon-Fri x1 week only, and holding for 3 weeks, and states the days he didn't take the Lonsurf he did not have nausea or vomiting. Since his discharge, his pain is much improved, and he is feeling close to his baseline.     No fevers, chills, chest pain, shortness of breath, abdominal pain, nausea, vomiting, diarrhea, or rashes.    ROS as above. Remainder of 10-point review of systems elicited and negative.     Objective:  /70   Temp 35.3 °C (95.5 °F)   Ht 1.687 m (5' 6.42\")   Wt 82.3 kg (181 lb 7 oz)   BMI 28.92 kg/m²     Gen: A&O, NAD  Head: Normocephalic, atraumatic  Eyes: no scleral icterus  ENT: mucous membranes moist, no oropharyngeal lesions  Resp: Lungs CTAB  Cardiac: Normal rate, regular rhythm, no murmurs appreciated  Abdomen: Soft, nondistended, nontender, +BS  Neuro: CNII-XII grossly intact  Psych: appropriate mood & affect  Skin: warm, dry, no apparent rashes     ECOG Performance Status: 1     CT Angio for PE and CT abd/pelvis " 12/20/23:  Pulmonary embolism in the right upper lobe.  This is a new finding  since August 25, 2023.  There is no evidence of right heart strain.  Bilateral pulmonary masses with mediastinal and hilar adenopathy not  significantly changed from the prior exam.  Edema surrounding the tail the pancreas.  Correlation with the  patient's pancreatic enzymes is recommended.  Bilateral adrenal masses not significantly changed from the prior  exam.  Presacral thick-walled fluid collection.  This may represent a  centrally necrotic mass.  It was seen on the prior exam is unchanged.    CT Angio for PE and CT Abd/pelvis 1/1/24:  Slight decreased size of right upper lobe PE since 12/20/2023. No new  pulmonary embolism identified.      Multiple bilateral pulmonary nodules/masses and lymphadenopathy  without significant change from 12/20/2023.      Increased upper abdominal/peripancreatic fat stranding and 4.5 cm  fluid collection indenting the stomach, most consistent with  worsening acute pancreatitis with developing pseudocyst. Clinical  correlation and further evaluation/follow-up recommended.    CT Angio for PE and CT Abd/pelvis 1/8/24:  1. Stable pulmonary emboli in the segmental and subsegmental arteries  to the right upper lobe. No new pulmonary embolism.  2. No right heart strain.  3. Slight interval progression in pulmonary metastases and right  hilar adenopathy.  4. Stable mediastinal adenopathy.  5. Interval development of free fluid collections in the left  anterior abdomen and greater curvature of the stomach, likely  abscesses or infected pancreatic pseudocyst.  6. Questionable acute pancreatitis. Correlation with serum amylase  and lipase levels is recommended.  7. Stable bilateral adrenal masses.  8. A small fluid collection anterior to the distal sacrum, unchanged.  This is likely an abscess.    Assessment & Plan:  Mr. Hernandez is a 38 y.o. man with a history of metastatic rectal adenocarcinoma, cardiomyopathy,  depression/anxiety, asthma/COPD, avascular necrosis who was previously treated with Dr. Willa Vega, and presents to day for my assumption of his care.    I previously saw the patient in March 2023 for 2nd opinion and for clinical trial consideration. His cancer therapy is summarized above, briefly was diagnosed in 8/2013 with localized rectal cancer. He underwent neoadjuvant chemoradiation with capecitabine followed by resection. He subsequently received an incomplete course of adjuvant FOLFOX.     He later developed metastatic recurrence to the lungs in 6/2018, biopsy proven, with MMR-proficient, no TERRY or BRAF mutations detected. He then received palliative FOLFOX , but developed a serious adverse reaction, unclear of the details. Later was treated with capecitabine + bevacizumab for unclear amount of time, d/c d/t progression. He started irinotecan + cetuximab 11/29/2021, and has been on this ever since.    When I met him first in March 2023, he had not had a CT for 1.5y despite ongoing treatment. There was some interval progression, but the tempo of it was not clear given the timing of his CTs. I recommended continued treatment with close interval CT to evaluate response. I also discussed clinical trial options at the time, but he was not interested in driving to [facility] for a study.     He discontinued irinotecan + cetuximab 8/2023 due to progression and started Lonsurf + bevacizumab 9/13/23. He reports only taking the Lonsurf for 1 week (prescribed as 2 weeks on, 2 weeks off) due to nausea/vomiting, and in Dec 2023 was having so much nausea that he did not take his Eliquis. Presented to the ED with n/v shortness of breat 12/20/23, found to have new PE. He was restarted on his Eliquis. He was again admitted 1/8/24 for abdominal pain, found to have stable PE, but with pancreatitis and abdominal abscess. He underwent IR-guided LUQ drain placement, thought to be pseudocyst.     He is now feeling much  better. I personally reviewed the images from the recent CT, which show stable to minimal decrease in size of pulmonary nodules. I discussed this with him, and he would like to continue Lonsurf + gerson with additional nausea support.     Plan:   Metastatic rectal ca to the lungs, KRAS, BRAF WT, MMR-intact  - Continue with Lonsurf + gerson, currently taking Lonsurf only on Days 1-5 (typically this is Days 1-5 and 8-12 on a 28 day cycle) with gerson Days 1 and 15.   - He will take Zofran & Compazine in the morning prior to his Lonsurf, can add low dose dexamethasone to this if still severe. Will add palnosetron to Day 1 with gerson  - RTC 4-5 weeks with next cycle     DM  - recommended by inpatient team to f/u with endocrinology, but pt not interested  - message sent to pt's pcp to inform and request assistance with management

## 2024-01-25 NOTE — PROGRESS NOTES
"Patient here for appointment with Dr. Lui alone. Medications and allergies reviewed.     Patient states he is in pain in his lung and his kidneys \"chronic pain\" from when I got the cancer- Dr. Dominguez is managing his pain medications.     Patient stated every since he started on his new pill- Lonsurf in September he has had nausea and vomiting even when taking his nausea medications before. Currently on Lonsurf M-F for 1 weeks on and 3 weeks off. No reported nausea and vomiting on off weeks. Per Dr. Lui plan to add different pre-meds on treatment days. Misael was instructed to stop taking the Lonsurf if he experiences nausea and vomiting again and call the office immediately by Dr. Lui.     Next treatment day on 1/31/24. Follow up MD in one month.             "

## 2024-01-30 ENCOUNTER — LAB (OUTPATIENT)
Dept: LAB | Facility: LAB | Age: 39
End: 2024-01-30
Payer: MEDICARE

## 2024-01-30 ENCOUNTER — APPOINTMENT (OUTPATIENT)
Dept: HEMATOLOGY/ONCOLOGY | Facility: HOSPITAL | Age: 39
End: 2024-01-30
Payer: MEDICARE

## 2024-01-30 DIAGNOSIS — I10 HYPERTENSION: Primary | ICD-10-CM

## 2024-01-30 DIAGNOSIS — C20 RECTAL CANCER (MULTI): ICD-10-CM

## 2024-01-30 LAB
ALBUMIN SERPL BCP-MCNC: 3.2 G/DL (ref 3.4–5)
ALP SERPL-CCNC: 142 U/L (ref 33–120)
ALT SERPL W P-5'-P-CCNC: 7 U/L (ref 10–52)
ANION GAP SERPL CALC-SCNC: 17 MMOL/L (ref 10–20)
AST SERPL W P-5'-P-CCNC: 6 U/L (ref 9–39)
BASOPHILS # BLD AUTO: 0.09 X10*3/UL (ref 0–0.1)
BASOPHILS NFR BLD AUTO: 0.6 %
BILIRUB SERPL-MCNC: 0.6 MG/DL (ref 0–1.2)
BUN SERPL-MCNC: 3 MG/DL (ref 6–23)
CALCIUM SERPL-MCNC: 8.8 MG/DL (ref 8.6–10.3)
CHLORIDE SERPL-SCNC: 92 MMOL/L (ref 98–107)
CO2 SERPL-SCNC: 24 MMOL/L (ref 21–32)
CREAT SERPL-MCNC: 0.52 MG/DL (ref 0.5–1.3)
EGFRCR SERPLBLD CKD-EPI 2021: >90 ML/MIN/1.73M*2
EOSINOPHIL # BLD AUTO: 0.13 X10*3/UL (ref 0–0.7)
EOSINOPHIL NFR BLD AUTO: 0.8 %
ERYTHROCYTE [DISTWIDTH] IN BLOOD BY AUTOMATED COUNT: 16.9 % (ref 11.5–14.5)
GLUCOSE SERPL-MCNC: 269 MG/DL (ref 74–99)
HCT VFR BLD AUTO: 37.8 % (ref 41–52)
HGB BLD-MCNC: 12 G/DL (ref 13.5–17.5)
IMM GRANULOCYTES # BLD AUTO: 0.1 X10*3/UL (ref 0–0.7)
IMM GRANULOCYTES NFR BLD AUTO: 0.6 % (ref 0–0.9)
LYMPHOCYTES # BLD AUTO: 1.6 X10*3/UL (ref 1.2–4.8)
LYMPHOCYTES NFR BLD AUTO: 9.9 %
MCH RBC QN AUTO: 27.3 PG (ref 26–34)
MCHC RBC AUTO-ENTMCNC: 31.7 G/DL (ref 32–36)
MCV RBC AUTO: 86 FL (ref 80–100)
MONOCYTES # BLD AUTO: 1.55 X10*3/UL (ref 0.1–1)
MONOCYTES NFR BLD AUTO: 9.6 %
NEUTROPHILS # BLD AUTO: 12.63 X10*3/UL (ref 1.2–7.7)
NEUTROPHILS NFR BLD AUTO: 78.5 %
NRBC BLD-RTO: 0 /100 WBCS (ref 0–0)
PLATELET # BLD AUTO: 375 X10*3/UL (ref 150–450)
POTASSIUM SERPL-SCNC: 3.2 MMOL/L (ref 3.5–5.3)
PROT SERPL-MCNC: 6.7 G/DL (ref 6.4–8.2)
RBC # BLD AUTO: 4.39 X10*6/UL (ref 4.5–5.9)
SODIUM SERPL-SCNC: 130 MMOL/L (ref 136–145)
WBC # BLD AUTO: 16.1 X10*3/UL (ref 4.4–11.3)

## 2024-01-31 ENCOUNTER — INFUSION (OUTPATIENT)
Dept: HEMATOLOGY/ONCOLOGY | Facility: HOSPITAL | Age: 39
End: 2024-01-31
Payer: MEDICARE

## 2024-01-31 ENCOUNTER — TELEPHONE (OUTPATIENT)
Dept: HEMATOLOGY/ONCOLOGY | Facility: CLINIC | Age: 39
End: 2024-01-31

## 2024-01-31 VITALS
SYSTOLIC BLOOD PRESSURE: 130 MMHG | BODY MASS INDEX: 28.99 KG/M2 | DIASTOLIC BLOOD PRESSURE: 86 MMHG | OXYGEN SATURATION: 99 % | TEMPERATURE: 96.8 F | RESPIRATION RATE: 16 BRPM | WEIGHT: 181.88 LBS | HEART RATE: 108 BPM

## 2024-01-31 DIAGNOSIS — C18.9 MALIGNANT NEOPLASM OF COLON, UNSPECIFIED PART OF COLON (MULTI): Primary | ICD-10-CM

## 2024-01-31 DIAGNOSIS — C20 RECTAL CANCER (MULTI): ICD-10-CM

## 2024-01-31 DIAGNOSIS — C18.9 MALIGNANT NEOPLASM OF COLON, UNSPECIFIED PART OF COLON (MULTI): ICD-10-CM

## 2024-01-31 LAB
APPEARANCE UR: CLEAR
BILIRUB UR STRIP.AUTO-MCNC: NEGATIVE MG/DL
COLOR UR: YELLOW
GLUCOSE UR STRIP.AUTO-MCNC: ABNORMAL MG/DL
KETONES UR STRIP.AUTO-MCNC: NEGATIVE MG/DL
LEUKOCYTE ESTERASE UR QL STRIP.AUTO: NEGATIVE
NITRITE UR QL STRIP.AUTO: NEGATIVE
PH UR STRIP.AUTO: 6 [PH]
PROT UR STRIP.AUTO-MCNC: NEGATIVE MG/DL
RBC # UR STRIP.AUTO: NEGATIVE /UL
SP GR UR STRIP.AUTO: 1.03
UROBILINOGEN UR STRIP.AUTO-MCNC: 4 MG/DL

## 2024-01-31 PROCEDURE — 2500000004 HC RX 250 GENERAL PHARMACY W/ HCPCS (ALT 636 FOR OP/ED): Mod: SE | Performed by: STUDENT IN AN ORGANIZED HEALTH CARE EDUCATION/TRAINING PROGRAM

## 2024-01-31 PROCEDURE — 2500000004 HC RX 250 GENERAL PHARMACY W/ HCPCS (ALT 636 FOR OP/ED): Mod: JZ,SE | Performed by: STUDENT IN AN ORGANIZED HEALTH CARE EDUCATION/TRAINING PROGRAM

## 2024-01-31 PROCEDURE — 81003 URINALYSIS AUTO W/O SCOPE: CPT | Performed by: STUDENT IN AN ORGANIZED HEALTH CARE EDUCATION/TRAINING PROGRAM

## 2024-01-31 PROCEDURE — 96366 THER/PROPH/DIAG IV INF ADDON: CPT

## 2024-01-31 PROCEDURE — 96409 CHEMO IV PUSH SNGL DRUG: CPT

## 2024-01-31 PROCEDURE — 96367 TX/PROPH/DG ADDL SEQ IV INF: CPT | Mod: INF,INF2

## 2024-01-31 PROCEDURE — 96375 TX/PRO/DX INJ NEW DRUG ADDON: CPT | Mod: INF

## 2024-01-31 RX ORDER — PROCHLORPERAZINE EDISYLATE 5 MG/ML
10 INJECTION INTRAMUSCULAR; INTRAVENOUS EVERY 6 HOURS PRN
Status: DISCONTINUED | OUTPATIENT
Start: 2024-01-31 | End: 2024-01-31 | Stop reason: HOSPADM

## 2024-01-31 RX ORDER — HEPARIN SODIUM,PORCINE/PF 10 UNIT/ML
50 SYRINGE (ML) INTRAVENOUS AS NEEDED
Status: CANCELLED | OUTPATIENT
Start: 2024-01-31

## 2024-01-31 RX ORDER — HEPARIN 100 UNIT/ML
500 SYRINGE INTRAVENOUS AS NEEDED
Status: DISCONTINUED | OUTPATIENT
Start: 2024-01-31 | End: 2024-01-31 | Stop reason: HOSPADM

## 2024-01-31 RX ORDER — PROCHLORPERAZINE MALEATE 10 MG
10 TABLET ORAL EVERY 6 HOURS PRN
Status: DISCONTINUED | OUTPATIENT
Start: 2024-01-31 | End: 2024-01-31 | Stop reason: HOSPADM

## 2024-01-31 RX ORDER — HEPARIN 100 UNIT/ML
500 SYRINGE INTRAVENOUS AS NEEDED
Status: CANCELLED | OUTPATIENT
Start: 2024-01-31

## 2024-01-31 RX ORDER — EPINEPHRINE 0.3 MG/.3ML
0.3 INJECTION SUBCUTANEOUS EVERY 5 MIN PRN
Status: DISCONTINUED | OUTPATIENT
Start: 2024-01-31 | End: 2024-01-31 | Stop reason: HOSPADM

## 2024-01-31 RX ORDER — POTASSIUM CHLORIDE 14.9 MG/ML
20 INJECTION INTRAVENOUS ONCE
Status: COMPLETED | OUTPATIENT
Start: 2024-01-31 | End: 2024-01-31

## 2024-01-31 RX ORDER — POTASSIUM CHLORIDE 14.9 MG/ML
20 INJECTION INTRAVENOUS ONCE
Status: CANCELLED | OUTPATIENT
Start: 2024-01-31 | End: 2024-01-31

## 2024-01-31 RX ORDER — PALONOSETRON 0.05 MG/ML
250 INJECTION, SOLUTION INTRAVENOUS ONCE
Status: COMPLETED | OUTPATIENT
Start: 2024-01-31 | End: 2024-01-31

## 2024-01-31 RX ORDER — ALBUTEROL SULFATE 0.83 MG/ML
3 SOLUTION RESPIRATORY (INHALATION) AS NEEDED
Status: DISCONTINUED | OUTPATIENT
Start: 2024-01-31 | End: 2024-01-31 | Stop reason: HOSPADM

## 2024-01-31 RX ORDER — DIPHENHYDRAMINE HYDROCHLORIDE 50 MG/ML
50 INJECTION INTRAMUSCULAR; INTRAVENOUS AS NEEDED
Status: DISCONTINUED | OUTPATIENT
Start: 2024-01-31 | End: 2024-01-31 | Stop reason: HOSPADM

## 2024-01-31 RX ORDER — DEXAMETHASONE SODIUM PHOSPHATE 4 MG/ML
10 INJECTION, SOLUTION INTRA-ARTICULAR; INTRALESIONAL; INTRAMUSCULAR; INTRAVENOUS; SOFT TISSUE ONCE
Status: COMPLETED | OUTPATIENT
Start: 2024-01-31 | End: 2024-01-31

## 2024-01-31 RX ORDER — FAMOTIDINE 10 MG/ML
20 INJECTION INTRAVENOUS ONCE AS NEEDED
Status: DISCONTINUED | OUTPATIENT
Start: 2024-01-31 | End: 2024-01-31 | Stop reason: HOSPADM

## 2024-01-31 RX ADMIN — Medication 500 UNITS: at 12:39

## 2024-01-31 RX ADMIN — BEVACIZUMAB-AWWB 400 MG: 400 INJECTION, SOLUTION INTRAVENOUS at 12:17

## 2024-01-31 RX ADMIN — PALONOSETRON 250 MCG: 0.05 INJECTION, SOLUTION INTRAVENOUS at 11:58

## 2024-01-31 RX ADMIN — POTASSIUM CHLORIDE 20 MEQ: 14.9 INJECTION, SOLUTION INTRAVENOUS at 09:33

## 2024-01-31 RX ADMIN — DEXAMETHASONE SODIUM PHOSPHATE 10 MG: 4 INJECTION, SOLUTION INTRAMUSCULAR; INTRAVENOUS at 12:00

## 2024-01-31 ASSESSMENT — PATIENT HEALTH QUESTIONNAIRE - PHQ9
10. IF YOU CHECKED OFF ANY PROBLEMS, HOW DIFFICULT HAVE THESE PROBLEMS MADE IT FOR YOU TO DO YOUR WORK, TAKE CARE OF THINGS AT HOME, OR GET ALONG WITH OTHER PEOPLE: NOT DIFFICULT AT ALL
1. LITTLE INTEREST OR PLEASURE IN DOING THINGS: NOT AT ALL
2. FEELING DOWN, DEPRESSED OR HOPELESS: NOT AT ALL
SUM OF ALL RESPONSES TO PHQ9 QUESTIONS 1 & 2: 0

## 2024-01-31 ASSESSMENT — PAIN SCALES - GENERAL: PAINLEVEL: 8

## 2024-01-31 NOTE — SIGNIFICANT EVENT
01/31/24 1044   Prechemo Checklist   Has the patient been in the hospital, ED, or urgent care since last date of service Yes   Chemo/Immuno Consent Signed Yes   Protocol/Indications Verified Yes   Confirmed to previous date/time of medication Yes   Compared to previous dose Yes   All medications are dated accurately Yes   Pregnancy Test Negative Not applicable   Parameters Met Yes   BSA/Weight-Height Verified Yes   Dose Calculations Verified Yes

## 2024-01-31 NOTE — PROGRESS NOTES
Treatment Pre-Review    Diagnosis:  No matching staging information was found for the patient.    Regimen: MVASI Days 1 + 15; Cycle repeats every 28 days      Current Cycle/Day: Cycle 5 Day 1    Treatment Date Appropriate: Yes; Last Treatment: 12/20/24 Last cycle delayed due to abdominal pain  Date change required: none    Dose or Regimen Modifications: None noted during pre-review    Med Rec/Drug Interactions: None noted during pre-review    Growth Factor: none    Financial Clearance/Authorization: Yes    Echo:  No results found for this or any previous visit from the past 1095 days.     Lifetime Dose Tracking   No doses have been documented on this patient for the following tracked chemicals: doxorubicin, epirubicin, idarubicin, daunorubicin, mitoxantrone, bleomycin, mitomycin, carmustine, doxorubicin HCl pegylated liposomal, daunorubicin citrate liposomal     Comments: none    I Raj Ramirez, PharmD hereby acknowledge that the treatment plan indicated above has been verified for correctness to the best of my ability on 1/31/2024 at 9:38 AM.

## 2024-02-09 ENCOUNTER — SPECIALTY PHARMACY (OUTPATIENT)
Dept: PHARMACY | Facility: CLINIC | Age: 39
End: 2024-02-09

## 2024-02-11 NOTE — ED PROVIDER NOTES
HPI   Chief Complaint   Patient presents with    Chest Pain     Patient states he has been having chest pain for 3 days , states it goes up with his left side and into his left shoulder. States it hurts to breath. Also hurts to lay on his left side. Feels short of breath, denies fevers or chills       Misael Hernandez is a 38 y.o. male with history of {History:5825051819} who presents with Chest Pain (Patient states he has been having chest pain for 3 days , states it goes up with his left side and into his left shoulder. States it hurts to breath. Also hurts to lay on his left side. Feels short of breath, denies fevers or chills). Patient took {Home Remedies:9820592860} prior to arrival.     - Symptoms began {Duration:1676013400}.   - Severity: {Severity:5679768071}   - Timing: {Timin}   - Quality: {Quality:6310620026}   - Chest Pain (Patient states he has been having chest pain for 3 days , states it goes up with his left side and into his left shoulder. States it hurts to breath. Also hurts to lay on his left side. Feels short of breath, denies fevers or chills) {Desc; is/are:15394} exacerbated by {Exacerbated By:[id number]}.   - Chest Pain (Patient states he has been having chest pain for 3 days , states it goes up with his left side and into his left shoulder. States it hurts to breath. Also hurts to lay on his left side. Feels short of breath, denies fevers or chills) {Desc; is/are:50784} not exacerbated by {Exacerbated By:[id number]}.   - Symptoms are associated with {Associated S/Sx:8535370743}.   - Symptoms are not associated with {Associated S/Sx:1256984811}.   - Improved by {Improved By:[id number]}.   - Not improved by {Improved By:[id number]}.    Misael Hernandez is a 38 y.o. male with history of {History:5496504611} who presents with {Location:6232010000} chest pain.     - Symptoms began {Duration:5770992545}. Onset was {Onset Quality:421183} and symptoms are {Progression:5892464383}.   - Severity:  {Severity:1263144415}   - Timing: {Timin}   - Quality: {Pain Quality:3574624780}   - Pain is exacerbated by {Exacerbated By:[id number]}.   - Pain is not exacerbated by {Exacerbated By:3712276771}.   - Symptoms are associated with {Associated S/Sx:6924018552}.   - Symptoms are not associated with {Associated S/Sx:7610164724}.   - Improved by {Improved By:[id number]}.   - Not improved by {Improved By:[id number]}.    Misael Hernandez is a 38 y.o. male with history of {History:3724151928} who presents with Chest Pain (Patient states he has been having chest pain for 3 days , states it goes up with his left side and into his left shoulder. States it hurts to breath. Also hurts to lay on his left side. Feels short of breath, denies fevers or chills). Patient took {Home Remedies:5450448660} prior to arrival.     - Symptoms began {Duration:8893875652}.   - Severity: {Severity:8882856867}   - Timing: {Timin}   - Quality: {Quality:4879263585}   - Chest Pain (Patient states he has been having chest pain for 3 days , states it goes up with his left side and into his left shoulder. States it hurts to breath. Also hurts to lay on his left side. Feels short of breath, denies fevers or chills) {Desc; is/are:30874} exacerbated by {Exacerbated By:[id number]}.   - Chest Pain (Patient states he has been having chest pain for 3 days , states it goes up with his left side and into his left shoulder. States it hurts to breath. Also hurts to lay on his left side. Feels short of breath, denies fevers or chills) {Desc; is/are:24064} not exacerbated by {Exacerbated By:[id number]}.   - Symptoms are associated with {Associated S/Sx:1221634667}.   - Symptoms are not associated with {Associated S/Sx:8242080057}.   - Improved by {Improved By:[id number]}.   - Not improved by {Improved By:[id number]}.    Misael Hernandez is a 38 y.o. male with history of {History:8321955506} who presents with {Location:9863775700} chest pain.     -  Symptoms began {Duration:2339732446}. Onset was {Onset Quality:168131} and symptoms are {Progression:7254502204}.   - Severity: {Severity:5680392669}   - Timing: {Timin}   - Quality: {Pain Quality:7720728780}   - Pain is exacerbated by {Exacerbated By:1279220440}.   - Pain is not exacerbated by {Exacerbated By:9353600570}.   - Symptoms are associated with {Associated S/Sx:3022971672}.   - Symptoms are not associated with {Associated S/Sx:4073454240}.   - Improved by {Improved By:[id number]}.   - Not improved by {Improved By:[id number]}.                          No data recorded                   Patient History   Past Medical History:   Diagnosis Date    Other conditions influencing health status     Rectal Cancer     Past Surgical History:   Procedure Laterality Date    COLON SURGERY  2014    Colon Surgery    CT GUIDED IMAGING FOR NEEDLE PLACEMENT  2018    CT GUIDED IMAGING FOR NEEDLE PLACEMENT [facility] INPATIENT LEGACY    CT HEAD ANGIO W AND WO IV CONTRAST  9/10/2014    CT HEAD ANGIO W AND WO IV CONTRAST 9/10/2014 [facility] INPATIENT LEGACY    ESOPHAGOGASTRODUODENOSCOPY  2014    Diagnostic Esophagogastroduodenoscopy    HAND SURGERY  2014    Hand Surgery                                                                                                                                                          MR HEAD ANGIO W IV CONTRAST  9/10/2014    MR HEAD ANGIO W IV CONTRAST 9/10/2014 [facility] INPATIENT LEGACY    OTHER SURGICAL HISTORY  2014    Laparoscopy Total Colectomy W/ Proctectomy, Ileostomy     Family History   Problem Relation Name Age of Onset    Other (ADENOCARCINOMA OF THE ESOPHAGUS) Father      Diabetes Father's Brother      Diabetes Maternal Grandfather      Cancer Maternal Grandfather      Cancer Other UNCLE      Social History     Tobacco Use    Smoking status: Every Day     Packs/day: 1     Types: Cigarettes     Passive exposure: Never    Smokeless tobacco: Never   Vaping Use     Vaping Use: Never used   Substance Use Topics    Alcohol use: Not Currently    Drug use: Yes     Types: Marijuana       Physical Exam   ED Triage Vitals [01/08/24 1512]   Temperature Heart Rate Respirations BP   36.7 °C (98 °F) (!) 118 18 (!) 136/96      Pulse Ox Temp Source Heart Rate Source Patient Position   96 % Oral Monitor Lying      BP Location FiO2 (%)     Right arm --       Physical Exam    ED Course & [facility]   ED Course as of 02/11/24 1646   Mon Jan 08, 2024   1526 An EKG at 1514 interpreted by me at 1520.  Sinus tachycardia rate 117 bpm.  Axis normal.   ms QRS 80 ms  ms.  Nonspecific ST-T change.  Left posterior fascicular block.  No acute ST segment elevation.  No STEMI. [EC]   1725 CBC with Differential(!)  White blood cell count 14.6, hemoglobin 12.2, hematocrit 37.9, platelet count 4 and 79,000, 10.5 neutrophils [EC]   1726 Comprehensive Metabolic Panel(!)  Glucose 219, sodium 136, potassium 3.4, chloride 98, bicarb 27, anion gap 14  BUN 6, creatinine 0.50, GFR greater than 90  AST, ALT, total bilirubin are normal. [EC]   1726 Sars-CoV-2 and Influenza A/B PCR  Influenza A/B is not detected/not detected,  Coronavirus is not detected [EC]   1726 Troponin I, High Sensitivity  First troponin is normal at 6, [EC]   1727 Protime-INR(!)  INR is 2.1 [EC]   1727 D-dimer, quantitative(!)  Quantitative D-dimer is elevated 1910  Patient has history of DVT/PE and has sharp pleuritic pain.  I have ordered a CTA of her chest to rule out PE [EC]   1727 Lipase(!)  Lipase 104 [EC]   1727 Chest x-ray shows no infiltrate effusion pneumothorax no acute cardiopulmonary process. [EC]   1851 CT chest abdomen pelvis  IMPRESSION:  1. Stable pulmonary emboli in the segmental and subsegmental arteries  to the right upper lobe. No new pulmonary embolism.  2. No right heart strain.  3. Slight interval progression in pulmonary metastases and right  hilar adenopathy.  4. Stable mediastinal adenopathy.  5. Interval  development of free fluid collections in the left  anterior abdomen and greater curvature of the stomach, likely  abscesses or infected pancreatic pseudocyst.  6. Questionable acute pancreatitis. Correlation with serum amylase  and lipase levels is recommended.  7. Stable bilateral adrenal masses.  8. A small fluid collection anterior to the distal sacrum, unchanged.  This is likely an abscess.   [EC]   1853 Ct abdomen continued:  Abdomen and pelvis:  Unremarkable liver, spleen, and bilateral kidneys. Bilateral adrenal  masses, unchanged. Gallbladder not seen. Stable subcentimeter  hypodensity in the pancreatic tail. Minimal fat stranding around the  pancreas with trace fluid in the left anterior pararenal space,  suspicious for acute pancreatitis. Fat containing ventral abdominal  wall hernias. Normal appendix. Bowel loops nonobstructed. No free air  or free fluid. Interval development of a fluid collection in the left  anterolateral abdomen measuring 3.7 x 3.0 cm. 2 fluid collections  associated with the greater curvature of the stomach measuring up to  4.7 and 2.3 cm. These are probably abscesses or infected pancreatic  pseudocysts. Vascular calcification. No abdominal aortic aneurysm.  Sections through the pelvis demonstrate an incompletely distended  urinary bladder. Prostate normal in size. Stable fluid collection  anterior to distal sacrum measuring 2.5 x 1.0 cm on image number 123.  No lytic or sclerotic lesion in the regional skeleton. Bilateral pars  defects of L5. No spondylolisthesis.   [EC]      ED Course User Index  [EC] John Roberts DO         Diagnoses as of 02/11/24 1646   Chest pain, unspecified type   Left upper quadrant abdominal pain   Intra-abdominal abscess (CMS/HCC)   History of pancreatitis       Medical Decision Making      Procedure  Procedures

## 2024-02-11 NOTE — PROGRESS NOTES
Emergency Medicine Transition of Care Note.    I received Misael Hernandez in signout from  Ramon Roberts. Please see the previous ED provider note for all HPI, PE and MDM up to the time of signout at 1900. This is in addition to the primary record.    In brief Misael Hernandez is an 38 y.o. male presenting for   Chief Complaint   Patient presents with    Chest Pain     Patient states he has been having chest pain for 3 days , states it goes up with his left side and into his left shoulder. States it hurts to breath. Also hurts to lay on his left side. Feels short of breath, denies fevers or chills     At the time of signout we were awaiting: Transport.    ED Course as of 02/11/24 1649   Mon Jan 08, 2024   1526 An EKG at 1514 interpreted by me at 1520.  Sinus tachycardia rate 117 bpm.  Axis normal.   ms QRS 80 ms  ms.  Nonspecific ST-T change.  Left posterior fascicular block.  No acute ST segment elevation.  No STEMI. [EC]   1725 CBC with Differential(!)  White blood cell count 14.6, hemoglobin 12.2, hematocrit 37.9, platelet count 4 and 79,000, 10.5 neutrophils [EC]   1726 Comprehensive Metabolic Panel(!)  Glucose 219, sodium 136, potassium 3.4, chloride 98, bicarb 27, anion gap 14  BUN 6, creatinine 0.50, GFR greater than 90  AST, ALT, total bilirubin are normal. [EC]   1726 Sars-CoV-2 and Influenza A/B PCR  Influenza A/B is not detected/not detected,  Coronavirus is not detected [EC]   1726 Troponin I, High Sensitivity  First troponin is normal at 6, [EC]   1727 Protime-INR(!)  INR is 2.1 [EC]   1727 D-dimer, quantitative(!)  Quantitative D-dimer is elevated 1910  Patient has history of DVT/PE and has sharp pleuritic pain.  I have ordered a CTA of her chest to rule out PE [EC]   1727 Lipase(!)  Lipase 104 [EC]   1727 Chest x-ray shows no infiltrate effusion pneumothorax no acute cardiopulmonary process. [EC]   1851 CT chest abdomen pelvis  IMPRESSION:  1. Stable pulmonary emboli in the segmental and  subsegmental arteries  to the right upper lobe. No new pulmonary embolism.  2. No right heart strain.  3. Slight interval progression in pulmonary metastases and right  hilar adenopathy.  4. Stable mediastinal adenopathy.  5. Interval development of free fluid collections in the left  anterior abdomen and greater curvature of the stomach, likely  abscesses or infected pancreatic pseudocyst.  6. Questionable acute pancreatitis. Correlation with serum amylase  and lipase levels is recommended.  7. Stable bilateral adrenal masses.  8. A small fluid collection anterior to the distal sacrum, unchanged.  This is likely an abscess.   [EC]   1853 Ct abdomen continued:  Abdomen and pelvis:  Unremarkable liver, spleen, and bilateral kidneys. Bilateral adrenal  masses, unchanged. Gallbladder not seen. Stable subcentimeter  hypodensity in the pancreatic tail. Minimal fat stranding around the  pancreas with trace fluid in the left anterior pararenal space,  suspicious for acute pancreatitis. Fat containing ventral abdominal  wall hernias. Normal appendix. Bowel loops nonobstructed. No free air  or free fluid. Interval development of a fluid collection in the left  anterolateral abdomen measuring 3.7 x 3.0 cm. 2 fluid collections  associated with the greater curvature of the stomach measuring up to  4.7 and 2.3 cm. These are probably abscesses or infected pancreatic  pseudocysts. Vascular calcification. No abdominal aortic aneurysm.  Sections through the pelvis demonstrate an incompletely distended  urinary bladder. Prostate normal in size. Stable fluid collection  anterior to distal sacrum measuring 2.5 x 1.0 cm on image number 123.  No lytic or sclerotic lesion in the regional skeleton. Bilateral pars  defects of L5. No spondylolisthesis.   [EC]      ED Course User Index  [EC] John Roberts DO         Diagnoses as of 02/11/24 1649   Chest pain, unspecified type   Left upper quadrant abdominal pain   Intra-abdominal abscess  (CMS/HCC)   History of pancreatitis       Medical Decision Making  Transported to [facility] without incident.    Final diagnoses:   [R07.9] Chest pain, unspecified type   [R10.12] Left upper quadrant abdominal pain   [K65.1] Intra-abdominal abscess (CMS/HCC)   [Z87.19] History of pancreatitis           Procedure  Procedures    David Blanc MD

## 2024-02-11 NOTE — ED PROVIDER NOTES
HPI   Chief Complaint   Patient presents with    Chest Pain     Patient states he has been having chest pain for 3 days , states it goes up with his left side and into his left shoulder. States it hurts to breath. Also hurts to lay on his left side. Feels short of breath, denies fevers or chills       HPI                    No data recorded                   Patient History   Past Medical History:   Diagnosis Date    Other conditions influencing health status     Rectal Cancer     Past Surgical History:   Procedure Laterality Date    COLON SURGERY  01/22/2014    Colon Surgery    CT GUIDED IMAGING FOR NEEDLE PLACEMENT  6/27/2018    CT GUIDED IMAGING FOR NEEDLE PLACEMENT [facility] INPATIENT LEGACY    CT HEAD ANGIO W AND WO IV CONTRAST  9/10/2014    CT HEAD ANGIO W AND WO IV CONTRAST 9/10/2014 [facility] INPATIENT LEGACY    ESOPHAGOGASTRODUODENOSCOPY  01/22/2014    Diagnostic Esophagogastroduodenoscopy    HAND SURGERY  01/22/2014    Hand Surgery                                                                                                                                                          MR HEAD ANGIO W IV CONTRAST  9/10/2014    MR HEAD ANGIO W IV CONTRAST 9/10/2014 [facility] INPATIENT LEGACY    OTHER SURGICAL HISTORY  07/07/2014    Laparoscopy Total Colectomy W/ Proctectomy, Ileostomy     Family History   Problem Relation Name Age of Onset    Other (ADENOCARCINOMA OF THE ESOPHAGUS) Father      Diabetes Father's Brother      Diabetes Maternal Grandfather      Cancer Maternal Grandfather      Cancer Other UNCLE      Social History     Tobacco Use    Smoking status: Every Day     Packs/day: 1     Types: Cigarettes     Passive exposure: Never    Smokeless tobacco: Never   Vaping Use    Vaping Use: Never used   Substance Use Topics    Alcohol use: Not Currently    Drug use: Yes     Types: Marijuana       Physical Exam   ED Triage Vitals [01/08/24 1512]   Temperature Heart Rate Respirations BP   36.7 °C (98 °F) (!) 118 18 (!) 136/96       Pulse Ox Temp Source Heart Rate Source Patient Position   96 % Oral Monitor Lying      BP Location FiO2 (%)     Right arm --       Physical Exam    ED Course & [facility]   ED Course as of 02/13/24 0446   Mon Jan 08, 2024   1526 An EKG at 1514 interpreted by me at 1520.  Sinus tachycardia rate 117 bpm.  Axis normal.   ms QRS 80 ms  ms.  Nonspecific ST-T change.  Left posterior fascicular block.  No acute ST segment elevation.  No STEMI. [EC]   1725 CBC with Differential(!)  White blood cell count 14.6, hemoglobin 12.2, hematocrit 37.9, platelet count 4 and 79,000, 10.5 neutrophils [EC]   1726 Comprehensive Metabolic Panel(!)  Glucose 219, sodium 136, potassium 3.4, chloride 98, bicarb 27, anion gap 14  BUN 6, creatinine 0.50, GFR greater than 90  AST, ALT, total bilirubin are normal. [EC]   1726 Sars-CoV-2 and Influenza A/B PCR  Influenza A/B is not detected/not detected,  Coronavirus is not detected [EC]   1726 Troponin I, High Sensitivity  First troponin is normal at 6, [EC]   1727 Protime-INR(!)  INR is 2.1 [EC]   1727 D-dimer, quantitative(!)  Quantitative D-dimer is elevated 1910  Patient has history of DVT/PE and has sharp pleuritic pain.  I have ordered a CTA of her chest to rule out PE [EC]   1727 Lipase(!)  Lipase 104 [EC]   1727 Chest x-ray shows no infiltrate effusion pneumothorax no acute cardiopulmonary process. [EC]   1851 CT chest abdomen pelvis  IMPRESSION:  1. Stable pulmonary emboli in the segmental and subsegmental arteries  to the right upper lobe. No new pulmonary embolism.  2. No right heart strain.  3. Slight interval progression in pulmonary metastases and right  hilar adenopathy.  4. Stable mediastinal adenopathy.  5. Interval development of free fluid collections in the left  anterior abdomen and greater curvature of the stomach, likely  abscesses or infected pancreatic pseudocyst.  6. Questionable acute pancreatitis. Correlation with serum amylase  and lipase levels is  recommended.  7. Stable bilateral adrenal masses.  8. A small fluid collection anterior to the distal sacrum, unchanged.  This is likely an abscess.   [EC]   1853 Ct abdomen continued:  Abdomen and pelvis:  Unremarkable liver, spleen, and bilateral kidneys. Bilateral adrenal  masses, unchanged. Gallbladder not seen. Stable subcentimeter  hypodensity in the pancreatic tail. Minimal fat stranding around the  pancreas with trace fluid in the left anterior pararenal space,  suspicious for acute pancreatitis. Fat containing ventral abdominal  wall hernias. Normal appendix. Bowel loops nonobstructed. No free air  or free fluid. Interval development of a fluid collection in the left  anterolateral abdomen measuring 3.7 x 3.0 cm. 2 fluid collections  associated with the greater curvature of the stomach measuring up to  4.7 and 2.3 cm. These are probably abscesses or infected pancreatic  pseudocysts. Vascular calcification. No abdominal aortic aneurysm.  Sections through the pelvis demonstrate an incompletely distended  urinary bladder. Prostate normal in size. Stable fluid collection  anterior to distal sacrum measuring 2.5 x 1.0 cm on image number 123.  No lytic or sclerotic lesion in the regional skeleton. Bilateral pars  defects of L5. No spondylolisthesis.   [EC]      ED Course User Index  [EC] John Roberts DO         Diagnoses as of 02/13/24 0446   Chest pain, unspecified type   Left upper quadrant abdominal pain   Intra-abdominal abscess (CMS/HCC)   History of pancreatitis       Medical Decision Making      Procedure  Procedures     David Blnac MD  02/13/24 0446

## 2024-02-12 DIAGNOSIS — Z85.038 HISTORY OF MALIGNANT NEOPLASM OF COLON: Primary | ICD-10-CM

## 2024-02-12 DIAGNOSIS — C18.9 MALIGNANT NEOPLASM OF COLON, UNSPECIFIED PART OF COLON (MULTI): ICD-10-CM

## 2024-02-13 ENCOUNTER — SPECIALTY PHARMACY (OUTPATIENT)
Dept: PHARMACY | Facility: CLINIC | Age: 39
End: 2024-02-13

## 2024-02-13 PROCEDURE — RXMED WILLOW AMBULATORY MEDICATION CHARGE

## 2024-02-13 NOTE — PROGRESS NOTES
Emergency Medicine Transition of Care Note.    I received Misael Hernandez in signout from     Dr. Flemingase see the previous ED provider note for all HPI, PE and MDM up to the time of signout at 1900. This is in addition to the primary record.    In brief Misael Hernandez is an 38 y.o. male presenting for   Chief Complaint   Patient presents with    Chest Pain     Patient states he has been having chest pain for 3 days , states it goes up with his left side and into his left shoulder. States it hurts to breath. Also hurts to lay on his left side. Feels short of breath, denies fevers or chills     At the time of signout we were awaiting: Transfer    ED Course as of 02/13/24 0446   Mon Jan 08, 2024   1526 An EKG at 1514 interpreted by me at 1520.  Sinus tachycardia rate 117 bpm.  Axis normal.   ms QRS 80 ms  ms.  Nonspecific ST-T change.  Left posterior fascicular block.  No acute ST segment elevation.  No STEMI. [EC]   1725 CBC with Differential(!)  White blood cell count 14.6, hemoglobin 12.2, hematocrit 37.9, platelet count 4 and 79,000, 10.5 neutrophils [EC]   1726 Comprehensive Metabolic Panel(!)  Glucose 219, sodium 136, potassium 3.4, chloride 98, bicarb 27, anion gap 14  BUN 6, creatinine 0.50, GFR greater than 90  AST, ALT, total bilirubin are normal. [EC]   1726 Sars-CoV-2 and Influenza A/B PCR  Influenza A/B is not detected/not detected,  Coronavirus is not detected [EC]   1726 Troponin I, High Sensitivity  First troponin is normal at 6, [EC]   1727 Protime-INR(!)  INR is 2.1 [EC]   1727 D-dimer, quantitative(!)  Quantitative D-dimer is elevated 1910  Patient has history of DVT/PE and has sharp pleuritic pain.  I have ordered a CTA of her chest to rule out PE [EC]   1727 Lipase(!)  Lipase 104 [EC]   1727 Chest x-ray shows no infiltrate effusion pneumothorax no acute cardiopulmonary process. [EC]   1851 CT chest abdomen pelvis  IMPRESSION:  1. Stable pulmonary emboli in the segmental and  subsegmental arteries  to the right upper lobe. No new pulmonary embolism.  2. No right heart strain.  3. Slight interval progression in pulmonary metastases and right  hilar adenopathy.  4. Stable mediastinal adenopathy.  5. Interval development of free fluid collections in the left  anterior abdomen and greater curvature of the stomach, likely  abscesses or infected pancreatic pseudocyst.  6. Questionable acute pancreatitis. Correlation with serum amylase  and lipase levels is recommended.  7. Stable bilateral adrenal masses.  8. A small fluid collection anterior to the distal sacrum, unchanged.  This is likely an abscess.   [EC]   1853 Ct abdomen continued:  Abdomen and pelvis:  Unremarkable liver, spleen, and bilateral kidneys. Bilateral adrenal  masses, unchanged. Gallbladder not seen. Stable subcentimeter  hypodensity in the pancreatic tail. Minimal fat stranding around the  pancreas with trace fluid in the left anterior pararenal space,  suspicious for acute pancreatitis. Fat containing ventral abdominal  wall hernias. Normal appendix. Bowel loops nonobstructed. No free air  or free fluid. Interval development of a fluid collection in the left  anterolateral abdomen measuring 3.7 x 3.0 cm. 2 fluid collections  associated with the greater curvature of the stomach measuring up to  4.7 and 2.3 cm. These are probably abscesses or infected pancreatic  pseudocysts. Vascular calcification. No abdominal aortic aneurysm.  Sections through the pelvis demonstrate an incompletely distended  urinary bladder. Prostate normal in size. Stable fluid collection  anterior to distal sacrum measuring 2.5 x 1.0 cm on image number 123.  No lytic or sclerotic lesion in the regional skeleton. Bilateral pars  defects of L5. No spondylolisthesis.   [EC]      ED Course User Index  [EC] John Roberts DO         Diagnoses as of 02/13/24 0446   Chest pain, unspecified type   Left upper quadrant abdominal pain   Intra-abdominal abscess  (CMS/HCC)   History of pancreatitis       Medical Decision Making  I received the patient handoff from Dr. May.  Patient is in stable condition though requiring analgesics and antiemetics.  He is felt to have a pancreatic abscess.  We are waiting on transfer to [facility] for IR to drain the abscess.  Patient has remained stable during his time here in the ED.    Final diagnoses:   [R07.9] Chest pain, unspecified type   [R10.12] Left upper quadrant abdominal pain   [K65.1] Intra-abdominal abscess (CMS/HCC)   [Z87.19] History of pancreatitis           Procedure  Procedures    David Blanc MD

## 2024-02-13 NOTE — PROGRESS NOTES
Treatment Pre-Review    Diagnosis:  No matching staging information was found for the patient.    Regimen: MVASI Days 1 + 15; Cycle repeats every 28 days     Current Cycle/Day: Cycle 5 Day 15   Treatment Date Appropriate: Yes; Last Treatment: 1/31/24 (Day 1 of cycle 5)  Date change required: none    Dose or Regimen Modifications: None noted during pre-review    Med Rec/Drug Interactions: None noted during pre-review    Growth Factor: none    Financial Clearance/Authorization: Yes    Echo:  No results found for this or any previous visit from the past 1095 days.     Lifetime Dose Tracking   No doses have been documented on this patient for the following tracked chemicals: doxorubicin, epirubicin, idarubicin, daunorubicin, mitoxantrone, bleomycin, mitomycin, carmustine, doxorubicin HCl pegylated liposomal, daunorubicin citrate liposomal     Comments: none    I Raj Ramirez, PharmD hereby acknowledge that the treatment plan indicated above has been verified for correctness to the best of my ability on 2/13/2024 at 3:29 PM.

## 2024-02-14 ENCOUNTER — APPOINTMENT (OUTPATIENT)
Dept: HEMATOLOGY/ONCOLOGY | Facility: HOSPITAL | Age: 39
End: 2024-02-14
Payer: MEDICARE

## 2024-02-14 ENCOUNTER — PHARMACY VISIT (OUTPATIENT)
Dept: PHARMACY | Facility: CLINIC | Age: 39
End: 2024-02-14
Payer: MEDICARE

## 2024-02-14 ENCOUNTER — TELEMEDICINE (OUTPATIENT)
Dept: SURGERY | Facility: CLINIC | Age: 39
End: 2024-02-14
Payer: MEDICARE

## 2024-02-14 ENCOUNTER — TELEPHONE (OUTPATIENT)
Dept: HEMATOLOGY/ONCOLOGY | Facility: HOSPITAL | Age: 39
End: 2024-02-14

## 2024-02-14 DIAGNOSIS — K86.89 FLUID COLLECTION OF PANCREAS (HHS-HCC): Primary | ICD-10-CM

## 2024-02-14 DIAGNOSIS — C20 METASTASIS FROM RECTAL CANCER (MULTI): ICD-10-CM

## 2024-02-14 DIAGNOSIS — C79.9 METASTASIS FROM RECTAL CANCER (MULTI): ICD-10-CM

## 2024-02-14 PROCEDURE — 99441 PR PHYS/QHP TELEPHONE EVALUATION 5-10 MIN: CPT | Performed by: SURGERY

## 2024-02-14 NOTE — PATIENT INSTRUCTIONS
You are doing well after the drainage of the fluid around the pancreas.  There is no infection.  There appear to be no obvious cancer cells.  At this time we will follow conservatively.  You are already on chemotherapy.  I want you to continue with a high-protein shakes we will see as needed.  If you have any recurrence in your pain or symptoms please come to the emergency room.

## 2024-02-14 NOTE — PROGRESS NOTES
Misael Hernandez        An interactive audio and video telecommunication system which permits real time communications between the patient (at the originating site) and provider (at the distant site) was utilized to provide this telehealth service.   Verbal consent was requested and obtained from Misael Hernandez on 2.14.24 for a telehealth visit.      The patient is following up after being admitted to [facility] ER  on January 11 with acute pancreatitis.  He was noted to have fluid collections.  The patient underwent CT-guided percutaneous drainage on January 12, 2024.  Cultures were negative.  Cytology was not able to be performed.  He has done well since that time.  He is tolerating his diet.  He is having no abdominal complaints.  He is back on his oral chemotherapy      Problem List Items Addressed This Visit       Metastasis from rectal cancer (CMS/HCC)    Fluid collection of pancreas - Primary      Plan-continue chemotherapy.  No indication at this time to repeat CT scan.  Will follow him as needed.    Time equals 8 minutes

## 2024-02-14 NOTE — TELEPHONE ENCOUNTER
Patient was due in today for his cycle 5 day15 of avastin and did not show. When I called he stated he did not get his lonsurf so thought he wasn't supposed to come in; it's arriving tomorrow. I asked if he could come in tomorrow and he said no only on Wednesdays. I have sent a message to Dr Lui to be advised on if treatment can just be pushed to next Wednesday.  Pt is willing to come in next week at 9 if that is what Dr Lui orders.

## 2024-02-20 ENCOUNTER — LAB (OUTPATIENT)
Dept: LAB | Facility: LAB | Age: 39
End: 2024-02-20
Payer: MEDICARE

## 2024-02-20 DIAGNOSIS — C20 METASTASIS FROM RECTAL CANCER (MULTI): ICD-10-CM

## 2024-02-20 DIAGNOSIS — C79.9 METASTASIS FROM RECTAL CANCER (MULTI): ICD-10-CM

## 2024-02-20 DIAGNOSIS — C78.00 COLON CANCER METASTASIZED TO LUNG (MULTI): Primary | ICD-10-CM

## 2024-02-20 DIAGNOSIS — C18.9 COLON CANCER METASTASIZED TO LUNG (MULTI): Primary | ICD-10-CM

## 2024-02-20 DIAGNOSIS — C18.9 COLON CANCER METASTASIZED TO LUNG (MULTI): ICD-10-CM

## 2024-02-20 DIAGNOSIS — C78.00 COLON CANCER METASTASIZED TO LUNG (MULTI): ICD-10-CM

## 2024-02-20 LAB
BASOPHILS # BLD AUTO: 0.07 X10*3/UL (ref 0–0.1)
BASOPHILS NFR BLD AUTO: 0.6 %
EOSINOPHIL # BLD AUTO: 0.13 X10*3/UL (ref 0–0.7)
EOSINOPHIL NFR BLD AUTO: 1.2 %
ERYTHROCYTE [DISTWIDTH] IN BLOOD BY AUTOMATED COUNT: 17.3 % (ref 11.5–14.5)
HCT VFR BLD AUTO: 35.8 % (ref 41–52)
HGB BLD-MCNC: 11.4 G/DL (ref 13.5–17.5)
IMM GRANULOCYTES # BLD AUTO: 0.13 X10*3/UL (ref 0–0.7)
IMM GRANULOCYTES NFR BLD AUTO: 1.2 % (ref 0–0.9)
LYMPHOCYTES # BLD AUTO: 1.98 X10*3/UL (ref 1.2–4.8)
LYMPHOCYTES NFR BLD AUTO: 17.5 %
MCH RBC QN AUTO: 26.7 PG (ref 26–34)
MCHC RBC AUTO-ENTMCNC: 31.8 G/DL (ref 32–36)
MCV RBC AUTO: 84 FL (ref 80–100)
MONOCYTES # BLD AUTO: 1.15 X10*3/UL (ref 0.1–1)
MONOCYTES NFR BLD AUTO: 10.2 %
NEUTROPHILS # BLD AUTO: 7.84 X10*3/UL (ref 1.2–7.7)
NEUTROPHILS NFR BLD AUTO: 69.3 %
NRBC BLD-RTO: 0 /100 WBCS (ref 0–0)
PLATELET # BLD AUTO: 604 X10*3/UL (ref 150–450)
RBC # BLD AUTO: 4.27 X10*6/UL (ref 4.5–5.9)
WBC # BLD AUTO: 11.3 X10*3/UL (ref 4.4–11.3)

## 2024-02-21 ENCOUNTER — INFUSION (OUTPATIENT)
Dept: HEMATOLOGY/ONCOLOGY | Facility: HOSPITAL | Age: 39
End: 2024-02-21
Payer: MEDICAID

## 2024-02-21 VITALS
WEIGHT: 175.71 LBS | HEART RATE: 100 BPM | RESPIRATION RATE: 18 BRPM | OXYGEN SATURATION: 97 % | SYSTOLIC BLOOD PRESSURE: 124 MMHG | TEMPERATURE: 96.8 F | DIASTOLIC BLOOD PRESSURE: 82 MMHG | BODY MASS INDEX: 28 KG/M2

## 2024-02-21 DIAGNOSIS — C20 RECTAL CANCER (MULTI): ICD-10-CM

## 2024-02-21 DIAGNOSIS — C20 METASTASIS FROM RECTAL CANCER (MULTI): ICD-10-CM

## 2024-02-21 DIAGNOSIS — C79.9 METASTASIS FROM RECTAL CANCER (MULTI): ICD-10-CM

## 2024-02-21 DIAGNOSIS — E53.8 B12 DEFICIENCY: ICD-10-CM

## 2024-02-21 LAB
ALBUMIN SERPL BCP-MCNC: 3 G/DL (ref 3.4–5)
ALP SERPL-CCNC: 136 U/L (ref 33–120)
ALT SERPL W P-5'-P-CCNC: 8 U/L (ref 10–52)
ANION GAP SERPL CALC-SCNC: 12 MMOL/L (ref 10–20)
AST SERPL W P-5'-P-CCNC: 8 U/L (ref 9–39)
BILIRUB SERPL-MCNC: 0.3 MG/DL (ref 0–1.2)
BUN SERPL-MCNC: 6 MG/DL (ref 6–23)
CALCIUM SERPL-MCNC: 8.8 MG/DL (ref 8.6–10.3)
CHLORIDE SERPL-SCNC: 93 MMOL/L (ref 98–107)
CO2 SERPL-SCNC: 30 MMOL/L (ref 21–32)
CREAT SERPL-MCNC: 0.48 MG/DL (ref 0.5–1.3)
EGFRCR SERPLBLD CKD-EPI 2021: >90 ML/MIN/1.73M*2
EST. AVERAGE GLUCOSE BLD GHB EST-MCNC: 203 MG/DL
GLUCOSE SERPL-MCNC: 196 MG/DL (ref 74–99)
HBA1C MFR BLD: 8.7 %
POTASSIUM SERPL-SCNC: 4.5 MMOL/L (ref 3.5–5.3)
PROT SERPL-MCNC: 7.2 G/DL (ref 6.4–8.2)
SODIUM SERPL-SCNC: 130 MMOL/L (ref 136–145)

## 2024-02-21 PROCEDURE — 2500000004 HC RX 250 GENERAL PHARMACY W/ HCPCS (ALT 636 FOR OP/ED): Mod: SE | Performed by: STUDENT IN AN ORGANIZED HEALTH CARE EDUCATION/TRAINING PROGRAM

## 2024-02-21 PROCEDURE — 96374 THER/PROPH/DIAG INJ IV PUSH: CPT

## 2024-02-21 PROCEDURE — 96375 TX/PRO/DX INJ NEW DRUG ADDON: CPT | Mod: INF

## 2024-02-21 PROCEDURE — 84075 ASSAY ALKALINE PHOSPHATASE: CPT

## 2024-02-21 PROCEDURE — 96360 HYDRATION IV INFUSION INIT: CPT | Mod: INF,INF2

## 2024-02-21 PROCEDURE — 83036 HEMOGLOBIN GLYCOSYLATED A1C: CPT | Mod: GENLAB

## 2024-02-21 PROCEDURE — 96361 HYDRATE IV INFUSION ADD-ON: CPT

## 2024-02-21 PROCEDURE — 96409 CHEMO IV PUSH SNGL DRUG: CPT

## 2024-02-21 RX ORDER — PALONOSETRON 0.05 MG/ML
250 INJECTION, SOLUTION INTRAVENOUS ONCE
Status: CANCELLED | OUTPATIENT
Start: 2024-02-21 | End: 2024-02-21

## 2024-02-21 RX ORDER — HEPARIN SODIUM,PORCINE/PF 10 UNIT/ML
50 SYRINGE (ML) INTRAVENOUS AS NEEDED
Status: CANCELLED | OUTPATIENT
Start: 2024-02-21

## 2024-02-21 RX ORDER — DEXAMETHASONE SODIUM PHOSPHATE 4 MG/ML
8 INJECTION, SOLUTION INTRA-ARTICULAR; INTRALESIONAL; INTRAMUSCULAR; INTRAVENOUS; SOFT TISSUE ONCE
Status: CANCELLED | OUTPATIENT
Start: 2024-02-21

## 2024-02-21 RX ORDER — PALONOSETRON 0.05 MG/ML
250 INJECTION, SOLUTION INTRAVENOUS ONCE
Status: COMPLETED | OUTPATIENT
Start: 2024-02-21 | End: 2024-02-21

## 2024-02-21 RX ORDER — HEPARIN 100 UNIT/ML
500 SYRINGE INTRAVENOUS AS NEEDED
Status: CANCELLED | OUTPATIENT
Start: 2024-02-21

## 2024-02-21 RX ORDER — HEPARIN 100 UNIT/ML
500 SYRINGE INTRAVENOUS AS NEEDED
Status: DISCONTINUED | OUTPATIENT
Start: 2024-02-21 | End: 2024-02-21 | Stop reason: HOSPADM

## 2024-02-21 RX ORDER — ONDANSETRON HYDROCHLORIDE 2 MG/ML
8 INJECTION, SOLUTION INTRAVENOUS ONCE
Status: CANCELLED | OUTPATIENT
Start: 2024-02-21

## 2024-02-21 RX ORDER — DEXAMETHASONE SODIUM PHOSPHATE 4 MG/ML
8 INJECTION, SOLUTION INTRA-ARTICULAR; INTRALESIONAL; INTRAMUSCULAR; INTRAVENOUS; SOFT TISSUE ONCE
Status: COMPLETED | OUTPATIENT
Start: 2024-02-21 | End: 2024-02-21

## 2024-02-21 RX ADMIN — DEXAMETHASONE SODIUM PHOSPHATE 8 MG: 4 INJECTION, SOLUTION INTRAMUSCULAR; INTRAVENOUS at 11:47

## 2024-02-21 RX ADMIN — BEVACIZUMAB-AWWB 400 MG: 400 INJECTION, SOLUTION INTRAVENOUS at 12:01

## 2024-02-21 RX ADMIN — Medication 500 UNITS: at 12:26

## 2024-02-21 RX ADMIN — SODIUM CHLORIDE 1000 ML: 9 INJECTION, SOLUTION INTRAVENOUS at 11:15

## 2024-02-21 RX ADMIN — PALONOSETRON 250 MCG: 0.05 INJECTION, SOLUTION INTRAVENOUS at 11:47

## 2024-02-21 RX ADMIN — ALTEPLASE 2 MG: 2.2 INJECTION, POWDER, LYOPHILIZED, FOR SOLUTION INTRAVENOUS at 09:34

## 2024-02-21 ASSESSMENT — PATIENT HEALTH QUESTIONNAIRE - PHQ9
1. LITTLE INTEREST OR PLEASURE IN DOING THINGS: NOT AT ALL
SUM OF ALL RESPONSES TO PHQ9 QUESTIONS 1 & 2: 0
2. FEELING DOWN, DEPRESSED OR HOPELESS: NOT AT ALL

## 2024-02-21 ASSESSMENT — PAIN SCALES - GENERAL: PAINLEVEL: 7

## 2024-02-21 NOTE — SIGNIFICANT EVENT
02/21/24 1102   Prechemo Checklist   Has the patient been in the hospital, ED, or urgent care since last date of service No   Chemo/Immuno Consent Signed Yes   Protocol/Indications Verified Yes   Confirmed to previous date/time of medication Yes   Compared to previous dose Yes   All medications are dated accurately Yes   Pregnancy Test Negative Not applicable   Parameters Met Yes   BSA/Weight-Height Verified Yes   Dose Calculations Verified Yes

## 2024-02-21 NOTE — PROGRESS NOTES
Treatment Pre-Review    Diagnosis:  No matching staging information was found for the patient.    Regimen: MVASI Days 1 + 15; Cycle repeats every 28 days       Current Cycle/Day: Cycle 5 Day 15   Treatment Date Appropriate: Yes; Last Treatment: 1/31/24 (Cycle 5 day 1) Treatment scheduled 1 week late due to medication refill issues   Date change required:  Patient scheduled 1 week late    Dose or Regimen Modifications: Yes:    MVASI dose adjusted to 400 mg due to weight loss    Med Rec/Drug Interactions: None noted during pre-review    Growth Factor: none    Financial Clearance/Authorization: Yes    Echo:  No results found for this or any previous visit from the past 1095 days.     Lifetime Dose Tracking   No doses have been documented on this patient for the following tracked chemicals: doxorubicin, epirubicin, idarubicin, daunorubicin, mitoxantrone, bleomycin, mitomycin, carmustine, doxorubicin HCl pegylated liposomal, daunorubicin citrate liposomal     Comments: none    I Raj Ramirez, PharmD hereby acknowledge that the treatment plan indicated above has been verified for correctness to the best of my ability on 2/21/2024 at 11:05 AM.

## 2024-02-21 NOTE — PROGRESS NOTES
After updating MELISA Nash oncology team, orders for meds adjusted to new weight.  Updated on pt continued nausea.  Orders placed for IVFs and additional meds to help.  Aware pt will be seen in office 2/29/24, prior to next infusion.

## 2024-02-21 NOTE — PROGRESS NOTES
2024 at 11:08 AM    Acetaminophen and Adhesive tape-silicones    Misael Hernandez is a 38 y.o. male   Blood pressure 132/86, pulse (!) 114, temperature 35.4 °C (95.7 °F), temperature source Temporal, resp. rate 14, weight 79.7 kg (175 lb 11.3 oz), SpO2 96 %.  Body surface area is 1.93 meters squared.    Past Medical History:   Diagnosis Date    Other conditions influencing health status     Rectal Cancer       Encounter Diagnoses   Name Primary?    B12 deficiency     Metastasis from rectal cancer (CMS/HCC)     Rectal cancer (CMS/HCC)        Has the entire regimen been authorized by financial clearance (pre-certification)?  Yes  2024    Prior to Admission medications    Medication Sig Start Date End Date Taking? Authorizing Provider   albuterol (Ventolin HFA) 90 mcg/actuation inhaler INHALE TWO PUFFS INTO THE LUNGS EVERY 6 HOURS AS NEEDED FOR SHORTNESS OF BREATH OR FOR WHEEZING FOR UP TO 30 DAYS 21   Historical Provider, MD   apixaban (Eliquis) 5 mg tablet Take 1 tablet (5 mg) by mouth 2 times a day. 1/15/24   Oswald Hernandez MD   atorvastatin (Lipitor) 40 mg tablet Take 1 tablet (40 mg) by mouth once daily at bedtime. 1/4/24 4/3/24  DARWIN Pavon-MELISA   buprenorphine-naloxone (Suboxone) 8-2 mg SL tablet Place 1 tablet under the tongue 2 times a day. With additional 0.5 tab in the evening 23  Historical Provider, MD   dapagliflozin propanediol (Farxiga) 5 mg Take 1 tablet (5 mg) by mouth once daily. 1/15/24   Oswald Hernandez MD   doxepin (SINEquan) 50 mg capsule Take 10 mg by mouth as needed at bedtime for sleep. 10/10/23   Historical Provider, MD   haloperidol (Haldol) 2 mg tablet Take 1 tablet (2 mg) by mouth once daily.    Historical Provider, MD   lidocaine-prilocaine (Emla) 2.5-2.5 % cream apply topically to affected area if needed 23   Willa Vega MD   losartan (Cozaar) 50 mg tablet Take 1 tablet (50 mg) by mouth once daily.    Historical Provider, MD   metFORMIN  (Glucophage) 500 mg tablet Take 1 tablet (500 mg) by mouth once daily with breakfast. 1/15/24   Oswald Hernandez MD   metoprolol succinate XL (Toprol-XL) 25 mg 24 hr tablet Take 1 tablet (25 mg) by mouth once daily.    Historical Provider, MD   omeprazole (PriLOSEC) 40 mg DR capsule Take 1 capsule (40 mg) by mouth once daily in the morning. Take before meals. Do not crush or chew.    Historical Provider, MD   ondansetron (Zofran) 4 mg tablet Take 1 tablet (4 mg) by mouth every 8 hours if needed for nausea or vomiting.    Historical Provider, MD   prochlorperazine (Compazine) 5 mg tablet Take 1 tablet (5 mg) by mouth every 6 hours if needed for nausea or vomiting.    Historical Provider, MD   trifluridine-tipiraciL (Lonsurf) 20-8.19 mg tablet TAKE THREE (3) TABLETS BY MOUTH 2 TIMES DAILY FOR 5 DAYS, EVERY 2 WEEKS 2/12/24   Sandra Lui MD PhD       Reviewed documented list of home medications.  No significant drug interactions identified between home medications and chemotherapy regimen.    Yes    WBC   Date Value Ref Range Status   02/20/2024 11.3 4.4 - 11.3 x10*3/uL Final   01/30/2024 16.1 (H) 4.4 - 11.3 x10*3/uL Final   01/15/2024 12.8 (H) 4.4 - 11.3 x10*3/uL Final     Hemoglobin   Date Value Ref Range Status   02/20/2024 11.4 (L) 13.5 - 17.5 g/dL Final   01/30/2024 12.0 (L) 13.5 - 17.5 g/dL Final   01/15/2024 11.2 (L) 13.5 - 17.5 g/dL Final     Hematocrit   Date Value Ref Range Status   02/20/2024 35.8 (L) 41.0 - 52.0 % Final   01/30/2024 37.8 (L) 41.0 - 52.0 % Final   01/15/2024 33.7 (L) 41.0 - 52.0 % Final     Neutrophils Absolute   Date Value Ref Range Status   02/20/2024 7.84 (H) 1.20 - 7.70 x10*3/uL Final     Comment:     Percent differential counts (%) should be interpreted in the context of the absolute cell counts (cells/uL).   01/30/2024 12.63 (H) 1.20 - 7.70 x10*3/uL Final     Comment:     Percent differential counts (%) should be interpreted in the context of the absolute cell counts (cells/uL).    01/15/2024 8.50 (H) 1.20 - 7.70 x10*3/uL Final     Comment:     Percent differential counts (%) should be interpreted in the context of the absolute cell counts (cells/uL).     Platelets   Date Value Ref Range Status   02/20/2024 604 (H) 150 - 450 x10*3/uL Final   01/30/2024 375 150 - 450 x10*3/uL Final   01/15/2024 398 150 - 450 x10*3/uL Final     Creatinine   Date Value Ref Range Status   01/30/2024 0.52 0.50 - 1.30 mg/dL Final   01/15/2024 0.41 (L) 0.50 - 1.30 mg/dL Final   01/14/2024 0.43 (L) 0.50 - 1.30 mg/dL Final     Alkaline Phosphatase   Date Value Ref Range Status   01/30/2024 142 (H) 33 - 120 U/L Final   01/15/2024 78 33 - 120 U/L Final   01/09/2024 91 33 - 120 U/L Final     AST   Date Value Ref Range Status   01/30/2024 6 (L) 9 - 39 U/L Final   01/15/2024 8 (L) 9 - 39 U/L Final   01/09/2024 10 9 - 39 U/L Final     ALT   Date Value Ref Range Status   01/30/2024 7 (L) 10 - 52 U/L Final     Comment:     Patients treated with Sulfasalazine may generate falsely decreased results for ALT.   01/15/2024 5 (L) 10 - 52 U/L Final     Comment:     Patients treated with Sulfasalazine may generate falsely decreased results for ALT.   01/09/2024 8 (L) 10 - 52 U/L Final     Comment:     Patients treated with Sulfasalazine may generate falsely decreased results for ALT.     Bilirubin, Total   Date Value Ref Range Status   01/30/2024 0.6 0.0 - 1.2 mg/dL Final   01/15/2024 0.3 0.0 - 1.2 mg/dL Final   01/09/2024 0.5 0.0 - 1.2 mg/dL Final         Does the patient meet the treatment conditions?  Yes    Are dosage calculations correct?  Yes  Dose adjusted to 400 mg due to updated weight    Are doses the same as previous cycle?   No - see comments    Were any doses modified?  No    Added dexamethasone + Aloxi pre-meds due to increased nausea     If appropriate, was the Creatinine Clearance independently calculated based on [facility] standards?   Yes      Comments:      I Raj Ramirez, PharmD hereby acknowledge  that the treatment plan indicated above has been verified for correctness to the best of my ability on 2/21/2024 at 11:08 AM.

## 2024-02-28 ENCOUNTER — APPOINTMENT (OUTPATIENT)
Dept: HEMATOLOGY/ONCOLOGY | Facility: HOSPITAL | Age: 39
End: 2024-02-28
Payer: MEDICARE

## 2024-02-29 ENCOUNTER — OFFICE VISIT (OUTPATIENT)
Dept: HEMATOLOGY/ONCOLOGY | Facility: CLINIC | Age: 39
End: 2024-02-29
Payer: MEDICARE

## 2024-02-29 VITALS
WEIGHT: 172.62 LBS | HEART RATE: 108 BPM | DIASTOLIC BLOOD PRESSURE: 98 MMHG | RESPIRATION RATE: 18 BRPM | SYSTOLIC BLOOD PRESSURE: 152 MMHG | BODY MASS INDEX: 27.51 KG/M2 | TEMPERATURE: 96.1 F | OXYGEN SATURATION: 99 %

## 2024-02-29 DIAGNOSIS — C20 METASTASIS FROM RECTAL CANCER (MULTI): Primary | ICD-10-CM

## 2024-02-29 DIAGNOSIS — C79.9 METASTASIS FROM RECTAL CANCER (MULTI): Primary | ICD-10-CM

## 2024-02-29 PROCEDURE — 3077F SYST BP >= 140 MM HG: CPT | Performed by: STUDENT IN AN ORGANIZED HEALTH CARE EDUCATION/TRAINING PROGRAM

## 2024-02-29 PROCEDURE — RXMED WILLOW AMBULATORY MEDICATION CHARGE

## 2024-02-29 PROCEDURE — 99213 OFFICE O/P EST LOW 20 MIN: CPT | Performed by: STUDENT IN AN ORGANIZED HEALTH CARE EDUCATION/TRAINING PROGRAM

## 2024-02-29 PROCEDURE — 3080F DIAST BP >= 90 MM HG: CPT | Performed by: STUDENT IN AN ORGANIZED HEALTH CARE EDUCATION/TRAINING PROGRAM

## 2024-02-29 RX ORDER — PROCHLORPERAZINE MALEATE 10 MG
10 TABLET ORAL EVERY 6 HOURS PRN
Status: CANCELLED | OUTPATIENT
Start: 2024-03-20

## 2024-02-29 RX ORDER — FAMOTIDINE 10 MG/ML
20 INJECTION INTRAVENOUS ONCE AS NEEDED
Status: CANCELLED | OUTPATIENT
Start: 2024-03-20

## 2024-02-29 RX ORDER — FAMOTIDINE 10 MG/ML
20 INJECTION INTRAVENOUS ONCE AS NEEDED
Status: CANCELLED | OUTPATIENT
Start: 2024-04-10

## 2024-02-29 RX ORDER — DIPHENHYDRAMINE HYDROCHLORIDE 50 MG/ML
50 INJECTION INTRAMUSCULAR; INTRAVENOUS AS NEEDED
Status: CANCELLED | OUTPATIENT
Start: 2024-03-20

## 2024-02-29 RX ORDER — PROCHLORPERAZINE EDISYLATE 5 MG/ML
10 INJECTION INTRAMUSCULAR; INTRAVENOUS EVERY 6 HOURS PRN
Status: CANCELLED | OUTPATIENT
Start: 2024-04-10

## 2024-02-29 RX ORDER — PALONOSETRON 0.05 MG/ML
250 INJECTION, SOLUTION INTRAVENOUS ONCE
Status: CANCELLED
Start: 2024-03-20 | End: 2024-03-20

## 2024-02-29 RX ORDER — EPINEPHRINE 0.3 MG/.3ML
0.3 INJECTION SUBCUTANEOUS EVERY 5 MIN PRN
Status: CANCELLED | OUTPATIENT
Start: 2024-03-20

## 2024-02-29 RX ORDER — DEXAMETHASONE SODIUM PHOSPHATE 4 MG/ML
10 INJECTION, SOLUTION INTRA-ARTICULAR; INTRALESIONAL; INTRAMUSCULAR; INTRAVENOUS; SOFT TISSUE ONCE
Status: CANCELLED
Start: 2024-03-20 | End: 2024-03-20

## 2024-02-29 RX ORDER — EPINEPHRINE 0.3 MG/.3ML
0.3 INJECTION SUBCUTANEOUS EVERY 5 MIN PRN
Status: CANCELLED | OUTPATIENT
Start: 2024-04-10

## 2024-02-29 RX ORDER — DEXAMETHASONE SODIUM PHOSPHATE 4 MG/ML
10 INJECTION, SOLUTION INTRA-ARTICULAR; INTRALESIONAL; INTRAMUSCULAR; INTRAVENOUS; SOFT TISSUE ONCE
Status: CANCELLED
Start: 2024-04-10 | End: 2024-04-10

## 2024-02-29 RX ORDER — PALONOSETRON 0.05 MG/ML
250 INJECTION, SOLUTION INTRAVENOUS ONCE
Status: CANCELLED
Start: 2024-04-10 | End: 2024-04-10

## 2024-02-29 RX ORDER — ALBUTEROL SULFATE 0.83 MG/ML
3 SOLUTION RESPIRATORY (INHALATION) AS NEEDED
Status: CANCELLED | OUTPATIENT
Start: 2024-03-06

## 2024-02-29 RX ORDER — PALONOSETRON 0.05 MG/ML
250 INJECTION, SOLUTION INTRAVENOUS ONCE
Status: CANCELLED
Start: 2024-03-06 | End: 2024-03-06

## 2024-02-29 RX ORDER — DEXAMETHASONE SODIUM PHOSPHATE 4 MG/ML
10 INJECTION, SOLUTION INTRA-ARTICULAR; INTRALESIONAL; INTRAMUSCULAR; INTRAVENOUS; SOFT TISSUE ONCE
Status: CANCELLED
Start: 2024-03-06 | End: 2024-03-06

## 2024-02-29 RX ORDER — ALBUTEROL SULFATE 0.83 MG/ML
3 SOLUTION RESPIRATORY (INHALATION) AS NEEDED
Status: CANCELLED | OUTPATIENT
Start: 2024-04-10

## 2024-02-29 RX ORDER — ALBUTEROL SULFATE 0.83 MG/ML
3 SOLUTION RESPIRATORY (INHALATION) AS NEEDED
Status: CANCELLED | OUTPATIENT
Start: 2024-03-20

## 2024-02-29 RX ORDER — EPINEPHRINE 0.3 MG/.3ML
0.3 INJECTION SUBCUTANEOUS EVERY 5 MIN PRN
Status: CANCELLED | OUTPATIENT
Start: 2024-03-06

## 2024-02-29 RX ORDER — FAMOTIDINE 10 MG/ML
20 INJECTION INTRAVENOUS ONCE AS NEEDED
Status: CANCELLED | OUTPATIENT
Start: 2024-03-06

## 2024-02-29 RX ORDER — DIPHENHYDRAMINE HYDROCHLORIDE 50 MG/ML
50 INJECTION INTRAMUSCULAR; INTRAVENOUS AS NEEDED
Status: CANCELLED | OUTPATIENT
Start: 2024-03-06

## 2024-02-29 RX ORDER — PROCHLORPERAZINE EDISYLATE 5 MG/ML
10 INJECTION INTRAMUSCULAR; INTRAVENOUS EVERY 6 HOURS PRN
Status: CANCELLED | OUTPATIENT
Start: 2024-03-20

## 2024-02-29 RX ORDER — PROCHLORPERAZINE EDISYLATE 5 MG/ML
10 INJECTION INTRAMUSCULAR; INTRAVENOUS EVERY 6 HOURS PRN
Status: CANCELLED | OUTPATIENT
Start: 2024-03-06

## 2024-02-29 RX ORDER — PROCHLORPERAZINE MALEATE 10 MG
10 TABLET ORAL EVERY 6 HOURS PRN
Status: CANCELLED | OUTPATIENT
Start: 2024-04-10

## 2024-02-29 RX ORDER — DIPHENHYDRAMINE HYDROCHLORIDE 50 MG/ML
50 INJECTION INTRAMUSCULAR; INTRAVENOUS AS NEEDED
Status: CANCELLED | OUTPATIENT
Start: 2024-04-10

## 2024-02-29 RX ORDER — PROCHLORPERAZINE MALEATE 10 MG
10 TABLET ORAL EVERY 6 HOURS PRN
Status: CANCELLED | OUTPATIENT
Start: 2024-03-06

## 2024-02-29 ASSESSMENT — PAIN SCALES - GENERAL: PAINLEVEL: 7

## 2024-02-29 NOTE — PROGRESS NOTES
"Patient here for follow up with Dr. Lui alone. Medications and allergies reviewed.     Patient stated that his diarrhea has improved on lonsurf but he is still nauseous when he takes it but able to eat and drink. \"I'm handling it\". Stated the whatever medicine you gave me IV with last treatment lasted for about 2 days but it helped me feel better. \"I wish it lasted longer.\"     Stated he is taking Lonsurf every other week for 5 days. Starting on Wednesday with the bevacizumab.     Misael stated he has a delivery set up for Lonsurf on Monday 3/4.     Labs prior to bevacizumab.     CT scan due 3/27. Follow up with Dr. Lui on 4/4 to review scan.         "

## 2024-02-29 NOTE — PROGRESS NOTES
Cancer History:  DIAGNOSIS  Rectal cancer    STAGING  Stage IV, M1b   Stage III, ypT3 ypN1b at diagnosis    CURRENT SITES OF DISEASE  Lungs, mediastinal and hilar lymph nodes, b/l adrenal glands  Presacral soft tissue swelling thought to be post-operative vs. residual tumor    MOLECULAR GENOMICS  MMR intact, BRAF and NRAS/KRAS wild-type    TUMOR MARKER  CEA 5.4 at diagnosis   CEA 79 on 2/7/23    PRIOR THERAPY  Neoadjuvant capecitabine and radiation  Adjuvant FOLFOX  Capecitabine, bevacizumab     CURRENT THERAPY  Irinotecan, cetuximab     CURRENT ONCOLOGICAL PROBLEMS  PE with recent recurrence 12/2023 d/t missed Eliquis from n/v  Pancreatitis 1/2024    HISTORY OF PRESENT ILLNESS  8/2013 - abdominal pain, rectal bleeding and weight loss leading to colonoscopy showing circumferential fungating partially obstructive mass in the rectum with biopsy showing invasive moderately differentiated adenocarcinoma.  Pathology showed invasive moderately differentiated adenocarcinoma, normal mismatch repair protein expression. Staged III = T3 N2 M0 rectal adenocarcinoma.  Initial CEA was 5.4.  8/2013 - noeadjuvant capecitabine and RT   1/2014 - open proctosigmoidectomy with coloanal anastomosis and diverting ileostomy.  Pathology showed invasive moderately differentiated adenocarcinoma of the rectum, 2 of 31 lymph nodes positive, therapy effect, low-grade, tumor 3.5 x 3 x 0.5 cm, negative margins, lymphovascular invasion and perineural invasion present, ypT3 ypN1b  11/2014 - ileostomy takedown with resection of small bowel by Dr. Willa Khan  11/2014 - given adjuvant FOLFOX (reportedly not completed due to patient non-compliance)  6/2018- biopsy proven RLL mets, MMR normal, NRAS and BRAF wild-type, moderately differentiated adenocarcinoma   6/2021 - disease progression seen at metsatatic sites, patient started on FOLFOX with intolerance to oxaliplatin leading to switch to capecitabine/bevacizumab  11/2021 - disease progression and  patient switched to irinotecan / cetuximab with subsequent dose reductions due to skin toxicity and diarrhea  2/20/23 - CT torso showing overall disease progression  PAST MEDICAL HISTORY  Rectal cancer  Recurrent b/l DVT  History of cardiomyopathy (ischemic vs. stress?)  Cholecystitis   SAH (9/2014)  Day's esophagus by EGD 8/2013, GERD  Tobacco abuse  Depression/anxiety  Dyslipidemia  Hypertension  Avascular necrosis femoral heads noted on imaging 2021  Asthma/COPD  Migraines  Hepatic steatosis noted on imaging 2020  Essential tremor    SOCIAL HISTORY  Unemployed. Lives with mom and brother. Has four children ages 10-17. Smokes 1ppd. No alcohol use. Uses THC.     CURRENT MEDS  see below    FAMILY HISTORY  Father - esophageal cancer, paternal GF - esophageal cancer    Subjective:  The Aloxi has definitely helped with the nausea, lasts 2-3 days. Diarrhea is much improved as well. He is taking the Lonsuf for 5 days every time he gets the bevacizumab (every 14 days).      No fevers, chills, chest pain, shortness of breath, abdominal pain, nausea, vomiting, diarrhea, or rashes.    ROS as above. Remainder of 10-point review of systems elicited and negative.     Objective:  BP (!) 152/98 (BP Location: Right arm, Patient Position: Sitting, BP Cuff Size: Adult long)   Pulse 108   Temp 35.6 °C (96.1 °F) (Temporal)   Resp 18   Wt 78.3 kg (172 lb 9.9 oz)   SpO2 99%   BMI 27.51 kg/m²     Gen: A&O, NAD  Head: Normocephalic, atraumatic  Eyes: no scleral icterus  ENT: mucous membranes moist, no oropharyngeal lesions  Resp: Lungs CTAB  Cardiac: Normal rate, regular rhythm, no murmurs appreciated  Abdomen: Soft, nondistended, nontender, +BS  Neuro: CNII-XII grossly intact  Psych: appropriate mood & affect  Skin: warm, dry, no apparent rashes     ECOG Performance Status: 1     CT Angio for PE and CT abd/pelvis 12/20/23:  Pulmonary embolism in the right upper lobe.  This is a new finding  since August 25, 2023.  There is no  evidence of right heart strain.  Bilateral pulmonary masses with mediastinal and hilar adenopathy not  significantly changed from the prior exam.  Edema surrounding the tail the pancreas.  Correlation with the  patient's pancreatic enzymes is recommended.  Bilateral adrenal masses not significantly changed from the prior  exam.  Presacral thick-walled fluid collection.  This may represent a  centrally necrotic mass.  It was seen on the prior exam is unchanged.    CT Angio for PE and CT Abd/pelvis 1/1/24:  Slight decreased size of right upper lobe PE since 12/20/2023. No new  pulmonary embolism identified.      Multiple bilateral pulmonary nodules/masses and lymphadenopathy  without significant change from 12/20/2023.      Increased upper abdominal/peripancreatic fat stranding and 4.5 cm  fluid collection indenting the stomach, most consistent with  worsening acute pancreatitis with developing pseudocyst. Clinical  correlation and further evaluation/follow-up recommended.    CT Angio for PE and CT Abd/pelvis 1/8/24:  1. Stable pulmonary emboli in the segmental and subsegmental arteries  to the right upper lobe. No new pulmonary embolism.  2. No right heart strain.  3. Slight interval progression in pulmonary metastases and right  hilar adenopathy.  4. Stable mediastinal adenopathy.  5. Interval development of free fluid collections in the left  anterior abdomen and greater curvature of the stomach, likely  abscesses or infected pancreatic pseudocyst.  6. Questionable acute pancreatitis. Correlation with serum amylase  and lipase levels is recommended.  7. Stable bilateral adrenal masses.  8. A small fluid collection anterior to the distal sacrum, unchanged.  This is likely an abscess.    Assessment & Plan:  Mr. Hernandez is a 38 y.o. man with a history of metastatic rectal adenocarcinoma, cardiomyopathy, depression/anxiety, asthma/COPD, avascular necrosis who was previously treated with Dr. Willa Vega, and presents to  day for my assumption of his care.    I previously saw the patient in March 2023 for 2nd opinion and for clinical trial consideration. His cancer therapy is summarized above, briefly was diagnosed in 8/2013 with localized rectal cancer. He underwent neoadjuvant chemoradiation with capecitabine followed by resection. He subsequently received an incomplete course of adjuvant FOLFOX.     He later developed metastatic recurrence to the lungs in 6/2018, biopsy proven, with MMR-proficient, no TERRY or BRAF mutations detected. He then received palliative FOLFOX , but developed a serious adverse reaction, unclear of the details. Later was treated with capecitabine + bevacizumab for unclear amount of time, d/c d/t progression. He started irinotecan + cetuximab 11/29/2021, and has been on this ever since.    When I met him first in March 2023, he had not had a CT for 1.5y despite ongoing treatment. There was some interval progression, but the tempo of it was not clear given the timing of his CTs. I recommended continued treatment with close interval CT to evaluate response. I also discussed clinical trial options at the time, but he was not interested in driving to [facility] for a study.     He discontinued irinotecan + cetuximab 8/2023 due to progression and started Lonsurf + bevacizumab 9/13/23. He reports only taking the Lonsurf for 1 week (prescribed as 2 weeks on, 2 weeks off) due to nausea/vomiting, and in Dec 2023 was having so much nausea that he did not take his Eliquis. Presented to the ED with n/v shortness of breat 12/20/23, found to have new PE. He was restarted on his Eliquis. He was again admitted 1/8/24 for abdominal pain, found to have stable PE, but with pancreatitis and abdominal abscess. He underwent IR-guided LUQ drain placement, thought to be pseudocyst.     He is now feeling much better. I personally reviewed the images from the recent CT, which show stable to minimal decrease in size of pulmonary  nodules. I discussed this with him, and he would like to continue Lonsurf + gerson with additional nausea support.     2/29/24: Tolerating Lonsurf + gerson much better with addition of dexamethasone & Aloxi. He is taking the Lonsurf Days 1-5 and Days 15-19. Will repeat CT in 1mo.     Plan:   Metastatic rectal ca to the lungs, KRAS, BRAF WT, MMR-intact  - Continue with Lonsurf + gerson, currently taking Lonsurf only on Days 1-5 (typically this is Days 1-5 and 8-12 on a 28 day cycle) with gerson Days 1 and 15.   - He will take Zofran & Compazine in the morning prior to his Lonsurf, can add low dose dexamethasone to this if still severe. Will add palnosetron to Days 1 & 15 with gerson  - RTC 1mo with CT      DM  - recommended by inpatient team to f/u with endocrinology, but pt not interested  - message sent to pt's pcp to inform and request assistance with management

## 2024-03-01 ENCOUNTER — PHARMACY VISIT (OUTPATIENT)
Dept: PHARMACY | Facility: CLINIC | Age: 39
End: 2024-03-01
Payer: MEDICARE

## 2024-03-04 ENCOUNTER — TELEMEDICINE (OUTPATIENT)
Dept: HEMATOLOGY/ONCOLOGY | Facility: HOSPITAL | Age: 39
End: 2024-03-04
Payer: MEDICARE

## 2024-03-04 DIAGNOSIS — C20 METASTASIS FROM RECTAL CANCER (MULTI): ICD-10-CM

## 2024-03-04 DIAGNOSIS — C79.9 METASTASIS FROM RECTAL CANCER (MULTI): ICD-10-CM

## 2024-03-05 ENCOUNTER — LAB (OUTPATIENT)
Dept: LAB | Facility: LAB | Age: 39
End: 2024-03-05
Payer: MEDICAID

## 2024-03-05 DIAGNOSIS — C20 METASTASIS FROM RECTAL CANCER (MULTI): ICD-10-CM

## 2024-03-05 DIAGNOSIS — C79.9 METASTASIS FROM RECTAL CANCER (MULTI): ICD-10-CM

## 2024-03-05 LAB
ALBUMIN SERPL BCP-MCNC: 3.3 G/DL (ref 3.4–5)
ALP SERPL-CCNC: 133 U/L (ref 33–120)
ALT SERPL W P-5'-P-CCNC: 11 U/L (ref 10–52)
ANION GAP SERPL CALC-SCNC: 14 MMOL/L (ref 10–20)
AST SERPL W P-5'-P-CCNC: 13 U/L (ref 9–39)
BASOPHILS # BLD AUTO: 0.08 X10*3/UL (ref 0–0.1)
BASOPHILS NFR BLD AUTO: 0.8 %
BILIRUB SERPL-MCNC: 0.4 MG/DL (ref 0–1.2)
BUN SERPL-MCNC: 4 MG/DL (ref 6–23)
CALCIUM SERPL-MCNC: 9.3 MG/DL (ref 8.6–10.3)
CHLORIDE SERPL-SCNC: 93 MMOL/L (ref 98–107)
CO2 SERPL-SCNC: 28 MMOL/L (ref 21–32)
CREAT SERPL-MCNC: 0.48 MG/DL (ref 0.5–1.3)
EGFRCR SERPLBLD CKD-EPI 2021: >90 ML/MIN/1.73M*2
EOSINOPHIL # BLD AUTO: 0.14 X10*3/UL (ref 0–0.7)
EOSINOPHIL NFR BLD AUTO: 1.4 %
ERYTHROCYTE [DISTWIDTH] IN BLOOD BY AUTOMATED COUNT: 18.6 % (ref 11.5–14.5)
GLUCOSE SERPL-MCNC: 201 MG/DL (ref 74–99)
HCT VFR BLD AUTO: 36.3 % (ref 41–52)
HGB BLD-MCNC: 11.5 G/DL (ref 13.5–17.5)
IMM GRANULOCYTES # BLD AUTO: 0.05 X10*3/UL (ref 0–0.7)
IMM GRANULOCYTES NFR BLD AUTO: 0.5 % (ref 0–0.9)
LYMPHOCYTES # BLD AUTO: 1.98 X10*3/UL (ref 1.2–4.8)
LYMPHOCYTES NFR BLD AUTO: 19.1 %
MCH RBC QN AUTO: 26.9 PG (ref 26–34)
MCHC RBC AUTO-ENTMCNC: 31.7 G/DL (ref 32–36)
MCV RBC AUTO: 85 FL (ref 80–100)
MONOCYTES # BLD AUTO: 0.94 X10*3/UL (ref 0.1–1)
MONOCYTES NFR BLD AUTO: 9.1 %
NEUTROPHILS # BLD AUTO: 7.18 X10*3/UL (ref 1.2–7.7)
NEUTROPHILS NFR BLD AUTO: 69.1 %
NRBC BLD-RTO: 0 /100 WBCS (ref 0–0)
PLATELET # BLD AUTO: 338 X10*3/UL (ref 150–450)
POTASSIUM SERPL-SCNC: 3.7 MMOL/L (ref 3.5–5.3)
PROT SERPL-MCNC: 7.3 G/DL (ref 6.4–8.2)
RBC # BLD AUTO: 4.28 X10*6/UL (ref 4.5–5.9)
SODIUM SERPL-SCNC: 131 MMOL/L (ref 136–145)
WBC # BLD AUTO: 10.4 X10*3/UL (ref 4.4–11.3)

## 2024-03-06 ENCOUNTER — INFUSION (OUTPATIENT)
Dept: HEMATOLOGY/ONCOLOGY | Facility: HOSPITAL | Age: 39
DRG: 641 | End: 2024-03-06
Payer: MEDICARE

## 2024-03-06 VITALS
BODY MASS INDEX: 27.64 KG/M2 | DIASTOLIC BLOOD PRESSURE: 97 MMHG | HEART RATE: 107 BPM | RESPIRATION RATE: 16 BRPM | SYSTOLIC BLOOD PRESSURE: 147 MMHG | OXYGEN SATURATION: 100 % | TEMPERATURE: 97.2 F | WEIGHT: 171.96 LBS | HEIGHT: 66 IN

## 2024-03-06 DIAGNOSIS — C20 METASTASIS FROM RECTAL CANCER (MULTI): ICD-10-CM

## 2024-03-06 DIAGNOSIS — C79.9 METASTASIS FROM RECTAL CANCER (MULTI): ICD-10-CM

## 2024-03-06 LAB
APPEARANCE UR: ABNORMAL
BACTERIA #/AREA URNS AUTO: ABNORMAL /HPF
BILIRUB UR STRIP.AUTO-MCNC: ABNORMAL MG/DL
COLOR UR: ABNORMAL
GLUCOSE UR STRIP.AUTO-MCNC: NEGATIVE MG/DL
HYALINE CASTS #/AREA URNS AUTO: ABNORMAL /LPF
KETONES UR STRIP.AUTO-MCNC: ABNORMAL MG/DL
LEUKOCYTE ESTERASE UR QL STRIP.AUTO: NEGATIVE
MUCOUS THREADS #/AREA URNS AUTO: ABNORMAL /LPF
NITRITE UR QL STRIP.AUTO: NEGATIVE
PH UR STRIP.AUTO: 5 [PH]
PROT UR STRIP.AUTO-MCNC: ABNORMAL MG/DL
RBC # UR STRIP.AUTO: NEGATIVE /UL
RBC #/AREA URNS AUTO: ABNORMAL /HPF
SP GR UR STRIP.AUTO: 1.03
UROBILINOGEN UR STRIP.AUTO-MCNC: 4 MG/DL
WBC #/AREA URNS AUTO: ABNORMAL /HPF

## 2024-03-06 PROCEDURE — 2500000004 HC RX 250 GENERAL PHARMACY W/ HCPCS (ALT 636 FOR OP/ED): Mod: SE | Performed by: STUDENT IN AN ORGANIZED HEALTH CARE EDUCATION/TRAINING PROGRAM

## 2024-03-06 PROCEDURE — 96409 CHEMO IV PUSH SNGL DRUG: CPT

## 2024-03-06 PROCEDURE — 81001 URINALYSIS AUTO W/SCOPE: CPT

## 2024-03-06 PROCEDURE — 96375 TX/PRO/DX INJ NEW DRUG ADDON: CPT | Mod: INF

## 2024-03-06 PROCEDURE — 96367 TX/PROPH/DG ADDL SEQ IV INF: CPT | Mod: INF,INF2

## 2024-03-06 RX ORDER — PALONOSETRON 0.05 MG/ML
250 INJECTION, SOLUTION INTRAVENOUS ONCE
Status: COMPLETED | OUTPATIENT
Start: 2024-03-06 | End: 2024-03-06

## 2024-03-06 RX ORDER — DIPHENHYDRAMINE HYDROCHLORIDE 50 MG/ML
50 INJECTION INTRAMUSCULAR; INTRAVENOUS AS NEEDED
Status: DISCONTINUED | OUTPATIENT
Start: 2024-03-06 | End: 2024-03-06 | Stop reason: HOSPADM

## 2024-03-06 RX ORDER — HEPARIN 100 UNIT/ML
500 SYRINGE INTRAVENOUS AS NEEDED
Status: DISCONTINUED | OUTPATIENT
Start: 2024-03-06 | End: 2024-03-06 | Stop reason: HOSPADM

## 2024-03-06 RX ORDER — PROCHLORPERAZINE MALEATE 10 MG
10 TABLET ORAL EVERY 6 HOURS PRN
Status: DISCONTINUED | OUTPATIENT
Start: 2024-03-06 | End: 2024-03-06 | Stop reason: HOSPADM

## 2024-03-06 RX ORDER — HEPARIN 100 UNIT/ML
500 SYRINGE INTRAVENOUS AS NEEDED
Status: CANCELLED | OUTPATIENT
Start: 2024-03-06

## 2024-03-06 RX ORDER — PROCHLORPERAZINE EDISYLATE 5 MG/ML
10 INJECTION INTRAMUSCULAR; INTRAVENOUS EVERY 6 HOURS PRN
Status: DISCONTINUED | OUTPATIENT
Start: 2024-03-06 | End: 2024-03-06 | Stop reason: HOSPADM

## 2024-03-06 RX ORDER — FAMOTIDINE 10 MG/ML
20 INJECTION INTRAVENOUS ONCE AS NEEDED
Status: DISCONTINUED | OUTPATIENT
Start: 2024-03-06 | End: 2024-03-06 | Stop reason: HOSPADM

## 2024-03-06 RX ORDER — ALBUTEROL SULFATE 0.83 MG/ML
3 SOLUTION RESPIRATORY (INHALATION) AS NEEDED
Status: DISCONTINUED | OUTPATIENT
Start: 2024-03-06 | End: 2024-03-06 | Stop reason: HOSPADM

## 2024-03-06 RX ORDER — DEXAMETHASONE SODIUM PHOSPHATE 4 MG/ML
10 INJECTION, SOLUTION INTRA-ARTICULAR; INTRALESIONAL; INTRAMUSCULAR; INTRAVENOUS; SOFT TISSUE ONCE
Status: COMPLETED | OUTPATIENT
Start: 2024-03-06 | End: 2024-03-06

## 2024-03-06 RX ORDER — HEPARIN SODIUM,PORCINE/PF 10 UNIT/ML
50 SYRINGE (ML) INTRAVENOUS AS NEEDED
Status: CANCELLED | OUTPATIENT
Start: 2024-03-06

## 2024-03-06 RX ORDER — EPINEPHRINE 0.3 MG/.3ML
0.3 INJECTION SUBCUTANEOUS EVERY 5 MIN PRN
Status: DISCONTINUED | OUTPATIENT
Start: 2024-03-06 | End: 2024-03-06 | Stop reason: HOSPADM

## 2024-03-06 RX ADMIN — PALONOSETRON 250 MCG: 0.05 INJECTION, SOLUTION INTRAVENOUS at 09:53

## 2024-03-06 RX ADMIN — Medication 500 UNITS: at 11:05

## 2024-03-06 RX ADMIN — BEVACIZUMAB-AWWB 400 MG: 400 INJECTION, SOLUTION INTRAVENOUS at 10:06

## 2024-03-06 RX ADMIN — SODIUM CHLORIDE 1000 ML: 9 INJECTION, SOLUTION INTRAVENOUS at 10:00

## 2024-03-06 RX ADMIN — DEXAMETHASONE SODIUM PHOSPHATE 10 MG: 4 INJECTION, SOLUTION INTRAMUSCULAR; INTRAVENOUS at 09:55

## 2024-03-06 ASSESSMENT — PATIENT HEALTH QUESTIONNAIRE - PHQ9
1. LITTLE INTEREST OR PLEASURE IN DOING THINGS: NOT AT ALL
10. IF YOU CHECKED OFF ANY PROBLEMS, HOW DIFFICULT HAVE THESE PROBLEMS MADE IT FOR YOU TO DO YOUR WORK, TAKE CARE OF THINGS AT HOME, OR GET ALONG WITH OTHER PEOPLE: NOT DIFFICULT AT ALL
SUM OF ALL RESPONSES TO PHQ9 QUESTIONS 1 & 2: 0
2. FEELING DOWN, DEPRESSED OR HOPELESS: NOT AT ALL

## 2024-03-06 ASSESSMENT — ENCOUNTER SYMPTOMS
DEPRESSION: 0
LOSS OF SENSATION IN FEET: 1
OCCASIONAL FEELINGS OF UNSTEADINESS: 0

## 2024-03-06 ASSESSMENT — PAIN SCALES - GENERAL: PAINLEVEL: 7

## 2024-03-06 NOTE — PROGRESS NOTES
March 6, 2024 at 9:23 AM    Acetaminophen and Adhesive tape-silicones    Misael Hernandez is a 38 y.o. male   There were no vitals taken for this visit.  There is no height or weight on file to calculate BSA.    Past Medical History:   Diagnosis Date    Other conditions influencing health status     Rectal Cancer       Encounter Diagnosis   Name Primary?    Metastasis from rectal cancer (CMS/HCC)        Has the entire regimen been authorized by financial clearance (pre-certification)?  Yes     Prior to Admission medications    Medication Sig Start Date End Date Taking? Authorizing Provider   albuterol (Ventolin HFA) 90 mcg/actuation inhaler INHALE TWO PUFFS INTO THE LUNGS EVERY 6 HOURS AS NEEDED FOR SHORTNESS OF BREATH OR FOR WHEEZING FOR UP TO 30 DAYS 11/2/21   Historical Provider, MD   apixaban (Eliquis) 5 mg tablet Take 1 tablet (5 mg) by mouth 2 times a day. 1/15/24   Oswald Hernandez MD   atorvastatin (Lipitor) 40 mg tablet Take 1 tablet (40 mg) by mouth once daily at bedtime. 1/4/24 4/3/24  DARWIN Pavon-MELISA   buprenorphine-naloxone (Suboxone) 8-2 mg SL tablet Place 1 tablet under the tongue 2 times a day. With additional 0.5 tab in the evening 8/22/23 1/9/24  Historical Provider, MD   dapagliflozin propanediol (Farxiga) 5 mg Take 1 tablet (5 mg) by mouth once daily. 1/15/24   Oswald Hernandez MD   doxepin (SINEquan) 50 mg capsule Take 10 mg by mouth as needed at bedtime for sleep. 10/10/23   Historical Provider, MD   haloperidol (Haldol) 2 mg tablet Take 1 tablet (2 mg) by mouth once daily.    Historical Provider, MD   lidocaine-prilocaine (Emla) 2.5-2.5 % cream apply topically to affected area if needed 11/21/23   Willa Vega MD   losartan (Cozaar) 50 mg tablet Take 1 tablet (50 mg) by mouth once daily.    Historical Provider, MD   metFORMIN (Glucophage) 500 mg tablet Take 1 tablet (500 mg) by mouth once daily with breakfast. 1/15/24   Oswald Hernandez MD   metoprolol succinate XL (Toprol-XL) 25 mg 24 hr tablet  Take 1 tablet (25 mg) by mouth once daily.    Historical Provider, MD   omeprazole (PriLOSEC) 40 mg DR capsule Take 1 capsule (40 mg) by mouth once daily in the morning. Take before meals. Do not crush or chew.    Historical Provider, MD   ondansetron (Zofran) 4 mg tablet Take 1 tablet (4 mg) by mouth every 8 hours if needed for nausea or vomiting.    Historical Provider, MD   prochlorperazine (Compazine) 5 mg tablet Take 1 tablet (5 mg) by mouth every 6 hours if needed for nausea or vomiting.    Historical Provider, MD   trifluridine-tipiraciL (Lonsurf) 20-8.19 mg tablet TAKE THREE (3) TABLETS BY MOUTH 2 TIMES DAILY FOR 5 DAYS, EVERY 2 WEEKS 2/12/24   Sandra Lui MD PhD       Reviewed documented list of home medications.  No significant drug interactions identified between home medications and chemotherapy regimen.    Yes    WBC   Date Value Ref Range Status   03/05/2024 10.4 4.4 - 11.3 x10*3/uL Final   02/20/2024 11.3 4.4 - 11.3 x10*3/uL Final   01/30/2024 16.1 (H) 4.4 - 11.3 x10*3/uL Final     Hemoglobin   Date Value Ref Range Status   03/05/2024 11.5 (L) 13.5 - 17.5 g/dL Final   02/20/2024 11.4 (L) 13.5 - 17.5 g/dL Final   01/30/2024 12.0 (L) 13.5 - 17.5 g/dL Final     Hematocrit   Date Value Ref Range Status   03/05/2024 36.3 (L) 41.0 - 52.0 % Final   02/20/2024 35.8 (L) 41.0 - 52.0 % Final   01/30/2024 37.8 (L) 41.0 - 52.0 % Final     Neutrophils Absolute   Date Value Ref Range Status   03/05/2024 7.18 1.20 - 7.70 x10*3/uL Final     Comment:     Percent differential counts (%) should be interpreted in the context of the absolute cell counts (cells/uL).   02/20/2024 7.84 (H) 1.20 - 7.70 x10*3/uL Final     Comment:     Percent differential counts (%) should be interpreted in the context of the absolute cell counts (cells/uL).   01/30/2024 12.63 (H) 1.20 - 7.70 x10*3/uL Final     Comment:     Percent differential counts (%) should be interpreted in the context of the absolute cell counts (cells/uL).      Platelets   Date Value Ref Range Status   03/05/2024 338 150 - 450 x10*3/uL Final   02/20/2024 604 (H) 150 - 450 x10*3/uL Final   01/30/2024 375 150 - 450 x10*3/uL Final     Creatinine   Date Value Ref Range Status   03/05/2024 0.48 (L) 0.50 - 1.30 mg/dL Final   02/21/2024 0.48 (L) 0.50 - 1.30 mg/dL Final   01/30/2024 0.52 0.50 - 1.30 mg/dL Final     Alkaline Phosphatase   Date Value Ref Range Status   03/05/2024 133 (H) 33 - 120 U/L Final   02/21/2024 136 (H) 33 - 120 U/L Final   01/30/2024 142 (H) 33 - 120 U/L Final     AST   Date Value Ref Range Status   03/05/2024 13 9 - 39 U/L Final   02/21/2024 8 (L) 9 - 39 U/L Final   01/30/2024 6 (L) 9 - 39 U/L Final     ALT   Date Value Ref Range Status   03/05/2024 11 10 - 52 U/L Final     Comment:     Patients treated with Sulfasalazine may generate falsely decreased results for ALT.   02/21/2024 8 (L) 10 - 52 U/L Final     Comment:     Patients treated with Sulfasalazine may generate falsely decreased results for ALT.   01/30/2024 7 (L) 10 - 52 U/L Final     Comment:     Patients treated with Sulfasalazine may generate falsely decreased results for ALT.     Bilirubin, Total   Date Value Ref Range Status   03/05/2024 0.4 0.0 - 1.2 mg/dL Final   02/21/2024 0.3 0.0 - 1.2 mg/dL Final   01/30/2024 0.6 0.0 - 1.2 mg/dL Final         Does the patient meet the treatment conditions?  Yes    ANC >= 1.5  Plt >= 75  Bili <= 1.5 x ULN  Crcl >= 30  Urine protein <= 2  BP <= 160/90    Are dosage calculations correct?  Yes    Are doses the same as previous cycle?   Yes    Were any doses modified?  No    If appropriate, was the Creatinine Clearance independently calculated based on [facility] standards?   Yes      Comments:      I Raj Ramriez, PharmD hereby acknowledge that the treatment plan indicated above has been verified for correctness to the best of my ability on 3/6/2024 at 9:23 AM.

## 2024-03-06 NOTE — SIGNIFICANT EVENT
03/06/24 0916   Prechemo Checklist   Has the patient been in the hospital, ED, or urgent care since last date of service No   Chemo/Immuno Consent Signed Yes   Protocol/Indications Verified Yes   Confirmed to previous date/time of medication Yes   Compared to previous dose Yes   All medications are dated accurately Yes   Pregnancy Test Negative Not applicable   Parameters Met Yes   BSA/Weight-Height Verified Yes   Dose Calculations Verified Yes

## 2024-03-06 NOTE — PROGRESS NOTES
Treatment Pre-Review    Diagnosis:  No matching staging information was found for the patient.    Regimen: MVASI Days 1 + 15; Cycle repeats every 28 days        Current Cycle/Day: Cycle 6 Day 1   Treatment Date Appropriate: Yes; Last Treatment: 2/21/24  Date change required: none    Dose or Regimen Modifications: None noted during pre-review    Med Rec/Drug Interactions: None noted during pre-review    Growth Factor: none    Financial Clearance/Authorization: Yes    Echo:  No results found for this or any previous visit from the past 1095 days.     Lifetime Dose Tracking   No doses have been documented on this patient for the following tracked chemicals: doxorubicin, epirubicin, idarubicin, daunorubicin, mitoxantrone, bleomycin, mitomycin, carmustine, doxorubicin HCl pegylated liposomal, daunorubicin citrate liposomal     Comments: none    I Raj Ramirez, PharmD hereby acknowledge that the treatment plan indicated above has been verified for correctness to the best of my ability on 3/6/2024 at 9:26 AM.

## 2024-03-09 ENCOUNTER — APPOINTMENT (OUTPATIENT)
Dept: RADIOLOGY | Facility: HOSPITAL | Age: 39
DRG: 641 | End: 2024-03-09
Payer: MEDICARE

## 2024-03-09 ENCOUNTER — HOSPITAL ENCOUNTER (OUTPATIENT)
Facility: HOSPITAL | Age: 39
Setting detail: OBSERVATION
LOS: 1 days | Discharge: HOME | DRG: 641 | End: 2024-03-09
Attending: FAMILY MEDICINE | Admitting: INTERNAL MEDICINE
Payer: MEDICARE

## 2024-03-09 VITALS
TEMPERATURE: 96.8 F | RESPIRATION RATE: 26 BRPM | SYSTOLIC BLOOD PRESSURE: 137 MMHG | WEIGHT: 166.01 LBS | HEART RATE: 96 BPM | BODY MASS INDEX: 24.59 KG/M2 | OXYGEN SATURATION: 100 % | HEIGHT: 69 IN | DIASTOLIC BLOOD PRESSURE: 86 MMHG

## 2024-03-09 DIAGNOSIS — E86.0 DEHYDRATION: ICD-10-CM

## 2024-03-09 DIAGNOSIS — R18.8 ABDOMINAL FLUID COLLECTION: ICD-10-CM

## 2024-03-09 DIAGNOSIS — E83.42 HYPOMAGNESEMIA: ICD-10-CM

## 2024-03-09 DIAGNOSIS — K52.9 COLITIS: ICD-10-CM

## 2024-03-09 DIAGNOSIS — R62.7 FAILURE TO THRIVE IN ADULT: Primary | ICD-10-CM

## 2024-03-09 LAB
ALBUMIN SERPL BCP-MCNC: 3.2 G/DL (ref 3.4–5)
ALP SERPL-CCNC: 129 U/L (ref 33–120)
ALT SERPL W P-5'-P-CCNC: 7 U/L (ref 10–52)
AMMONIA PLAS-SCNC: 23 UMOL/L (ref 16–53)
AMMONIA PLAS-SCNC: 54 UMOL/L (ref 16–53)
ANION GAP SERPL CALC-SCNC: 15 MMOL/L (ref 10–20)
ANION GAP SERPL CALC-SCNC: 16 MMOL/L (ref 10–20)
APPEARANCE UR: CLEAR
APTT PPP: 42 SECONDS (ref 27–38)
AST SERPL W P-5'-P-CCNC: 8 U/L (ref 9–39)
BASE EXCESS BLDV CALC-SCNC: 5.1 MMOL/L (ref -2–3)
BASOPHILS # BLD AUTO: 0.04 X10*3/UL (ref 0–0.1)
BASOPHILS NFR BLD AUTO: 0.4 %
BILIRUB SERPL-MCNC: 0.7 MG/DL (ref 0–1.2)
BILIRUB UR STRIP.AUTO-MCNC: NEGATIVE MG/DL
BODY TEMPERATURE: ABNORMAL
BUN SERPL-MCNC: 4 MG/DL (ref 6–23)
BUN SERPL-MCNC: 4 MG/DL (ref 6–23)
CALCIUM SERPL-MCNC: 8.4 MG/DL (ref 8.6–10.3)
CALCIUM SERPL-MCNC: 9.2 MG/DL (ref 8.6–10.3)
CARDIAC TROPONIN I PNL SERPL HS: 4 NG/L (ref 0–20)
CHLORIDE SERPL-SCNC: 101 MMOL/L (ref 98–107)
CHLORIDE SERPL-SCNC: 95 MMOL/L (ref 98–107)
CO2 SERPL-SCNC: 23 MMOL/L (ref 21–32)
CO2 SERPL-SCNC: 25 MMOL/L (ref 21–32)
COLOR UR: YELLOW
CREAT SERPL-MCNC: 0.36 MG/DL (ref 0.5–1.3)
CREAT SERPL-MCNC: 0.46 MG/DL (ref 0.5–1.3)
D DIMER PPP FEU-MCNC: 2440 NG/ML FEU
EGFRCR SERPLBLD CKD-EPI 2021: >90 ML/MIN/1.73M*2
EGFRCR SERPLBLD CKD-EPI 2021: >90 ML/MIN/1.73M*2
EOSINOPHIL # BLD AUTO: 0.12 X10*3/UL (ref 0–0.7)
EOSINOPHIL NFR BLD AUTO: 1.2 %
ERYTHROCYTE [DISTWIDTH] IN BLOOD BY AUTOMATED COUNT: 18.8 % (ref 11.5–14.5)
ERYTHROCYTE [DISTWIDTH] IN BLOOD BY AUTOMATED COUNT: 19.3 % (ref 11.5–14.5)
GLUCOSE BLD MANUAL STRIP-MCNC: 151 MG/DL (ref 74–99)
GLUCOSE SERPL-MCNC: 154 MG/DL (ref 74–99)
GLUCOSE SERPL-MCNC: 203 MG/DL (ref 74–99)
GLUCOSE UR STRIP.AUTO-MCNC: NEGATIVE MG/DL
HCO3 BLDV-SCNC: 32.2 MMOL/L (ref 22–26)
HCT VFR BLD AUTO: 32.7 % (ref 41–52)
HCT VFR BLD AUTO: 35.1 % (ref 41–52)
HGB BLD-MCNC: 10.2 G/DL (ref 13.5–17.5)
HGB BLD-MCNC: 11.1 G/DL (ref 13.5–17.5)
HOLD SPECIMEN: NORMAL
IMM GRANULOCYTES # BLD AUTO: 0.04 X10*3/UL (ref 0–0.7)
IMM GRANULOCYTES NFR BLD AUTO: 0.4 % (ref 0–0.9)
INHALED O2 CONCENTRATION: 21 %
INR PPP: 2.3 (ref 0.9–1.1)
KETONES UR STRIP.AUTO-MCNC: NEGATIVE MG/DL
LACTATE SERPL-SCNC: 0.9 MMOL/L (ref 0.4–2)
LACTATE SERPL-SCNC: 4.6 MMOL/L (ref 0.4–2)
LEUKOCYTE ESTERASE UR QL STRIP.AUTO: NEGATIVE
LIPASE SERPL-CCNC: 75 U/L (ref 9–82)
LYMPHOCYTES # BLD AUTO: 1.42 X10*3/UL (ref 1.2–4.8)
LYMPHOCYTES NFR BLD AUTO: 13.8 %
MAGNESIUM SERPL-MCNC: 1.49 MG/DL (ref 1.6–2.4)
MAGNESIUM SERPL-MCNC: 2.23 MG/DL (ref 1.6–2.4)
MCH RBC QN AUTO: 26.2 PG (ref 26–34)
MCH RBC QN AUTO: 27 PG (ref 26–34)
MCHC RBC AUTO-ENTMCNC: 31.2 G/DL (ref 32–36)
MCHC RBC AUTO-ENTMCNC: 31.6 G/DL (ref 32–36)
MCV RBC AUTO: 83 FL (ref 80–100)
MCV RBC AUTO: 87 FL (ref 80–100)
MONOCYTES # BLD AUTO: 0.68 X10*3/UL (ref 0.1–1)
MONOCYTES NFR BLD AUTO: 6.6 %
NEUTROPHILS # BLD AUTO: 7.97 X10*3/UL (ref 1.2–7.7)
NEUTROPHILS NFR BLD AUTO: 77.6 %
NITRITE UR QL STRIP.AUTO: NEGATIVE
NRBC BLD-RTO: 0 /100 WBCS (ref 0–0)
NRBC BLD-RTO: 0 /100 WBCS (ref 0–0)
OXYHGB MFR BLDV: 60.6 % (ref 45–75)
PCO2 BLDV: 57 MM HG (ref 41–51)
PH BLDV: 7.36 PH (ref 7.33–7.43)
PH UR STRIP.AUTO: 7 [PH]
PHOSPHATE SERPL-MCNC: 3.5 MG/DL (ref 2.5–4.9)
PLATELET # BLD AUTO: 247 X10*3/UL (ref 150–450)
PLATELET # BLD AUTO: 300 X10*3/UL (ref 150–450)
PO2 BLDV: 43 MM HG (ref 35–45)
POTASSIUM SERPL-SCNC: 3.3 MMOL/L (ref 3.5–5.3)
POTASSIUM SERPL-SCNC: 3.3 MMOL/L (ref 3.5–5.3)
PROT SERPL-MCNC: 7.3 G/DL (ref 6.4–8.2)
PROT UR STRIP.AUTO-MCNC: NEGATIVE MG/DL
PROTHROMBIN TIME: 26.5 SECONDS (ref 9.8–12.8)
RBC # BLD AUTO: 3.78 X10*6/UL (ref 4.5–5.9)
RBC # BLD AUTO: 4.23 X10*6/UL (ref 4.5–5.9)
RBC # UR STRIP.AUTO: NEGATIVE /UL
SAO2 % BLDV: 64 % (ref 45–75)
SODIUM SERPL-SCNC: 133 MMOL/L (ref 136–145)
SODIUM SERPL-SCNC: 136 MMOL/L (ref 136–145)
SP GR UR STRIP.AUTO: 1.01
UROBILINOGEN UR STRIP.AUTO-MCNC: 4 MG/DL
WBC # BLD AUTO: 10.3 X10*3/UL (ref 4.4–11.3)
WBC # BLD AUTO: 8.9 X10*3/UL (ref 4.4–11.3)

## 2024-03-09 PROCEDURE — 94668 MNPJ CHEST WALL SBSQ: CPT | Mod: IPSPLIT

## 2024-03-09 PROCEDURE — 83735 ASSAY OF MAGNESIUM: CPT | Mod: IPSPLIT | Performed by: INTERNAL MEDICINE

## 2024-03-09 PROCEDURE — 83605 ASSAY OF LACTIC ACID: CPT | Performed by: FAMILY MEDICINE

## 2024-03-09 PROCEDURE — 85025 COMPLETE CBC W/AUTO DIFF WBC: CPT | Performed by: FAMILY MEDICINE

## 2024-03-09 PROCEDURE — 96367 TX/PROPH/DG ADDL SEQ IV INF: CPT

## 2024-03-09 PROCEDURE — 2500000004 HC RX 250 GENERAL PHARMACY W/ HCPCS (ALT 636 FOR OP/ED): Mod: IPSPLIT | Performed by: NURSE PRACTITIONER

## 2024-03-09 PROCEDURE — 83690 ASSAY OF LIPASE: CPT | Performed by: FAMILY MEDICINE

## 2024-03-09 PROCEDURE — 99285 EMERGENCY DEPT VISIT HI MDM: CPT | Mod: 25

## 2024-03-09 PROCEDURE — 96374 THER/PROPH/DIAG INJ IV PUSH: CPT

## 2024-03-09 PROCEDURE — 96365 THER/PROPH/DIAG IV INF INIT: CPT | Mod: 59,IPSPLIT

## 2024-03-09 PROCEDURE — 83735 ASSAY OF MAGNESIUM: CPT | Performed by: FAMILY MEDICINE

## 2024-03-09 PROCEDURE — 82140 ASSAY OF AMMONIA: CPT | Mod: IPSPLIT | Performed by: NURSE PRACTITIONER

## 2024-03-09 PROCEDURE — 2500000002 HC RX 250 W HCPCS SELF ADMINISTERED DRUGS (ALT 637 FOR MEDICARE OP, ALT 636 FOR OP/ED): Mod: IPSPLIT | Performed by: INTERNAL MEDICINE

## 2024-03-09 PROCEDURE — 84100 ASSAY OF PHOSPHORUS: CPT | Mod: IPSPLIT | Performed by: NURSE PRACTITIONER

## 2024-03-09 PROCEDURE — 37799 UNLISTED PX VASCULAR SURGERY: CPT | Mod: IPSPLIT | Performed by: NURSE PRACTITIONER

## 2024-03-09 PROCEDURE — G0378 HOSPITAL OBSERVATION PER HR: HCPCS

## 2024-03-09 PROCEDURE — 85610 PROTHROMBIN TIME: CPT | Performed by: FAMILY MEDICINE

## 2024-03-09 PROCEDURE — 99222 1ST HOSP IP/OBS MODERATE 55: CPT | Performed by: SURGERY

## 2024-03-09 PROCEDURE — 96361 HYDRATE IV INFUSION ADD-ON: CPT

## 2024-03-09 PROCEDURE — 81003 URINALYSIS AUTO W/O SCOPE: CPT | Mod: IPSPLIT | Performed by: FAMILY MEDICINE

## 2024-03-09 PROCEDURE — 74018 RADEX ABDOMEN 1 VIEW: CPT | Performed by: RADIOLOGY

## 2024-03-09 PROCEDURE — 74018 RADEX ABDOMEN 1 VIEW: CPT | Mod: IPSPLIT

## 2024-03-09 PROCEDURE — 2500000004 HC RX 250 GENERAL PHARMACY W/ HCPCS (ALT 636 FOR OP/ED): Mod: SE

## 2024-03-09 PROCEDURE — 84484 ASSAY OF TROPONIN QUANT: CPT | Performed by: FAMILY MEDICINE

## 2024-03-09 PROCEDURE — 96366 THER/PROPH/DIAG IV INF ADDON: CPT

## 2024-03-09 PROCEDURE — 2500000001 HC RX 250 WO HCPCS SELF ADMINISTERED DRUGS (ALT 637 FOR MEDICARE OP): Mod: IPSPLIT | Performed by: NURSE PRACTITIONER

## 2024-03-09 PROCEDURE — 96365 THER/PROPH/DIAG IV INF INIT: CPT

## 2024-03-09 PROCEDURE — 96361 HYDRATE IV INFUSION ADD-ON: CPT | Mod: IPSPLIT

## 2024-03-09 PROCEDURE — 82805 BLOOD GASES W/O2 SATURATION: CPT | Performed by: FAMILY MEDICINE

## 2024-03-09 PROCEDURE — 71045 X-RAY EXAM CHEST 1 VIEW: CPT | Mod: IPSPLIT

## 2024-03-09 PROCEDURE — 71045 X-RAY EXAM CHEST 1 VIEW: CPT | Performed by: RADIOLOGY

## 2024-03-09 PROCEDURE — 85730 THROMBOPLASTIN TIME PARTIAL: CPT | Performed by: FAMILY MEDICINE

## 2024-03-09 PROCEDURE — 2500000002 HC RX 250 W HCPCS SELF ADMINISTERED DRUGS (ALT 637 FOR MEDICARE OP, ALT 636 FOR OP/ED): Mod: IPSPLIT | Performed by: NURSE PRACTITIONER

## 2024-03-09 PROCEDURE — 9420000001 HC RT PATIENT EDUCATION 5 MIN: Mod: IPSPLIT

## 2024-03-09 PROCEDURE — 1200000002 HC GENERAL ROOM WITH TELEMETRY DAILY: Mod: IPSPLIT

## 2024-03-09 PROCEDURE — 87040 BLOOD CULTURE FOR BACTERIA: CPT | Mod: GENLAB | Performed by: NURSE PRACTITIONER

## 2024-03-09 PROCEDURE — 80048 BASIC METABOLIC PNL TOTAL CA: CPT | Mod: CCI,IPSPLIT | Performed by: INTERNAL MEDICINE

## 2024-03-09 PROCEDURE — 2500000005 HC RX 250 GENERAL PHARMACY W/O HCPCS: Mod: IPSPLIT | Performed by: NURSE PRACTITIONER

## 2024-03-09 PROCEDURE — 82140 ASSAY OF AMMONIA: CPT | Performed by: FAMILY MEDICINE

## 2024-03-09 PROCEDURE — 85379 FIBRIN DEGRADATION QUANT: CPT | Mod: IPSPLIT | Performed by: NURSE PRACTITIONER

## 2024-03-09 PROCEDURE — 84075 ASSAY ALKALINE PHOSPHATASE: CPT | Performed by: FAMILY MEDICINE

## 2024-03-09 PROCEDURE — 2500000004 HC RX 250 GENERAL PHARMACY W/ HCPCS (ALT 636 FOR OP/ED): Mod: SE | Performed by: FAMILY MEDICINE

## 2024-03-09 PROCEDURE — 94664 DEMO&/EVAL PT USE INHALER: CPT | Mod: IPSPLIT

## 2024-03-09 PROCEDURE — 96375 TX/PRO/DX INJ NEW DRUG ADDON: CPT | Mod: IPSPLIT

## 2024-03-09 PROCEDURE — 37799 UNLISTED PX VASCULAR SURGERY: CPT | Mod: IPSPLIT | Performed by: INTERNAL MEDICINE

## 2024-03-09 PROCEDURE — 74176 CT ABD & PELVIS W/O CONTRAST: CPT

## 2024-03-09 PROCEDURE — 85027 COMPLETE CBC AUTOMATED: CPT | Mod: IPSPLIT | Performed by: INTERNAL MEDICINE

## 2024-03-09 PROCEDURE — 82947 ASSAY GLUCOSE BLOOD QUANT: CPT | Mod: IPSPLIT

## 2024-03-09 PROCEDURE — 99222 1ST HOSP IP/OBS MODERATE 55: CPT | Performed by: NURSE PRACTITIONER

## 2024-03-09 PROCEDURE — 74176 CT ABD & PELVIS W/O CONTRAST: CPT | Performed by: RADIOLOGY

## 2024-03-09 PROCEDURE — 94640 AIRWAY INHALATION TREATMENT: CPT | Mod: IPSPLIT

## 2024-03-09 PROCEDURE — 2500000001 HC RX 250 WO HCPCS SELF ADMINISTERED DRUGS (ALT 637 FOR MEDICARE OP): Mod: IPSPLIT

## 2024-03-09 PROCEDURE — 36415 COLL VENOUS BLD VENIPUNCTURE: CPT | Mod: IPSPLIT | Performed by: NURSE PRACTITIONER

## 2024-03-09 RX ORDER — DEXTROSE MONOHYDRATE 100 MG/ML
0.3 INJECTION, SOLUTION INTRAVENOUS ONCE AS NEEDED
Status: DISCONTINUED | OUTPATIENT
Start: 2024-03-09 | End: 2024-03-09 | Stop reason: HOSPADM

## 2024-03-09 RX ORDER — AMOXICILLIN 250 MG
1 CAPSULE ORAL NIGHTLY
Status: DISCONTINUED | OUTPATIENT
Start: 2024-03-09 | End: 2024-03-09 | Stop reason: HOSPADM

## 2024-03-09 RX ORDER — LACTULOSE 10 G/15ML
20 SOLUTION ORAL DAILY
Status: DISCONTINUED | OUTPATIENT
Start: 2024-03-09 | End: 2024-03-09

## 2024-03-09 RX ORDER — PANTOPRAZOLE SODIUM 40 MG/1
40 TABLET, DELAYED RELEASE ORAL
Status: DISCONTINUED | OUTPATIENT
Start: 2024-03-09 | End: 2024-03-09 | Stop reason: HOSPADM

## 2024-03-09 RX ORDER — ENOXAPARIN SODIUM 100 MG/ML
40 INJECTION SUBCUTANEOUS EVERY 24 HOURS
Status: DISCONTINUED | OUTPATIENT
Start: 2024-03-09 | End: 2024-03-09 | Stop reason: HOSPADM

## 2024-03-09 RX ORDER — MORPHINE SULFATE 2 MG/ML
2 INJECTION, SOLUTION INTRAMUSCULAR; INTRAVENOUS EVERY 4 HOURS PRN
Status: DISCONTINUED | OUTPATIENT
Start: 2024-03-09 | End: 2024-03-09 | Stop reason: HOSPADM

## 2024-03-09 RX ORDER — SUCRALFATE 1 G/10ML
1 SUSPENSION ORAL EVERY 6 HOURS SCHEDULED
Status: DISCONTINUED | OUTPATIENT
Start: 2024-03-09 | End: 2024-03-09 | Stop reason: HOSPADM

## 2024-03-09 RX ORDER — POLYETHYLENE GLYCOL 3350 17 G/17G
17 POWDER, FOR SOLUTION ORAL 2 TIMES DAILY
Status: DISCONTINUED | OUTPATIENT
Start: 2024-03-09 | End: 2024-03-09 | Stop reason: HOSPADM

## 2024-03-09 RX ORDER — POTASSIUM CHLORIDE 20 MEQ/1
20 TABLET, EXTENDED RELEASE ORAL ONCE
Status: DISCONTINUED | OUTPATIENT
Start: 2024-03-09 | End: 2024-03-09 | Stop reason: HOSPADM

## 2024-03-09 RX ORDER — LACTULOSE 10 G/15ML
20 SOLUTION ORAL 3 TIMES DAILY
Status: DISCONTINUED | OUTPATIENT
Start: 2024-03-09 | End: 2024-03-09 | Stop reason: HOSPADM

## 2024-03-09 RX ORDER — DEXTROSE MONOHYDRATE 100 MG/ML
0.3 INJECTION, SOLUTION INTRAVENOUS ONCE AS NEEDED
Status: DISCONTINUED | OUTPATIENT
Start: 2024-03-09 | End: 2024-03-09

## 2024-03-09 RX ORDER — METOPROLOL SUCCINATE 25 MG/1
25 TABLET, EXTENDED RELEASE ORAL DAILY
Status: DISCONTINUED | OUTPATIENT
Start: 2024-03-09 | End: 2024-03-09 | Stop reason: HOSPADM

## 2024-03-09 RX ORDER — DOCUSATE SODIUM 100 MG/1
100 CAPSULE, LIQUID FILLED ORAL 2 TIMES DAILY
Status: DISCONTINUED | OUTPATIENT
Start: 2024-03-09 | End: 2024-03-09

## 2024-03-09 RX ORDER — IBUPROFEN 200 MG
1 TABLET ORAL DAILY
Status: DISCONTINUED | OUTPATIENT
Start: 2024-03-09 | End: 2024-03-09 | Stop reason: HOSPADM

## 2024-03-09 RX ORDER — LOSARTAN POTASSIUM 50 MG/1
50 TABLET ORAL DAILY
Status: DISCONTINUED | OUTPATIENT
Start: 2024-03-09 | End: 2024-03-09 | Stop reason: HOSPADM

## 2024-03-09 RX ORDER — DEXTROSE 50 % IN WATER (D50W) INTRAVENOUS SYRINGE
25
Status: DISCONTINUED | OUTPATIENT
Start: 2024-03-09 | End: 2024-03-09

## 2024-03-09 RX ORDER — KETOROLAC TROMETHAMINE 15 MG/ML
15 INJECTION, SOLUTION INTRAMUSCULAR; INTRAVENOUS ONCE
Status: COMPLETED | OUTPATIENT
Start: 2024-03-09 | End: 2024-03-09

## 2024-03-09 RX ORDER — ALBUTEROL SULFATE 0.83 MG/ML
2.5 SOLUTION RESPIRATORY (INHALATION) EVERY 2 HOUR PRN
Status: DISCONTINUED | OUTPATIENT
Start: 2024-03-09 | End: 2024-03-09 | Stop reason: HOSPADM

## 2024-03-09 RX ORDER — ONDANSETRON 4 MG/1
4 TABLET, FILM COATED ORAL EVERY 8 HOURS PRN
Status: DISCONTINUED | OUTPATIENT
Start: 2024-03-09 | End: 2024-03-09 | Stop reason: HOSPADM

## 2024-03-09 RX ORDER — ONDANSETRON HYDROCHLORIDE 2 MG/ML
4 INJECTION, SOLUTION INTRAVENOUS ONCE
Status: COMPLETED | OUTPATIENT
Start: 2024-03-09 | End: 2024-03-09

## 2024-03-09 RX ORDER — ONDANSETRON HYDROCHLORIDE 2 MG/ML
4 INJECTION, SOLUTION INTRAVENOUS EVERY 8 HOURS PRN
Status: DISCONTINUED | OUTPATIENT
Start: 2024-03-09 | End: 2024-03-09 | Stop reason: HOSPADM

## 2024-03-09 RX ORDER — MAGNESIUM HYDROXIDE 2400 MG/10ML
10 SUSPENSION ORAL DAILY PRN
Status: DISCONTINUED | OUTPATIENT
Start: 2024-03-09 | End: 2024-03-09 | Stop reason: HOSPADM

## 2024-03-09 RX ORDER — PANTOPRAZOLE SODIUM 40 MG/10ML
40 INJECTION, POWDER, LYOPHILIZED, FOR SOLUTION INTRAVENOUS
Status: DISCONTINUED | OUTPATIENT
Start: 2024-03-09 | End: 2024-03-09 | Stop reason: HOSPADM

## 2024-03-09 RX ORDER — POLYETHYLENE GLYCOL 3350 17 G/17G
17 POWDER, FOR SOLUTION ORAL DAILY PRN
Status: DISCONTINUED | OUTPATIENT
Start: 2024-03-09 | End: 2024-03-09 | Stop reason: HOSPADM

## 2024-03-09 RX ORDER — MAGNESIUM SULFATE HEPTAHYDRATE 40 MG/ML
2 INJECTION, SOLUTION INTRAVENOUS ONCE
Status: COMPLETED | OUTPATIENT
Start: 2024-03-09 | End: 2024-03-09

## 2024-03-09 RX ORDER — ATORVASTATIN CALCIUM 40 MG/1
40 TABLET, FILM COATED ORAL NIGHTLY
Status: DISCONTINUED | OUTPATIENT
Start: 2024-03-09 | End: 2024-03-09 | Stop reason: HOSPADM

## 2024-03-09 RX ORDER — BISACODYL 10 MG/1
10 SUPPOSITORY RECTAL DAILY PRN
Status: DISCONTINUED | OUTPATIENT
Start: 2024-03-09 | End: 2024-03-09 | Stop reason: HOSPADM

## 2024-03-09 RX ORDER — TALC
3 POWDER (GRAM) TOPICAL NIGHTLY PRN
Status: DISCONTINUED | OUTPATIENT
Start: 2024-03-09 | End: 2024-03-09 | Stop reason: HOSPADM

## 2024-03-09 RX ORDER — ALBUTEROL SULFATE 90 UG/1
2 AEROSOL, METERED RESPIRATORY (INHALATION) EVERY 6 HOURS PRN
Status: DISCONTINUED | OUTPATIENT
Start: 2024-03-09 | End: 2024-03-09

## 2024-03-09 RX ORDER — IBUPROFEN 400 MG/1
400 TABLET ORAL EVERY 4 HOURS PRN
Status: DISCONTINUED | OUTPATIENT
Start: 2024-03-09 | End: 2024-03-09

## 2024-03-09 RX ORDER — DEXTROSE 50 % IN WATER (D50W) INTRAVENOUS SYRINGE
25
Status: DISCONTINUED | OUTPATIENT
Start: 2024-03-09 | End: 2024-03-09 | Stop reason: HOSPADM

## 2024-03-09 RX ORDER — MORPHINE SULFATE 4 MG/ML
INJECTION, SOLUTION INTRAMUSCULAR; INTRAVENOUS
Status: COMPLETED
Start: 2024-03-09 | End: 2024-03-09

## 2024-03-09 RX ORDER — DOXEPIN HYDROCHLORIDE 10 MG/1
10 CAPSULE ORAL NIGHTLY PRN
Status: DISCONTINUED | OUTPATIENT
Start: 2024-03-09 | End: 2024-03-09 | Stop reason: HOSPADM

## 2024-03-09 RX ADMIN — LOSARTAN POTASSIUM 50 MG: 50 TABLET, FILM COATED ORAL at 09:07

## 2024-03-09 RX ADMIN — KETOROLAC TROMETHAMINE 15 MG: 15 INJECTION INTRAMUSCULAR; INTRAVENOUS at 01:30

## 2024-03-09 RX ADMIN — SUCRALFATE 1 G: 1 SUSPENSION ORAL at 08:04

## 2024-03-09 RX ADMIN — DOCUSATE SODIUM 100 MG: 100 CAPSULE, LIQUID FILLED ORAL at 09:07

## 2024-03-09 RX ADMIN — MORPHINE SULFATE 4 MG: 4 INJECTION, SOLUTION INTRAMUSCULAR; INTRAVENOUS at 03:15

## 2024-03-09 RX ADMIN — SODIUM CHLORIDE 1000 ML: 9 INJECTION, SOLUTION INTRAVENOUS at 03:15

## 2024-03-09 RX ADMIN — METOPROLOL SUCCINATE 25 MG: 25 TABLET, EXTENDED RELEASE ORAL at 09:07

## 2024-03-09 RX ADMIN — PANTOPRAZOLE SODIUM 40 MG: 40 TABLET, DELAYED RELEASE ORAL at 06:46

## 2024-03-09 RX ADMIN — IBUPROFEN 400 MG: 400 TABLET ORAL at 06:31

## 2024-03-09 RX ADMIN — ONDANSETRON 4 MG: 2 INJECTION INTRAMUSCULAR; INTRAVENOUS at 01:30

## 2024-03-09 RX ADMIN — Medication 2 L/MIN: at 08:16

## 2024-03-09 RX ADMIN — ALBUTEROL SULFATE 2.5 MG: 2.5 SOLUTION RESPIRATORY (INHALATION) at 06:48

## 2024-03-09 RX ADMIN — MORPHINE SULFATE 2 MG: 2 INJECTION, SOLUTION INTRAMUSCULAR; INTRAVENOUS at 07:39

## 2024-03-09 RX ADMIN — ENOXAPARIN SODIUM 40 MG: 40 INJECTION SUBCUTANEOUS at 08:04

## 2024-03-09 RX ADMIN — SODIUM CHLORIDE 1000 ML: 9 INJECTION, SOLUTION INTRAVENOUS at 01:29

## 2024-03-09 RX ADMIN — PIPERACILLIN SODIUM AND TAZOBACTAM SODIUM 3.38 G: 3; .375 INJECTION, SOLUTION INTRAVENOUS at 04:32

## 2024-03-09 RX ADMIN — MAGNESIUM SULFATE IN WATER 2 G: 40 INJECTION, SOLUTION INTRAVENOUS at 03:15

## 2024-03-09 SDOH — SOCIAL STABILITY: SOCIAL INSECURITY: WERE YOU ABLE TO COMPLETE ALL THE BEHAVIORAL HEALTH SCREENINGS?: YES

## 2024-03-09 SDOH — SOCIAL STABILITY: SOCIAL INSECURITY: DOES ANYONE TRY TO KEEP YOU FROM HAVING/CONTACTING OTHER FRIENDS OR DOING THINGS OUTSIDE YOUR HOME?: UNABLE TO ASSESS

## 2024-03-09 SDOH — SOCIAL STABILITY: SOCIAL INSECURITY: ARE YOU OR HAVE YOU BEEN THREATENED OR ABUSED PHYSICALLY, EMOTIONALLY, OR SEXUALLY BY ANYONE?: UNABLE TO ASSESS

## 2024-03-09 SDOH — SOCIAL STABILITY: SOCIAL INSECURITY: HAVE YOU HAD THOUGHTS OF HARMING ANYONE ELSE?: NO

## 2024-03-09 SDOH — SOCIAL STABILITY: SOCIAL INSECURITY: HAS ANYONE EVER THREATENED TO HURT YOUR FAMILY OR YOUR PETS?: UNABLE TO ASSESS

## 2024-03-09 SDOH — SOCIAL STABILITY: SOCIAL INSECURITY: DO YOU FEEL UNSAFE GOING BACK TO THE PLACE WHERE YOU ARE LIVING?: UNABLE TO ASSESS

## 2024-03-09 SDOH — SOCIAL STABILITY: SOCIAL INSECURITY: DO YOU FEEL ANYONE HAS EXPLOITED OR TAKEN ADVANTAGE OF YOU FINANCIALLY OR OF YOUR PERSONAL PROPERTY?: UNABLE TO ASSESS

## 2024-03-09 SDOH — SOCIAL STABILITY: SOCIAL INSECURITY: ABUSE: ADULT

## 2024-03-09 SDOH — SOCIAL STABILITY: SOCIAL INSECURITY: ARE THERE ANY APPARENT SIGNS OF INJURIES/BEHAVIORS THAT COULD BE RELATED TO ABUSE/NEGLECT?: UNABLE TO ASSESS

## 2024-03-09 ASSESSMENT — ACTIVITIES OF DAILY LIVING (ADL)
JUDGMENT_ADEQUATE_SAFELY_COMPLETE_DAILY_ACTIVITIES: UNABLE TO ASSESS
GROOMING: NEEDS ASSISTANCE
HEARING - RIGHT EAR: FUNCTIONAL
TOILETING: UNABLE TO ASSESS
HEARING - LEFT EAR: FUNCTIONAL
LACK_OF_TRANSPORTATION: PATIENT DECLINED
ADEQUATE_TO_COMPLETE_ADL: YES
DRESSING YOURSELF: NEEDS ASSISTANCE
BATHING: UNABLE TO ASSESS
WALKS IN HOME: INDEPENDENT
PATIENT'S MEMORY ADEQUATE TO SAFELY COMPLETE DAILY ACTIVITIES?: UNABLE TO ASSESS
FEEDING YOURSELF: UNABLE TO ASSESS

## 2024-03-09 ASSESSMENT — COGNITIVE AND FUNCTIONAL STATUS - GENERAL
CLIMB 3 TO 5 STEPS WITH RAILING: A LITTLE
DAILY ACTIVITIY SCORE: 24
PATIENT BASELINE BEDBOUND: NO
MOBILITY SCORE: 23

## 2024-03-09 ASSESSMENT — ENCOUNTER SYMPTOMS
MUSCULOSKELETAL NEGATIVE: 1
ABDOMINAL DISTENTION: 1
PSYCHIATRIC NEGATIVE: 1
FATIGUE: 1
ALLERGIC/IMMUNOLOGIC NEGATIVE: 1
ENDOCRINE NEGATIVE: 1
CARDIOVASCULAR NEGATIVE: 1
CONSTIPATION: 1
RESPIRATORY NEGATIVE: 1
ABDOMINAL PAIN: 1
APPETITE CHANGE: 1
HEMATOLOGIC/LYMPHATIC NEGATIVE: 1
EYES NEGATIVE: 1
NEUROLOGICAL NEGATIVE: 1

## 2024-03-09 ASSESSMENT — PAIN DESCRIPTION - LOCATION
LOCATION: ABDOMEN

## 2024-03-09 ASSESSMENT — PAIN SCALES - GENERAL
PAINLEVEL_OUTOF10: 7
PAINLEVEL_OUTOF10: 3
PAINLEVEL_OUTOF10: 10 - WORST POSSIBLE PAIN
PAINLEVEL_OUTOF10: 10 - WORST POSSIBLE PAIN
PAINLEVEL_OUTOF10: 6
PAINLEVEL_OUTOF10: 5 - MODERATE PAIN
PAINLEVEL_OUTOF10: 10 - WORST POSSIBLE PAIN
PAINLEVEL_OUTOF10: 8

## 2024-03-09 ASSESSMENT — LIFESTYLE VARIABLES
AUDIT-C TOTAL SCORE: 0
HOW OFTEN DO YOU HAVE 6 OR MORE DRINKS ON ONE OCCASION: NEVER
AUDIT-C TOTAL SCORE: 0
HOW MANY STANDARD DRINKS CONTAINING ALCOHOL DO YOU HAVE ON A TYPICAL DAY: PATIENT DOES NOT DRINK
HOW OFTEN DO YOU HAVE A DRINK CONTAINING ALCOHOL: NEVER
SKIP TO QUESTIONS 9-10: 1

## 2024-03-09 ASSESSMENT — PAIN DESCRIPTION - DESCRIPTORS: DESCRIPTORS: ACHING

## 2024-03-09 ASSESSMENT — PAIN - FUNCTIONAL ASSESSMENT
PAIN_FUNCTIONAL_ASSESSMENT: 0-10

## 2024-03-09 ASSESSMENT — PAIN DESCRIPTION - PROGRESSION: CLINICAL_PROGRESSION: GRADUALLY IMPROVING

## 2024-03-09 ASSESSMENT — PATIENT HEALTH QUESTIONNAIRE - PHQ9
SUM OF ALL RESPONSES TO PHQ9 QUESTIONS 1 & 2: 3
1. LITTLE INTEREST OR PLEASURE IN DOING THINGS: MORE THAN HALF THE DAYS
2. FEELING DOWN, DEPRESSED OR HOPELESS: SEVERAL DAYS

## 2024-03-09 ASSESSMENT — PAIN DESCRIPTION - PAIN TYPE
TYPE: ACUTE PAIN
TYPE: ACUTE PAIN
TYPE: CHRONIC PAIN

## 2024-03-09 NOTE — H&P
History Of Present Illness  Misael Hernandez is a 38 y.o. male with a past medical history of DM, PE, pancreatitis, HTN, opioid use disorder, rectal cancer Stage IV M1b, mets to lungs, bilateral adrenal glands currently on irinotecan and cetuuximab presenting with failure to thrive, poor po intake, constipation and abdominal pain.      Past Medical History  He has a past medical history of Other conditions influencing health status.    Surgical History  He has a past surgical history that includes Colon surgery (01/22/2014); Hand surgery (01/22/2014); Esophagogastroduodenoscopy (01/22/2014); Other surgical history (07/07/2014); CT angio head w and wo IV contrast (9/10/2014); MR head angio w IV contrast (9/10/2014); and CT guided imaging for needle placement (6/27/2018).     Social History  He reports that he has been smoking cigarettes. He has been smoking an average of 1 pack per day. He has never been exposed to tobacco smoke. He has never used smokeless tobacco. He reports that he does not currently use alcohol. He reports current drug use. Drug: Marijuana.    Family History  Family History   Problem Relation Name Age of Onset    Other (ADENOCARCINOMA OF THE ESOPHAGUS) Father      Diabetes Father's Brother      Diabetes Maternal Grandfather      Cancer Maternal Grandfather      Cancer Other UNCLE         Allergies  Acetaminophen and Adhesive tape-silicones    Review of Systems   Constitutional:  Positive for appetite change and fatigue.   HENT: Negative.     Eyes: Negative.    Respiratory: Negative.     Cardiovascular: Negative.    Gastrointestinal:  Positive for abdominal distention and constipation.   Endocrine: Negative.    Genitourinary: Negative.    Musculoskeletal: Negative.    Allergic/Immunologic: Negative.    Neurological: Negative.    Hematological: Negative.    Psychiatric/Behavioral: Negative.          Physical Exam  Constitutional:       Appearance: He is ill-appearing.   HENT:      Head: Normocephalic  and atraumatic.      Nose: Nose normal.      Mouth/Throat:      Mouth: Mucous membranes are moist.   Eyes:      Extraocular Movements: Extraocular movements intact.   Cardiovascular:      Rate and Rhythm: Normal rate and regular rhythm.      Pulses: Normal pulses.      Heart sounds: Normal heart sounds.   Pulmonary:      Comments: tachypneic  Abdominal:      Comments: Firm with RUQ tenderness to palpation hypoactive BS    Musculoskeletal:         General: Normal range of motion.      Cervical back: Normal range of motion.   Skin:     General: Skin is warm and dry.      Capillary Refill: Capillary refill takes less than 2 seconds.   Neurological:      General: No focal deficit present.      Mental Status: He is alert and oriented to person, place, and time.   Psychiatric:         Mood and Affect: Mood normal.         Behavior: Behavior normal.          Last Recorded Vitals  /90 (BP Location: Left arm, Patient Position: Lying)   Pulse 106   Temp 36.1 °C (97 °F) (Temporal)   Resp 25   Wt 75.3 kg (166 lb 0.1 oz)   SpO2 98%     Relevant Results        Scheduled medications  atorvastatin, 40 mg, oral, Nightly  enoxaparin, 40 mg, subcutaneous, q24h  insulin regular, 0-10 Units, subcutaneous, TID with meals  lactulose, 20 g, oral, TID  losartan, 50 mg, oral, Daily  metoprolol succinate XL, 25 mg, oral, Daily  nicotine, 1 patch, transdermal, Daily  pantoprazole, 40 mg, oral, Daily before breakfast   Or  pantoprazole, 40 mg, intravenous, Daily before breakfast  polyethylene glycol, 17 g, oral, BID  potassium chloride CR, 20 mEq, oral, Once  sennosides-docusate sodium, 1 tablet, oral, Nightly  sucralfate, 1 g, oral, q6h JULIO      Continuous medications     PRN medications  PRN medications: albuterol, bisacodyl, dextrose 10 % in water (D10W), dextrose, doxepin, glucagon, magnesium hydroxide, melatonin, morphine, ondansetron, ondansetron, oxygen, polyethylene glycol  Results for orders placed or performed during the  hospital encounter of 03/09/24 (from the past 24 hour(s))   CBC and Auto Differential   Result Value Ref Range    WBC 10.3 4.4 - 11.3 x10*3/uL    nRBC 0.0 0.0 - 0.0 /100 WBCs    RBC 4.23 (L) 4.50 - 5.90 x10*6/uL    Hemoglobin 11.1 (L) 13.5 - 17.5 g/dL    Hematocrit 35.1 (L) 41.0 - 52.0 %    MCV 83 80 - 100 fL    MCH 26.2 26.0 - 34.0 pg    MCHC 31.6 (L) 32.0 - 36.0 g/dL    RDW 18.8 (H) 11.5 - 14.5 %    Platelets 300 150 - 450 x10*3/uL    Neutrophils % 77.6 40.0 - 80.0 %    Immature Granulocytes %, Automated 0.4 0.0 - 0.9 %    Lymphocytes % 13.8 13.0 - 44.0 %    Monocytes % 6.6 2.0 - 10.0 %    Eosinophils % 1.2 0.0 - 6.0 %    Basophils % 0.4 0.0 - 2.0 %    Neutrophils Absolute 7.97 (H) 1.20 - 7.70 x10*3/uL    Immature Granulocytes Absolute, Automated 0.04 0.00 - 0.70 x10*3/uL    Lymphocytes Absolute 1.42 1.20 - 4.80 x10*3/uL    Monocytes Absolute 0.68 0.10 - 1.00 x10*3/uL    Eosinophils Absolute 0.12 0.00 - 0.70 x10*3/uL    Basophils Absolute 0.04 0.00 - 0.10 x10*3/uL   Comprehensive metabolic panel   Result Value Ref Range    Glucose 203 (H) 74 - 99 mg/dL    Sodium 133 (L) 136 - 145 mmol/L    Potassium 3.3 (L) 3.5 - 5.3 mmol/L    Chloride 95 (L) 98 - 107 mmol/L    Bicarbonate 25 21 - 32 mmol/L    Anion Gap 16 10 - 20 mmol/L    Urea Nitrogen 4 (L) 6 - 23 mg/dL    Creatinine 0.46 (L) 0.50 - 1.30 mg/dL    eGFR >90 >60 mL/min/1.73m*2    Calcium 9.2 8.6 - 10.3 mg/dL    Albumin 3.2 (L) 3.4 - 5.0 g/dL    Alkaline Phosphatase 129 (H) 33 - 120 U/L    Total Protein 7.3 6.4 - 8.2 g/dL    AST 8 (L) 9 - 39 U/L    Bilirubin, Total 0.7 0.0 - 1.2 mg/dL    ALT 7 (L) 10 - 52 U/L   Lipase   Result Value Ref Range    Lipase 75 9 - 82 U/L   Magnesium   Result Value Ref Range    Magnesium 1.49 (L) 1.60 - 2.40 mg/dL   Troponin I, High Sensitivity   Result Value Ref Range    Troponin I, High Sensitivity 4 0 - 20 ng/L   Lactate   Result Value Ref Range    Lactate 4.6 (HH) 0.4 - 2.0 mmol/L   Blood Gas Venous   Result Value Ref Range    POCT  pH, Venous 7.36 7.33 - 7.43 pH    POCT pCO2, Venous 57 (H) 41 - 51 mm Hg    POCT pO2, Venous 43 35 - 45 mm Hg    POCT SO2, Venous 64 45 - 75 %    POCT Oxy Hemoglobin, Venous 60.6 45.0 - 75.0 %    POCT Base Excess, Venous 5.1 (H) -2.0 - 3.0 mmol/L    POCT HCO3 Calculated, Venous 32.2 (H) 22.0 - 26.0 mmol/L    Patient Temperature      FiO2 21 %   Protime-INR   Result Value Ref Range    Protime 26.5 (H) 9.8 - 12.8 seconds    INR 2.3 (H) 0.9 - 1.1   APTT   Result Value Ref Range    aPTT 42 (H) 27 - 38 seconds   Ammonia   Result Value Ref Range    Ammonia 54 (H) 16 - 53 umol/L   Lactate   Result Value Ref Range    Lactate 0.9 0.4 - 2.0 mmol/L   Urinalysis with Reflex Culture and Microscopic   Result Value Ref Range    Color, Urine Yellow Straw, Yellow    Appearance, Urine Clear Clear    Specific Gravity, Urine 1.009 1.005 - 1.035    pH, Urine 7.0 5.0, 5.5, 6.0, 6.5, 7.0, 7.5, 8.0    Protein, Urine NEGATIVE NEGATIVE mg/dL    Glucose, Urine NEGATIVE NEGATIVE mg/dL    Blood, Urine NEGATIVE NEGATIVE    Ketones, Urine NEGATIVE NEGATIVE mg/dL    Bilirubin, Urine NEGATIVE NEGATIVE    Urobilinogen, Urine 4.0 (N) <2.0 mg/dL    Nitrite, Urine NEGATIVE NEGATIVE    Leukocyte Esterase, Urine NEGATIVE NEGATIVE   CBC   Result Value Ref Range    WBC 8.9 4.4 - 11.3 x10*3/uL    nRBC 0.0 0.0 - 0.0 /100 WBCs    RBC 3.78 (L) 4.50 - 5.90 x10*6/uL    Hemoglobin 10.2 (L) 13.5 - 17.5 g/dL    Hematocrit 32.7 (L) 41.0 - 52.0 %    MCV 87 80 - 100 fL    MCH 27.0 26.0 - 34.0 pg    MCHC 31.2 (L) 32.0 - 36.0 g/dL    RDW 19.3 (H) 11.5 - 14.5 %    Platelets 247 150 - 450 x10*3/uL   Basic Metabolic Panel   Result Value Ref Range    Glucose 154 (H) 74 - 99 mg/dL    Sodium 136 136 - 145 mmol/L    Potassium 3.3 (L) 3.5 - 5.3 mmol/L    Chloride 101 98 - 107 mmol/L    Bicarbonate 23 21 - 32 mmol/L    Anion Gap 15 10 - 20 mmol/L    Urea Nitrogen 4 (L) 6 - 23 mg/dL    Creatinine 0.36 (L) 0.50 - 1.30 mg/dL    eGFR >90 >60 mL/min/1.73m*2    Calcium 8.4 (L) 8.6 -  "10.3 mg/dL   Magnesium   Result Value Ref Range    Magnesium 2.23 1.60 - 2.40 mg/dL   SST TOP   Result Value Ref Range    Extra Tube Hold for add-ons.    Light Blue Top   Result Value Ref Range    Extra Tube Hold for add-ons.    Phosphorus   Result Value Ref Range    Phosphorus 3.5 2.5 - 4.9 mg/dL   POCT GLUCOSE   Result Value Ref Range    POCT Glucose 151 (H) 74 - 99 mg/dL   Ammonia   Result Value Ref Range    Ammonia 23 16 - 53 umol/L          Assessment/Plan   Principal Problem:    Failure to thrive in adult    Rectal Cancer (with Stage IV, M1b)mets to the bilateral adrenals and lungs   STAGING  Stage IV, M1b   Stage III, ypT3 ypN1b at diagnosis     CURRENT SITES OF DISEASE  Lungs, mediastinal and hilar lymph nodes, b/l adrenal glands  Presacral soft tissue swelling thought to be post-operative vs. residual tumor   PRIOR THERAPY  Neoadjuvant capecitabine and radiation  Adjuvant FOLFOX  Capecitabine, bevacizumab      CURRENT THERAPY  Irinotecan, cetuximab   CT ABD:Redemonstrated wall thickening of the stomach with persistent  adjacent inflammatory stranding. There are fluid collections again  seen in the left upper quadrant, largest measuring up to 2.7 x 3.4  cm, which may represent abscesses or pseudocysts - likely residual from Jan. Admission and Dr. Fernando aware  -   - constipation - has not had bm in 3-4 days and has (+) RUQ tenderness -   - NH3 54>23  - lactulose ordered until bm - concern for obstruction  - liver US pending - pt left AMA before completing   - Jan. 24 admission with pancreatitis and abd abscesses that were drained by Dr. Fernando - 1/11 culture of fluid from abscess drainage no growth   - blood cultures pending, UA neg CXR report pending - film appears similar to 1/8/24 - no focal infiltrate or pneumo - multiple metastatic masses - throughout, resp cx not completed before leaving AMA - no sputum  - tachypneic, tachycardic - may be chronic from \"stable PE\" lung masses - cx's pending CT abd - no " acute process found   - room air at home - now requiring 2L NC - infectious work-up in progress - has chronic PE and multiple sizable lung masses - left AMA before CT chest could be completed   - CT chest 1/8/24: Stable pulmonary emboli in the segmental and subsegmental arteries - to the right upper lobe - on eliquis at home - lovenox here until certain no surgical intervention required     Diabetes  - hold home metformin as inpatient - sliding scale coverage AC and HS - known refusal to see endocrine as at oupatient     HTN  - continue cozaar, metoprolol    Opioid use disorder  - 2/20/24 visit with Dr. Dominguez - THC use chronically  - opioid abstinence since May 2015  - on suboxone at home with q4wk follow ups     F: 2LNS bolus in the ED - cardiac diet  E: replete as needed  N: as above  A: PIV and right chest tunneled catheter    Code Status; Full code  Dispo: pt requires more than 2 days inpatient at this time     11:00 am patient discussed options with Dr. Walker - wishes to leave AMA and was told he could have serious, life-threatening complications     Total accumulated time spent face to face and not face to face preparing to see the patient, obtaining and reviewing separately obtained history; performing a medically appropriate examination and/or evaluation; counseling and educating the patient, family; ordering medications, tests, or procedures; referring and communicating with other health care professionals; documenting clinical information in the patient's medical record; independently interpreting results and communicating the results to the patient, family; and care coordination was 45 minutes          DARWIN Tyler-CNP

## 2024-03-09 NOTE — NURSING NOTE
"0726: C/o abd pain \"10\"/10, no pain meds on profile. Jeremi MELISA notified via secure chat of pain, home meds not ordered.     0740: ROWENA Blood CNP at bedside, pt seen. Jeremi wants lovenox given and is going to hold the eliquis for now.     0843: bedside KUB    1000: Refused miralax, ROWENA Blood CNP notified. Pt reported that he is feeling better and wants to go home. Jeremi also made aware of this. Pt aware he is not discharged.     1035: CXR done.    1045: Went in room to scan pt for nicotine patch. Pt said he wants to leave and go home now. Pt is shaking his Rt Leg fast while talking. ROWENA Blood CNP notified and asked to come speak with pt.     1051: Dr Walker & ROWENA Blood CNP at bedside and discuss AMA discharge with pt. Pt wants to go AMA. Dr Walker verbalized  instructions to the patient to return to the ER if he becomes worse.     1105: Pt dressed, signs AMA paperwork. Says he just \"wants to go home\" and gives no further reasoning for leaving. Asked pt if there is anything we could do/ do differently for him to stay and he replied \"no, you guys have been great. I just want to go home\". Refused potassium dose, says he has some at home and will take. Nicotine patch never applied. Awaiting arrival of ride.   "

## 2024-03-09 NOTE — CARE PLAN
The patient's goals for the shift include      The clinical goals for the shift include Less abdominal pain by shift end.    Pt left AMA.

## 2024-03-09 NOTE — ED PROVIDER NOTES
"HPI   Chief Complaint   Patient presents with    Abdominal Pain     Patient reports that he feels that his \"pancreatitis is acting up\" abdominal pain started a \"few hours\" ago, deneis nausea, vomiting, or diarrhea, Chemo Wednesday, treatment for Colon, Kidney, Lung,        38-year-old male with rectal cancer with mets comes to the ED via EMS per family's request since patient is continue to have failure to thrive for the past 1 to 2 weeks.  According family patient has not had much of an appetite and has had nausea and not eating or drinking much.  Patient began reporting generalized abdominal pain the last several days continue to appear to be deteriorating due to not wanting to eat or drink or take care of himself.  EMS was called and patient was brought into the ED.  Patient upon arrival to the ED was alert, cooperative, appears anxious, uncomfortable, but in no distress.  Patient corroborates report that he had nausea and not been eating or drinking much the last week or 2.  Patient also reports increased intensity of his abdominal pain concerning him that he may have pancreatitis.  Patient describes the pain as sharp and rates an 8/10.  Patient currently denies headache, neck pain, chest pain, back pain, shortness of breath, fevers, falls, recent travel, sick contacts, hematemesis, diarrhea, black tarry stools, blood in stool, dysuria, hematuria, dizziness, and weakness.      History provided by:  Patient, relative, medical records and EMS personnel   used: No                        Donna Coma Scale Score: 15                     Patient History   Past Medical History:   Diagnosis Date    Other conditions influencing health status     Rectal Cancer     Past Surgical History:   Procedure Laterality Date    COLON SURGERY  01/22/2014    Colon Surgery    CT GUIDED IMAGING FOR NEEDLE PLACEMENT  6/27/2018    CT GUIDED IMAGING FOR NEEDLE PLACEMENT [facility]    CT HEAD ANGIO W AND WO IV " CONTRAST  9/10/2014    CT HEAD ANGIO W AND WO IV [place]AST [place] [facility]    ESOPHAGOGASTRODUODENOSCOPY  01/22/2014    Diagnostic Esophagogastroduodenoscopy    HAND SURGERY  01/22/2014    Hand Surgery                                                                                                                                                          MR HEAD ANGIO W IV CONTRAST  9/10/2014    MR HEAD ANGIO W IV CONTRAST [place] [facility] [place] [facility]    OTHER SURGICAL HISTORY  07/07/2014    Laparoscopy Total Colectomy W/ Proctectomy, Ileostomy     Family History   Problem Relation Name Age of Onset    Other (ADENOCARCINOMA OF THE ESOPHAGUS) Father      Diabetes Father's Brother      Diabetes Maternal Grandfather      Cancer Maternal Grandfather      Cancer Other UNCLE      Social History     Tobacco Use    Smoking status: Every Day     Packs/day: 1     Types: Cigarettes     Passive exposure: Never    Smokeless tobacco: Never   Vaping Use    Vaping Use: Never used   Substance Use Topics    Alcohol use: Not Currently    Drug use: Yes     Types: Marijuana       Physical Exam   ED Triage Vitals [03/09/24 0111]   Temperature Heart Rate Respirations BP   36.8 °C (98.2 °F) (!) 128 (!) 34 (!) 130/95      Pulse Ox Temp Source Heart Rate Source Patient Position   98 % Skin Monitor --      BP Location FiO2 (%)     -- --       Physical Exam  Vitals and nursing note reviewed.   Constitutional:       General: He is not in acute distress.     Appearance: He is well-developed.   HENT:      Head: Normocephalic and atraumatic.   Eyes:      Conjunctiva/sclera: Conjunctivae normal.   Cardiovascular:      Rate and Rhythm: Regular rhythm. Tachycardia present.      Pulses: Normal pulses.      Heart sounds: Normal heart sounds, S1 normal and S2 normal. No murmur heard.  Pulmonary:      Effort: Pulmonary effort is normal. No respiratory distress.      Breath sounds: Normal breath sounds.   Abdominal:      General: Abdomen is  flat.      Palpations: Abdomen is soft.      Tenderness: There is generalized abdominal tenderness. There is no right CVA tenderness, left CVA tenderness, guarding or rebound.      Hernia: No hernia is present.       Musculoskeletal:         General: No swelling.      Cervical back: Neck supple.   Skin:     General: Skin is warm and dry.      Capillary Refill: Capillary refill takes less than 2 seconds.   Neurological:      General: No focal deficit present.      Mental Status: He is alert and oriented to person, place, and time.      GCS: GCS eye subscore is 4. GCS verbal subscore is 5. GCS motor subscore is 6.   Psychiatric:         Mood and Affect: Mood normal.         ED Course & MDM   Diagnoses as of 03/09/24 0336   Failure to thrive in adult   Dehydration   Colitis   Abdominal fluid collection   Hypomagnesemia     Labs Reviewed   CBC WITH AUTO DIFFERENTIAL - Abnormal       Result Value    WBC 10.3      nRBC 0.0      RBC 4.23 (*)     Hemoglobin 11.1 (*)     Hematocrit 35.1 (*)     MCV 83      MCH 26.2      MCHC 31.6 (*)     RDW 18.8 (*)     Platelets 300      Neutrophils % 77.6      Immature Granulocytes %, Automated 0.4      Lymphocytes % 13.8      Monocytes % 6.6      Eosinophils % 1.2      Basophils % 0.4      Neutrophils Absolute 7.97 (*)     Immature Granulocytes Absolute, Automated 0.04      Lymphocytes Absolute 1.42      Monocytes Absolute 0.68      Eosinophils Absolute 0.12      Basophils Absolute 0.04     COMPREHENSIVE METABOLIC PANEL - Abnormal    Glucose 203 (*)     Sodium 133 (*)     Potassium 3.3 (*)     Chloride 95 (*)     Bicarbonate 25      Anion Gap 16      Urea Nitrogen 4 (*)     Creatinine 0.46 (*)     eGFR >90      Calcium 9.2      Albumin 3.2 (*)     Alkaline Phosphatase 129 (*)     Total Protein 7.3      AST 8 (*)     Bilirubin, Total 0.7      ALT 7 (*)    LACTATE - Abnormal    Lactate 4.6 (*)     Narrative:     Venipuncture immediately after or during the administration of Metamizole may  lead to falsely low results. Testing should be performed immediately  prior to Metamizole dosing.   BLOOD GAS VENOUS - Abnormal    POCT pH, Venous 7.36      POCT pCO2, Venous 57 (*)     POCT pO2, Venous 43      POCT SO2, Venous 64      POCT Oxy Hemoglobin, Venous 60.6      POCT Base Excess, Venous 5.1 (*)     POCT HCO3 Calculated, Venous 32.2 (*)     Patient Temperature        FiO2 21     PROTIME-INR - Abnormal    Protime 26.5 (*)     INR 2.3 (*)    APTT - Abnormal    aPTT 42 (*)     Narrative:     The APTT is no longer used for monitoring Unfractionated Heparin Therapy. For monitoring Heparin Therapy, use the Heparin Assay.   MAGNESIUM - Abnormal    Magnesium 1.49 (*)    AMMONIA - Abnormal    Ammonia 54 (*)    LIPASE - Normal    Lipase 75      Narrative:     Venipuncture immediately after or during the administration of Metamizole may lead to falsely low results. Testing should be performed immediately prior to Metamizole dosing.   TROPONIN I, HIGH SENSITIVITY - Normal    Troponin I, High Sensitivity 4      Narrative:     Less than 99th percentile of normal range cutoff-  Female and children under 18 years old <14 ng/L; Male <21 ng/L: Negative  Repeat testing should be performed if clinically indicated.     Female and children under 18 years old 14-50 ng/L; Male 21-50 ng/L:  Consistent with possible cardiac damage and possible increased clinical   risk. Serial measurements may help to assess extent of myocardial damage.     >50 ng/L: Consistent with cardiac damage, increased clinical risk and  myocardial infarction. Serial measurements may help assess extent of   myocardial damage.      NOTE: Children less than 1 year old may have higher baseline troponin   levels and results should be interpreted in conjunction with the overall   clinical context.     NOTE: Troponin I testing is performed using a different   testing methodology at [facility] than at other   [facility]. Direct result comparisons  should only   be made within the same method.   URINALYSIS WITH REFLEX CULTURE AND MICROSCOPIC    Narrative:     The following orders were created for panel order Urinalysis with Reflex Culture and Microscopic.  Procedure                               Abnormality         Status                     ---------                               -----------         ------                     Urinalysis with Reflex C...[[id number]]                                                 Extra Urine Gray Tube[[id number]]                                                         Please view results for these tests on the individual orders.   URINALYSIS WITH REFLEX CULTURE AND MICROSCOPIC   EXTRA URINE GRAY TUBE   LACTATE     CT abdomen pelvis wo IV contrast   Final Result   Redemonstrated wall thickening of the stomach with persistent   adjacent inflammatory stranding. There are fluid collections again   seen in the left upper quadrant, largest measuring up to 2.7 x 3.4   cm, which may represent abscesses or pseudocysts.        Persistent, partially visualized partially calcified pulmonary masses.        Persistent adrenal masses bilaterally.        Fat containing umbilical hernia and fat containing supraumbilical   ventral abdominal wall hernia.        Wall thickening of the sigmoid colon and rectum. Correlate for   symptoms of colitis. Recommend nonemergent colonoscopy to further   evaluate        1.9 x 1.1 cm hypodense collection with peripherally thickened walls   anterior to the lower sacrum, similar compared to prior imaging.        MACRO:   None.        Signed by: Evan Finkelstein 3/9/2024 3:03 AM   Dictation workstation:   [id number]        0106 -- sinus tachycardia rate 128.  Normal axis.  Normal intervals.  Nonspecific ST-T wave changes and no signs of ischemia.  Normal EKG with no findings of STEMI. Unchanged compared to old EKG      Medical Decision Making  Patient upon arrival to the ED appeared to be anxious and  uncomfortable but in no distress with noted elevated heart rate.  Discussed with patient/family presenting complaints and clinical findings.  Reviewed them patient's epic chart and counseled them on failure to thrive and appropriate approach to management/treatments.  After assessment and evaluation IV fluids are started, labs sent, imaging ordered, EKG reviewed, given IV Zofran, IV Toradol, placed on cardiac monitor, and observed.  After treatment and a period of rest patient was reassessed and found to be feeling a little better, requesting further treatment, given IV morphine, and pulmonary results were reviewed/discussed.  At this time patient is given further IV fluid and IV magnesium supplementation.  Patient is also started on IV antibiotics and further testing was ordered.  Final results were reviewed/discussed at this time decision made to findings to have the patient admitted.  Patient was informed of this and comfortable with plan of care for admission.  On-call internal medicine at [facility] was contacted and after discussion was agreed patient will be admitted for further care and treatment with consultation to general surgery.  Patient Stable in the ED and transferred to the floor.    Amount and/or Complexity of Data Reviewed  Independent Historian: EMS  External Data Reviewed: labs, radiology, ECG and notes.  Labs: ordered. Decision-making details documented in ED Course.  Radiology: ordered. Decision-making details documented in ED Course.  ECG/medicine tests: ordered and independent interpretation performed. Decision-making details documented in ED Course.    Risk  Decision regarding hospitalization.        Procedure  Procedures     Chang Ghosh MD  03/09/24 0341

## 2024-03-09 NOTE — NURSING NOTE
Pt arrived to ICU bed 3 by wheelchair. Pt is M/S border due need [facility] and there are no tele's available on M/S

## 2024-03-09 NOTE — CONSULTS
Abdominal Pain      This is a consult requested for a patient with recurrent epigastric pain. Patient is well known to me with metastatic rectal cancer and recent pancreatitis that required IR drainage of the fluid collections. Had been doing well since then until yesterday when he developed upper abdominal pain. Pain was in the epigastric area and constant. Some nausea. Came to the ER and CT showed some small residual collections. WCC was normal as was Lipase.  Patient been taking 4 Motrin daily    Past Medical History:   Diagnosis Date    Other conditions influencing health status     Rectal Cancer          Current Facility-Administered Medications:     albuterol 2.5 mg /3 mL (0.083 %) nebulizer solution 2.5 mg, 2.5 mg, nebulization, q2h PRN, Deepak Walker MD, 2.5 mg at 03/09/24 0648    atorvastatin (Lipitor) tablet 40 mg, 40 mg, oral, Nightly, RON Tyler    bisacodyl (Dulcolax) suppository 10 mg, 10 mg, rectal, Daily PRN, RON Pavon    dextrose 10 % in water (D10W) infusion, 0.3 g/kg/hr, intravenous, Once PRN, RON Tyler    dextrose 50 % injection 25 g, 25 g, intravenous, q15 min PRN, RON Tyler    docusate sodium (Colace) capsule 100 mg, 100 mg, oral, BID, RON Tyler    doxepin (SINEquan) capsule 10 mg, 10 mg, oral, Nightly PRN, RON Tyler    enoxaparin (Lovenox) syringe 40 mg, 40 mg, subcutaneous, q24h, RON Tyler, 40 mg at 03/09/24 0804    glucagon (Glucagen) injection 1 mg, 1 mg, intramuscular, q15 min PRN, RON Tyler    insulin regular (HumuLIN R,NovoLIN R) injection 0-10 Units, 0-10 Units, subcutaneous, TID with meals, RON Tyler    losartan (Cozaar) tablet 50 mg, 50 mg, oral, Daily, RON Tyler    magnesium hydroxide (Milk of Magnesia) 2,400 mg/10 mL suspension 10 mL, 10 mL, oral, Daily PRN, Jennifer Gurrola, DARWIN-CNP    melatonin tablet 3 mg, 3 mg, oral, Nightly PRN, Jennifre Gurrola, APRN-CNP     metoprolol succinate XL (Toprol-XL) 24 hr tablet 25 mg, 25 mg, oral, Daily, RON Tyler    morphine injection 2 mg, 2 mg, intravenous, q4h PRN, RON Tyler, 2 mg at 03/09/24 0739    ondansetron (Zofran) injection 4 mg, 4 mg, intravenous, q8h PRN, RON Pavon    ondansetron (Zofran) tablet 4 mg, 4 mg, oral, q8h PRN, RON Tyler    pantoprazole (ProtoNix) EC tablet 40 mg, 40 mg, oral, Daily before breakfast, 40 mg at 03/09/24 0646 **OR** pantoprazole (ProtoNix) injection 40 mg, 40 mg, intravenous, Daily before breakfast, RON Tyler    polyethylene glycol (Glycolax, Miralax) packet 17 g, 17 g, oral, Daily PRN, RON Pavon    polyethylene glycol (Glycolax, Miralax) packet 17 g, 17 g, oral, BID, RON Tyler    sucralfate (Carafate) suspension 1 g, 1 g, oral, q6h JULIO, RON Tyler, 1 g at 03/09/24 0804     Allergies   Allergen Reactions    Acetaminophen Nausea And Vomiting    Adhesive Tape-Silicones Unknown        Review of Systems  Review of Systems   Gastrointestinal:  Positive for abdominal pain.       Objective     Vital signs for last 24 hours:  Temp:  [36.1 °C (97 °F)-37.1 °C (98.8 °F)] 36.1 °C (97 °F)  Heart Rate:  [106-130] 106  Resp:  [21-38] 25  BP: (130-144)/(81-95) 144/90  FiO2 (%):  [21 %] 21 %    Intake/Output this shift:  No intake/output data recorded.    Physical Exam  Physical Exam  Vitals reviewed.   Constitutional:       Appearance: He is cachectic.   HENT:      Head: Normocephalic.   Cardiovascular:      Rate and Rhythm: Normal rate and regular rhythm.      Heart sounds: Normal heart sounds.   Pulmonary:      Effort: Pulmonary effort is normal.      Breath sounds: Normal breath sounds.   Abdominal:      General: Abdomen is flat. There is no distension.      Palpations: Abdomen is soft. There is no mass.      Tenderness: There is abdominal tenderness in the epigastric area. There is no guarding.      Hernia: No  hernia is present.   Musculoskeletal:         General: Normal range of motion.      Cervical back: Normal range of motion.   Skin:     General: Skin is warm.   Neurological:      General: No focal deficit present.   Psychiatric:         Mood and Affect: Mood normal.         Labs & Radiology      Labs Reviewed   CBC WITH AUTO DIFFERENTIAL - Abnormal       Result Value    WBC 10.3      nRBC 0.0      RBC 4.23 (*)     Hemoglobin 11.1 (*)     Hematocrit 35.1 (*)     MCV 83      MCH 26.2      MCHC 31.6 (*)     RDW 18.8 (*)     Platelets 300      Neutrophils % 77.6      Immature Granulocytes %, Automated 0.4      Lymphocytes % 13.8      Monocytes % 6.6      Eosinophils % 1.2      Basophils % 0.4      Neutrophils Absolute 7.97 (*)     Immature Granulocytes Absolute, Automated 0.04      Lymphocytes Absolute 1.42      Monocytes Absolute 0.68      Eosinophils Absolute 0.12      Basophils Absolute 0.04     COMPREHENSIVE METABOLIC PANEL - Abnormal    Glucose 203 (*)     Sodium 133 (*)     Potassium 3.3 (*)     Chloride 95 (*)     Bicarbonate 25      Anion Gap 16      Urea Nitrogen 4 (*)     Creatinine 0.46 (*)     eGFR >90      Calcium 9.2      Albumin 3.2 (*)     Alkaline Phosphatase 129 (*)     Total Protein 7.3      AST 8 (*)     Bilirubin, Total 0.7      ALT 7 (*)    URINALYSIS WITH REFLEX CULTURE AND MICROSCOPIC - Abnormal    Color, Urine Yellow      Appearance, Urine Clear      Specific Gravity, Urine 1.009      pH, Urine 7.0      Protein, Urine NEGATIVE      Glucose, Urine NEGATIVE      Blood, Urine NEGATIVE      Ketones, Urine NEGATIVE      Bilirubin, Urine NEGATIVE      Urobilinogen, Urine 4.0 (*)     Nitrite, Urine NEGATIVE      Leukocyte Esterase, Urine NEGATIVE     LACTATE - Abnormal    Lactate 4.6 (*)     Narrative:     Venipuncture immediately after or during the administration of Metamizole may lead to falsely low results. Testing should be performed immediately                  prior to Metamizole dosing.   BLOOD  GAS VENOUS - Abnormal    POCT pH, Venous 7.36      POCT pCO2, Venous 57 (*)     POCT pO2, Venous 43      POCT SO2, Venous 64      POCT Oxy Hemoglobin, Venous 60.6      POCT Base Excess, Venous 5.1 (*)     POCT HCO3 Calculated, Venous 32.2 (*)     Patient Temperature        FiO2 21     PROTIME-INR - Abnormal    Protime 26.5 (*)     INR 2.3 (*)    APTT - Abnormal    aPTT 42 (*)     Narrative:     The APTT is no longer used for monitoring Unfractionated Heparin Therapy. For monitoring Heparin Therapy, use the Heparin Assay.   MAGNESIUM - Abnormal    Magnesium 1.49 (*)    AMMONIA - Abnormal    Ammonia 54 (*)    CBC - Abnormal    WBC 8.9      nRBC 0.0      RBC 3.78 (*)     Hemoglobin 10.2 (*)     Hematocrit 32.7 (*)     MCV 87      MCH 27.0      MCHC 31.2 (*)     RDW 19.3 (*)     Platelets 247     BASIC METABOLIC PANEL - Abnormal    Glucose 154 (*)     Sodium 136      Potassium 3.3 (*)     Chloride 101      Bicarbonate 23      Anion Gap 15      Urea Nitrogen 4 (*)     Creatinine 0.36 (*)     eGFR >90      Calcium 8.4 (*)    POCT GLUCOSE - Abnormal    POCT Glucose 151 (*)    LIPASE - Normal    Lipase 75      Narrative:     Venipuncture immediately after or during the administration of Metamizole may lead to falsely low results. Testing should be performed immediately prior to Metamizole dosing.   TROPONIN I, HIGH SENSITIVITY - Normal    Troponin I, High Sensitivity 4      Narrative:     Less than 99th percentile of normal range cutoff-                  Female and children under 18 years old <14 ng/L; Male <21 ng/L: Negative                  Repeat testing should be performed if clinically indicated.                                     Female and children under 18 years old 14-50 ng/L; Male 21-50 ng/L:                  Consistent with possible cardiac damage and possible increased clinical                   risk. Serial measurements may help to assess extent of myocardial damage.                                     >50  ng/L: Consistent with cardiac damage, increased clinical risk and                  myocardial infarction. Serial measurements may help assess extent of                   myocardial damage.                                      NOTE: Children less than 1 year old may have higher baseline troponin                   levels and results should be interpreted in conjunction with the overall                   clinical context.                                     NOTE: Troponin I testing is performed using a different                   testing methodology at [facility] than at other                   [facility]. Direct result comparisons should only                   be made within the same method.   LACTATE - Normal    Lactate 0.9      Narrative:     Venipuncture immediately after or during the administration of Metamizole may lead to falsely low results. Testing should be performed immediately                  prior to Metamizole dosing.   MAGNESIUM - Normal    Magnesium 2.23     URINALYSIS WITH REFLEX CULTURE AND MICROSCOPIC    Narrative:     The following orders were created for panel order Urinalysis with Reflex Culture and Microscopic.                  Procedure                               Abnormality         Status                                     ---------                               -----------         ------                                     Urinalysis with Reflex C...[[id number]]  Abnormal            Final result                               Extra Urine Gray Tube[[id number]]                            In process                                                   Please view results for these tests on the individual orders.   EXTRA URINE GRAY TUBE   AMMONIA   PHOSPHORUS   POCT GLUCOSE   POCT GLUCOSE METER     CT abdomen pelvis w IV contrast   IMPRESSION:  Redemonstrated wall thickening of the stomach with persistent  adjacent inflammatory stranding. There are fluid collections again  seen  in the left upper quadrant, largest measuring up to 2.7 x 3.4  cm, which may represent abscesses or pseudocysts.      Persistent, partially visualized partially calcified pulmonary masses.      Persistent adrenal masses bilaterally.      Fat containing umbilical hernia and fat containing supraumbilical  ventral abdominal wall hernia.      Wall thickening of the sigmoid colon and rectum. Correlate for  symptoms of colitis. Recommend nonemergent colonoscopy to further  evaluate      1.9 x 1.1 cm hypodense collection with peripherally thickened walls  anterior to the lower sacrum, similar compared to prior imaging.    Impression  Epigastric pain.  Possible gastritis as a result of NSAIDs.  Stable pancreatic collections  Plan   Recommend starting Carafate.  Continue PPI.  Analgesia.  Ensure protein shakes.  Consider chronic pain management.  Metastatic rectal cancer with poor prognosis.

## 2024-03-09 NOTE — CARE PLAN
The patient's goals for the shift include  to have less pain    The clinical goals for the shift include Pt will be monitored on tele during shift    Over the shift, the patient was admitted to ICU as a M/S border due to needing tele monitoring for ST and none available on M/S. Pt has a flat affect. Refused to allow nurse to go through his back pack of items. Called brother Gene per patient request to inform of admission, voicemail left. Pt is requesting pain medication. Tolerated sip of water well. ST on tele. Pt is on RA. Resting in bed at this time.

## 2024-03-12 ENCOUNTER — HOSPITAL ENCOUNTER (OUTPATIENT)
Dept: CARDIOLOGY | Facility: HOSPITAL | Age: 39
Discharge: HOME | End: 2024-03-12
Payer: MEDICARE

## 2024-03-12 LAB
ATRIAL RATE: 128 BPM
P AXIS: 66 DEGREES
P OFFSET: 199 MS
P ONSET: 149 MS
PR INTERVAL: 142 MS
Q ONSET: 220 MS
QRS COUNT: 21 BEATS
QRS DURATION: 76 MS
QT INTERVAL: 290 MS
QTC CALCULATION(BAZETT): 423 MS
QTC FREDERICIA: 373 MS
R AXIS: 73 DEGREES
T AXIS: 67 DEGREES
T OFFSET: 365 MS
VENTRICULAR RATE: 128 BPM

## 2024-03-12 PROCEDURE — 93005 ELECTROCARDIOGRAM TRACING: CPT

## 2024-03-13 ENCOUNTER — APPOINTMENT (OUTPATIENT)
Dept: HEMATOLOGY/ONCOLOGY | Facility: HOSPITAL | Age: 39
End: 2024-03-13
Payer: MEDICARE

## 2024-03-14 LAB
BACTERIA BLD CULT: NORMAL
BACTERIA BLD CULT: NORMAL

## 2024-03-15 ENCOUNTER — SPECIALTY PHARMACY (OUTPATIENT)
Dept: PHARMACY | Facility: CLINIC | Age: 39
End: 2024-03-15

## 2024-03-15 ENCOUNTER — PHARMACY VISIT (OUTPATIENT)
Dept: PHARMACY | Facility: CLINIC | Age: 39
End: 2024-03-15
Payer: MEDICARE

## 2024-03-15 PROCEDURE — RXMED WILLOW AMBULATORY MEDICATION CHARGE

## 2024-03-19 ENCOUNTER — LAB (OUTPATIENT)
Dept: LAB | Facility: LAB | Age: 39
End: 2024-03-19
Payer: MEDICARE

## 2024-03-19 DIAGNOSIS — C79.9 METASTASIS FROM RECTAL CANCER (MULTI): ICD-10-CM

## 2024-03-19 DIAGNOSIS — C20 METASTASIS FROM RECTAL CANCER (MULTI): ICD-10-CM

## 2024-03-19 LAB
BASOPHILS # BLD AUTO: 0.08 X10*3/UL (ref 0–0.1)
BASOPHILS NFR BLD AUTO: 0.7 %
EOSINOPHIL # BLD AUTO: 0.24 X10*3/UL (ref 0–0.7)
EOSINOPHIL NFR BLD AUTO: 2.2 %
ERYTHROCYTE [DISTWIDTH] IN BLOOD BY AUTOMATED COUNT: 19.8 % (ref 11.5–14.5)
HCT VFR BLD AUTO: 36.6 % (ref 41–52)
HGB BLD-MCNC: 11.3 G/DL (ref 13.5–17.5)
IMM GRANULOCYTES # BLD AUTO: 0.1 X10*3/UL (ref 0–0.7)
IMM GRANULOCYTES NFR BLD AUTO: 0.9 % (ref 0–0.9)
LYMPHOCYTES # BLD AUTO: 1.96 X10*3/UL (ref 1.2–4.8)
LYMPHOCYTES NFR BLD AUTO: 17.8 %
MCH RBC QN AUTO: 26.5 PG (ref 26–34)
MCHC RBC AUTO-ENTMCNC: 30.9 G/DL (ref 32–36)
MCV RBC AUTO: 86 FL (ref 80–100)
MONOCYTES # BLD AUTO: 0.99 X10*3/UL (ref 0.1–1)
MONOCYTES NFR BLD AUTO: 9 %
NEUTROPHILS # BLD AUTO: 7.67 X10*3/UL (ref 1.2–7.7)
NEUTROPHILS NFR BLD AUTO: 69.4 %
NRBC BLD-RTO: 0 /100 WBCS (ref 0–0)
PLATELET # BLD AUTO: 380 X10*3/UL (ref 150–450)
RBC # BLD AUTO: 4.27 X10*6/UL (ref 4.5–5.9)
WBC # BLD AUTO: 11 X10*3/UL (ref 4.4–11.3)

## 2024-03-19 PROCEDURE — 36415 COLL VENOUS BLD VENIPUNCTURE: CPT

## 2024-03-20 ENCOUNTER — TELEPHONE (OUTPATIENT)
Dept: HEMATOLOGY/ONCOLOGY | Facility: HOSPITAL | Age: 39
End: 2024-03-20

## 2024-03-20 ENCOUNTER — INFUSION (OUTPATIENT)
Dept: HEMATOLOGY/ONCOLOGY | Facility: HOSPITAL | Age: 39
End: 2024-03-20
Payer: MEDICARE

## 2024-03-20 VITALS
RESPIRATION RATE: 17 BRPM | HEART RATE: 112 BPM | SYSTOLIC BLOOD PRESSURE: 116 MMHG | DIASTOLIC BLOOD PRESSURE: 81 MMHG | TEMPERATURE: 96.8 F | OXYGEN SATURATION: 99 % | WEIGHT: 168.43 LBS | BODY MASS INDEX: 24.87 KG/M2

## 2024-03-20 DIAGNOSIS — E53.8 B12 DEFICIENCY: ICD-10-CM

## 2024-03-20 DIAGNOSIS — C79.9 METASTASIS FROM RECTAL CANCER (MULTI): ICD-10-CM

## 2024-03-20 DIAGNOSIS — C20 METASTASIS FROM RECTAL CANCER (MULTI): Primary | ICD-10-CM

## 2024-03-20 DIAGNOSIS — C79.9 METASTASIS FROM RECTAL CANCER (MULTI): Primary | ICD-10-CM

## 2024-03-20 DIAGNOSIS — C20 METASTASIS FROM RECTAL CANCER (MULTI): ICD-10-CM

## 2024-03-20 LAB
AMORPH CRY #/AREA UR COMP ASSIST: ABNORMAL /HPF
APPEARANCE UR: ABNORMAL
BILIRUB UR STRIP.AUTO-MCNC: NEGATIVE MG/DL
COLOR UR: YELLOW
GLUCOSE UR STRIP.AUTO-MCNC: ABNORMAL MG/DL
GRAN CASTS #/AREA UR COMP ASSIST: ABNORMAL /LPF
HYALINE CASTS #/AREA URNS AUTO: ABNORMAL /LPF
KETONES UR STRIP.AUTO-MCNC: NEGATIVE MG/DL
LEUKOCYTE ESTERASE UR QL STRIP.AUTO: ABNORMAL
MUCOUS THREADS #/AREA URNS AUTO: ABNORMAL /LPF
NITRITE UR QL STRIP.AUTO: NEGATIVE
PH UR STRIP.AUTO: 5 [PH]
PROT UR STRIP.AUTO-MCNC: ABNORMAL MG/DL
RBC # UR STRIP.AUTO: ABNORMAL /UL
RBC #/AREA URNS AUTO: ABNORMAL /HPF
SP GR UR STRIP.AUTO: 1.02
SQUAMOUS #/AREA URNS AUTO: ABNORMAL /HPF
UROBILINOGEN UR STRIP.AUTO-MCNC: <2 MG/DL
WBC #/AREA URNS AUTO: ABNORMAL /HPF

## 2024-03-20 PROCEDURE — 96409 CHEMO IV PUSH SNGL DRUG: CPT

## 2024-03-20 PROCEDURE — 81001 URINALYSIS AUTO W/SCOPE: CPT

## 2024-03-20 PROCEDURE — 2500000004 HC RX 250 GENERAL PHARMACY W/ HCPCS (ALT 636 FOR OP/ED): Mod: JZ,SE,TB | Performed by: STUDENT IN AN ORGANIZED HEALTH CARE EDUCATION/TRAINING PROGRAM

## 2024-03-20 PROCEDURE — 2500000004 HC RX 250 GENERAL PHARMACY W/ HCPCS (ALT 636 FOR OP/ED): Mod: SE | Performed by: STUDENT IN AN ORGANIZED HEALTH CARE EDUCATION/TRAINING PROGRAM

## 2024-03-20 PROCEDURE — 87086 URINE CULTURE/COLONY COUNT: CPT | Mod: GENLAB

## 2024-03-20 PROCEDURE — 96375 TX/PRO/DX INJ NEW DRUG ADDON: CPT | Mod: INF

## 2024-03-20 RX ORDER — HEPARIN 100 UNIT/ML
500 SYRINGE INTRAVENOUS AS NEEDED
Status: DISCONTINUED | OUTPATIENT
Start: 2024-03-20 | End: 2024-03-20 | Stop reason: HOSPADM

## 2024-03-20 RX ORDER — ALBUTEROL SULFATE 0.83 MG/ML
3 SOLUTION RESPIRATORY (INHALATION) AS NEEDED
Status: DISCONTINUED | OUTPATIENT
Start: 2024-03-20 | End: 2024-03-20 | Stop reason: HOSPADM

## 2024-03-20 RX ORDER — HEPARIN 100 UNIT/ML
500 SYRINGE INTRAVENOUS AS NEEDED
Status: CANCELLED | OUTPATIENT
Start: 2024-03-20

## 2024-03-20 RX ORDER — EPINEPHRINE 0.3 MG/.3ML
0.3 INJECTION SUBCUTANEOUS EVERY 5 MIN PRN
Status: DISCONTINUED | OUTPATIENT
Start: 2024-03-20 | End: 2024-03-20 | Stop reason: HOSPADM

## 2024-03-20 RX ORDER — PALONOSETRON 0.05 MG/ML
250 INJECTION, SOLUTION INTRAVENOUS ONCE
Status: COMPLETED | OUTPATIENT
Start: 2024-03-20 | End: 2024-03-20

## 2024-03-20 RX ORDER — DIPHENHYDRAMINE HYDROCHLORIDE 50 MG/ML
50 INJECTION INTRAMUSCULAR; INTRAVENOUS AS NEEDED
Status: DISCONTINUED | OUTPATIENT
Start: 2024-03-20 | End: 2024-03-20 | Stop reason: HOSPADM

## 2024-03-20 RX ORDER — HEPARIN SODIUM,PORCINE/PF 10 UNIT/ML
50 SYRINGE (ML) INTRAVENOUS AS NEEDED
Status: CANCELLED | OUTPATIENT
Start: 2024-03-20

## 2024-03-20 RX ORDER — PROCHLORPERAZINE MALEATE 10 MG
10 TABLET ORAL EVERY 6 HOURS PRN
Status: DISCONTINUED | OUTPATIENT
Start: 2024-03-20 | End: 2024-03-20 | Stop reason: HOSPADM

## 2024-03-20 RX ORDER — FAMOTIDINE 10 MG/ML
20 INJECTION INTRAVENOUS ONCE AS NEEDED
Status: DISCONTINUED | OUTPATIENT
Start: 2024-03-20 | End: 2024-03-20 | Stop reason: HOSPADM

## 2024-03-20 RX ORDER — DEXAMETHASONE SODIUM PHOSPHATE 4 MG/ML
10 INJECTION, SOLUTION INTRA-ARTICULAR; INTRALESIONAL; INTRAMUSCULAR; INTRAVENOUS; SOFT TISSUE ONCE
Status: COMPLETED | OUTPATIENT
Start: 2024-03-20 | End: 2024-03-20

## 2024-03-20 RX ORDER — PROCHLORPERAZINE EDISYLATE 5 MG/ML
10 INJECTION INTRAMUSCULAR; INTRAVENOUS EVERY 6 HOURS PRN
Status: DISCONTINUED | OUTPATIENT
Start: 2024-03-20 | End: 2024-03-20 | Stop reason: HOSPADM

## 2024-03-20 RX ADMIN — Medication 500 UNITS: at 10:43

## 2024-03-20 RX ADMIN — PALONOSETRON 250 MCG: 0.05 INJECTION, SOLUTION INTRAVENOUS at 10:13

## 2024-03-20 RX ADMIN — ALTEPLASE 2 MG: 2.2 INJECTION, POWDER, LYOPHILIZED, FOR SOLUTION INTRAVENOUS at 09:35

## 2024-03-20 RX ADMIN — DEXAMETHASONE SODIUM PHOSPHATE 10 MG: 4 INJECTION, SOLUTION INTRAMUSCULAR; INTRAVENOUS at 10:13

## 2024-03-20 RX ADMIN — BEVACIZUMAB-AWWB 400 MG: 400 INJECTION, SOLUTION INTRAVENOUS at 10:32

## 2024-03-20 ASSESSMENT — PATIENT HEALTH QUESTIONNAIRE - PHQ9
SUM OF ALL RESPONSES TO PHQ9 QUESTIONS 1 & 2: 2
2. FEELING DOWN, DEPRESSED OR HOPELESS: SEVERAL DAYS
1. LITTLE INTEREST OR PLEASURE IN DOING THINGS: SEVERAL DAYS

## 2024-03-20 ASSESSMENT — ENCOUNTER SYMPTOMS
LOSS OF SENSATION IN FEET: 0
DEPRESSION: 0
OCCASIONAL FEELINGS OF UNSTEADINESS: 0

## 2024-03-20 ASSESSMENT — PAIN SCALES - GENERAL
PAINLEVEL_OUTOF10: 5
PAINLEVEL: 7

## 2024-03-20 NOTE — PROGRESS NOTES
March 20, 2024 at 9:29 AM    Acetaminophen and Adhesive tape-silicones    Misael Hernandez is a 38 y.o. male   There were no vitals taken for this visit.  There is no height or weight on file to calculate BSA.    Past Medical History:   Diagnosis Date    Other conditions influencing health status     Rectal Cancer       Encounter Diagnoses   Name Primary?    Metastasis from rectal cancer (CMS/HCC)     B12 deficiency        Has the entire regimen been authorized by financial clearance (pre-certification)?  Yes     Prior to Admission medications    Medication Sig Start Date End Date Taking? Authorizing Provider   albuterol (Ventolin HFA) 90 mcg/actuation inhaler INHALE TWO PUFFS INTO THE LUNGS EVERY 6 HOURS AS NEEDED FOR SHORTNESS OF BREATH OR FOR WHEEZING FOR UP TO 30 DAYS 11/2/21   Historical Provider, MD   apixaban (Eliquis) 5 mg tablet Take 1 tablet (5 mg) by mouth 2 times a day. 1/15/24   Oswald Hernandez MD   atorvastatin (Lipitor) 40 mg tablet Take 1 tablet (40 mg) by mouth once daily at bedtime. 1/4/24 4/3/24  DARWIN Pavon-MELISA   buprenorphine-naloxone (Suboxone) 8-2 mg SL tablet Place 1 tablet under the tongue 2 times a day. With additional 0.5 tab in the evening 8/22/23 1/9/24  Historical Provider, MD   dapagliflozin propanediol (Farxiga) 5 mg Take 1 tablet (5 mg) by mouth once daily. 1/15/24   Oswald Hernandez MD   doxepin (SINEquan) 50 mg capsule Take 10 mg by mouth as needed at bedtime for sleep. 10/10/23   Historical Provider, MD   haloperidol (Haldol) 2 mg tablet Take 1 tablet (2 mg) by mouth once daily.    Historical Provider, MD   lidocaine-prilocaine (Emla) 2.5-2.5 % cream apply topically to affected area if needed 11/21/23   Willa Vega MD   losartan (Cozaar) 50 mg tablet Take 1 tablet (50 mg) by mouth once daily.    Historical Provider, MD   metFORMIN (Glucophage) 500 mg tablet Take 1 tablet (500 mg) by mouth once daily with breakfast. 1/15/24   Oswald Hernandez MD   metoprolol succinate XL (Toprol-XL) 25  mg 24 hr tablet Take 1 tablet (25 mg) by mouth once daily.    Historical Provider, MD   omeprazole (PriLOSEC) 40 mg DR capsule Take 1 capsule (40 mg) by mouth once daily in the morning. Take before meals. Do not crush or chew.    Historical Provider, MD   ondansetron (Zofran) 4 mg tablet Take 1 tablet (4 mg) by mouth every 8 hours if needed for nausea or vomiting.    Historical Provider, MD   prochlorperazine (Compazine) 5 mg tablet Take 1 tablet (5 mg) by mouth every 6 hours if needed for nausea or vomiting.    Historical Provider, MD   trifluridine-tipiraciL (Lonsurf) 20-8.19 mg tablet TAKE THREE (3) TABLETS BY MOUTH 2 TIMES DAILY FOR 5 DAYS, EVERY 2 WEEKS 2/12/24   Sandra Lui MD PhD       Reviewed documented list of home medications.  No significant drug interactions identified between home medications and chemotherapy regimen.    Yes    WBC   Date Value Ref Range Status   03/19/2024 11.0 4.4 - 11.3 x10*3/uL Final   03/09/2024 8.9 4.4 - 11.3 x10*3/uL Final   03/09/2024 10.3 4.4 - 11.3 x10*3/uL Final     Hemoglobin   Date Value Ref Range Status   03/19/2024 11.3 (L) 13.5 - 17.5 g/dL Final   03/09/2024 10.2 (L) 13.5 - 17.5 g/dL Final   03/09/2024 11.1 (L) 13.5 - 17.5 g/dL Final     Hematocrit   Date Value Ref Range Status   03/19/2024 36.6 (L) 41.0 - 52.0 % Final   03/09/2024 32.7 (L) 41.0 - 52.0 % Final   03/09/2024 35.1 (L) 41.0 - 52.0 % Final     Neutrophils Absolute   Date Value Ref Range Status   03/19/2024 7.67 1.20 - 7.70 x10*3/uL Final     Comment:     Percent differential counts (%) should be interpreted in the context of the absolute cell counts (cells/uL).   03/09/2024 7.97 (H) 1.20 - 7.70 x10*3/uL Final     Comment:     Percent differential counts (%) should be interpreted in the context of the absolute cell counts (cells/uL).   03/05/2024 7.18 1.20 - 7.70 x10*3/uL Final     Comment:     Percent differential counts (%) should be interpreted in the context of the absolute cell counts  (cells/uL).     Platelets   Date Value Ref Range Status   03/19/2024 380 150 - 450 x10*3/uL Final   03/09/2024 247 150 - 450 x10*3/uL Final   03/09/2024 300 150 - 450 x10*3/uL Final     Creatinine   Date Value Ref Range Status   03/09/2024 0.36 (L) 0.50 - 1.30 mg/dL Final   03/09/2024 0.46 (L) 0.50 - 1.30 mg/dL Final   03/05/2024 0.48 (L) 0.50 - 1.30 mg/dL Final     Alkaline Phosphatase   Date Value Ref Range Status   03/09/2024 129 (H) 33 - 120 U/L Final   03/05/2024 133 (H) 33 - 120 U/L Final   02/21/2024 136 (H) 33 - 120 U/L Final     AST   Date Value Ref Range Status   03/09/2024 8 (L) 9 - 39 U/L Final   03/05/2024 13 9 - 39 U/L Final   02/21/2024 8 (L) 9 - 39 U/L Final     ALT   Date Value Ref Range Status   03/09/2024 7 (L) 10 - 52 U/L Final     Comment:     Patients treated with Sulfasalazine may generate falsely decreased results for ALT.   03/05/2024 11 10 - 52 U/L Final     Comment:     Patients treated with Sulfasalazine may generate falsely decreased results for ALT.   02/21/2024 8 (L) 10 - 52 U/L Final     Comment:     Patients treated with Sulfasalazine may generate falsely decreased results for ALT.     Bilirubin, Total   Date Value Ref Range Status   03/09/2024 0.7 0.0 - 1.2 mg/dL Final   03/05/2024 0.4 0.0 - 1.2 mg/dL Final   02/21/2024 0.3 0.0 - 1.2 mg/dL Final         Does the patient meet the treatment conditions?  Yes    ANC >= 1.5  Plt >= 75  Bili <= 1.5 x ULN  Crcl >= 30  Urine protein <= 2  BP <= 160/90    Are dosage calculations correct?  Yes    Are doses the same as previous cycle?   Yes    Were any doses modified?  No    If appropriate, was the Creatinine Clearance independently calculated based on [facility] standards?   Yes      Comments:      I Raj Ramirez, PharmD hereby acknowledge that the treatment plan indicated above has been verified for correctness to the best of my ability on 3/20/2024 at 9:29 AM.

## 2024-03-20 NOTE — PROGRESS NOTES
Treatment Pre-Review    Diagnosis:  No matching staging information was found for the patient.    Regimen: MVASI Days 1 + 15; Cycle repeats every 28 days     Current Cycle/Day: Cycle 6 Day 15   Treatment Date Appropriate: Yes; Last Treatment: 3/6/24 (Cycle 6 Day 1)  Date change required: none    Dose or Regimen Modifications: None noted during pre-review    Med Rec/Drug Interactions: None noted during pre-review    Growth Factor: none    Financial Clearance/Authorization: Yes    Echo:  No results found for this or any previous visit from the past 1095 days.    Lifetime Dose Tracking   No doses have been documented on this patient for the following tracked chemicals: doxorubicin, epirubicin, idarubicin, daunorubicin, mitoxantrone, bleomycin, mitomycin, carmustine, doxorubicin HCl pegylated liposomal, daunorubicin citrate liposomal     Comments: none    I Raj Ramirez, PharmD hereby acknowledge that the treatment plan indicated above has been verified for correctness to the best of my ability on 3/20/2024 at 9:23 AM.

## 2024-03-20 NOTE — PROGRESS NOTES
0845: Dr Lui aware of ER visit and results of tests. Will assess pt when arrives.   0900: Dr Lui notified of pt complaints of new left flank/back pain - rates it a 7 on pain scale 1-10. Pt states abdominal pain is better that he had when he went to ER. Denies n/v, diarrhea, constipation, pt denies issues with urinating or blood in urine. See new orders for UA. Dr Lui ok to proceed with tx. Pt aware

## 2024-03-21 LAB — BACTERIA UR CULT: NORMAL

## 2024-03-27 ENCOUNTER — HOSPITAL ENCOUNTER (OUTPATIENT)
Dept: RADIOLOGY | Facility: HOSPITAL | Age: 39
Discharge: HOME | End: 2024-03-27
Payer: MEDICARE

## 2024-03-27 DIAGNOSIS — C20 METASTASIS FROM RECTAL CANCER (MULTI): ICD-10-CM

## 2024-03-27 DIAGNOSIS — C79.9 METASTASIS FROM RECTAL CANCER (MULTI): ICD-10-CM

## 2024-03-27 PROCEDURE — 2550000001 HC RX 255 CONTRASTS: Mod: SE | Performed by: STUDENT IN AN ORGANIZED HEALTH CARE EDUCATION/TRAINING PROGRAM

## 2024-03-27 PROCEDURE — 74177 CT ABD & PELVIS W/CONTRAST: CPT

## 2024-03-27 PROCEDURE — 74177 CT ABD & PELVIS W/CONTRAST: CPT | Performed by: RADIOLOGY

## 2024-03-27 PROCEDURE — 71260 CT THORAX DX C+: CPT | Performed by: RADIOLOGY

## 2024-03-27 RX ADMIN — IOHEXOL 75 ML: 350 INJECTION, SOLUTION INTRAVENOUS at 08:18

## 2024-04-01 DIAGNOSIS — C18.9 MALIGNANT NEOPLASM OF COLON, UNSPECIFIED PART OF COLON (MULTI): ICD-10-CM

## 2024-04-04 ENCOUNTER — APPOINTMENT (OUTPATIENT)
Dept: HEMATOLOGY/ONCOLOGY | Facility: CLINIC | Age: 39
End: 2024-04-04
Payer: MEDICARE

## 2024-04-04 ENCOUNTER — OFFICE VISIT (OUTPATIENT)
Dept: HEMATOLOGY/ONCOLOGY | Facility: CLINIC | Age: 39
End: 2024-04-04
Payer: MEDICARE

## 2024-04-04 VITALS
HEIGHT: 67 IN | SYSTOLIC BLOOD PRESSURE: 120 MMHG | DIASTOLIC BLOOD PRESSURE: 78 MMHG | TEMPERATURE: 95.2 F | HEART RATE: 112 BPM | RESPIRATION RATE: 18 BRPM | BODY MASS INDEX: 25.54 KG/M2 | WEIGHT: 162.7 LBS | OXYGEN SATURATION: 99 %

## 2024-04-04 DIAGNOSIS — G89.3 CANCER RELATED PAIN: ICD-10-CM

## 2024-04-04 DIAGNOSIS — C20 METASTASIS FROM RECTAL CANCER (MULTI): Primary | ICD-10-CM

## 2024-04-04 DIAGNOSIS — C79.9 METASTASIS FROM RECTAL CANCER (MULTI): Primary | ICD-10-CM

## 2024-04-04 DIAGNOSIS — C18.9 MALIGNANT NEOPLASM OF COLON, UNSPECIFIED PART OF COLON (MULTI): ICD-10-CM

## 2024-04-04 PROCEDURE — 3078F DIAST BP <80 MM HG: CPT | Performed by: STUDENT IN AN ORGANIZED HEALTH CARE EDUCATION/TRAINING PROGRAM

## 2024-04-04 PROCEDURE — 99215 OFFICE O/P EST HI 40 MIN: CPT | Performed by: STUDENT IN AN ORGANIZED HEALTH CARE EDUCATION/TRAINING PROGRAM

## 2024-04-04 PROCEDURE — 3074F SYST BP LT 130 MM HG: CPT | Performed by: STUDENT IN AN ORGANIZED HEALTH CARE EDUCATION/TRAINING PROGRAM

## 2024-04-04 RX ORDER — EPINEPHRINE 0.3 MG/.3ML
0.3 INJECTION SUBCUTANEOUS EVERY 5 MIN PRN
Status: CANCELLED | OUTPATIENT
Start: 2024-04-24

## 2024-04-04 RX ORDER — PROCHLORPERAZINE EDISYLATE 5 MG/ML
10 INJECTION INTRAMUSCULAR; INTRAVENOUS EVERY 6 HOURS PRN
Status: CANCELLED | OUTPATIENT
Start: 2024-04-24

## 2024-04-04 RX ORDER — ALBUTEROL SULFATE 0.83 MG/ML
3 SOLUTION RESPIRATORY (INHALATION) AS NEEDED
Status: CANCELLED | OUTPATIENT
Start: 2024-04-24

## 2024-04-04 RX ORDER — BISACODYL 10 MG/1
10 SUPPOSITORY RECTAL DAILY PRN
COMMUNITY
Start: 2024-03-09

## 2024-04-04 RX ORDER — FAMOTIDINE 10 MG/ML
20 INJECTION INTRAVENOUS ONCE AS NEEDED
Status: CANCELLED | OUTPATIENT
Start: 2024-04-24

## 2024-04-04 RX ORDER — NALOXONE HYDROCHLORIDE 4 MG/.1ML
SPRAY NASAL
COMMUNITY
Start: 2024-03-29

## 2024-04-04 RX ORDER — PALONOSETRON 0.05 MG/ML
250 INJECTION, SOLUTION INTRAVENOUS ONCE
Status: CANCELLED
Start: 2024-04-24 | End: 2024-04-24

## 2024-04-04 RX ORDER — DIPHENHYDRAMINE HYDROCHLORIDE 50 MG/ML
50 INJECTION INTRAMUSCULAR; INTRAVENOUS AS NEEDED
Status: CANCELLED | OUTPATIENT
Start: 2024-04-24

## 2024-04-04 RX ORDER — PROCHLORPERAZINE MALEATE 10 MG
10 TABLET ORAL EVERY 6 HOURS PRN
Status: CANCELLED | OUTPATIENT
Start: 2024-04-24

## 2024-04-04 ASSESSMENT — PAIN SCALES - GENERAL: PAINLEVEL: 7

## 2024-04-04 NOTE — PROGRESS NOTES
Cancer History:  DIAGNOSIS  Rectal cancer    STAGING  Stage IV, M1b   Stage III, ypT3 ypN1b at diagnosis    CURRENT SITES OF DISEASE  Lungs, mediastinal and hilar lymph nodes, b/l adrenal glands  Presacral soft tissue swelling thought to be post-operative vs. residual tumor    MOLECULAR GENOMICS  MMR intact, BRAF and NRAS/KRAS wild-type    TUMOR MARKER  CEA 5.4 at diagnosis   CEA 79 on 2/7/23    PRIOR THERAPY  Neoadjuvant capecitabine and radiation  Adjuvant FOLFOX  Capecitabine, bevacizumab     CURRENT THERAPY  Irinotecan, cetuximab     CURRENT ONCOLOGICAL PROBLEMS  PE with recent recurrence 12/2023 d/t missed Eliquis from n/v  Pancreatitis 1/2024    HISTORY OF PRESENT ILLNESS  8/2013 - abdominal pain, rectal bleeding and weight loss leading to colonoscopy showing circumferential fungating partially obstructive mass in the rectum with biopsy showing invasive moderately differentiated adenocarcinoma.  Pathology showed invasive moderately differentiated adenocarcinoma, normal mismatch repair protein expression. Staged III = T3 N2 M0 rectal adenocarcinoma.  Initial CEA was 5.4.  8/2013 - noeadjuvant capecitabine and RT   1/2014 - open proctosigmoidectomy with coloanal anastomosis and diverting ileostomy.  Pathology showed invasive moderately differentiated adenocarcinoma of the rectum, 2 of 31 lymph nodes positive, therapy effect, low-grade, tumor 3.5 x 3 x 0.5 cm, negative margins, lymphovascular invasion and perineural invasion present, ypT3 ypN1b  11/2014 - ileostomy takedown with resection of small bowel by Dr. Willa Khan  11/2014 - given adjuvant FOLFOX (reportedly not completed due to patient non-compliance)  6/2018- biopsy proven RLL mets, MMR normal, NRAS and BRAF wild-type, moderately differentiated adenocarcinoma   6/2021 - disease progression seen at metsatatic sites, patient started on FOLFOX with intolerance to oxaliplatin leading to switch to capecitabine/bevacizumab  11/2021 - disease progression and  "patient switched to irinotecan / cetuximab with subsequent dose reductions due to skin toxicity and diarrhea  2/20/23 - CT torso showing overall disease progression  PAST MEDICAL HISTORY  Rectal cancer  Recurrent b/l DVT  History of cardiomyopathy (ischemic vs. stress?)  Cholecystitis   SAH (9/2014)  Day's esophagus by EGD 8/2013, GERD  Tobacco abuse  Depression/anxiety  Dyslipidemia  Hypertension  Avascular necrosis femoral heads noted on imaging 2021  Asthma/COPD  Migraines  Hepatic steatosis noted on imaging 2020  Essential tremor    SOCIAL HISTORY  Unemployed. Lives with mom and brother. Has four children ages 10-17. Smokes 1ppd. No alcohol use. Uses THC.     CURRENT MEDS  see below    FAMILY HISTORY  Father - esophageal cancer, paternal GF - esophageal cancer    Subjective:  Nausea is well controlled on Aloxi, pain is stable, but needs to sleep propped up and is not able to sleep fully reclined.     No fevers, chills, chest pain, shortness of breath, abdominal pain, nausea, vomiting, diarrhea, or rashes.    ROS as above. Remainder of 10-point review of systems elicited and negative.     Objective:  /78 (BP Location: Right arm, Patient Position: Sitting, BP Cuff Size: Adult long)   Pulse (!) 112   Temp 35.1 °C (95.2 °F) (Temporal)   Resp 18   Ht (S) 1.695 m (5' 6.73\")   Wt 73.8 kg (162 lb 11.2 oz)   SpO2 99%   BMI 25.69 kg/m²     Gen: A&O, NAD  Head: Normocephalic, atraumatic  Eyes: no scleral icterus  ENT: mucous membranes moist, no oropharyngeal lesions  Resp: Lungs CTAB  Cardiac: Normal rate, regular rhythm, no murmurs appreciated  Abdomen: Soft, nondistended, nontender, +BS  Neuro: CNII-XII grossly intact  Psych: appropriate mood & affect  Skin: warm, dry, no apparent rashes     ECOG Performance Status: 1     CT chest/abd/pelvis 3/27/24:  Metastatic colorectal cancer restaging scan compared to CT abdomen  pelvis dated 02/09/2024 and CTPE dated 01/08/2024:  1. Grossly stable metastatic " disease burden involving the lungs,  mediastinum, and bilateral adrenal glands. A few of the smaller  pulmonary metastases demonstrate increasing lucency which could  reflect treatment response.  2. No definite new sites of metastatic disease.  3. New small ground-glass opacity in the left lower lobe could  reflect atelectasis or developing pulmonary infarct.  4. Increase in size of a loculated hypoattenuating fluid collection  in the left subphrenic space abutting the gastric fundus.  5. Additional chronic incidental findings as described above    Assessment & Plan:  Mr. Hernandez is a 38 y.o. man with a history of metastatic rectal adenocarcinoma, cardiomyopathy, depression/anxiety, asthma/COPD, avascular necrosis who was previously treated with Dr. Willa Vega, and presents to day for my assumption of his care.    I previously saw the patient in March 2023 for 2nd opinion and for clinical trial consideration. His cancer therapy is summarized above, briefly was diagnosed in 8/2013 with localized rectal cancer. He underwent neoadjuvant chemoradiation with capecitabine followed by resection. He subsequently received an incomplete course of adjuvant FOLFOX.     He later developed metastatic recurrence to the lungs in 6/2018, biopsy proven, with MMR-proficient, no TERRY or BRAF mutations detected. He then received palliative FOLFOX , but developed a serious adverse reaction, unclear of the details. Later was treated with capecitabine + bevacizumab for unclear amount of time, d/c d/t progression. He started irinotecan + cetuximab 11/29/2021, and has been on this ever since.    When I met him first in March 2023, he had not had a CT for 1.5y despite ongoing treatment. There was some interval progression, but the tempo of it was not clear given the timing of his CTs. I recommended continued treatment with close interval CT to evaluate response. I also discussed clinical trial options at the time, but he was not interested in  driving to [facility] for a study.     He discontinued irinotecan + cetuximab 8/2023 due to progression and started Lonsurf + bevacizumab 9/13/23. He reports only taking the Lonsurf for 1 week (prescribed as 2 weeks on, 2 weeks off) due to nausea/vomiting, and in Dec 2023 was having so much nausea that he did not take his Eliquis. Presented to the ED with n/v shortness of breat 12/20/23, found to have new PE. He was restarted on his Eliquis. He was again admitted 1/8/24 for abdominal pain, found to have stable PE, but with pancreatitis and abdominal abscess. He underwent IR-guided LUQ drain placement, thought to be pseudocyst.     He is now feeling much better. I personally reviewed the images from the recent CT, which show stable to minimal decrease in size of pulmonary nodules. I discussed this with him, and he would like to continue Lonsurf + gerson with additional nausea support.     2/29/24: Tolerating Lonsurf + gerson much better with addition of dexamethasone & Aloxi. He is taking the Lonsurf Days 1-5 and Days 15-19. Will repeat CT in 1mo.     4/4/24: I personally reviewed the images from the recent CT, which show stable disease in the lungs and adrenal glands; stable to slightly increased pancreatic pseudocyst. Pain is well controlled, but needs a hospital bed that will allow him to sleep on an incline due to pain. Will reach out to  to assist with this.     Plan:   Metastatic rectal ca to the lungs, KRAS, BRAF WT, MMR-intact  - Continue with Lonsurf + gerson, currently taking Lonsurf only on Days 1-5 (typically this is Days 1-5 and 8-12 on a 28 day cycle) with gerson Days 1 and 15.   - He will take Zofran & Compazine in the morning prior to his Lonsurf, can add low dose dexamethasone to this if still severe. Will add palnosetron to Days 1 & 15 with gerson  - RTC 1mo for next cycle; repeat CT in 2mo    DM  - recommended by inpatient team to f/u with endocrinology, but pt not interested  - message sent to pt's pcp to  inform and request assistance with management

## 2024-04-04 NOTE — PROGRESS NOTES
Patient here for follow up with Dr. Lui alone. Medications and allergies reviewed. Stated he was out of Lonsu and his chemo was canceled yesterday due to not having the Lonsurf. RX to be sent to  [facility].    Patient stated he has been feeling okay, nausea has been better and he is able to eat and drink. Stated his pain is about the same but manageable. Patient stated he is only able to sleep sitting up and he is sleeping on the couch but not getting much sleep at all, requested hospital bed RX.  notified for assistance.     CT reviewed with Misael by Dr. Lui.    Patient due for avastin on 4/10. RX sent to  [facility].     Next follow up virtually before next cycle.

## 2024-04-05 ENCOUNTER — SPECIALTY PHARMACY (OUTPATIENT)
Dept: PHARMACY | Facility: CLINIC | Age: 39
End: 2024-04-05

## 2024-04-05 ENCOUNTER — PHARMACY VISIT (OUTPATIENT)
Dept: PHARMACY | Facility: CLINIC | Age: 39
End: 2024-04-05
Payer: MEDICARE

## 2024-04-05 ENCOUNTER — SOCIAL WORK (OUTPATIENT)
Dept: CASE MANAGEMENT | Facility: HOSPITAL | Age: 39
End: 2024-04-05
Payer: MEDICARE

## 2024-04-05 PROCEDURE — RXMED WILLOW AMBULATORY MEDICATION CHARGE

## 2024-04-05 NOTE — PROGRESS NOTES
ELSA received word that Pt is in need of a hospital bed at home. ELSA obtained the required information for referral. ELSA called Pt and spoke to Pt's mother. She states there is no particular DME company she wants to use, no there DME in the home. ELSA sent the referral to [facility] and they will call Pt to schedule delivery.

## 2024-04-09 ENCOUNTER — LAB (OUTPATIENT)
Dept: LAB | Facility: LAB | Age: 39
End: 2024-04-09
Payer: MEDICARE

## 2024-04-09 DIAGNOSIS — C79.9 METASTASIS FROM RECTAL CANCER (MULTI): ICD-10-CM

## 2024-04-09 DIAGNOSIS — C20 METASTASIS FROM RECTAL CANCER (MULTI): ICD-10-CM

## 2024-04-09 LAB
ALBUMIN SERPL BCP-MCNC: 2.9 G/DL (ref 3.4–5)
ALP SERPL-CCNC: 124 U/L (ref 33–120)
ALT SERPL W P-5'-P-CCNC: 6 U/L (ref 10–52)
ANION GAP SERPL CALC-SCNC: 14 MMOL/L (ref 10–20)
AST SERPL W P-5'-P-CCNC: 5 U/L (ref 9–39)
BASOPHILS # BLD AUTO: 0.09 X10*3/UL (ref 0–0.1)
BASOPHILS NFR BLD AUTO: 0.7 %
BILIRUB SERPL-MCNC: 0.5 MG/DL (ref 0–1.2)
BUN SERPL-MCNC: 5 MG/DL (ref 6–23)
CALCIUM SERPL-MCNC: 9.3 MG/DL (ref 8.6–10.3)
CHLORIDE SERPL-SCNC: 99 MMOL/L (ref 98–107)
CO2 SERPL-SCNC: 26 MMOL/L (ref 21–32)
CREAT SERPL-MCNC: 0.57 MG/DL (ref 0.5–1.3)
EGFRCR SERPLBLD CKD-EPI 2021: >90 ML/MIN/1.73M*2
EOSINOPHIL # BLD AUTO: 0.32 X10*3/UL (ref 0–0.7)
EOSINOPHIL NFR BLD AUTO: 2.4 %
ERYTHROCYTE [DISTWIDTH] IN BLOOD BY AUTOMATED COUNT: 19.9 % (ref 11.5–14.5)
GLUCOSE SERPL-MCNC: 183 MG/DL (ref 74–99)
HCT VFR BLD AUTO: 38.1 % (ref 41–52)
HGB BLD-MCNC: 12.2 G/DL (ref 13.5–17.5)
IMM GRANULOCYTES # BLD AUTO: 0.1 X10*3/UL (ref 0–0.7)
IMM GRANULOCYTES NFR BLD AUTO: 0.7 % (ref 0–0.9)
LYMPHOCYTES # BLD AUTO: 2.28 X10*3/UL (ref 1.2–4.8)
LYMPHOCYTES NFR BLD AUTO: 17.1 %
MCH RBC QN AUTO: 27.7 PG (ref 26–34)
MCHC RBC AUTO-ENTMCNC: 32 G/DL (ref 32–36)
MCV RBC AUTO: 86 FL (ref 80–100)
MONOCYTES # BLD AUTO: 1.36 X10*3/UL (ref 0.1–1)
MONOCYTES NFR BLD AUTO: 10.2 %
NEUTROPHILS # BLD AUTO: 9.22 X10*3/UL (ref 1.2–7.7)
NEUTROPHILS NFR BLD AUTO: 68.9 %
NRBC BLD-RTO: 0 /100 WBCS (ref 0–0)
PLATELET # BLD AUTO: 449 X10*3/UL (ref 150–450)
POTASSIUM SERPL-SCNC: 3.8 MMOL/L (ref 3.5–5.3)
PROT SERPL-MCNC: 7.1 G/DL (ref 6.4–8.2)
RBC # BLD AUTO: 4.41 X10*6/UL (ref 4.5–5.9)
SODIUM SERPL-SCNC: 135 MMOL/L (ref 136–145)
WBC # BLD AUTO: 13.4 X10*3/UL (ref 4.4–11.3)

## 2024-04-10 ENCOUNTER — INFUSION (OUTPATIENT)
Dept: HEMATOLOGY/ONCOLOGY | Facility: HOSPITAL | Age: 39
End: 2024-04-10
Payer: MEDICARE

## 2024-04-10 VITALS
BODY MASS INDEX: 26.04 KG/M2 | DIASTOLIC BLOOD PRESSURE: 85 MMHG | WEIGHT: 164.9 LBS | OXYGEN SATURATION: 99 % | SYSTOLIC BLOOD PRESSURE: 130 MMHG | RESPIRATION RATE: 20 BRPM | HEART RATE: 110 BPM | TEMPERATURE: 96.1 F

## 2024-04-10 DIAGNOSIS — C79.9 METASTASIS FROM RECTAL CANCER (MULTI): ICD-10-CM

## 2024-04-10 DIAGNOSIS — C20 METASTASIS FROM RECTAL CANCER (MULTI): ICD-10-CM

## 2024-04-10 LAB
APPEARANCE UR: CLEAR
BACTERIA #/AREA URNS AUTO: ABNORMAL /HPF
BILIRUB UR STRIP.AUTO-MCNC: NEGATIVE MG/DL
COLOR UR: YELLOW
GLUCOSE UR STRIP.AUTO-MCNC: ABNORMAL MG/DL
HYALINE CASTS #/AREA URNS AUTO: ABNORMAL /LPF
KETONES UR STRIP.AUTO-MCNC: NEGATIVE MG/DL
LEUKOCYTE ESTERASE UR QL STRIP.AUTO: ABNORMAL
MUCOUS THREADS #/AREA URNS AUTO: ABNORMAL /LPF
NITRITE UR QL STRIP.AUTO: NEGATIVE
PH UR STRIP.AUTO: 6.5 [PH]
PROT UR STRIP.AUTO-MCNC: ABNORMAL MG/DL
RBC # UR STRIP.AUTO: ABNORMAL /UL
RBC #/AREA URNS AUTO: ABNORMAL /HPF
SP GR UR STRIP.AUTO: 1.01
SQUAMOUS #/AREA URNS AUTO: ABNORMAL /HPF
TRANS CELLS #/AREA UR COMP ASSIST: ABNORMAL /HPF
UROBILINOGEN UR STRIP.AUTO-MCNC: ABNORMAL MG/DL
WBC #/AREA URNS AUTO: ABNORMAL /HPF

## 2024-04-10 PROCEDURE — 81001 URINALYSIS AUTO W/SCOPE: CPT | Performed by: STUDENT IN AN ORGANIZED HEALTH CARE EDUCATION/TRAINING PROGRAM

## 2024-04-10 PROCEDURE — 96375 TX/PRO/DX INJ NEW DRUG ADDON: CPT | Mod: INF

## 2024-04-10 PROCEDURE — 96409 CHEMO IV PUSH SNGL DRUG: CPT

## 2024-04-10 PROCEDURE — 2500000004 HC RX 250 GENERAL PHARMACY W/ HCPCS (ALT 636 FOR OP/ED): Mod: JZ,SE,TB | Performed by: STUDENT IN AN ORGANIZED HEALTH CARE EDUCATION/TRAINING PROGRAM

## 2024-04-10 PROCEDURE — 2500000004 HC RX 250 GENERAL PHARMACY W/ HCPCS (ALT 636 FOR OP/ED): Mod: SE | Performed by: STUDENT IN AN ORGANIZED HEALTH CARE EDUCATION/TRAINING PROGRAM

## 2024-04-10 RX ORDER — HEPARIN 100 UNIT/ML
500 SYRINGE INTRAVENOUS AS NEEDED
Status: CANCELLED | OUTPATIENT
Start: 2024-04-10

## 2024-04-10 RX ORDER — FAMOTIDINE 10 MG/ML
20 INJECTION INTRAVENOUS ONCE AS NEEDED
Status: DISCONTINUED | OUTPATIENT
Start: 2024-04-10 | End: 2024-04-10 | Stop reason: HOSPADM

## 2024-04-10 RX ORDER — HEPARIN SODIUM,PORCINE/PF 10 UNIT/ML
50 SYRINGE (ML) INTRAVENOUS AS NEEDED
Status: CANCELLED | OUTPATIENT
Start: 2024-04-10

## 2024-04-10 RX ORDER — EPINEPHRINE 0.3 MG/.3ML
0.3 INJECTION SUBCUTANEOUS EVERY 5 MIN PRN
Status: DISCONTINUED | OUTPATIENT
Start: 2024-04-10 | End: 2024-04-10 | Stop reason: HOSPADM

## 2024-04-10 RX ORDER — DIPHENHYDRAMINE HYDROCHLORIDE 50 MG/ML
50 INJECTION INTRAMUSCULAR; INTRAVENOUS AS NEEDED
Status: DISCONTINUED | OUTPATIENT
Start: 2024-04-10 | End: 2024-04-10 | Stop reason: HOSPADM

## 2024-04-10 RX ORDER — PROCHLORPERAZINE EDISYLATE 5 MG/ML
10 INJECTION INTRAMUSCULAR; INTRAVENOUS EVERY 6 HOURS PRN
Status: DISCONTINUED | OUTPATIENT
Start: 2024-04-10 | End: 2024-04-10 | Stop reason: HOSPADM

## 2024-04-10 RX ORDER — PALONOSETRON 0.05 MG/ML
250 INJECTION, SOLUTION INTRAVENOUS ONCE
Status: COMPLETED | OUTPATIENT
Start: 2024-04-10 | End: 2024-04-10

## 2024-04-10 RX ORDER — ALBUTEROL SULFATE 0.83 MG/ML
3 SOLUTION RESPIRATORY (INHALATION) AS NEEDED
Status: DISCONTINUED | OUTPATIENT
Start: 2024-04-10 | End: 2024-04-10 | Stop reason: HOSPADM

## 2024-04-10 RX ORDER — PROCHLORPERAZINE MALEATE 10 MG
10 TABLET ORAL EVERY 6 HOURS PRN
Status: DISCONTINUED | OUTPATIENT
Start: 2024-04-10 | End: 2024-04-10 | Stop reason: HOSPADM

## 2024-04-10 RX ORDER — HEPARIN 100 UNIT/ML
500 SYRINGE INTRAVENOUS AS NEEDED
Status: DISCONTINUED | OUTPATIENT
Start: 2024-04-10 | End: 2024-04-10 | Stop reason: HOSPADM

## 2024-04-10 RX ADMIN — BEVACIZUMAB-AWWB 400 MG: 400 INJECTION, SOLUTION INTRAVENOUS at 10:30

## 2024-04-10 RX ADMIN — PALONOSETRON 250 MCG: 0.05 INJECTION, SOLUTION INTRAVENOUS at 10:02

## 2024-04-10 RX ADMIN — DEXAMETHASONE SODIUM PHOSPHATE 10 MG: 4 INJECTION, SOLUTION INTRA-ARTICULAR; INTRALESIONAL; INTRAMUSCULAR; INTRAVENOUS; SOFT TISSUE at 10:02

## 2024-04-10 RX ADMIN — Medication 500 UNITS: at 10:45

## 2024-04-10 ASSESSMENT — PATIENT HEALTH QUESTIONNAIRE - PHQ9
SUM OF ALL RESPONSES TO PHQ9 QUESTIONS 1 & 2: 2
10. IF YOU CHECKED OFF ANY PROBLEMS, HOW DIFFICULT HAVE THESE PROBLEMS MADE IT FOR YOU TO DO YOUR WORK, TAKE CARE OF THINGS AT HOME, OR GET ALONG WITH OTHER PEOPLE: SOMEWHAT DIFFICULT
2. FEELING DOWN, DEPRESSED OR HOPELESS: SEVERAL DAYS
1. LITTLE INTEREST OR PLEASURE IN DOING THINGS: SEVERAL DAYS

## 2024-04-10 ASSESSMENT — PAIN SCALES - GENERAL: PAINLEVEL: 9

## 2024-04-10 NOTE — PROGRESS NOTES
Treatment Pre-Review    Diagnosis:  No matching staging information was found for the patient.    Regimen: MVASI Days 1 + 15; Cycle repeats every 28 days     Current Cycle/Day: Cycle 7 Day 1    Treatment Date Appropriate: Yes; Last Treatment: 3/20/24 (Cycle 6 Day 15)   Date change required: none    Dose or Regimen Modifications: None noted during pre-review    Med Rec/Drug Interactions: None noted during pre-review    Growth Factor: none    Financial Clearance/Authorization: Yes    Echo:  No results found for this or any previous visit from the past 1095 days.    Lifetime Dose Tracking   No doses have been documented on this patient for the following tracked chemicals: doxorubicin, epirubicin, idarubicin, daunorubicin, mitoxantrone, bleomycin, mitomycin, carmustine, doxorubicin HCl pegylated liposomal, daunorubicin citrate liposomal     Comments: none    I Raj Ramirez, PharmD hereby acknowledge that the treatment plan indicated above has been verified for correctness to the best of my ability on 4/10/2024 at 9:01 AM.

## 2024-04-10 NOTE — PROGRESS NOTES
April 10, 2024 at 9:17 AM    Acetaminophen and Adhesive tape-silicones    Misael Hernandez is a 38 y.o. male   There were no vitals taken for this visit.  There is no height or weight on file to calculate BSA.    Past Medical History:   Diagnosis Date    Other conditions influencing health status     Rectal Cancer       Encounter Diagnosis   Name Primary?    Metastasis from rectal cancer (CMS/HCC)        Has the entire regimen been authorized by financial clearance (pre-certification)?  Yes     Prior to Admission medications    Medication Sig Start Date End Date Taking? Authorizing Provider   albuterol (Ventolin HFA) 90 mcg/actuation inhaler INHALE TWO PUFFS INTO THE LUNGS EVERY 6 HOURS AS NEEDED FOR SHORTNESS OF BREATH OR FOR WHEEZING FOR UP TO 30 DAYS 11/2/21   Historical Provider, MD   apixaban (Eliquis) 5 mg tablet Take 1 tablet (5 mg) by mouth 2 times a day. 1/15/24   Oswald Hernandez MD   atorvastatin (Lipitor) 40 mg tablet Take 1 tablet (40 mg) by mouth once daily at bedtime. 1/4/24 4/3/24  RON Pavon   bisacodyl (Dulcolax) 10 mg suppository Insert 1 suppository (10 mg) into the rectum once daily as needed. 3/9/24   Historical Provider, MD   buprenorphine-naloxone (Suboxone) 8-2 mg SL tablet Place 1 tablet under the tongue 2 times a day. With additional 0.5 tab in the evening 8/22/23 1/9/24  Historical Provider, MD   dapagliflozin propanediol (Farxiga) 5 mg Take 1 tablet (5 mg) by mouth once daily. 1/15/24   Oswald Hernandez MD   doxepin (SINEquan) 50 mg capsule Take 10 mg by mouth as needed at bedtime for sleep. 10/10/23   Historical Provider, MD   haloperidol (Haldol) 2 mg tablet Take 1 tablet (2 mg) by mouth once daily.    Historical Provider, MD   insulin regular (HumuLIN R,NovoLIN R) 100 unit/mL injection Inject under the skin. 3/9/24   Historical Provider, MD   LACTULOSE ORAL Take 20 g by mouth every 8 hours. 3/9/24   Historical Provider, MD   lidocaine-prilocaine (Emla) 2.5-2.5 % cream apply topically  to affected area if needed 11/21/23   Willa Vega MD   losartan (Cozaar) 50 mg tablet Take 1 tablet (50 mg) by mouth once daily.    Historical Provider, MD   metFORMIN (Glucophage) 500 mg tablet TAKE ONE TABLET BY MOUTH DAILY WITH BREAKFAST 4/3/24   Oswald Hernandez MD   metoprolol succinate XL (Toprol-XL) 25 mg 24 hr tablet Take 1 tablet (25 mg) by mouth once daily.    Historical Provider, MD   naloxone (Narcan) 4 mg/0.1 mL nasal spray  3/29/24   Historical Provider, MD   omeprazole (PriLOSEC) 40 mg DR capsule Take 1 capsule (40 mg) by mouth once daily in the morning. Take before meals. Do not crush or chew.    Historical Provider, MD   ondansetron (Zofran) 4 mg tablet Take 1 tablet (4 mg) by mouth every 8 hours if needed for nausea or vomiting.    Historical Provider, MD   prochlorperazine (Compazine) 5 mg tablet Take 1 tablet (5 mg) by mouth every 6 hours if needed for nausea or vomiting.    Historical Provider, MD   trifluridine-tipiraciL (Lonsurf) 20-8.19 mg tablet Take 3 tablets (60 mg total) by mouth 2 times a day.  Take on days 1-5 and 15-19 of each 28 day cycle 4/4/24   Sandra Lui MD PhD   trifluridine-tipiraciL (Lonsurf) 20-8.19 mg tablet TAKE THREE (3) TABLETS BY MOUTH 2 TIMES DAILY FOR 5 DAYS, EVERY 2 WEEKS 4/3/24 4/4/24  Sandra Lui MD PhD   trifluridine-tipiraciL (Lonsurf) 20-8.19 mg tablet Take 3 tablets (60 mg total) by mouth 2 times a day.  Take on days 1-5 and 15-19 of each 28 day cycle 4/4/24 4/4/24  Sandra Lui MD PhD       Reviewed documented list of home medications.  No significant drug interactions identified between home medications and chemotherapy regimen.    Yes    WBC   Date Value Ref Range Status   04/09/2024 13.4 (H) 4.4 - 11.3 x10*3/uL Final   03/19/2024 11.0 4.4 - 11.3 x10*3/uL Final   03/09/2024 8.9 4.4 - 11.3 x10*3/uL Final     Hemoglobin   Date Value Ref Range Status   04/09/2024 12.2 (L) 13.5 - 17.5 g/dL Final   03/19/2024 11.3 (L) 13.5 - 17.5 g/dL  Final   03/09/2024 10.2 (L) 13.5 - 17.5 g/dL Final     Hematocrit   Date Value Ref Range Status   04/09/2024 38.1 (L) 41.0 - 52.0 % Final   03/19/2024 36.6 (L) 41.0 - 52.0 % Final   03/09/2024 32.7 (L) 41.0 - 52.0 % Final     Neutrophils Absolute   Date Value Ref Range Status   04/09/2024 9.22 (H) 1.20 - 7.70 x10*3/uL Final     Comment:     Percent differential counts (%) should be interpreted in the context of the absolute cell counts (cells/uL).   03/19/2024 7.67 1.20 - 7.70 x10*3/uL Final     Comment:     Percent differential counts (%) should be interpreted in the context of the absolute cell counts (cells/uL).   03/09/2024 7.97 (H) 1.20 - 7.70 x10*3/uL Final     Comment:     Percent differential counts (%) should be interpreted in the context of the absolute cell counts (cells/uL).     Platelets   Date Value Ref Range Status   04/09/2024 449 150 - 450 x10*3/uL Final   03/19/2024 380 150 - 450 x10*3/uL Final   03/09/2024 247 150 - 450 x10*3/uL Final     Creatinine   Date Value Ref Range Status   04/09/2024 0.57 0.50 - 1.30 mg/dL Final   03/09/2024 0.36 (L) 0.50 - 1.30 mg/dL Final   03/09/2024 0.46 (L) 0.50 - 1.30 mg/dL Final     Alkaline Phosphatase   Date Value Ref Range Status   04/09/2024 124 (H) 33 - 120 U/L Final   03/09/2024 129 (H) 33 - 120 U/L Final   03/05/2024 133 (H) 33 - 120 U/L Final     AST   Date Value Ref Range Status   04/09/2024 5 (L) 9 - 39 U/L Final   03/09/2024 8 (L) 9 - 39 U/L Final   03/05/2024 13 9 - 39 U/L Final     ALT   Date Value Ref Range Status   04/09/2024 6 (L) 10 - 52 U/L Final     Comment:     Patients treated with Sulfasalazine may generate falsely decreased results for ALT.   03/09/2024 7 (L) 10 - 52 U/L Final     Comment:     Patients treated with Sulfasalazine may generate falsely decreased results for ALT.   03/05/2024 11 10 - 52 U/L Final     Comment:     Patients treated with Sulfasalazine may generate falsely decreased results for ALT.     Bilirubin, Total   Date Value  Ref Range Status   04/09/2024 0.5 0.0 - 1.2 mg/dL Final   03/09/2024 0.7 0.0 - 1.2 mg/dL Final   03/05/2024 0.4 0.0 - 1.2 mg/dL Final         Does the patient meet the treatment conditions?  Yes    ANC >= 1.5  Plt >= 75  Bili <= 1.5 x ULN  CrCl >= 30   Urine protein <= 2  BP <= 160/90    Are dosage calculations correct?  Yes    Are doses the same as previous cycle?   Yes    Were any doses modified?  No    If appropriate, was the Creatinine Clearance independently calculated based on [facility] standards?   Yes      Comments:      I Raj Ramirez, PharmD hereby acknowledge that the treatment plan indicated above has been verified for correctness to the best of my ability on 4/10/2024 at 9:17 AM.

## 2024-04-23 ENCOUNTER — LAB (OUTPATIENT)
Dept: LAB | Facility: LAB | Age: 39
End: 2024-04-23
Payer: MEDICARE

## 2024-04-23 DIAGNOSIS — C79.9 METASTASIS FROM RECTAL CANCER (MULTI): ICD-10-CM

## 2024-04-23 DIAGNOSIS — C20 METASTASIS FROM RECTAL CANCER (MULTI): ICD-10-CM

## 2024-04-23 LAB
BASOPHILS # BLD AUTO: 0.06 X10*3/UL (ref 0–0.1)
BASOPHILS NFR BLD AUTO: 0.6 %
EOSINOPHIL # BLD AUTO: 0.17 X10*3/UL (ref 0–0.7)
EOSINOPHIL NFR BLD AUTO: 1.6 %
ERYTHROCYTE [DISTWIDTH] IN BLOOD BY AUTOMATED COUNT: 19.9 % (ref 11.5–14.5)
HCT VFR BLD AUTO: 35.5 % (ref 41–52)
HGB BLD-MCNC: 11.3 G/DL (ref 13.5–17.5)
IMM GRANULOCYTES # BLD AUTO: 0.09 X10*3/UL (ref 0–0.7)
IMM GRANULOCYTES NFR BLD AUTO: 0.9 % (ref 0–0.9)
LYMPHOCYTES # BLD AUTO: 2.04 X10*3/UL (ref 1.2–4.8)
LYMPHOCYTES NFR BLD AUTO: 19.4 %
MCH RBC QN AUTO: 28.1 PG (ref 26–34)
MCHC RBC AUTO-ENTMCNC: 31.8 G/DL (ref 32–36)
MCV RBC AUTO: 88 FL (ref 80–100)
MONOCYTES # BLD AUTO: 0.89 X10*3/UL (ref 0.1–1)
MONOCYTES NFR BLD AUTO: 8.5 %
NEUTROPHILS # BLD AUTO: 7.25 X10*3/UL (ref 1.2–7.7)
NEUTROPHILS NFR BLD AUTO: 69 %
NRBC BLD-RTO: 0 /100 WBCS (ref 0–0)
PLATELET # BLD AUTO: 321 X10*3/UL (ref 150–450)
RBC # BLD AUTO: 4.02 X10*6/UL (ref 4.5–5.9)
WBC # BLD AUTO: 10.5 X10*3/UL (ref 4.4–11.3)

## 2024-04-23 PROCEDURE — 85025 COMPLETE CBC W/AUTO DIFF WBC: CPT

## 2024-04-24 ENCOUNTER — INFUSION (OUTPATIENT)
Dept: HEMATOLOGY/ONCOLOGY | Facility: HOSPITAL | Age: 39
End: 2024-04-24
Payer: MEDICARE

## 2024-04-24 VITALS
DIASTOLIC BLOOD PRESSURE: 81 MMHG | OXYGEN SATURATION: 98 % | TEMPERATURE: 96.8 F | RESPIRATION RATE: 17 BRPM | SYSTOLIC BLOOD PRESSURE: 114 MMHG | HEART RATE: 113 BPM | WEIGHT: 166.67 LBS | BODY MASS INDEX: 26.31 KG/M2

## 2024-04-24 DIAGNOSIS — C79.9 METASTASIS FROM RECTAL CANCER (MULTI): ICD-10-CM

## 2024-04-24 DIAGNOSIS — C20 METASTASIS FROM RECTAL CANCER (MULTI): ICD-10-CM

## 2024-04-24 PROCEDURE — 96409 CHEMO IV PUSH SNGL DRUG: CPT

## 2024-04-24 PROCEDURE — 2500000004 HC RX 250 GENERAL PHARMACY W/ HCPCS (ALT 636 FOR OP/ED): Mod: SE | Performed by: STUDENT IN AN ORGANIZED HEALTH CARE EDUCATION/TRAINING PROGRAM

## 2024-04-24 PROCEDURE — 96375 TX/PRO/DX INJ NEW DRUG ADDON: CPT | Mod: INF

## 2024-04-24 RX ORDER — HEPARIN 100 UNIT/ML
500 SYRINGE INTRAVENOUS AS NEEDED
Status: CANCELLED | OUTPATIENT
Start: 2024-04-24

## 2024-04-24 RX ORDER — HEPARIN SODIUM,PORCINE/PF 10 UNIT/ML
50 SYRINGE (ML) INTRAVENOUS AS NEEDED
Status: CANCELLED | OUTPATIENT
Start: 2024-04-24

## 2024-04-24 RX ORDER — HEPARIN 100 UNIT/ML
500 SYRINGE INTRAVENOUS AS NEEDED
Status: DISCONTINUED | OUTPATIENT
Start: 2024-04-24 | End: 2024-04-24 | Stop reason: HOSPADM

## 2024-04-24 RX ORDER — PALONOSETRON 0.05 MG/ML
250 INJECTION, SOLUTION INTRAVENOUS ONCE
Status: COMPLETED | OUTPATIENT
Start: 2024-04-24 | End: 2024-04-24

## 2024-04-24 RX ADMIN — BEVACIZUMAB-AWWB 400 MG: 400 INJECTION, SOLUTION INTRAVENOUS at 09:58

## 2024-04-24 RX ADMIN — PALONOSETRON 250 MCG: 0.05 INJECTION, SOLUTION INTRAVENOUS at 09:52

## 2024-04-24 RX ADMIN — Medication 500 UNITS: at 10:12

## 2024-04-24 RX ADMIN — DEXAMETHASONE SODIUM PHOSPHATE 10 MG: 4 INJECTION, SOLUTION INTRA-ARTICULAR; INTRALESIONAL; INTRAMUSCULAR; INTRAVENOUS; SOFT TISSUE at 09:53

## 2024-04-24 ASSESSMENT — ENCOUNTER SYMPTOMS
DEPRESSION: 0
OCCASIONAL FEELINGS OF UNSTEADINESS: 0
LOSS OF SENSATION IN FEET: 0

## 2024-04-24 ASSESSMENT — PAIN SCALES - GENERAL: PAINLEVEL: 7

## 2024-04-24 ASSESSMENT — PATIENT HEALTH QUESTIONNAIRE - PHQ9
SUM OF ALL RESPONSES TO PHQ9 QUESTIONS 1 & 2: 0
1. LITTLE INTEREST OR PLEASURE IN DOING THINGS: NOT AT ALL
2. FEELING DOWN, DEPRESSED OR HOPELESS: NOT AT ALL

## 2024-04-24 NOTE — SIGNIFICANT EVENT
04/24/24 0902   Prechemo Checklist   Has the patient been in the hospital, ED, or urgent care since last date of service No   Chemo/Immuno Consent Signed Yes   Protocol/Indications Verified Yes   Confirmed to previous date/time of medication Yes   Compared to previous dose Yes   All medications are dated accurately Yes   Pregnancy Test Negative Not applicable   Parameters Met Yes   BSA/Weight-Height Verified Yes   Dose Calculations Verified (current, total, cumulative) Yes

## 2024-04-24 NOTE — PROGRESS NOTES
Treatment Pre-Review for Date of Service: 4/24/24    Diagnosis:  No matching staging information was found for the patient.    Regimen: MVASI Days 1 + 15; Cycle repeats every 28 days     Current Cycle/Day: Cycle 7 Day 15   Treatment Date Appropriate: Yes; Last Treatment: 4/10/24  Date change required: none    Dose or Regimen Modifications: None noted during pre-review    Med Rec/Drug Interactions: None noted during pre-review    Growth Factor: none    Financial Clearance/Authorization: Yes    Echo:  No results found for this or any previous visit from the past 1095 days.    Lifetime Dose Tracking   No doses have been documented on this patient for the following tracked chemicals: doxorubicin, epirubicin, idarubicin, daunorubicin, mitoxantrone, bleomycin, mitomycin, carmustine, doxorubicin HCl pegylated liposomal, daunorubicin citrate liposomal     Comments: none    I Raj Ramirez, PharmD hereby acknowledge that the treatment plan indicated above has been verified for correctness to the best of my ability on 4/24/2024 at 8:01 AM.

## 2024-04-24 NOTE — PROGRESS NOTES
April 24, 2024 at 9:24 AM    Acetaminophen and Adhesive tape-silicones    Misael Hernandez is a 38 y.o. male   Blood pressure 110/80, pulse (!) 120, temperature 36 °C (96.8 °F), temperature source Temporal, resp. rate 16, weight 75.6 kg (166 lb 10.7 oz), SpO2 98%.  Body surface area is 1.89 meters squared.    Past Medical History:   Diagnosis Date    Other conditions influencing health status     Rectal Cancer       Encounter Diagnosis   Name Primary?    Metastasis from rectal cancer (Multi)        Has the entire regimen been authorized by financial clearance (pre-certification)?  Yes     Prior to Admission medications    Medication Sig Start Date End Date Taking? Authorizing Provider   albuterol (Ventolin HFA) 90 mcg/actuation inhaler INHALE TWO PUFFS INTO THE LUNGS EVERY 6 HOURS AS NEEDED FOR SHORTNESS OF BREATH OR FOR WHEEZING FOR UP TO 30 DAYS 11/2/21   Historical Provider, MD   apixaban (Eliquis) 5 mg tablet Take 1 tablet (5 mg) by mouth 2 times a day. 1/15/24   Oswald Hernandez MD   atorvastatin (Lipitor) 40 mg tablet Take 1 tablet (40 mg) by mouth once daily at bedtime. 1/4/24 4/3/24  DARWIN Pavon-CNP   bisacodyl (Dulcolax) 10 mg suppository Insert 1 suppository (10 mg) into the rectum once daily as needed. 3/9/24   Historical Provider, MD   buprenorphine-naloxone (Suboxone) 8-2 mg SL tablet Place 1 tablet under the tongue 2 times a day. With additional 0.5 tab in the evening 8/22/23 1/9/24  Historical Provider, MD   dapagliflozin propanediol (Farxiga) 5 mg Take 1 tablet (5 mg) by mouth once daily. 1/15/24   Oswald Hernandez MD   doxepin (SINEquan) 50 mg capsule Take 10 mg by mouth as needed at bedtime for sleep. 10/10/23   Historical Provider, MD   haloperidol (Haldol) 2 mg tablet Take 1 tablet (2 mg) by mouth once daily.    Historical Provider, MD   insulin regular (HumuLIN R,NovoLIN R) 100 unit/mL injection Inject under the skin. 3/9/24   Historical Provider, MD   LACTULOSE ORAL Take 20 g by mouth every 8 hours.  3/9/24   Historical Provider, MD   lidocaine-prilocaine (Emla) 2.5-2.5 % cream apply topically to affected area if needed 11/21/23   Willa Vega MD   losartan (Cozaar) 50 mg tablet Take 1 tablet (50 mg) by mouth once daily.    Historical Provider, MD   metFORMIN (Glucophage) 500 mg tablet TAKE ONE TABLET BY MOUTH DAILY WITH BREAKFAST 4/3/24   Oswald Hernandez MD   metoprolol succinate XL (Toprol-XL) 25 mg 24 hr tablet Take 1 tablet (25 mg) by mouth once daily.    Historical Provider, MD   naloxone (Narcan) 4 mg/0.1 mL nasal spray  3/29/24   Historical Provider, MD   omeprazole (PriLOSEC) 40 mg DR capsule Take 1 capsule (40 mg) by mouth once daily in the morning. Take before meals. Do not crush or chew.    Historical Provider, MD   ondansetron (Zofran) 4 mg tablet Take 1 tablet (4 mg) by mouth every 8 hours if needed for nausea or vomiting.    Historical Provider, MD   prochlorperazine (Compazine) 5 mg tablet Take 1 tablet (5 mg) by mouth every 6 hours if needed for nausea or vomiting.    Historical Provider, MD   trifluridine-tipiraciL (Lonsurf) 20-8.19 mg tablet Take 3 tablets (60 mg total) by mouth 2 times a day.  Take on days 1-5 and 15-19 of each 28 day cycle 4/4/24   Sandra Lui MD PhD       Reviewed documented list of home medications.  No significant drug interactions identified between home medications and chemotherapy regimen.    Yes    WBC   Date Value Ref Range Status   04/23/2024 10.5 4.4 - 11.3 x10*3/uL Final   04/09/2024 13.4 (H) 4.4 - 11.3 x10*3/uL Final   03/19/2024 11.0 4.4 - 11.3 x10*3/uL Final     Hemoglobin   Date Value Ref Range Status   04/23/2024 11.3 (L) 13.5 - 17.5 g/dL Final   04/09/2024 12.2 (L) 13.5 - 17.5 g/dL Final   03/19/2024 11.3 (L) 13.5 - 17.5 g/dL Final     Hematocrit   Date Value Ref Range Status   04/23/2024 35.5 (L) 41.0 - 52.0 % Final   04/09/2024 38.1 (L) 41.0 - 52.0 % Final   03/19/2024 36.6 (L) 41.0 - 52.0 % Final     Neutrophils Absolute   Date Value Ref Range  Status   04/23/2024 7.25 1.20 - 7.70 x10*3/uL Final     Comment:     Percent differential counts (%) should be interpreted in the context of the absolute cell counts (cells/uL).   04/09/2024 9.22 (H) 1.20 - 7.70 x10*3/uL Final     Comment:     Percent differential counts (%) should be interpreted in the context of the absolute cell counts (cells/uL).   03/19/2024 7.67 1.20 - 7.70 x10*3/uL Final     Comment:     Percent differential counts (%) should be interpreted in the context of the absolute cell counts (cells/uL).     Platelets   Date Value Ref Range Status   04/23/2024 321 150 - 450 x10*3/uL Final   04/09/2024 449 150 - 450 x10*3/uL Final   03/19/2024 380 150 - 450 x10*3/uL Final     Creatinine   Date Value Ref Range Status   04/09/2024 0.57 0.50 - 1.30 mg/dL Final   03/09/2024 0.36 (L) 0.50 - 1.30 mg/dL Final   03/09/2024 0.46 (L) 0.50 - 1.30 mg/dL Final     Alkaline Phosphatase   Date Value Ref Range Status   04/09/2024 124 (H) 33 - 120 U/L Final   03/09/2024 129 (H) 33 - 120 U/L Final   03/05/2024 133 (H) 33 - 120 U/L Final     AST   Date Value Ref Range Status   04/09/2024 5 (L) 9 - 39 U/L Final   03/09/2024 8 (L) 9 - 39 U/L Final   03/05/2024 13 9 - 39 U/L Final     ALT   Date Value Ref Range Status   04/09/2024 6 (L) 10 - 52 U/L Final     Comment:     Patients treated with Sulfasalazine may generate falsely decreased results for ALT.   03/09/2024 7 (L) 10 - 52 U/L Final     Comment:     Patients treated with Sulfasalazine may generate falsely decreased results for ALT.   03/05/2024 11 10 - 52 U/L Final     Comment:     Patients treated with Sulfasalazine may generate falsely decreased results for ALT.     Bilirubin, Total   Date Value Ref Range Status   04/09/2024 0.5 0.0 - 1.2 mg/dL Final   03/09/2024 0.7 0.0 - 1.2 mg/dL Final   03/05/2024 0.4 0.0 - 1.2 mg/dL Final         Does the patient meet the treatment conditions?  Yes    ANC >= 1.5  Plt >= 75  Bili <= 1.5 x ULN  CrCl >= 30   Urine protein <= 2  BP  <= 160/90    Are dosage calculations correct?  Yes    Are doses the same as previous cycle?   Yes    Were any doses modified?  No    If appropriate, was the Creatinine Clearance independently calculated based on [facility] standards?   Yes      Comments:      I Raj Ramirez, PharmD hereby acknowledge that the treatment plan indicated above has been verified for correctness to the best of my ability on 4/24/2024 at 9:24 AM.

## 2024-05-03 PROCEDURE — RXMED WILLOW AMBULATORY MEDICATION CHARGE

## 2024-05-06 ENCOUNTER — PHARMACY VISIT (OUTPATIENT)
Dept: PHARMACY | Facility: CLINIC | Age: 39
End: 2024-05-06
Payer: MEDICARE

## 2024-05-06 ENCOUNTER — SPECIALTY PHARMACY (OUTPATIENT)
Dept: PHARMACY | Facility: CLINIC | Age: 39
End: 2024-05-06

## 2024-05-07 ENCOUNTER — LAB (OUTPATIENT)
Dept: LAB | Facility: LAB | Age: 39
End: 2024-05-07
Payer: MEDICARE

## 2024-05-07 DIAGNOSIS — C79.9 METASTASIS FROM RECTAL CANCER (MULTI): ICD-10-CM

## 2024-05-07 DIAGNOSIS — C20 METASTASIS FROM RECTAL CANCER (MULTI): ICD-10-CM

## 2024-05-07 LAB
ALBUMIN SERPL BCP-MCNC: 3.5 G/DL (ref 3.4–5)
ALP SERPL-CCNC: 113 U/L (ref 33–120)
ALT SERPL W P-5'-P-CCNC: 5 U/L (ref 10–52)
ANION GAP SERPL CALC-SCNC: 13 MMOL/L (ref 10–20)
AST SERPL W P-5'-P-CCNC: 10 U/L (ref 9–39)
BASOPHILS # BLD AUTO: 0.1 X10*3/UL (ref 0–0.1)
BASOPHILS NFR BLD AUTO: 1.1 %
BILIRUB SERPL-MCNC: 0.4 MG/DL (ref 0–1.2)
BUN SERPL-MCNC: 4 MG/DL (ref 6–23)
CALCIUM SERPL-MCNC: 9.2 MG/DL (ref 8.6–10.3)
CHLORIDE SERPL-SCNC: 94 MMOL/L (ref 98–107)
CO2 SERPL-SCNC: 29 MMOL/L (ref 21–32)
CREAT SERPL-MCNC: 0.49 MG/DL (ref 0.5–1.3)
EGFRCR SERPLBLD CKD-EPI 2021: >90 ML/MIN/1.73M*2
EOSINOPHIL # BLD AUTO: 0.25 X10*3/UL (ref 0–0.7)
EOSINOPHIL NFR BLD AUTO: 2.7 %
ERYTHROCYTE [DISTWIDTH] IN BLOOD BY AUTOMATED COUNT: 20.1 % (ref 11.5–14.5)
GLUCOSE SERPL-MCNC: 241 MG/DL (ref 74–99)
HCT VFR BLD AUTO: 39.9 % (ref 41–52)
HGB BLD-MCNC: 12.7 G/DL (ref 13.5–17.5)
IMM GRANULOCYTES # BLD AUTO: 0.09 X10*3/UL (ref 0–0.7)
IMM GRANULOCYTES NFR BLD AUTO: 1 % (ref 0–0.9)
LYMPHOCYTES # BLD AUTO: 1.62 X10*3/UL (ref 1.2–4.8)
LYMPHOCYTES NFR BLD AUTO: 17.5 %
MCH RBC QN AUTO: 28.6 PG (ref 26–34)
MCHC RBC AUTO-ENTMCNC: 31.8 G/DL (ref 32–36)
MCV RBC AUTO: 90 FL (ref 80–100)
MONOCYTES # BLD AUTO: 1.06 X10*3/UL (ref 0.1–1)
MONOCYTES NFR BLD AUTO: 11.5 %
NEUTROPHILS # BLD AUTO: 6.13 X10*3/UL (ref 1.2–7.7)
NEUTROPHILS NFR BLD AUTO: 66.2 %
NRBC BLD-RTO: 0 /100 WBCS (ref 0–0)
PLATELET # BLD AUTO: 331 X10*3/UL (ref 150–450)
POTASSIUM SERPL-SCNC: 3.3 MMOL/L (ref 3.5–5.3)
PROT SERPL-MCNC: 7 G/DL (ref 6.4–8.2)
RBC # BLD AUTO: 4.44 X10*6/UL (ref 4.5–5.9)
SODIUM SERPL-SCNC: 133 MMOL/L (ref 136–145)
WBC # BLD AUTO: 9.3 X10*3/UL (ref 4.4–11.3)

## 2024-05-07 PROCEDURE — 85025 COMPLETE CBC W/AUTO DIFF WBC: CPT

## 2024-05-07 PROCEDURE — 80053 COMPREHEN METABOLIC PANEL: CPT

## 2024-05-08 ENCOUNTER — INFUSION (OUTPATIENT)
Dept: HEMATOLOGY/ONCOLOGY | Facility: HOSPITAL | Age: 39
End: 2024-05-08
Payer: MEDICARE

## 2024-05-08 VITALS
DIASTOLIC BLOOD PRESSURE: 90 MMHG | WEIGHT: 165.46 LBS | OXYGEN SATURATION: 97 % | RESPIRATION RATE: 20 BRPM | BODY MASS INDEX: 26.12 KG/M2 | HEART RATE: 120 BPM | SYSTOLIC BLOOD PRESSURE: 133 MMHG | TEMPERATURE: 96.1 F

## 2024-05-08 DIAGNOSIS — C20 METASTASIS FROM RECTAL CANCER (MULTI): ICD-10-CM

## 2024-05-08 DIAGNOSIS — C79.9 METASTASIS FROM RECTAL CANCER (MULTI): ICD-10-CM

## 2024-05-08 PROCEDURE — 96523 IRRIG DRUG DELIVERY DEVICE: CPT

## 2024-05-08 PROCEDURE — 2500000004 HC RX 250 GENERAL PHARMACY W/ HCPCS (ALT 636 FOR OP/ED): Mod: SE | Performed by: STUDENT IN AN ORGANIZED HEALTH CARE EDUCATION/TRAINING PROGRAM

## 2024-05-08 RX ORDER — HEPARIN SODIUM,PORCINE/PF 10 UNIT/ML
50 SYRINGE (ML) INTRAVENOUS AS NEEDED
Status: CANCELLED | OUTPATIENT
Start: 2024-05-08

## 2024-05-08 RX ORDER — HEPARIN 100 UNIT/ML
500 SYRINGE INTRAVENOUS AS NEEDED
Status: CANCELLED | OUTPATIENT
Start: 2024-05-08

## 2024-05-08 RX ORDER — HEPARIN 100 UNIT/ML
500 SYRINGE INTRAVENOUS AS NEEDED
Status: DISCONTINUED | OUTPATIENT
Start: 2024-05-08 | End: 2024-05-08 | Stop reason: HOSPADM

## 2024-05-08 RX ADMIN — Medication 500 UNITS: at 10:30

## 2024-05-08 ASSESSMENT — PATIENT HEALTH QUESTIONNAIRE - PHQ9
10. IF YOU CHECKED OFF ANY PROBLEMS, HOW DIFFICULT HAVE THESE PROBLEMS MADE IT FOR YOU TO DO YOUR WORK, TAKE CARE OF THINGS AT HOME, OR GET ALONG WITH OTHER PEOPLE: SOMEWHAT DIFFICULT
1. LITTLE INTEREST OR PLEASURE IN DOING THINGS: NOT AT ALL
SUM OF ALL RESPONSES TO PHQ9 QUESTIONS 1 & 2: 0
2. FEELING DOWN, DEPRESSED OR HOPELESS: NOT AT ALL

## 2024-05-08 ASSESSMENT — PAIN SCALES - GENERAL: PAINLEVEL: 7

## 2024-05-08 NOTE — PROGRESS NOTES
On arrival patient was very hypertensive; 136/102; after resting approx 15 minutes later it was still 130/100, so I notified Dr Lui.  We are holding the avastin for now, pt will see Dr Lui on the 14th (next week) and we will resume treatments per her evaluation next week.  Pt aware and glad to have a break.  Advised to call or go to ER with CP/SOB or other concerns. Pt voiced understanding.

## 2024-05-08 NOTE — PROGRESS NOTES
Treatment Pre-Review for Date of Service: 5/8/24    Diagnosis:  No matching staging information was found for the patient.    Regimen: MVASI Days 1 + 15; Cycle repeats every 28 days     Current Cycle/Day: Cycle 8 Day 1    Treatment Date Appropriate: Yes; Last Treatment: 4/10/24  Date change required: none    Dose or Regimen Modifications: None noted during pre-review    Med Rec/Drug Interactions: None noted during pre-review    Growth Factor: none    Financial Clearance/Authorization: Yes    Echo:  No results found for this or any previous visit from the past 1095 days.    Lifetime Dose Tracking   No doses have been documented on this patient for the following tracked chemicals: doxorubicin, epirubicin, idarubicin, daunorubicin, mitoxantrone, bleomycin, mitomycin, carmustine, doxorubicin HCl pegylated liposomal, daunorubicin citrate liposomal     Comments: none    I Raj Ramirez, PharmD hereby acknowledge that the treatment plan indicated above has been verified for correctness to the best of my ability on 5/8/2024 at 7:46 AM.

## 2024-05-08 NOTE — PROGRESS NOTES
Treatment Pre-Review for Date of Service: 5/8/24    Diagnosis:  No matching staging information was found for the patient.    Regimen: MVASI Days 1 + 15; Cycle repeats every 28 day     Current Cycle/Day: Cycle 8 Day 1    Treatment Date Appropriate: Yes; Last Treatment: 4/24/24  Date change required: none    Dose or Regimen Modifications: None noted during pre-review    Med Rec/Drug Interactions: None noted during pre-review    Growth Factor: none    Financial Clearance/Authorization: Yes    Echo:  No results found for this or any previous visit from the past 1095 days.    Lifetime Dose Tracking   No doses have been documented on this patient for the following tracked chemicals: doxorubicin, epirubicin, idarubicin, daunorubicin, mitoxantrone, bleomycin, mitomycin, carmustine, doxorubicin HCl pegylated liposomal, daunorubicin citrate liposomal     Comments: none    I Raj Ramirez, PharmD hereby acknowledge that the treatment plan indicated above has been verified for correctness to the best of my ability on 5/8/2024 at 8:25 AM.

## 2024-05-14 ENCOUNTER — TELEPHONE (OUTPATIENT)
Dept: HEMATOLOGY/ONCOLOGY | Facility: HOSPITAL | Age: 39
End: 2024-05-14
Payer: MEDICARE

## 2024-05-14 NOTE — TELEPHONE ENCOUNTER
Contacted patient to inform him that [facility] was running a bit late - he was aware of appointment and confirmed that he would still be available later to speak with [facility].

## 2024-05-15 ENCOUNTER — APPOINTMENT (OUTPATIENT)
Dept: HEMATOLOGY/ONCOLOGY | Facility: HOSPITAL | Age: 39
End: 2024-05-15
Payer: MEDICARE

## 2024-05-29 ENCOUNTER — APPOINTMENT (OUTPATIENT)
Dept: HEMATOLOGY/ONCOLOGY | Facility: HOSPITAL | Age: 39
End: 2024-05-29
Payer: MEDICARE

## 2024-05-29 ENCOUNTER — INFUSION (OUTPATIENT)
Dept: HEMATOLOGY/ONCOLOGY | Facility: HOSPITAL | Age: 39
End: 2024-05-29
Payer: MEDICARE

## 2024-05-29 ENCOUNTER — OFFICE VISIT (OUTPATIENT)
Dept: HEMATOLOGY/ONCOLOGY | Facility: HOSPITAL | Age: 39
End: 2024-05-29
Payer: MEDICARE

## 2024-05-29 VITALS
WEIGHT: 161.82 LBS | TEMPERATURE: 96.1 F | OXYGEN SATURATION: 99 % | HEIGHT: 67 IN | HEART RATE: 106 BPM | DIASTOLIC BLOOD PRESSURE: 68 MMHG | BODY MASS INDEX: 25.4 KG/M2 | RESPIRATION RATE: 18 BRPM | SYSTOLIC BLOOD PRESSURE: 91 MMHG

## 2024-05-29 VITALS
TEMPERATURE: 96.6 F | OXYGEN SATURATION: 100 % | HEART RATE: 102 BPM | DIASTOLIC BLOOD PRESSURE: 63 MMHG | SYSTOLIC BLOOD PRESSURE: 88 MMHG | RESPIRATION RATE: 18 BRPM

## 2024-05-29 DIAGNOSIS — C20 METASTASIS FROM RECTAL CANCER (MULTI): ICD-10-CM

## 2024-05-29 DIAGNOSIS — C20 METASTASIS FROM RECTAL CANCER (MULTI): Primary | ICD-10-CM

## 2024-05-29 DIAGNOSIS — C79.9 METASTASIS FROM RECTAL CANCER (MULTI): ICD-10-CM

## 2024-05-29 DIAGNOSIS — C79.9 METASTASIS FROM RECTAL CANCER (MULTI): Primary | ICD-10-CM

## 2024-05-29 DIAGNOSIS — R97.8 OTHER ABNORMAL TUMOR MARKERS: ICD-10-CM

## 2024-05-29 LAB
ALBUMIN SERPL BCP-MCNC: 3.3 G/DL (ref 3.4–5)
ALP SERPL-CCNC: 127 U/L (ref 33–120)
ALT SERPL W P-5'-P-CCNC: 6 U/L (ref 10–52)
ANION GAP SERPL CALC-SCNC: 12 MMOL/L (ref 10–20)
AST SERPL W P-5'-P-CCNC: 8 U/L (ref 9–39)
BASOPHILS # BLD AUTO: 0.19 X10*3/UL (ref 0–0.1)
BASOPHILS NFR BLD AUTO: 1.2 %
BILIRUB SERPL-MCNC: 0.5 MG/DL (ref 0–1.2)
BUN SERPL-MCNC: 3 MG/DL (ref 6–23)
CALCIUM SERPL-MCNC: 9.4 MG/DL (ref 8.6–10.3)
CANCER AG19-9 SERPL-ACNC: 1679.64 U/ML
CEA SERPL-MCNC: 292.3 UG/L
CHLORIDE SERPL-SCNC: 93 MMOL/L (ref 98–107)
CO2 SERPL-SCNC: 29 MMOL/L (ref 21–32)
CREAT SERPL-MCNC: 0.67 MG/DL (ref 0.5–1.3)
EGFRCR SERPLBLD CKD-EPI 2021: >90 ML/MIN/1.73M*2
EOSINOPHIL # BLD AUTO: 0.32 X10*3/UL (ref 0–0.7)
EOSINOPHIL NFR BLD AUTO: 2 %
ERYTHROCYTE [DISTWIDTH] IN BLOOD BY AUTOMATED COUNT: 16.7 % (ref 11.5–14.5)
FERRITIN SERPL-MCNC: 645 NG/ML (ref 20–300)
FOLATE SERPL-MCNC: 8.4 NG/ML
GLUCOSE SERPL-MCNC: 177 MG/DL (ref 74–99)
HCT VFR BLD AUTO: 42 % (ref 41–52)
HGB BLD-MCNC: 13.2 G/DL (ref 13.5–17.5)
IMM GRANULOCYTES # BLD AUTO: 0.13 X10*3/UL (ref 0–0.7)
IMM GRANULOCYTES NFR BLD AUTO: 0.8 % (ref 0–0.9)
IRON SATN MFR SERPL: 35 % (ref 25–45)
IRON SERPL-MCNC: 72 UG/DL (ref 35–150)
LYMPHOCYTES # BLD AUTO: 2.57 X10*3/UL (ref 1.2–4.8)
LYMPHOCYTES NFR BLD AUTO: 16 %
MAGNESIUM SERPL-MCNC: 1.6 MG/DL (ref 1.6–2.4)
MCH RBC QN AUTO: 28.9 PG (ref 26–34)
MCHC RBC AUTO-ENTMCNC: 31.4 G/DL (ref 32–36)
MCV RBC AUTO: 92 FL (ref 80–100)
MONOCYTES # BLD AUTO: 1.37 X10*3/UL (ref 0.1–1)
MONOCYTES NFR BLD AUTO: 8.5 %
NEUTROPHILS # BLD AUTO: 11.52 X10*3/UL (ref 1.2–7.7)
NEUTROPHILS NFR BLD AUTO: 71.5 %
NRBC BLD-RTO: 0 /100 WBCS (ref 0–0)
PLATELET # BLD AUTO: 447 X10*3/UL (ref 150–450)
POTASSIUM SERPL-SCNC: 3.4 MMOL/L (ref 3.5–5.3)
PROT SERPL-MCNC: 7.5 G/DL (ref 6.4–8.2)
RBC # BLD AUTO: 4.57 X10*6/UL (ref 4.5–5.9)
SODIUM SERPL-SCNC: 131 MMOL/L (ref 136–145)
TIBC SERPL-MCNC: 203 UG/DL (ref 240–445)
UIBC SERPL-MCNC: 131 UG/DL (ref 110–370)
WBC # BLD AUTO: 16.1 X10*3/UL (ref 4.4–11.3)

## 2024-05-29 PROCEDURE — 99215 OFFICE O/P EST HI 40 MIN: CPT | Performed by: INTERNAL MEDICINE

## 2024-05-29 PROCEDURE — 2500000004 HC RX 250 GENERAL PHARMACY W/ HCPCS (ALT 636 FOR OP/ED): Mod: SE | Performed by: STUDENT IN AN ORGANIZED HEALTH CARE EDUCATION/TRAINING PROGRAM

## 2024-05-29 PROCEDURE — 86301 IMMUNOASSAY TUMOR CA 19-9: CPT | Mod: GENLAB

## 2024-05-29 PROCEDURE — 83735 ASSAY OF MAGNESIUM: CPT

## 2024-05-29 PROCEDURE — 82378 CARCINOEMBRYONIC ANTIGEN: CPT | Mod: GENLAB

## 2024-05-29 PROCEDURE — 96375 TX/PRO/DX INJ NEW DRUG ADDON: CPT | Mod: INF

## 2024-05-29 PROCEDURE — 3074F SYST BP LT 130 MM HG: CPT | Performed by: INTERNAL MEDICINE

## 2024-05-29 PROCEDURE — 96409 CHEMO IV PUSH SNGL DRUG: CPT

## 2024-05-29 PROCEDURE — 3078F DIAST BP <80 MM HG: CPT | Performed by: INTERNAL MEDICINE

## 2024-05-29 PROCEDURE — 84075 ASSAY ALKALINE PHOSPHATASE: CPT

## 2024-05-29 PROCEDURE — 83540 ASSAY OF IRON: CPT

## 2024-05-29 PROCEDURE — 2500000004 HC RX 250 GENERAL PHARMACY W/ HCPCS (ALT 636 FOR OP/ED): Mod: SE | Performed by: INTERNAL MEDICINE

## 2024-05-29 PROCEDURE — 82746 ASSAY OF FOLIC ACID SERUM: CPT | Mod: GENLAB

## 2024-05-29 PROCEDURE — 82728 ASSAY OF FERRITIN: CPT

## 2024-05-29 PROCEDURE — 99215 OFFICE O/P EST HI 40 MIN: CPT | Mod: 25 | Performed by: INTERNAL MEDICINE

## 2024-05-29 PROCEDURE — 85025 COMPLETE CBC W/AUTO DIFF WBC: CPT

## 2024-05-29 RX ORDER — ALBUTEROL SULFATE 0.83 MG/ML
3 SOLUTION RESPIRATORY (INHALATION) AS NEEDED
Status: DISCONTINUED | OUTPATIENT
Start: 2024-05-29 | End: 2024-05-29 | Stop reason: HOSPADM

## 2024-05-29 RX ORDER — PROCHLORPERAZINE MALEATE 5 MG
10 TABLET ORAL EVERY 6 HOURS PRN
OUTPATIENT
Start: 2024-06-12

## 2024-05-29 RX ORDER — FAMOTIDINE 10 MG/ML
20 INJECTION INTRAVENOUS ONCE AS NEEDED
OUTPATIENT
Start: 2024-06-12

## 2024-05-29 RX ORDER — DEXAMETHASONE SODIUM PHOSPHATE 100 MG/10ML
10 INJECTION INTRAMUSCULAR; INTRAVENOUS ONCE
Start: 2024-06-12 | End: 2024-06-19

## 2024-05-29 RX ORDER — DIPHENHYDRAMINE HYDROCHLORIDE 50 MG/ML
50 INJECTION INTRAMUSCULAR; INTRAVENOUS AS NEEDED
Status: CANCELLED | OUTPATIENT
Start: 2024-05-29

## 2024-05-29 RX ORDER — PALONOSETRON 0.05 MG/ML
250 INJECTION, SOLUTION INTRAVENOUS ONCE
Status: COMPLETED | OUTPATIENT
Start: 2024-05-29 | End: 2024-05-29

## 2024-05-29 RX ORDER — PROCHLORPERAZINE EDISYLATE 5 MG/ML
10 INJECTION INTRAMUSCULAR; INTRAVENOUS EVERY 6 HOURS PRN
OUTPATIENT
Start: 2024-06-12

## 2024-05-29 RX ORDER — ALBUTEROL SULFATE 0.83 MG/ML
3 SOLUTION RESPIRATORY (INHALATION) AS NEEDED
OUTPATIENT
Start: 2024-06-26

## 2024-05-29 RX ORDER — ALBUTEROL SULFATE 0.83 MG/ML
3 SOLUTION RESPIRATORY (INHALATION) AS NEEDED
Status: CANCELLED | OUTPATIENT
Start: 2024-05-29

## 2024-05-29 RX ORDER — PROCHLORPERAZINE EDISYLATE 5 MG/ML
10 INJECTION INTRAMUSCULAR; INTRAVENOUS EVERY 6 HOURS PRN
Status: CANCELLED | OUTPATIENT
Start: 2024-05-29

## 2024-05-29 RX ORDER — DIPHENHYDRAMINE HYDROCHLORIDE 50 MG/ML
50 INJECTION INTRAMUSCULAR; INTRAVENOUS AS NEEDED
Status: DISCONTINUED | OUTPATIENT
Start: 2024-05-29 | End: 2024-05-29 | Stop reason: HOSPADM

## 2024-05-29 RX ORDER — PROCHLORPERAZINE MALEATE 10 MG
10 TABLET ORAL EVERY 6 HOURS PRN
Status: DISCONTINUED | OUTPATIENT
Start: 2024-05-29 | End: 2024-05-29 | Stop reason: HOSPADM

## 2024-05-29 RX ORDER — DEXAMETHASONE SODIUM PHOSPHATE 100 MG/10ML
10 INJECTION INTRAMUSCULAR; INTRAVENOUS ONCE
Start: 2024-06-26 | End: 2024-07-03

## 2024-05-29 RX ORDER — ALBUTEROL SULFATE 0.83 MG/ML
3 SOLUTION RESPIRATORY (INHALATION) AS NEEDED
OUTPATIENT
Start: 2024-06-12

## 2024-05-29 RX ORDER — HEPARIN 100 UNIT/ML
500 SYRINGE INTRAVENOUS AS NEEDED
Status: DISCONTINUED | OUTPATIENT
Start: 2024-05-29 | End: 2024-05-29 | Stop reason: HOSPADM

## 2024-05-29 RX ORDER — APREPITANT 40 MG/1
40 CAPSULE ORAL DAILY
Qty: 3 CAPSULE | Refills: 2 | Status: SHIPPED | OUTPATIENT
Start: 2024-05-29 | End: 2024-07-28

## 2024-05-29 RX ORDER — PROCHLORPERAZINE EDISYLATE 5 MG/ML
10 INJECTION INTRAMUSCULAR; INTRAVENOUS EVERY 6 HOURS PRN
Status: DISCONTINUED | OUTPATIENT
Start: 2024-05-29 | End: 2024-05-29 | Stop reason: HOSPADM

## 2024-05-29 RX ORDER — EPINEPHRINE 0.3 MG/.3ML
0.3 INJECTION SUBCUTANEOUS EVERY 5 MIN PRN
Status: CANCELLED | OUTPATIENT
Start: 2024-05-29

## 2024-05-29 RX ORDER — PALONOSETRON 0.05 MG/ML
250 INJECTION, SOLUTION INTRAVENOUS ONCE
Start: 2024-06-12 | End: 2024-06-19

## 2024-05-29 RX ORDER — FAMOTIDINE 10 MG/ML
20 INJECTION INTRAVENOUS ONCE AS NEEDED
OUTPATIENT
Start: 2024-06-26

## 2024-05-29 RX ORDER — DIPHENHYDRAMINE HYDROCHLORIDE 50 MG/ML
50 INJECTION INTRAMUSCULAR; INTRAVENOUS AS NEEDED
OUTPATIENT
Start: 2024-06-26

## 2024-05-29 RX ORDER — FAMOTIDINE 10 MG/ML
20 INJECTION INTRAVENOUS ONCE AS NEEDED
Status: CANCELLED | OUTPATIENT
Start: 2024-05-29

## 2024-05-29 RX ORDER — PALONOSETRON 0.05 MG/ML
250 INJECTION, SOLUTION INTRAVENOUS ONCE
Status: CANCELLED
Start: 2024-05-29 | End: 2024-05-29

## 2024-05-29 RX ORDER — EPINEPHRINE 0.3 MG/.3ML
0.3 INJECTION SUBCUTANEOUS EVERY 5 MIN PRN
OUTPATIENT
Start: 2024-06-26

## 2024-05-29 RX ORDER — EPINEPHRINE 0.3 MG/.3ML
0.3 INJECTION SUBCUTANEOUS EVERY 5 MIN PRN
Status: DISCONTINUED | OUTPATIENT
Start: 2024-05-29 | End: 2024-05-29 | Stop reason: HOSPADM

## 2024-05-29 RX ORDER — FAMOTIDINE 10 MG/ML
20 INJECTION INTRAVENOUS ONCE AS NEEDED
Status: DISCONTINUED | OUTPATIENT
Start: 2024-05-29 | End: 2024-05-29 | Stop reason: HOSPADM

## 2024-05-29 RX ORDER — EPINEPHRINE 0.3 MG/.3ML
0.3 INJECTION SUBCUTANEOUS EVERY 5 MIN PRN
OUTPATIENT
Start: 2024-06-12

## 2024-05-29 RX ORDER — HEPARIN 100 UNIT/ML
500 SYRINGE INTRAVENOUS AS NEEDED
OUTPATIENT
Start: 2024-05-29

## 2024-05-29 RX ORDER — PALONOSETRON 0.05 MG/ML
250 INJECTION, SOLUTION INTRAVENOUS ONCE
Start: 2024-06-26 | End: 2024-07-03

## 2024-05-29 RX ORDER — HEPARIN SODIUM,PORCINE/PF 10 UNIT/ML
50 SYRINGE (ML) INTRAVENOUS AS NEEDED
OUTPATIENT
Start: 2024-05-29

## 2024-05-29 RX ORDER — DEXAMETHASONE SODIUM PHOSPHATE 100 MG/10ML
10 INJECTION INTRAMUSCULAR; INTRAVENOUS ONCE
Status: CANCELLED
Start: 2024-05-29 | End: 2024-05-29

## 2024-05-29 RX ORDER — PROCHLORPERAZINE MALEATE 5 MG
10 TABLET ORAL EVERY 6 HOURS PRN
Status: CANCELLED | OUTPATIENT
Start: 2024-05-29

## 2024-05-29 RX ORDER — PROCHLORPERAZINE EDISYLATE 5 MG/ML
10 INJECTION INTRAMUSCULAR; INTRAVENOUS EVERY 6 HOURS PRN
OUTPATIENT
Start: 2024-06-26

## 2024-05-29 RX ORDER — DIPHENHYDRAMINE HYDROCHLORIDE 50 MG/ML
50 INJECTION INTRAMUSCULAR; INTRAVENOUS AS NEEDED
OUTPATIENT
Start: 2024-06-12

## 2024-05-29 RX ORDER — PROCHLORPERAZINE MALEATE 5 MG
10 TABLET ORAL EVERY 6 HOURS PRN
OUTPATIENT
Start: 2024-06-26

## 2024-05-29 RX ADMIN — BEVACIZUMAB-AWWB 400 MG: 400 INJECTION, SOLUTION INTRAVENOUS at 11:49

## 2024-05-29 RX ADMIN — PALONOSETRON 250 MCG: 0.05 INJECTION, SOLUTION INTRAVENOUS at 11:37

## 2024-05-29 RX ADMIN — DEXAMETHASONE SODIUM PHOSPHATE 10 MG: 4 INJECTION, SOLUTION INTRAMUSCULAR; INTRAVENOUS at 11:37

## 2024-05-29 RX ADMIN — Medication 500 UNITS: at 12:00

## 2024-05-29 SDOH — ECONOMIC STABILITY: GENERAL: WHICH OF THE FOLLOWING DO YOU KNOW HOW TO USE AND HAVE ACCESS TO EVERY DAY? (CHOOSE ALL THAT APPLY): NONE OF THESE

## 2024-05-29 SDOH — ECONOMIC STABILITY: GENERAL
WHICH OF THE FOLLOWING WOULD YOU LIKE TO GET CONNECTED TO IN ORDER TO RECEIVE A DISCOUNT OR FOR FREE? (CHOOSE ALL THAT APPLY): NONE OF THESE

## 2024-05-29 ASSESSMENT — ENCOUNTER SYMPTOMS
GASTROINTESTINAL NEGATIVE: 1
DEPRESSION: 0
MUSCULOSKELETAL NEGATIVE: 1
OCCASIONAL FEELINGS OF UNSTEADINESS: 0
CARDIOVASCULAR NEGATIVE: 1
FATIGUE: 1
RESPIRATORY NEGATIVE: 1
LOSS OF SENSATION IN FEET: 0

## 2024-05-29 ASSESSMENT — PATIENT HEALTH QUESTIONNAIRE - PHQ9
10. IF YOU CHECKED OFF ANY PROBLEMS, HOW DIFFICULT HAVE THESE PROBLEMS MADE IT FOR YOU TO DO YOUR WORK, TAKE CARE OF THINGS AT HOME, OR GET ALONG WITH OTHER PEOPLE: NOT DIFFICULT AT ALL
1. LITTLE INTEREST OR PLEASURE IN DOING THINGS: NOT AT ALL
SUM OF ALL RESPONSES TO PHQ9 QUESTIONS 1 & 2: 1
2. FEELING DOWN, DEPRESSED OR HOPELESS: SEVERAL DAYS

## 2024-05-29 ASSESSMENT — PAIN SCALES - GENERAL: PAINLEVEL: 7

## 2024-05-29 NOTE — PROGRESS NOTES
May 29, 2024 at 11:06 AM    Acetaminophen and Adhesive tape-silicones    Misael Hernandez is a 38 y.o. male   There were no vitals taken for this visit.  There is no height or weight on file to calculate BSA.    Past Medical History:   Diagnosis Date    Other conditions influencing health status     Rectal Cancer       Encounter Diagnoses   Name Primary?    Metastasis from rectal cancer (Multi)     Other abnormal tumor markers        Has the entire regimen been authorized by financial clearance (pre-certification)?  Yes     Prior to Admission medications    Medication Sig Start Date End Date Taking? Authorizing Provider   albuterol (Ventolin HFA) 90 mcg/actuation inhaler INHALE TWO PUFFS INTO THE LUNGS EVERY 6 HOURS AS NEEDED FOR SHORTNESS OF BREATH OR FOR WHEEZING FOR UP TO 30 DAYS 11/2/21   Historical Provider, MD   apixaban (Eliquis) 5 mg tablet Take 1 tablet (5 mg) by mouth 2 times a day. 1/15/24   Oswald Hernandez MD   aprepitant (Emend) 40 mg capsule Take 1 capsule (40 mg) by mouth once daily. 5/29/24 7/28/24  Lorenzo Nina MD   atorvastatin (Lipitor) 40 mg tablet Take 1 tablet (40 mg) by mouth once daily at bedtime. 1/4/24 4/3/24  RON Pavon   bisacodyl (Dulcolax) 10 mg suppository Insert 1 suppository (10 mg) into the rectum once daily as needed. 3/9/24   Historical Provider, MD   buprenorphine-naloxone (Suboxone) 8-2 mg SL tablet Place 1 tablet under the tongue 2 times a day. With additional 0.5 tab in the evening 8/22/23 1/9/24  Historical Provider, MD   doxepin (SINEquan) 50 mg capsule Take 10 mg by mouth as needed at bedtime for sleep. 10/10/23   Historical Provider, MD   Farxiga 5 mg TAKE ONE TABLET BY MOUTH DAILY 5/14/24   Oswald Hernandez MD   haloperidol (Haldol) 2 mg tablet Take 1 tablet (2 mg) by mouth once daily.    Historical Provider, MD   insulin regular (HumuLIN R,NovoLIN R) 100 unit/mL injection Inject under the skin. 3/9/24   Historical Provider, MD   LACTULOSE ORAL Take 20 g by mouth  every 8 hours. 3/9/24   Historical Provider, MD   lidocaine-prilocaine (Emla) 2.5-2.5 % cream apply topically to affected area if needed 11/21/23   Willa Vega MD   losartan (Cozaar) 50 mg tablet Take 1 tablet (50 mg) by mouth once daily.    Historical Provider, MD   metFORMIN (Glucophage) 500 mg tablet TAKE ONE TABLET BY MOUTH DAILY WITH BREAKFAST 5/14/24   Oswald Hernandez MD   metoprolol succinate XL (Toprol-XL) 25 mg 24 hr tablet Take 1 tablet (25 mg) by mouth once daily.    Historical Provider, MD   naloxone (Narcan) 4 mg/0.1 mL nasal spray  3/29/24   Historical Provider, MD   omeprazole (PriLOSEC) 40 mg DR capsule Take 1 capsule (40 mg) by mouth once daily in the morning. Take before meals. Do not crush or chew.    Historical Provider, MD   ondansetron (Zofran) 4 mg tablet Take 1 tablet (4 mg) by mouth every 8 hours if needed for nausea or vomiting.    Historical Provider, MD   prochlorperazine (Compazine) 5 mg tablet Take 1 tablet (5 mg) by mouth every 6 hours if needed for nausea or vomiting.    Historical Provider, MD   trifluridine-tipiraciL (Lonsurf) 20-8.19 mg tablet Take 3 tablets (60 mg total) by mouth 2 times a day.  Take on days 1-5 and 15-19 of each 28 day cycle 4/4/24   Sandra Lui MD PhD       Reviewed documented list of home medications.  No significant drug interactions identified between home medications and chemotherapy regimen.    Yes    WBC   Date Value Ref Range Status   05/29/2024 16.1 (H) 4.4 - 11.3 x10*3/uL Final   05/07/2024 9.3 4.4 - 11.3 x10*3/uL Final   04/23/2024 10.5 4.4 - 11.3 x10*3/uL Final     Hemoglobin   Date Value Ref Range Status   05/29/2024 13.2 (L) 13.5 - 17.5 g/dL Final   05/07/2024 12.7 (L) 13.5 - 17.5 g/dL Final   04/23/2024 11.3 (L) 13.5 - 17.5 g/dL Final     Hematocrit   Date Value Ref Range Status   05/29/2024 42.0 41.0 - 52.0 % Final   05/07/2024 39.9 (L) 41.0 - 52.0 % Final   04/23/2024 35.5 (L) 41.0 - 52.0 % Final     Neutrophils Absolute   Date Value  Ref Range Status   05/29/2024 11.52 (H) 1.20 - 7.70 x10*3/uL Final     Comment:     Percent differential counts (%) should be interpreted in the context of the absolute cell counts (cells/uL).   05/07/2024 6.13 1.20 - 7.70 x10*3/uL Final     Comment:     Percent differential counts (%) should be interpreted in the context of the absolute cell counts (cells/uL).   04/23/2024 7.25 1.20 - 7.70 x10*3/uL Final     Comment:     Percent differential counts (%) should be interpreted in the context of the absolute cell counts (cells/uL).     Platelets   Date Value Ref Range Status   05/29/2024 447 150 - 450 x10*3/uL Final   05/07/2024 331 150 - 450 x10*3/uL Final   04/23/2024 321 150 - 450 x10*3/uL Final     Creatinine   Date Value Ref Range Status   05/29/2024 0.67 0.50 - 1.30 mg/dL Final   05/07/2024 0.49 (L) 0.50 - 1.30 mg/dL Final   04/09/2024 0.57 0.50 - 1.30 mg/dL Final     Alkaline Phosphatase   Date Value Ref Range Status   05/29/2024 127 (H) 33 - 120 U/L Final   05/07/2024 113 33 - 120 U/L Final   04/09/2024 124 (H) 33 - 120 U/L Final     AST   Date Value Ref Range Status   05/29/2024 8 (L) 9 - 39 U/L Final   05/07/2024 10 9 - 39 U/L Final   04/09/2024 5 (L) 9 - 39 U/L Final     ALT   Date Value Ref Range Status   05/29/2024 6 (L) 10 - 52 U/L Final     Comment:     Patients treated with Sulfasalazine may generate falsely decreased results for ALT.   05/07/2024 5 (L) 10 - 52 U/L Final     Comment:     Patients treated with Sulfasalazine may generate falsely decreased results for ALT.   04/09/2024 6 (L) 10 - 52 U/L Final     Comment:     Patients treated with Sulfasalazine may generate falsely decreased results for ALT.     Bilirubin, Total   Date Value Ref Range Status   05/29/2024 0.5 0.0 - 1.2 mg/dL Final   05/07/2024 0.4 0.0 - 1.2 mg/dL Final   04/09/2024 0.5 0.0 - 1.2 mg/dL Final         Does the patient meet the treatment conditions?  Yes    Are dosage calculations correct?  Yes    Are doses the same as previous  cycle?   Yes    Were any doses modified?  No    If appropriate, was the Creatinine Clearance independently calculated based on [facility] standards?   Yes      Comments:      I Raj Ramirez, PharmD hereby acknowledge that the treatment plan indicated above has been verified for correctness to the best of my ability on 5/29/2024 at 11:06 AM.

## 2024-05-29 NOTE — PROGRESS NOTES
"Patient ID: Misael Hernandez is a 38 y.o. male.    Subjective:      Heme/Onc History:  Stage/Status:  - T3N2M0 rectal adenoCa dx in 2013 => Neoadj chemoRT with capecitabine that was not completed due to non-compliance  - Surgical resection followed by adjuvant FOLFOX - again not completed due to non-compliance  - Recurred in lungs in 2018. Bx from RLL lung nodule (Jun 2018)L AdenoCa. MMR normal. Cruz/marguerite WT  - Again treated with FOLFOX but had serious reaction to OXALIplatin as per records and was switched to Capecitabine/Bevacizumab  - Started on Irinotecan/cetuximab in Nov 2021. Dose decreased due to GI and skin toxicity  - Cts in Aug 2023 showed progression in lungs and b/l adrenal mets => Switched to Lonsurf/bevacizumab. 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 Developed PE in Jan 2024 when he stopped Eliquis for a while. Now back on it at 5 mg BID    Assessment/Plan:  ? Rectal cancer: Now on 3rd line Lonsurf/bevacizumab. Bevacizumab is given every 2 weeks and Lonsurf on days 1-5 of 28 days cycles (2nd week is not given due to nausea). Will check CEA level today. Plan to repeat CT scans in early August. I prescribed Emend for his nausea.    ? Recurrent VTEs: Had multiple DVTs before. In Jan 2024, he develoepd PE when he stopped Eliquis. Needs to continue it indefinitely at 5 mg po bid.    Uses marijuana daily    Review Of Systems:  Review of Systems   Constitutional:  Positive for fatigue.   HENT:  Negative.     Respiratory: Negative.     Cardiovascular: Negative.    Gastrointestinal: Negative.    Genitourinary: Negative.     Musculoskeletal: Negative.        Physical Exam:  BP 91/68 (BP Location: Right arm, Patient Position: Sitting, BP Cuff Size: Adult)   Pulse 106   Temp 35.6 °C (96.1 °F) (Temporal)   Resp 18   Ht 1.695 m (5' 6.73\")   Wt 73.4 kg (161 lb 13.1 oz)   SpO2 99%   BMI 25.55 kg/m²   BSA: 1.86 " meters squared  Performance Status: Symptomatic; fully ambulatory  Physical Exam  HENT:      Head: Normocephalic.   Cardiovascular:      Rate and Rhythm: Normal rate.   Abdominal:      General: Abdomen is flat. Bowel sounds are normal.      Palpations: Abdomen is soft.   Neurological:      Mental Status: He is alert.         Results:  Diagnostic Results   Lab Results   Component Value Date    WBC 16.1 (H) 05/29/2024    HGB 13.2 (L) 05/29/2024    HCT 42.0 05/29/2024    MCV 92 05/29/2024     05/29/2024     Lab Results   Component Value Date    CALCIUM 9.4 05/29/2024     (L) 05/29/2024    K 3.4 (L) 05/29/2024    CO2 29 05/29/2024    CL 93 (L) 05/29/2024    BUN 3 (L) 05/29/2024    CREATININE 0.67 05/29/2024    ALT 6 (L) 05/29/2024    AST 8 (L) 05/29/2024       Current Outpatient Medications:     albuterol (Ventolin HFA) 90 mcg/actuation inhaler, INHALE TWO PUFFS INTO THE LUNGS EVERY 6 HOURS AS NEEDED FOR SHORTNESS OF BREATH OR FOR WHEEZING FOR UP TO 30 DAYS, Disp: , Rfl:     apixaban (Eliquis) 5 mg tablet, Take 1 tablet (5 mg) by mouth 2 times a day., Disp: 60 tablet, Rfl: 3    aprepitant (Emend) 40 mg capsule, Take 1 capsule (40 mg) by mouth once daily., Disp: 3 capsule, Rfl: 2    atorvastatin (Lipitor) 40 mg tablet, Take 1 tablet (40 mg) by mouth once daily at bedtime., Disp: 30 tablet, Rfl: 2    bisacodyl (Dulcolax) 10 mg suppository, Insert 1 suppository (10 mg) into the rectum once daily as needed., Disp: , Rfl:     buprenorphine-naloxone (Suboxone) 8-2 mg SL tablet, Place 1 tablet under the tongue 2 times a day. With additional 0.5 tab in the evening, Disp: , Rfl:     doxepin (SINEquan) 50 mg capsule, Take 10 mg by mouth as needed at bedtime for sleep., Disp: , Rfl:     Farxiga 5 mg, TAKE ONE TABLET BY MOUTH DAILY, Disp: 30 tablet, Rfl: 2    haloperidol (Haldol) 2 mg tablet, Take 1 tablet (2 mg) by mouth once daily., Disp: , Rfl:     insulin regular (HumuLIN R,NovoLIN R) 100 unit/mL injection, Inject  under the skin., Disp: , Rfl:     LACTULOSE ORAL, Take 20 g by mouth every 8 hours., Disp: , Rfl:     lidocaine-prilocaine (Emla) 2.5-2.5 % cream, apply topically to affected area if needed, Disp: 1 g, Rfl: 1    losartan (Cozaar) 50 mg tablet, Take 1 tablet (50 mg) by mouth once daily., Disp: , Rfl:     metFORMIN (Glucophage) 500 mg tablet, TAKE ONE TABLET BY MOUTH DAILY WITH BREAKFAST, Disp: 30 tablet, Rfl: 2    metoprolol succinate XL (Toprol-XL) 25 mg 24 hr tablet, Take 1 tablet (25 mg) by mouth once daily., Disp: , Rfl:     naloxone (Narcan) 4 mg/0.1 mL nasal spray, , Disp: , Rfl:     omeprazole (PriLOSEC) 40 mg DR capsule, Take 1 capsule (40 mg) by mouth once daily in the morning. Take before meals. Do not crush or chew., Disp: , Rfl:     ondansetron (Zofran) 4 mg tablet, Take 1 tablet (4 mg) by mouth every 8 hours if needed for nausea or vomiting., Disp: , Rfl:     prochlorperazine (Compazine) 5 mg tablet, Take 1 tablet (5 mg) by mouth every 6 hours if needed for nausea or vomiting., Disp: , Rfl:     trifluridine-tipiraciL (Lonsurf) 20-8.19 mg tablet, Take 3 tablets (60 mg total) by mouth 2 times a day.  Take on days 1-5 and 15-19 of each 28 day cycle, Disp: 60 tablet, Rfl: 2  No current facility-administered medications for this visit.    Facility-Administered Medications Ordered in Other Visits:     albuterol 2.5 mg /3 mL (0.083 %) nebulizer solution 3 mL, 3 mL, nebulization, PRN, Lorenzo Nina MD    alteplase (Cathflo Activase) injection 2 mg, 2 mg, intra-catheter, PRN, Sandra Lui MD PhD    bevacizumab-awwb (Mvasi) 400 mg in sodium chloride 0.9% 100 mL IV, 5 mg/kg (Order-Specific), intravenous, Once, Lorenzo Nina MD    dexAMETHasone (Decadron) injection 10 mg, 10 mg, intravenous, Once, Lorenzo Nina MD    dextrose 5 % in water (D5W) bolus, 500 mL, intravenous, PRN, Lorenzo Nina MD    diphenhydrAMINE (BENADryl) injection 50 mg, 50 mg, intravenous, PRN, Lorenzo Nina MD     EPINEPHrine (Epipen) injection syringe 0.3 mg, 0.3 mg, intramuscular, q5 min PRN, Lorenzo Nina MD    famotidine PF (Pepcid) injection 20 mg, 20 mg, intravenous, Once PRN, Lorenzo Nina MD    heparin flush 100 unit/mL syringe 500 Units, 500 Units, intra-catheter, PRN, Sandra Lui MD PhD    methylPREDNISolone sod succinate (SOLU-Medrol) 40 mg/mL injection 40 mg, 40 mg, intravenous, PRN, Lorenzo Nina MD    palonosetron (Aloxi) injection 250 mcg, 250 mcg, intravenous, Once, Lorenzo Nina MD    prochlorperazine (Compazine) injection 10 mg, 10 mg, intravenous, q6h PRN, Lorenzo Nina MD    prochlorperazine (Compazine) tablet 10 mg, 10 mg, oral, q6h PRN, Lorenzo Nina MD    sodium chloride 0.9 % bolus 500 mL, 500 mL, intravenous, PRN, Lorenzo Nina MD     Past Surgical History:   Procedure Laterality Date    COLON SURGERY  01/22/2014    Colon Surgery    CT GUIDED IMAGING FOR NEEDLE PLACEMENT  6/27/2018    CT GUIDED IMAGING FOR NEEDLE PLACEMENT [facility] INPATIENT LEGACY    CT HEAD ANGIO W AND WO IV CONTRAST  9/10/2014    CT HEAD ANGIO W AND WO IV CONTRAST 9/10/2014 [facility] [place] INPATIENT LEGACY    ESOPHAGOGASTRODUODENOSCOPY  01/22/2014    Diagnostic Esophagogastroduodenoscopy    HAND SURGERY  01/22/2014    Hand Surgery                                                                                                                                                          MR HEAD ANGIO W IV CONTRAST  9/10/2014    MR HEAD ANGIO W IV CONTRAST 9/10/2014 [facility] [place] [place] INPATIENT LEGACY    OTHER SURGICAL HISTORY  07/07/2014    Laparoscopy Total Colectomy W/ Proctectomy, Ileostomy     Family History   Problem Relation Name Age of Onset    Other (ADENOCARCINOMA OF THE ESOPHAGUS) Father      Diabetes Father's Brother      Diabetes Maternal Grandfather      Cancer Maternal Grandfather      Cancer Other UNCLE       reports that he has been smoking cigarettes. He has never been exposed to tobacco smoke. He has never used  smokeless tobacco.    Diagnoses and all orders for this visit:  Metastasis from rectal cancer (Multi)  -     CBC and Auto Differential; Future  -     Carcinoembryonic Antigen; Future  -     Magnesium; Future  -     Folate; Future  -     Ferritin; Future  -     Iron and TIBC; Future  -     Clinic Appointment Request; Future  -     Comprehensive Metabolic Panel; Future  -     Magnesium; Future  -     Clinic Appointment Request; Future  -     Infusion Appointment Request; Future  -     CBC and Auto Differential; Future  -     Comprehensive metabolic panel; Future  -     Urinalysis with Reflex Microscopic; Future  -     Infusion Appointment Request; Future  -     CBC and Auto Differential; Future  -     aprepitant (Emend) 40 mg capsule; Take 1 capsule (40 mg) by mouth once daily.  Other orders  -     palonosetron (Aloxi) injection 250 mcg  -     dexAMETHasone (Decadron) injection 10 mg  -     prochlorperazine (Compazine) tablet 10 mg  -     prochlorperazine (Compazine) injection 10 mg  -     bevacizumab (Avastin) 400 mg in sodium chloride 0.9% 116 mL IV  -     sodium chloride 0.9 % bolus 500 mL  -     dextrose 5 % in water (D5W) bolus  -     diphenhydrAMINE (BENADryl) injection 50 mg  -     methylPREDNISolone sod succinate (SOLU-Medrol) 40 mg/mL injection 40 mg  -     famotidine PF (Pepcid) injection 20 mg  -     EPINEPHrine (Epipen) injection syringe 0.3 mg  -     albuterol 2.5 mg /3 mL (0.083 %) nebulizer solution 3 mL  -     palonosetron (Aloxi) injection 250 mcg  -     dexAMETHasone (Decadron) injection 10 mg  -     prochlorperazine (Compazine) tablet 10 mg  -     prochlorperazine (Compazine) injection 10 mg  -     bevacizumab (Avastin) 400 mg in sodium chloride 0.9% 116 mL IV  -     sodium chloride 0.9 % bolus 500 mL  -     dextrose 5 % in water (D5W) bolus  -     diphenhydrAMINE (BENADryl) injection 50 mg  -     methylPREDNISolone sod succinate (SOLU-Medrol) 40 mg/mL injection 40 mg  -     famotidine PF (Pepcid)  injection 20 mg  -     EPINEPHrine (Epipen) injection syringe 0.3 mg  -     albuterol 2.5 mg /3 mL (0.083 %) nebulizer solution 3 mL       I spent more than 40 minutes for the patient today, including face-to-face conversation, pre-visit preparation, post-visit orders, and others.   Lorenzo Nina MD

## 2024-05-29 NOTE — PROGRESS NOTES
Treatment Pre-Review for Date of Service: 5/29/24    Diagnosis:  No matching staging information was found for the patient.      Regimen: MVASI Days 1 + 15; Cycle repeats every 28 days     Current Cycle/Day: Cycle 8 Day 15    Treatment Date Appropriate: Yes; Last Treatment: 5/8/24 (Day 1), patient is receiving day 15 x 1 week late   Date change required: none    Dose or Regimen Modifications: None noted during pre-review    Med Rec/Drug Interactions: None noted during pre-review    Growth Factor: none    Financial Clearance/Authorization: Yes    Echo:  No results found for this or any previous visit from the past 1095 days.    Lifetime Dose Tracking   No doses have been documented on this patient for the following tracked chemicals: doxorubicin, epirubicin, idarubicin, daunorubicin, mitoxantrone, bleomycin, mitomycin, carmustine, doxorubicin HCl pegylated liposomal, daunorubicin citrate liposomal     Comments: none    I Raj Ramirez, PharmD hereby acknowledge that the treatment plan indicated above has been verified for correctness to the best of my ability on 5/29/2024 at 11:04 AM.

## 2024-05-30 ENCOUNTER — APPOINTMENT (OUTPATIENT)
Dept: HEMATOLOGY/ONCOLOGY | Facility: CLINIC | Age: 39
End: 2024-05-30
Payer: MEDICARE

## 2024-06-03 PROCEDURE — RXMED WILLOW AMBULATORY MEDICATION CHARGE

## 2024-06-05 ENCOUNTER — APPOINTMENT (OUTPATIENT)
Dept: HEMATOLOGY/ONCOLOGY | Facility: HOSPITAL | Age: 39
End: 2024-06-05
Payer: MEDICARE

## 2024-06-07 ENCOUNTER — SPECIALTY PHARMACY (OUTPATIENT)
Dept: PHARMACY | Facility: CLINIC | Age: 39
End: 2024-06-07

## 2024-06-11 ENCOUNTER — LAB (OUTPATIENT)
Dept: LAB | Facility: LAB | Age: 39
End: 2024-06-11
Payer: MEDICARE

## 2024-06-11 ENCOUNTER — PHARMACY VISIT (OUTPATIENT)
Dept: PHARMACY | Facility: CLINIC | Age: 39
End: 2024-06-11
Payer: MEDICARE

## 2024-06-11 DIAGNOSIS — C79.9 METASTASIS FROM RECTAL CANCER (MULTI): ICD-10-CM

## 2024-06-11 DIAGNOSIS — C20 METASTASIS FROM RECTAL CANCER (MULTI): ICD-10-CM

## 2024-06-11 LAB
ALBUMIN SERPL BCP-MCNC: 3.2 G/DL (ref 3.4–5)
ALP SERPL-CCNC: 148 U/L (ref 33–120)
ALT SERPL W P-5'-P-CCNC: 6 U/L (ref 10–52)
ANION GAP SERPL CALC-SCNC: 15 MMOL/L (ref 10–20)
AST SERPL W P-5'-P-CCNC: 11 U/L (ref 9–39)
BASOPHILS # BLD AUTO: 0.13 X10*3/UL (ref 0–0.1)
BASOPHILS NFR BLD AUTO: 0.9 %
BILIRUB SERPL-MCNC: 0.5 MG/DL (ref 0–1.2)
BUN SERPL-MCNC: 5 MG/DL (ref 6–23)
CALCIUM SERPL-MCNC: 9.5 MG/DL (ref 8.6–10.3)
CHLORIDE SERPL-SCNC: 90 MMOL/L (ref 98–107)
CO2 SERPL-SCNC: 28 MMOL/L (ref 21–32)
CREAT SERPL-MCNC: 0.64 MG/DL (ref 0.5–1.3)
EGFRCR SERPLBLD CKD-EPI 2021: >90 ML/MIN/1.73M*2
EOSINOPHIL # BLD AUTO: 0.44 X10*3/UL (ref 0–0.7)
EOSINOPHIL NFR BLD AUTO: 3.1 %
ERYTHROCYTE [DISTWIDTH] IN BLOOD BY AUTOMATED COUNT: 16.2 % (ref 11.5–14.5)
GLUCOSE SERPL-MCNC: 235 MG/DL (ref 74–99)
HCT VFR BLD AUTO: 41.7 % (ref 41–52)
HGB BLD-MCNC: 13.5 G/DL (ref 13.5–17.5)
IMM GRANULOCYTES # BLD AUTO: 0.15 X10*3/UL (ref 0–0.7)
IMM GRANULOCYTES NFR BLD AUTO: 1.1 % (ref 0–0.9)
LYMPHOCYTES # BLD AUTO: 2.35 X10*3/UL (ref 1.2–4.8)
LYMPHOCYTES NFR BLD AUTO: 16.6 %
MCH RBC QN AUTO: 27.9 PG (ref 26–34)
MCHC RBC AUTO-ENTMCNC: 32.4 G/DL (ref 32–36)
MCV RBC AUTO: 86 FL (ref 80–100)
MONOCYTES # BLD AUTO: 1.1 X10*3/UL (ref 0.1–1)
MONOCYTES NFR BLD AUTO: 7.8 %
NEUTROPHILS # BLD AUTO: 9.97 X10*3/UL (ref 1.2–7.7)
NEUTROPHILS NFR BLD AUTO: 70.5 %
NRBC BLD-RTO: 0 /100 WBCS (ref 0–0)
PLATELET # BLD AUTO: 383 X10*3/UL (ref 150–450)
POTASSIUM SERPL-SCNC: 3.5 MMOL/L (ref 3.5–5.3)
PROT SERPL-MCNC: 7.7 G/DL (ref 6.4–8.2)
RBC # BLD AUTO: 4.84 X10*6/UL (ref 4.5–5.9)
SODIUM SERPL-SCNC: 129 MMOL/L (ref 136–145)
WBC # BLD AUTO: 14.1 X10*3/UL (ref 4.4–11.3)

## 2024-06-12 ENCOUNTER — INFUSION (OUTPATIENT)
Dept: HEMATOLOGY/ONCOLOGY | Facility: HOSPITAL | Age: 39
End: 2024-06-12
Payer: MEDICARE

## 2024-06-12 VITALS
SYSTOLIC BLOOD PRESSURE: 110 MMHG | RESPIRATION RATE: 16 BRPM | HEART RATE: 117 BPM | TEMPERATURE: 97.2 F | OXYGEN SATURATION: 100 % | DIASTOLIC BLOOD PRESSURE: 76 MMHG | WEIGHT: 154.32 LBS | BODY MASS INDEX: 24.37 KG/M2

## 2024-06-12 DIAGNOSIS — C79.9 METASTASIS FROM RECTAL CANCER (MULTI): Primary | ICD-10-CM

## 2024-06-12 DIAGNOSIS — C20 METASTASIS FROM RECTAL CANCER (MULTI): Primary | ICD-10-CM

## 2024-06-12 LAB
APPEARANCE UR: CLEAR
BILIRUB UR STRIP.AUTO-MCNC: NEGATIVE MG/DL
COLOR UR: YELLOW
GLUCOSE UR STRIP.AUTO-MCNC: ABNORMAL MG/DL
HYALINE CASTS #/AREA URNS AUTO: ABNORMAL /LPF
KETONES UR STRIP.AUTO-MCNC: NEGATIVE MG/DL
LEUKOCYTE ESTERASE UR QL STRIP.AUTO: NEGATIVE
MUCOUS THREADS #/AREA URNS AUTO: ABNORMAL /LPF
NITRITE UR QL STRIP.AUTO: NEGATIVE
PH UR STRIP.AUTO: 7 [PH]
PROT UR STRIP.AUTO-MCNC: ABNORMAL MG/DL
RBC # UR STRIP.AUTO: NEGATIVE /UL
RBC #/AREA URNS AUTO: ABNORMAL /HPF
SP GR UR STRIP.AUTO: 1.02
UROBILINOGEN UR STRIP.AUTO-MCNC: ABNORMAL MG/DL
WBC #/AREA URNS AUTO: ABNORMAL /HPF

## 2024-06-12 PROCEDURE — 81001 URINALYSIS AUTO W/SCOPE: CPT

## 2024-06-12 PROCEDURE — 96367 TX/PROPH/DG ADDL SEQ IV INF: CPT | Mod: INF,INF2

## 2024-06-12 PROCEDURE — 96375 TX/PRO/DX INJ NEW DRUG ADDON: CPT | Mod: INF

## 2024-06-12 PROCEDURE — 2500000004 HC RX 250 GENERAL PHARMACY W/ HCPCS (ALT 636 FOR OP/ED): Mod: SE | Performed by: INTERNAL MEDICINE

## 2024-06-12 PROCEDURE — 96409 CHEMO IV PUSH SNGL DRUG: CPT

## 2024-06-12 PROCEDURE — 2500000004 HC RX 250 GENERAL PHARMACY W/ HCPCS (ALT 636 FOR OP/ED): Mod: SE | Performed by: STUDENT IN AN ORGANIZED HEALTH CARE EDUCATION/TRAINING PROGRAM

## 2024-06-12 RX ORDER — PROCHLORPERAZINE MALEATE 10 MG
10 TABLET ORAL EVERY 6 HOURS PRN
Status: DISCONTINUED | OUTPATIENT
Start: 2024-06-12 | End: 2024-06-12 | Stop reason: HOSPADM

## 2024-06-12 RX ORDER — DIPHENHYDRAMINE HYDROCHLORIDE 50 MG/ML
50 INJECTION INTRAMUSCULAR; INTRAVENOUS AS NEEDED
Status: DISCONTINUED | OUTPATIENT
Start: 2024-06-12 | End: 2024-06-12 | Stop reason: HOSPADM

## 2024-06-12 RX ORDER — ALBUTEROL SULFATE 0.83 MG/ML
3 SOLUTION RESPIRATORY (INHALATION) AS NEEDED
Status: DISCONTINUED | OUTPATIENT
Start: 2024-06-12 | End: 2024-06-12 | Stop reason: HOSPADM

## 2024-06-12 RX ORDER — SODIUM CHLORIDE 9 MG/ML
250 INJECTION, SOLUTION INTRAVENOUS CONTINUOUS
Status: ACTIVE | OUTPATIENT
Start: 2024-06-12 | End: 2024-06-12

## 2024-06-12 RX ORDER — PALONOSETRON 0.05 MG/ML
250 INJECTION, SOLUTION INTRAVENOUS ONCE
Status: COMPLETED | OUTPATIENT
Start: 2024-06-12 | End: 2024-06-12

## 2024-06-12 RX ORDER — HEPARIN 100 UNIT/ML
500 SYRINGE INTRAVENOUS AS NEEDED
OUTPATIENT
Start: 2024-06-12

## 2024-06-12 RX ORDER — HEPARIN SODIUM,PORCINE/PF 10 UNIT/ML
50 SYRINGE (ML) INTRAVENOUS AS NEEDED
OUTPATIENT
Start: 2024-06-12

## 2024-06-12 RX ORDER — PROCHLORPERAZINE EDISYLATE 5 MG/ML
10 INJECTION INTRAMUSCULAR; INTRAVENOUS EVERY 6 HOURS PRN
Status: DISCONTINUED | OUTPATIENT
Start: 2024-06-12 | End: 2024-06-12 | Stop reason: HOSPADM

## 2024-06-12 RX ORDER — HEPARIN 100 UNIT/ML
500 SYRINGE INTRAVENOUS AS NEEDED
Status: DISCONTINUED | OUTPATIENT
Start: 2024-06-12 | End: 2024-06-12 | Stop reason: HOSPADM

## 2024-06-12 RX ORDER — FAMOTIDINE 10 MG/ML
20 INJECTION INTRAVENOUS ONCE AS NEEDED
Status: DISCONTINUED | OUTPATIENT
Start: 2024-06-12 | End: 2024-06-12 | Stop reason: HOSPADM

## 2024-06-12 RX ORDER — EPINEPHRINE 0.3 MG/.3ML
0.3 INJECTION SUBCUTANEOUS EVERY 5 MIN PRN
Status: DISCONTINUED | OUTPATIENT
Start: 2024-06-12 | End: 2024-06-12 | Stop reason: HOSPADM

## 2024-06-12 ASSESSMENT — PATIENT HEALTH QUESTIONNAIRE - PHQ9
2. FEELING DOWN, DEPRESSED OR HOPELESS: SEVERAL DAYS
1. LITTLE INTEREST OR PLEASURE IN DOING THINGS: NOT AT ALL
SUM OF ALL RESPONSES TO PHQ9 QUESTIONS 1 & 2: 1
10. IF YOU CHECKED OFF ANY PROBLEMS, HOW DIFFICULT HAVE THESE PROBLEMS MADE IT FOR YOU TO DO YOUR WORK, TAKE CARE OF THINGS AT HOME, OR GET ALONG WITH OTHER PEOPLE: NOT DIFFICULT AT ALL

## 2024-06-12 ASSESSMENT — PAIN SCALES - GENERAL: PAINLEVEL: 7

## 2024-06-12 NOTE — SIGNIFICANT EVENT
06/12/24 1012   Prechemo Checklist   Has the patient been in the hospital, ED, or urgent care since last date of service No   Chemo/Immuno Consent Completed and Signed Yes   Protocol/Indications Verified Yes   Confirmed to previous date/time of medication Yes   Compared to previous dose Yes   All medications are dated accurately Yes   Pregnancy Test Negative Not applicable   Parameters Met Yes   Provider Name Dayna   BSA/Weight-Height Verified Yes   Dose Calculations Verified (current, total, cumulative) Yes

## 2024-06-19 ENCOUNTER — APPOINTMENT (OUTPATIENT)
Dept: HEMATOLOGY/ONCOLOGY | Facility: HOSPITAL | Age: 39
End: 2024-06-19
Payer: MEDICARE

## 2024-06-26 ENCOUNTER — INFUSION (OUTPATIENT)
Dept: HEMATOLOGY/ONCOLOGY | Facility: HOSPITAL | Age: 39
End: 2024-06-26
Payer: MEDICARE

## 2024-06-26 ENCOUNTER — HOSPITAL ENCOUNTER (OUTPATIENT)
Dept: RADIOLOGY | Facility: HOSPITAL | Age: 39
Discharge: HOME | End: 2024-06-26
Payer: MEDICARE

## 2024-06-26 ENCOUNTER — OFFICE VISIT (OUTPATIENT)
Dept: HEMATOLOGY/ONCOLOGY | Facility: HOSPITAL | Age: 39
End: 2024-06-26
Payer: MEDICARE

## 2024-06-26 VITALS
HEIGHT: 67 IN | WEIGHT: 148.26 LBS | OXYGEN SATURATION: 98 % | RESPIRATION RATE: 18 BRPM | DIASTOLIC BLOOD PRESSURE: 79 MMHG | SYSTOLIC BLOOD PRESSURE: 107 MMHG | BODY MASS INDEX: 23.27 KG/M2 | HEART RATE: 132 BPM | TEMPERATURE: 95.7 F

## 2024-06-26 VITALS
OXYGEN SATURATION: 98 % | HEART RATE: 105 BPM | RESPIRATION RATE: 18 BRPM | DIASTOLIC BLOOD PRESSURE: 80 MMHG | SYSTOLIC BLOOD PRESSURE: 112 MMHG | TEMPERATURE: 96.4 F

## 2024-06-26 DIAGNOSIS — C79.9 METASTASIS FROM RECTAL CANCER (MULTI): ICD-10-CM

## 2024-06-26 DIAGNOSIS — C78.7 SECONDARY MALIGNANT NEOPLASM OF LIVER AND INTRAHEPATIC BILE DUCT (MULTI): ICD-10-CM

## 2024-06-26 DIAGNOSIS — C20 METASTASIS FROM RECTAL CANCER (MULTI): ICD-10-CM

## 2024-06-26 DIAGNOSIS — C79.9 METASTASIS FROM RECTAL CANCER (MULTI): Primary | ICD-10-CM

## 2024-06-26 DIAGNOSIS — C20 METASTASIS FROM RECTAL CANCER (MULTI): Primary | ICD-10-CM

## 2024-06-26 LAB
ALBUMIN SERPL BCP-MCNC: 2.9 G/DL (ref 3.4–5)
ALP SERPL-CCNC: 129 U/L (ref 33–120)
ALT SERPL W P-5'-P-CCNC: 7 U/L (ref 10–52)
ANION GAP SERPL CALC-SCNC: 14 MMOL/L (ref 10–20)
AST SERPL W P-5'-P-CCNC: 11 U/L (ref 9–39)
BASOPHILS # BLD AUTO: 0.09 X10*3/UL (ref 0–0.1)
BASOPHILS NFR BLD AUTO: 0.8 %
BILIRUB SERPL-MCNC: 0.4 MG/DL (ref 0–1.2)
BUN SERPL-MCNC: 6 MG/DL (ref 6–23)
CALCIUM SERPL-MCNC: 8.7 MG/DL (ref 8.6–10.3)
CANCER AG19-9 SERPL-ACNC: 1558.55 U/ML
CEA SERPL-MCNC: 195.6 UG/L
CHLORIDE SERPL-SCNC: 89 MMOL/L (ref 98–107)
CO2 SERPL-SCNC: 26 MMOL/L (ref 21–32)
CREAT SERPL-MCNC: 0.69 MG/DL (ref 0.5–1.3)
EGFRCR SERPLBLD CKD-EPI 2021: >90 ML/MIN/1.73M*2
EOSINOPHIL # BLD AUTO: 0.13 X10*3/UL (ref 0–0.7)
EOSINOPHIL NFR BLD AUTO: 1.1 %
ERYTHROCYTE [DISTWIDTH] IN BLOOD BY AUTOMATED COUNT: 15.9 % (ref 11.5–14.5)
GLUCOSE SERPL-MCNC: 300 MG/DL (ref 74–99)
HCT VFR BLD AUTO: 36.4 % (ref 41–52)
HGB BLD-MCNC: 12.4 G/DL (ref 13.5–17.5)
IMM GRANULOCYTES # BLD AUTO: 0.1 X10*3/UL (ref 0–0.7)
IMM GRANULOCYTES NFR BLD AUTO: 0.9 % (ref 0–0.9)
LYMPHOCYTES # BLD AUTO: 2.21 X10*3/UL (ref 1.2–4.8)
LYMPHOCYTES NFR BLD AUTO: 18.8 %
MAGNESIUM SERPL-MCNC: 1.3 MG/DL (ref 1.6–2.4)
MCH RBC QN AUTO: 28.3 PG (ref 26–34)
MCHC RBC AUTO-ENTMCNC: 34.1 G/DL (ref 32–36)
MCV RBC AUTO: 83 FL (ref 80–100)
MONOCYTES # BLD AUTO: 0.95 X10*3/UL (ref 0.1–1)
MONOCYTES NFR BLD AUTO: 8.1 %
NEUTROPHILS # BLD AUTO: 8.28 X10*3/UL (ref 1.2–7.7)
NEUTROPHILS NFR BLD AUTO: 70.3 %
NRBC BLD-RTO: 0 /100 WBCS (ref 0–0)
PLATELET # BLD AUTO: 304 X10*3/UL (ref 150–450)
POTASSIUM SERPL-SCNC: 3.6 MMOL/L (ref 3.5–5.3)
PROT SERPL-MCNC: 6.5 G/DL (ref 6.4–8.2)
RBC # BLD AUTO: 4.38 X10*6/UL (ref 4.5–5.9)
SODIUM SERPL-SCNC: 125 MMOL/L (ref 136–145)
WBC # BLD AUTO: 11.8 X10*3/UL (ref 4.4–11.3)

## 2024-06-26 PROCEDURE — 2550000001 HC RX 255 CONTRASTS: Mod: SE | Performed by: INTERNAL MEDICINE

## 2024-06-26 PROCEDURE — 2500000004 HC RX 250 GENERAL PHARMACY W/ HCPCS (ALT 636 FOR OP/ED): Mod: SE | Performed by: INTERNAL MEDICINE

## 2024-06-26 PROCEDURE — 3074F SYST BP LT 130 MM HG: CPT | Performed by: INTERNAL MEDICINE

## 2024-06-26 PROCEDURE — 74177 CT ABD & PELVIS W/CONTRAST: CPT

## 2024-06-26 PROCEDURE — 96409 CHEMO IV PUSH SNGL DRUG: CPT

## 2024-06-26 PROCEDURE — 2500000001 HC RX 250 WO HCPCS SELF ADMINISTERED DRUGS (ALT 637 FOR MEDICARE OP): Mod: SE | Performed by: INTERNAL MEDICINE

## 2024-06-26 PROCEDURE — 2500000004 HC RX 250 GENERAL PHARMACY W/ HCPCS (ALT 636 FOR OP/ED): Mod: SE | Performed by: STUDENT IN AN ORGANIZED HEALTH CARE EDUCATION/TRAINING PROGRAM

## 2024-06-26 PROCEDURE — 80053 COMPREHEN METABOLIC PANEL: CPT | Performed by: INTERNAL MEDICINE

## 2024-06-26 PROCEDURE — 96375 TX/PRO/DX INJ NEW DRUG ADDON: CPT | Mod: INF

## 2024-06-26 PROCEDURE — 3078F DIAST BP <80 MM HG: CPT | Performed by: INTERNAL MEDICINE

## 2024-06-26 PROCEDURE — 86301 IMMUNOASSAY TUMOR CA 19-9: CPT | Mod: GENLAB

## 2024-06-26 PROCEDURE — 83735 ASSAY OF MAGNESIUM: CPT | Performed by: INTERNAL MEDICINE

## 2024-06-26 PROCEDURE — 99215 OFFICE O/P EST HI 40 MIN: CPT | Mod: 25 | Performed by: INTERNAL MEDICINE

## 2024-06-26 PROCEDURE — 85025 COMPLETE CBC W/AUTO DIFF WBC: CPT | Performed by: INTERNAL MEDICINE

## 2024-06-26 PROCEDURE — 82378 CARCINOEMBRYONIC ANTIGEN: CPT | Mod: GENLAB

## 2024-06-26 PROCEDURE — 99215 OFFICE O/P EST HI 40 MIN: CPT | Performed by: INTERNAL MEDICINE

## 2024-06-26 RX ORDER — DIPHENHYDRAMINE HYDROCHLORIDE 50 MG/ML
50 INJECTION INTRAMUSCULAR; INTRAVENOUS AS NEEDED
Status: DISCONTINUED | OUTPATIENT
Start: 2024-06-26 | End: 2024-06-26 | Stop reason: HOSPADM

## 2024-06-26 RX ORDER — ALBUTEROL SULFATE 0.83 MG/ML
3 SOLUTION RESPIRATORY (INHALATION) AS NEEDED
OUTPATIENT
Start: 2024-07-10

## 2024-06-26 RX ORDER — ALBUTEROL SULFATE 0.83 MG/ML
3 SOLUTION RESPIRATORY (INHALATION) AS NEEDED
Status: DISCONTINUED | OUTPATIENT
Start: 2024-06-26 | End: 2024-06-26 | Stop reason: HOSPADM

## 2024-06-26 RX ORDER — DIPHENHYDRAMINE HYDROCHLORIDE 50 MG/ML
50 INJECTION INTRAMUSCULAR; INTRAVENOUS AS NEEDED
OUTPATIENT
Start: 2024-07-24

## 2024-06-26 RX ORDER — DIPHENHYDRAMINE HYDROCHLORIDE 50 MG/ML
50 INJECTION INTRAMUSCULAR; INTRAVENOUS AS NEEDED
OUTPATIENT
Start: 2024-07-10

## 2024-06-26 RX ORDER — PROCHLORPERAZINE EDISYLATE 5 MG/ML
10 INJECTION INTRAMUSCULAR; INTRAVENOUS EVERY 6 HOURS PRN
OUTPATIENT
Start: 2024-07-24

## 2024-06-26 RX ORDER — PALONOSETRON 0.05 MG/ML
250 INJECTION, SOLUTION INTRAVENOUS ONCE
Status: COMPLETED | OUTPATIENT
Start: 2024-06-26 | End: 2024-06-26

## 2024-06-26 RX ORDER — PALONOSETRON 0.05 MG/ML
250 INJECTION, SOLUTION INTRAVENOUS ONCE
Start: 2024-07-10 | End: 2024-07-10

## 2024-06-26 RX ORDER — PROCHLORPERAZINE EDISYLATE 5 MG/ML
10 INJECTION INTRAMUSCULAR; INTRAVENOUS EVERY 6 HOURS PRN
Status: DISCONTINUED | OUTPATIENT
Start: 2024-06-26 | End: 2024-06-26 | Stop reason: HOSPADM

## 2024-06-26 RX ORDER — PROCHLORPERAZINE MALEATE 5 MG
10 TABLET ORAL EVERY 6 HOURS PRN
OUTPATIENT
Start: 2024-07-10

## 2024-06-26 RX ORDER — EPINEPHRINE 0.3 MG/.3ML
0.3 INJECTION SUBCUTANEOUS EVERY 5 MIN PRN
OUTPATIENT
Start: 2024-07-10

## 2024-06-26 RX ORDER — EPINEPHRINE 0.3 MG/.3ML
0.3 INJECTION SUBCUTANEOUS EVERY 5 MIN PRN
Status: DISCONTINUED | OUTPATIENT
Start: 2024-06-26 | End: 2024-06-26 | Stop reason: HOSPADM

## 2024-06-26 RX ORDER — PROCHLORPERAZINE MALEATE 10 MG
10 TABLET ORAL EVERY 6 HOURS PRN
Status: DISCONTINUED | OUTPATIENT
Start: 2024-06-26 | End: 2024-06-26 | Stop reason: HOSPADM

## 2024-06-26 RX ORDER — HEPARIN 100 UNIT/ML
500 SYRINGE INTRAVENOUS AS NEEDED
OUTPATIENT
Start: 2024-06-26

## 2024-06-26 RX ORDER — CODEINE PHOSPHATE AND GUAIFENESIN 10; 100 MG/5ML; MG/5ML
5 SOLUTION ORAL EVERY 6 HOURS PRN
Status: COMPLETED | OUTPATIENT
Start: 2024-06-26 | End: 2024-06-26

## 2024-06-26 RX ORDER — EPINEPHRINE 0.3 MG/.3ML
0.3 INJECTION SUBCUTANEOUS EVERY 5 MIN PRN
OUTPATIENT
Start: 2024-07-24

## 2024-06-26 RX ORDER — HEPARIN SODIUM,PORCINE/PF 10 UNIT/ML
50 SYRINGE (ML) INTRAVENOUS AS NEEDED
OUTPATIENT
Start: 2024-06-26

## 2024-06-26 RX ORDER — CODEINE PHOSPHATE AND GUAIFENESIN 10; 100 MG/5ML; MG/5ML
5 SOLUTION ORAL EVERY 6 HOURS PRN
Qty: 300 ML | Refills: 2 | Status: SHIPPED | OUTPATIENT
Start: 2024-06-26 | End: 2024-09-24

## 2024-06-26 RX ORDER — DEXAMETHASONE SODIUM PHOSPHATE 100 MG/10ML
10 INJECTION INTRAMUSCULAR; INTRAVENOUS ONCE
Start: 2024-07-10 | End: 2024-07-10

## 2024-06-26 RX ORDER — HEPARIN 100 UNIT/ML
500 SYRINGE INTRAVENOUS AS NEEDED
Status: DISCONTINUED | OUTPATIENT
Start: 2024-06-26 | End: 2024-06-26 | Stop reason: HOSPADM

## 2024-06-26 RX ORDER — FAMOTIDINE 10 MG/ML
20 INJECTION INTRAVENOUS ONCE AS NEEDED
OUTPATIENT
Start: 2024-07-10

## 2024-06-26 RX ORDER — PROCHLORPERAZINE MALEATE 5 MG
10 TABLET ORAL EVERY 6 HOURS PRN
OUTPATIENT
Start: 2024-07-24

## 2024-06-26 RX ORDER — DEXAMETHASONE SODIUM PHOSPHATE 100 MG/10ML
10 INJECTION INTRAMUSCULAR; INTRAVENOUS ONCE
Start: 2024-07-24 | End: 2024-07-24

## 2024-06-26 RX ORDER — PROCHLORPERAZINE EDISYLATE 5 MG/ML
10 INJECTION INTRAMUSCULAR; INTRAVENOUS EVERY 6 HOURS PRN
OUTPATIENT
Start: 2024-07-10

## 2024-06-26 RX ORDER — FAMOTIDINE 10 MG/ML
20 INJECTION INTRAVENOUS ONCE AS NEEDED
Status: DISCONTINUED | OUTPATIENT
Start: 2024-06-26 | End: 2024-06-26 | Stop reason: HOSPADM

## 2024-06-26 RX ORDER — ALBUTEROL SULFATE 0.83 MG/ML
3 SOLUTION RESPIRATORY (INHALATION) AS NEEDED
OUTPATIENT
Start: 2024-07-24

## 2024-06-26 RX ORDER — FAMOTIDINE 10 MG/ML
20 INJECTION INTRAVENOUS ONCE AS NEEDED
OUTPATIENT
Start: 2024-07-24

## 2024-06-26 RX ORDER — HEPARIN SODIUM,PORCINE/PF 10 UNIT/ML
50 SYRINGE (ML) INTRAVENOUS AS NEEDED
Status: DISCONTINUED | OUTPATIENT
Start: 2024-06-26 | End: 2024-06-26 | Stop reason: HOSPADM

## 2024-06-26 RX ORDER — PALONOSETRON 0.05 MG/ML
250 INJECTION, SOLUTION INTRAVENOUS ONCE
Start: 2024-07-24 | End: 2024-07-24

## 2024-06-26 ASSESSMENT — PATIENT HEALTH QUESTIONNAIRE - PHQ9
1. LITTLE INTEREST OR PLEASURE IN DOING THINGS: NOT AT ALL
SUM OF ALL RESPONSES TO PHQ9 QUESTIONS 1 & 2: 0
2. FEELING DOWN, DEPRESSED OR HOPELESS: NOT AT ALL
10. IF YOU CHECKED OFF ANY PROBLEMS, HOW DIFFICULT HAVE THESE PROBLEMS MADE IT FOR YOU TO DO YOUR WORK, TAKE CARE OF THINGS AT HOME, OR GET ALONG WITH OTHER PEOPLE: NOT DIFFICULT AT ALL

## 2024-06-26 ASSESSMENT — ENCOUNTER SYMPTOMS
RESPIRATORY NEGATIVE: 1
GASTROINTESTINAL NEGATIVE: 1
MUSCULOSKELETAL NEGATIVE: 1
CARDIOVASCULAR NEGATIVE: 1
FATIGUE: 1

## 2024-06-26 ASSESSMENT — PAIN SCALES - GENERAL: PAINLEVEL: 7

## 2024-06-26 NOTE — PROGRESS NOTES
Pt here for Bevacizumab-awwb today. Pts weight down so was not within 10%. Dr. Nina notified and orders changed and dose lowered. Pt given pre-meds per orders and then received Bevacizumab-awwb 336.5 mg diluted in 110 mg over 10 mins via MP. +BR noted from MP prior to and following infusion. Pt denied further questions at this time, tolerated dose WNL, VS stable and ready to go home.

## 2024-06-26 NOTE — PROGRESS NOTES
Patient ID: Misael Hernandez is a 38 y.o. male.    Subjective:  Returns for follow up for rectal cancer. Feels OK. No more tired than usual. Coughs a lot but he says it is not worse either. Denies fever. Denies chest pain.     Heme/Onc History:  Stage/Status:  - T3N2M0 rectal adenoCa dx in 2013 => Neoadj chemoRT with capecitabine that was not completed due to non-compliance  - Surgical resection followed by adjuvant FOLFOX - again not completed due to non-compliance  - Recurred in lungs in 2018. Bx from RLL lung nodule (Jun 2018)L AdenoCa. MMR normal. Cruz/marguerite WT  - Again treated with FOLFOX but had serious reaction to OXALIplatin as per records and was switched to Capecitabine/Bevacizumab  - Started on Irinotecan/cetuximab in Nov 2021. Dose decreased due to GI and skin toxicity  - Cts in Aug 2023 showed progression in lungs and b/l adrenal mets => Switched to Lonsurf/bevacizumab. Lonsurf later changed to 1 out of 4 weeks due to severe nausea  - CT c/a/p (Mar 2024): Stable disease  - CT c/a/p (June 2024): Radiology report pending. As far as I can tell it looks like he has stable disease in lungs and adrenals.    Recurrent DVTs. Has been on Eliquis. Developed PE in Jan 2024 when he stopped Eliquis for a while. Now back on it at 5 mg BID    Assessment/Plan:  ? Rectal cancer: Metastatic mostly in lungs and also in b/l adrenals. Now on 3rd line Lonsurf/bevacizumab. Bevacizumab is given every 2 weeks and Lonsurf on days 1-5 of 28 days cycles (2nd week is not given due to nausea). There was an increase in tumor markers so I asked for CT scans earlier that I would have. It is done today before the exam. Report not out yet. I reviewed the images myself. It looks like all lesions are stable in size. A questionable lesion around pancreas? We will continue with today's bevacizumab and wait for the final report.     ? Recurrent VTEs: Had multiple DVTs before. In Jan 2024, he develoepd PE when he stopped Eliquis. Needs to continue it  "indefinitely at 5 mg po bid.    Uses marijuana daily    Review Of Systems:  Review of Systems   Constitutional:  Positive for fatigue.   HENT:  Negative.     Respiratory: Negative.     Cardiovascular: Negative.    Gastrointestinal: Negative.    Genitourinary: Negative.     Musculoskeletal: Negative.        Physical Exam:  /79 (BP Location: Left arm, Patient Position: Sitting, BP Cuff Size: Adult)   Pulse (!) 132   Temp 35.4 °C (95.7 °F) (Temporal)   Resp 18   Ht 1.695 m (5' 6.73\")   Wt 67.3 kg (148 lb 4.2 oz)   SpO2 98%   BMI 23.41 kg/m²   BSA: 1.78 meters squared  Performance Status: Symptomatic; fully ambulatory  Physical Exam  HENT:      Head: Normocephalic.   Cardiovascular:      Rate and Rhythm: Normal rate.   Abdominal:      General: Abdomen is flat. Bowel sounds are normal.      Palpations: Abdomen is soft.   Neurological:      Mental Status: He is alert.         Results:  Diagnostic Results   Lab Results   Component Value Date    WBC 11.8 (H) 06/26/2024    HGB 12.4 (L) 06/26/2024    HCT 36.4 (L) 06/26/2024    MCV 83 06/26/2024     06/26/2024     Lab Results   Component Value Date    CALCIUM 9.5 06/11/2024     (L) 06/11/2024    K 3.5 06/11/2024    CO2 28 06/11/2024    CL 90 (L) 06/11/2024    BUN 5 (L) 06/11/2024    CREATININE 0.64 06/11/2024    ALT 6 (L) 06/11/2024    AST 11 06/11/2024       Current Outpatient Medications:     albuterol (Ventolin HFA) 90 mcg/actuation inhaler, INHALE TWO PUFFS INTO THE LUNGS EVERY 6 HOURS AS NEEDED FOR SHORTNESS OF BREATH OR FOR WHEEZING FOR UP TO 30 DAYS, Disp: , Rfl:     apixaban (Eliquis) 5 mg tablet, Take 1 tablet (5 mg) by mouth 2 times a day., Disp: 60 tablet, Rfl: 3    aprepitant (Emend) 40 mg capsule, Take 1 capsule (40 mg) by mouth once daily., Disp: 3 capsule, Rfl: 2    atorvastatin (Lipitor) 40 mg tablet, Take 1 tablet (40 mg) by mouth once daily at bedtime., Disp: 30 tablet, Rfl: 2    buprenorphine-naloxone (Suboxone) 8-2 mg SL tablet, " Place 1 tablet under the tongue 2 times a day. With additional 0.5 tab in the evening, Disp: , Rfl:     doxepin (SINEquan) 50 mg capsule, Take 10 mg by mouth as needed at bedtime for sleep., Disp: , Rfl:     Farxiga 5 mg, TAKE ONE TABLET BY MOUTH DAILY, Disp: 30 tablet, Rfl: 2    haloperidol (Haldol) 2 mg tablet, Take 1 tablet (2 mg) by mouth once daily., Disp: , Rfl:     insulin regular (HumuLIN R,NovoLIN R) 100 unit/mL injection, Inject under the skin., Disp: , Rfl:     LACTULOSE ORAL, Take 20 g by mouth every 8 hours., Disp: , Rfl:     lidocaine-prilocaine (Emla) 2.5-2.5 % cream, apply topically to affected area if needed, Disp: 1 g, Rfl: 1    losartan (Cozaar) 50 mg tablet, Take 1 tablet (50 mg) by mouth once daily., Disp: , Rfl:     metFORMIN (Glucophage) 500 mg tablet, TAKE ONE TABLET BY MOUTH DAILY WITH BREAKFAST, Disp: 30 tablet, Rfl: 2    metoprolol succinate XL (Toprol-XL) 25 mg 24 hr tablet, Take 1 tablet (25 mg) by mouth once daily., Disp: , Rfl:     naloxone (Narcan) 4 mg/0.1 mL nasal spray, , Disp: , Rfl:     omeprazole (PriLOSEC) 40 mg DR capsule, Take 1 capsule (40 mg) by mouth once daily in the morning. Take before meals. Do not crush or chew., Disp: , Rfl:     ondansetron (Zofran) 4 mg tablet, Take 1 tablet (4 mg) by mouth every 8 hours if needed for nausea or vomiting., Disp: , Rfl:     prochlorperazine (Compazine) 5 mg tablet, Take 1 tablet (5 mg) by mouth every 6 hours if needed for nausea or vomiting., Disp: , Rfl:     trifluridine-tipiraciL (Lonsurf) 20-8.19 mg tablet, Take 3 tablets (60 mg total) by mouth 2 times a day.  Take on days 1-5 and 15-19 of each 28 day cycle, Disp: 60 tablet, Rfl: 2    bisacodyl (Dulcolax) 10 mg suppository, Insert 1 suppository (10 mg) into the rectum once daily as needed., Disp: , Rfl:     codeine-guaifenesin (Robitussin-AC)  mg/5 mL syrup, Take 5 mL by mouth every 6 hours if needed for cough., Disp: 300 mL, Rfl: 2  No current facility-administered  medications for this visit.    Facility-Administered Medications Ordered in Other Visits:     albuterol 2.5 mg /3 mL (0.083 %) nebulizer solution 3 mL, 3 mL, nebulization, PRN, Lorenzo Nina MD    bevacizumab-awwb (Mvasi) 336.5 mg in sodium chloride 0.9% 100 mL IV, 5 mg/kg (Treatment Plan Recorded), intravenous, Once, Lorenzo Nina MD    dextrose 5 % in water (D5W) bolus, 500 mL, intravenous, PRN, Lorenzo Nina MD    diphenhydrAMINE (BENADryl) injection 50 mg, 50 mg, intravenous, PRN, Lorenzo Nina MD    EPINEPHrine (Epipen) injection syringe 0.3 mg, 0.3 mg, intramuscular, q5 min PRN, Lorenzo Nina MD    famotidine PF (Pepcid) injection 20 mg, 20 mg, intravenous, Once PRN, Lorenzo Nina MD    methylPREDNISolone sod succinate (SOLU-Medrol) 40 mg/mL injection 40 mg, 40 mg, intravenous, PRN, Lorenzo Nina MD    prochlorperazine (Compazine) injection 10 mg, 10 mg, intravenous, q6h PRN, Lorenzo Nina MD    prochlorperazine (Compazine) tablet 10 mg, 10 mg, oral, q6h PRN, Lorenzo Nina MD    sodium chloride 0.9 % bolus 500 mL, 500 mL, intravenous, PRN, Lorenzo Nina MD     Past Surgical History:   Procedure Laterality Date    COLON SURGERY  01/22/2014    Colon Surgery    CT GUIDED IMAGING FOR NEEDLE PLACEMENT  6/27/2018    CT GUIDED IMAGING FOR NEEDLE PLACEMENT [facility] INPATIENT LEGACY    CT HEAD ANGIO W AND WO IV CONTRAST  9/10/2014    CT HEAD ANGIO W AND WO IV CONTRAST 9/10/2014 [facility] [place] INPATIENT LEGACY    ESOPHAGOGASTRODUODENOSCOPY  01/22/2014    Diagnostic Esophagogastroduodenoscopy    HAND SURGERY  01/22/2014    Hand Surgery                                                                                                                                                          MR HEAD ANGIO W IV CONTRAST  9/10/2014    MR HEAD ANGIO W IV CONTRAST 9/10/2014 [facility] [place] INPATIENT LEGACY    OTHER SURGICAL HISTORY  07/07/2014    Laparoscopy Total Colectomy W/ Proctectomy, Ileostomy     Family History    Problem Relation Name Age of Onset    Other (ADENOCARCINOMA OF THE ESOPHAGUS) Father      Diabetes Father's Brother      Diabetes Maternal Grandfather      Cancer Maternal Grandfather      Cancer Other UNCLE       reports that he has been smoking cigarettes. He has never been exposed to tobacco smoke. He has never used smokeless tobacco.    Diagnoses and all orders for this visit:  Metastasis from rectal cancer (Multi)  -     Clinic Appointment Request  -     Comprehensive Metabolic Panel  -     Magnesium  -     CBC and Auto Differential  -     Cancer Antigen 19-9; Future  -     Carcinoembryonic Antigen; Future  -     Clinic Appointment Request; Future  -     Infusion Appointment Request; Future  -     CBC and Auto Differential; Future  -     Comprehensive metabolic panel; Future  -     Urinalysis with Reflex Microscopic; Future  -     Infusion Appointment Request; Future  -     CBC and Auto Differential; Future  -     codeine-guaifenesin (Robitussin-AC)  mg/5 mL syrup; Take 5 mL by mouth every 6 hours if needed for cough.  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)  -     Cancer Antigen 19-9; Future  Other orders  -     palonosetron (Aloxi) injection 250 mcg  -     dexAMETHasone (Decadron) injection 10 mg  -     prochlorperazine (Compazine) tablet 10 mg  -     prochlorperazine (Compazine) injection 10 mg  -     sodium chloride 0.9 % bolus 500 mL  -     dextrose 5 % in water (D5W) bolus  -     diphenhydrAMINE (BENADryl) injection 50 mg  -     methylPREDNISolone sod succinate (SOLU-Medrol) 40 mg/mL injection 40 mg  -     famotidine PF (Pepcid) injection 20 mg  -     EPINEPHrine (Epipen) injection syringe 0.3 mg  -     albuterol 2.5 mg /3 mL (0.083 %) nebulizer solution 3 mL  -     palonosetron (Aloxi) injection 250 mcg  -     dexAMETHasone (Decadron) injection 10 mg  -     prochlorperazine (Compazine) tablet 10 mg  -     prochlorperazine (Compazine) injection 10 mg  -     sodium  chloride 0.9 % bolus 500 mL  -     dextrose 5 % in water (D5W) bolus  -     diphenhydrAMINE (BENADryl) injection 50 mg  -     methylPREDNISolone sod succinate (SOLU-Medrol) 40 mg/mL injection 40 mg  -     famotidine PF (Pepcid) injection 20 mg  -     EPINEPHrine (Epipen) injection syringe 0.3 mg  -     albuterol 2.5 mg /3 mL (0.083 %) nebulizer solution 3 mL  -     bevacizumab-awwb (Mvasi) 336.5 mg in sodium chloride 0.9% 113.46 mL IV  -     bevacizumab-awwb (Mvasi) 336.5 mg in sodium chloride 0.9% 113.46 mL IV       I spent more than 40 minutes for the patient today, including face-to-face conversation, pre-visit preparation, post-visit orders, and others.   Lorenzo Nina MD

## 2024-06-26 NOTE — SIGNIFICANT EVENT

## 2024-06-26 NOTE — PROGRESS NOTES
Treatment Pre-Review for Date of Service: 6/26/24    Diagnosis:  No matching staging information was found for the patient.      Regimen: MVASI Days 1 + 15; Cycle repeats every 28 days     Current Cycle/Day: Cycle 9 Day 15    Treatment Date Appropriate: Yes; Last Treatment: 6/12/24  Date change required: none    Dose or Regimen Modifications: None noted during pre-review    Med Rec/Drug Interactions: None noted during pre-review    Growth Factor: none    Financial Clearance/Authorization: Yes    Echo:  No results found for this or any previous visit from the past 1095 days.    Lifetime Dose Tracking   No doses have been documented on this patient for the following tracked chemicals: doxorubicin, epirubicin, idarubicin, daunorubicin, mitoxantrone, bleomycin, mitomycin, carmustine, doxorubicin HCl pegylated liposomal, daunorubicin citrate liposomal     Comments: none    I Raj Ramirez, PharmD hereby acknowledge that the treatment plan indicated above has been verified for correctness to the best of my ability on 6/26/2024 at 9:29 AM.

## 2024-06-26 NOTE — PROGRESS NOTES
June 26, 2024 at 12:00 PM    Acetaminophen and Adhesive tape-silicones    Misael Hernandez is a 38 y.o. male   There were no vitals taken for this visit.  There is no height or weight on file to calculate BSA.    Past Medical History:   Diagnosis Date    Other conditions influencing health status     Rectal Cancer       Encounter Diagnoses   Name Primary?    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  Yes     Prior to Admission medications    Medication Sig Start Date End Date Taking? Authorizing Provider   albuterol (Ventolin HFA) 90 mcg/actuation inhaler INHALE TWO PUFFS INTO THE LUNGS EVERY 6 HOURS AS NEEDED FOR SHORTNESS OF BREATH OR FOR WHEEZING FOR UP TO 30 DAYS 11/2/21   Historical Provider, MD   apixaban (Eliquis) 5 mg tablet Take 1 tablet (5 mg) by mouth 2 times a day. 1/15/24   Oswald Hernandez MD   aprepitant (Emend) 40 mg capsule Take 1 capsule (40 mg) by mouth once daily. 5/29/24 7/28/24  Lorenzo Nina MD   atorvastatin (Lipitor) 40 mg tablet Take 1 tablet (40 mg) by mouth once daily at bedtime. 1/4/24 6/26/24  RON Pavon   bisacodyl (Dulcolax) 10 mg suppository Insert 1 suppository (10 mg) into the rectum once daily as needed. 3/9/24   Historical Provider, MD   buprenorphine-naloxone (Suboxone) 8-2 mg SL tablet Place 1 tablet under the tongue 2 times a day. With additional 0.5 tab in the evening 8/22/23 6/26/24  Historical Provider, MD   codeine-guaifenesin (Robitussin-AC)  mg/5 mL syrup Take 5 mL by mouth every 6 hours if needed for cough. 6/26/24 9/24/24  Lorenzo Nina MD   doxepin (SINEquan) 50 mg capsule Take 10 mg by mouth as needed at bedtime for sleep. 10/10/23   Historical Provider, MD   Farxiga 5 mg TAKE ONE TABLET BY MOUTH DAILY 5/14/24   Oswald Hernandez MD   haloperidol (Haldol) 2 mg tablet Take 1 tablet (2 mg) by  mouth once daily.    Historical Provider, MD   insulin regular (HumuLIN R,NovoLIN R) 100 unit/mL injection Inject under the skin. 3/9/24   Historical Provider, MD   LACTULOSE ORAL Take 20 g by mouth every 8 hours. 3/9/24   Historical Provider, MD   lidocaine-prilocaine (Emla) 2.5-2.5 % cream apply topically to affected area if needed 11/21/23   Willa Vega MD   losartan (Cozaar) 50 mg tablet Take 1 tablet (50 mg) by mouth once daily.    Historical Provider, MD   metFORMIN (Glucophage) 500 mg tablet TAKE ONE TABLET BY MOUTH DAILY WITH BREAKFAST 5/14/24   Oswald Hernandez MD   metoprolol succinate XL (Toprol-XL) 25 mg 24 hr tablet Take 1 tablet (25 mg) by mouth once daily.    Historical Provider, MD   naloxone (Narcan) 4 mg/0.1 mL nasal spray  3/29/24   Historical Provider, MD   omeprazole (PriLOSEC) 40 mg DR capsule Take 1 capsule (40 mg) by mouth once daily in the morning. Take before meals. Do not crush or chew.    Historical Provider, MD   ondansetron (Zofran) 4 mg tablet Take 1 tablet (4 mg) by mouth every 8 hours if needed for nausea or vomiting.    Historical Provider, MD   prochlorperazine (Compazine) 5 mg tablet Take 1 tablet (5 mg) by mouth every 6 hours if needed for nausea or vomiting.    Historical Provider, MD   trifluridine-tipiraciL (Lonsurf) 20-8.19 mg tablet Take 3 tablets (60 mg total) by mouth 2 times a day.  Take on days 1-5 and 15-19 of each 28 day cycle 4/4/24   Sandra Lui MD PhD       Reviewed documented list of home medications.  No significant drug interactions identified between home medications and chemotherapy regimen.    Yes    WBC   Date Value Ref Range Status   06/26/2024 11.8 (H) 4.4 - 11.3 x10*3/uL Final   06/11/2024 14.1 (H) 4.4 - 11.3 x10*3/uL Final   05/29/2024 16.1 (H) 4.4 - 11.3 x10*3/uL Final     Hemoglobin   Date Value Ref Range Status   06/26/2024 12.4 (L) 13.5 - 17.5 g/dL Final   06/11/2024 13.5 13.5 - 17.5 g/dL Final   05/29/2024 13.2 (L) 13.5 - 17.5 g/dL Final      Hematocrit   Date Value Ref Range Status   06/26/2024 36.4 (L) 41.0 - 52.0 % Final   06/11/2024 41.7 41.0 - 52.0 % Final   05/29/2024 42.0 41.0 - 52.0 % Final     Neutrophils Absolute   Date Value Ref Range Status   06/26/2024 8.28 (H) 1.20 - 7.70 x10*3/uL Final     Comment:     Percent differential counts (%) should be interpreted in the context of the absolute cell counts (cells/uL).   06/11/2024 9.97 (H) 1.20 - 7.70 x10*3/uL Final     Comment:     Percent differential counts (%) should be interpreted in the context of the absolute cell counts (cells/uL).   05/29/2024 11.52 (H) 1.20 - 7.70 x10*3/uL Final     Comment:     Percent differential counts (%) should be interpreted in the context of the absolute cell counts (cells/uL).     Platelets   Date Value Ref Range Status   06/26/2024 304 150 - 450 x10*3/uL Final   06/11/2024 383 150 - 450 x10*3/uL Final   05/29/2024 447 150 - 450 x10*3/uL Final     Creatinine   Date Value Ref Range Status   06/11/2024 0.64 0.50 - 1.30 mg/dL Final   05/29/2024 0.67 0.50 - 1.30 mg/dL Final   05/07/2024 0.49 (L) 0.50 - 1.30 mg/dL Final     Alkaline Phosphatase   Date Value Ref Range Status   06/11/2024 148 (H) 33 - 120 U/L Final   05/29/2024 127 (H) 33 - 120 U/L Final   05/07/2024 113 33 - 120 U/L Final     AST   Date Value Ref Range Status   06/11/2024 11 9 - 39 U/L Final   05/29/2024 8 (L) 9 - 39 U/L Final   05/07/2024 10 9 - 39 U/L Final     ALT   Date Value Ref Range Status   06/11/2024 6 (L) 10 - 52 U/L Final     Comment:     Patients treated with Sulfasalazine may generate falsely decreased results for ALT.   05/29/2024 6 (L) 10 - 52 U/L Final     Comment:     Patients treated with Sulfasalazine may generate falsely decreased results for ALT.   05/07/2024 5 (L) 10 - 52 U/L Final     Comment:     Patients treated with Sulfasalazine may generate falsely decreased results for ALT.     Bilirubin, Total   Date Value Ref Range Status   06/11/2024 0.5 0.0 - 1.2 mg/dL Final    05/29/2024 0.5 0.0 - 1.2 mg/dL Final   05/07/2024 0.4 0.0 - 1.2 mg/dL Final         Does the patient meet the treatment conditions?  Yes    Are dosage calculations correct?  Yes    Are doses the same as previous cycle?   No - see comments    Dose decreased to reflect change in weight     Were any doses modified?  No    If appropriate, was the Creatinine Clearance independently calculated based on [facility] standards?   Yes      Comments:      I Raj Ramirez, PharmD hereby acknowledge that the treatment plan indicated above has been verified for correctness to the best of my ability on 6/26/2024 at 12:00 PM.

## 2024-07-03 ENCOUNTER — APPOINTMENT (OUTPATIENT)
Dept: HEMATOLOGY/ONCOLOGY | Facility: HOSPITAL | Age: 39
End: 2024-07-03
Payer: MEDICARE

## 2024-07-10 ENCOUNTER — APPOINTMENT (OUTPATIENT)
Dept: HEMATOLOGY/ONCOLOGY | Facility: HOSPITAL | Age: 39
End: 2024-07-10
Payer: MEDICARE

## 2024-07-17 ENCOUNTER — APPOINTMENT (OUTPATIENT)
Dept: HEMATOLOGY/ONCOLOGY | Facility: HOSPITAL | Age: 39
End: 2024-07-17
Payer: MEDICARE

## 2024-07-24 ENCOUNTER — APPOINTMENT (OUTPATIENT)
Dept: HEMATOLOGY/ONCOLOGY | Facility: HOSPITAL | Age: 39
End: 2024-07-24
Payer: MEDICARE

## 2024-07-31 ENCOUNTER — APPOINTMENT (OUTPATIENT)
Dept: HEMATOLOGY/ONCOLOGY | Facility: HOSPITAL | Age: 39
End: 2024-07-31
Payer: MEDICARE